# Patient Record
Sex: FEMALE | Race: BLACK OR AFRICAN AMERICAN | NOT HISPANIC OR LATINO | Employment: OTHER | URBAN - METROPOLITAN AREA
[De-identification: names, ages, dates, MRNs, and addresses within clinical notes are randomized per-mention and may not be internally consistent; named-entity substitution may affect disease eponyms.]

---

## 2023-03-09 LAB
ALBUMIN (MG/L) IN URINE: 38.5 MG/L
ALBUMIN/CREATININE (UG/MG) IN URINE: 56.2 UG/MG CRT (ref 0–30)
APPEARANCE, URINE: CLEAR
BILIRUBIN, URINE: NEGATIVE
BLOOD, URINE: NEGATIVE
COLOR, URINE: YELLOW
CREATININE (MG/DL) IN URINE: 68.5 MG/DL (ref 20–320)
CREATININE (MG/DL) IN URINE: 68.5 MG/DL (ref 20–320)
GLUCOSE, URINE: ABNORMAL MG/DL
KETONES, URINE: NEGATIVE MG/DL
LEUKOCYTE ESTERASE, URINE: NEGATIVE
NITRITE, URINE: NEGATIVE
PH, URINE: 5 (ref 5–8)
PROTEIN (MG/DL) IN URINE: 13 MG/DL (ref 5–24)
PROTEIN, URINE: NEGATIVE MG/DL
PROTEIN/CREATININE (MG/MG) IN URINE: 0.19 MG/MG CREAT (ref 0–0.17)
SPECIFIC GRAVITY, URINE: 1.01 (ref 1–1.03)
UROBILINOGEN, URINE: <2 MG/DL (ref 0–1.9)

## 2023-03-10 DIAGNOSIS — I10 PRIMARY HYPERTENSION: Primary | ICD-10-CM

## 2023-03-10 LAB — URINE CULTURE: ABNORMAL

## 2023-03-13 DIAGNOSIS — E78.00 PURE HYPERCHOLESTEROLEMIA: Primary | ICD-10-CM

## 2023-03-15 RX ORDER — CLONIDINE HYDROCHLORIDE 0.1 MG/1
TABLET ORAL
Qty: 30 TABLET | Refills: 0 | Status: SHIPPED | OUTPATIENT
Start: 2023-03-15 | End: 2023-03-28 | Stop reason: SDUPTHER

## 2023-03-15 RX ORDER — ATORVASTATIN CALCIUM 40 MG/1
TABLET, FILM COATED ORAL
Qty: 90 TABLET | Refills: 3 | Status: SHIPPED | OUTPATIENT
Start: 2023-03-15 | End: 2023-03-28 | Stop reason: SDUPTHER

## 2023-03-15 RX ORDER — METOPROLOL SUCCINATE 50 MG/1
TABLET, EXTENDED RELEASE ORAL
Qty: 90 TABLET | Refills: 3 | Status: SHIPPED | OUTPATIENT
Start: 2023-03-15 | End: 2023-03-28 | Stop reason: SDUPTHER

## 2023-03-23 PROBLEM — I34.0 MITRAL VALVE REGURGITATION: Status: ACTIVE | Noted: 2023-03-23

## 2023-03-23 PROBLEM — M20.61 TOE DEFORMITY, RIGHT: Status: ACTIVE | Noted: 2023-03-23

## 2023-03-23 PROBLEM — L84 PRE-ULCERATIVE CORN OR CALLOUS: Status: ACTIVE | Noted: 2023-03-23

## 2023-03-23 PROBLEM — L29.9 PRURITUS: Status: ACTIVE | Noted: 2023-03-23

## 2023-03-23 PROBLEM — L60.1 ONYCHOLYSIS OF TOENAIL: Status: ACTIVE | Noted: 2023-03-23

## 2023-03-23 PROBLEM — N28.1 BILATERAL RENAL CYSTS: Status: ACTIVE | Noted: 2023-03-23

## 2023-03-23 PROBLEM — R32 INTERMITTENT URINARY INCONTINENCE: Status: ACTIVE | Noted: 2023-03-23

## 2023-03-23 PROBLEM — I10 BENIGN HYPERTENSION: Status: ACTIVE | Noted: 2023-03-23

## 2023-03-23 PROBLEM — N18.9 CKD (CHRONIC KIDNEY DISEASE): Status: ACTIVE | Noted: 2023-03-23

## 2023-03-23 PROBLEM — R35.1 NOCTURIA: Status: ACTIVE | Noted: 2023-03-23

## 2023-03-23 PROBLEM — B35.1 ONYCHOMYCOSIS OF TOENAIL: Status: ACTIVE | Noted: 2023-03-23

## 2023-03-23 PROBLEM — Z86.39 HISTORY OF HYPERCHOLESTEROLEMIA: Status: ACTIVE | Noted: 2023-03-23

## 2023-03-23 PROBLEM — D21.9 FIBROIDS: Status: ACTIVE | Noted: 2023-03-23

## 2023-03-23 PROBLEM — H52.209 ASTIGMATISM: Status: ACTIVE | Noted: 2023-03-23

## 2023-03-23 PROBLEM — K76.0 HEPATIC STEATOSIS: Status: ACTIVE | Noted: 2023-03-23

## 2023-03-23 PROBLEM — G47.33 OBSTRUCTIVE SLEEP APNEA, ADULT: Status: ACTIVE | Noted: 2023-03-23

## 2023-03-23 PROBLEM — N28.1 COMPLEX RENAL CYST: Status: ACTIVE | Noted: 2023-03-23

## 2023-03-23 PROBLEM — M10.9 GOUT: Status: ACTIVE | Noted: 2023-03-23

## 2023-03-23 PROBLEM — G89.29 CHRONIC RIGHT SHOULDER PAIN: Status: ACTIVE | Noted: 2023-03-23

## 2023-03-23 PROBLEM — H43.813 PVD (POSTERIOR VITREOUS DETACHMENT), BOTH EYES: Status: ACTIVE | Noted: 2023-03-23

## 2023-03-23 PROBLEM — E78.5 HYPERLIPIDEMIA: Status: ACTIVE | Noted: 2023-03-23

## 2023-03-23 PROBLEM — I10 ESSENTIAL HYPERTENSION: Status: ACTIVE | Noted: 2023-03-23

## 2023-03-23 PROBLEM — H52.4 MYOPIA WITH PRESBYOPIA: Status: ACTIVE | Noted: 2023-03-23

## 2023-03-23 PROBLEM — H52.10 MYOPIA WITH PRESBYOPIA: Status: ACTIVE | Noted: 2023-03-23

## 2023-03-23 PROBLEM — H40.003 GLAUCOMA SUSPECT OF BOTH EYES: Status: ACTIVE | Noted: 2023-03-23

## 2023-03-23 PROBLEM — M25.511 CHRONIC RIGHT SHOULDER PAIN: Status: ACTIVE | Noted: 2023-03-23

## 2023-03-23 PROBLEM — N18.4 CHRONIC KIDNEY DISEASE (CKD) STAGE G4/A1, SEVERELY DECREASED GLOMERULAR FILTRATION RATE (GFR) BETWEEN 15-29 ML/MIN/1.73 SQUARE METER AND ALBUMINURIA CREATININE RATIO LESS THAN 30 MG/G (CMS/HC* (MULTI): Status: ACTIVE | Noted: 2023-03-23

## 2023-03-23 PROBLEM — G47.30 SLEEP APNEA: Status: ACTIVE | Noted: 2023-03-23

## 2023-03-23 PROBLEM — H25.813 COMBINED FORM OF AGE-RELATED CATARACT, BOTH EYES: Status: ACTIVE | Noted: 2023-03-23

## 2023-03-23 PROBLEM — H52.00 HYPEROPIA: Status: ACTIVE | Noted: 2023-03-23

## 2023-03-23 RX ORDER — FERROUS SULFATE 325(65) MG
1 TABLET ORAL DAILY
COMMUNITY
Start: 2020-10-14 | End: 2023-03-28 | Stop reason: SDUPTHER

## 2023-03-23 RX ORDER — LOSARTAN POTASSIUM 100 MG/1
1 TABLET ORAL DAILY
COMMUNITY
Start: 2020-02-13 | End: 2023-03-28 | Stop reason: SDUPTHER

## 2023-03-23 RX ORDER — COLCHICINE 0.6 MG/1
1 TABLET ORAL DAILY PRN
COMMUNITY
End: 2023-03-28 | Stop reason: SDUPTHER

## 2023-03-23 RX ORDER — EPINEPHRINE 0.22MG
AEROSOL WITH ADAPTER (ML) INHALATION
COMMUNITY
Start: 2021-01-13

## 2023-03-23 RX ORDER — FUROSEMIDE 40 MG/1
1 TABLET ORAL DAILY
COMMUNITY
Start: 2020-02-12 | End: 2023-03-28 | Stop reason: SDUPTHER

## 2023-03-23 RX ORDER — ALLOPURINOL 100 MG/1
1 TABLET ORAL DAILY
COMMUNITY
Start: 2020-02-13 | End: 2023-03-28 | Stop reason: SDUPTHER

## 2023-03-23 RX ORDER — MELOXICAM 15 MG/1
1 TABLET ORAL DAILY
COMMUNITY
Start: 2023-02-20 | End: 2023-03-28 | Stop reason: SINTOL

## 2023-03-23 RX ORDER — ASPIRIN 81 MG/1
1 TABLET ORAL DAILY
COMMUNITY

## 2023-03-23 RX ORDER — DOCUSATE SODIUM 100 MG/1
100 CAPSULE, LIQUID FILLED ORAL 2 TIMES DAILY PRN
COMMUNITY
End: 2023-03-28 | Stop reason: SDUPTHER

## 2023-03-23 RX ORDER — HYDRALAZINE HYDROCHLORIDE 100 MG/1
1 TABLET, FILM COATED ORAL 2 TIMES DAILY
COMMUNITY
Start: 2023-02-28 | End: 2023-03-28 | Stop reason: SDUPTHER

## 2023-03-23 RX ORDER — ASCORBIC ACID 125 MG
1 TABLET,CHEWABLE ORAL 2 TIMES WEEKLY
COMMUNITY
Start: 2020-10-14 | End: 2024-03-22 | Stop reason: SDUPTHER

## 2023-03-23 RX ORDER — HYDROXYZINE HYDROCHLORIDE 25 MG/1
TABLET, FILM COATED ORAL
COMMUNITY
Start: 2020-07-16 | End: 2023-03-28 | Stop reason: SDUPTHER

## 2023-03-23 RX ORDER — CALCITRIOL 0.25 UG/1
0.25 CAPSULE ORAL 2 TIMES WEEKLY
COMMUNITY
Start: 2021-08-18 | End: 2023-12-13 | Stop reason: SDUPTHER

## 2023-03-23 RX ORDER — DAPAGLIFLOZIN 10 MG/1
10 TABLET, FILM COATED ORAL EVERY MORNING
COMMUNITY
Start: 2023-02-15 | End: 2023-09-28 | Stop reason: SDUPTHER

## 2023-03-28 ENCOUNTER — OFFICE VISIT (OUTPATIENT)
Dept: PRIMARY CARE | Facility: CLINIC | Age: 78
End: 2023-03-28
Payer: COMMERCIAL

## 2023-03-28 VITALS
DIASTOLIC BLOOD PRESSURE: 69 MMHG | HEART RATE: 69 BPM | BODY MASS INDEX: 33.85 KG/M2 | SYSTOLIC BLOOD PRESSURE: 133 MMHG | TEMPERATURE: 97 F | WEIGHT: 203.2 LBS | HEIGHT: 65 IN

## 2023-03-28 DIAGNOSIS — Z86.2 HISTORY OF ANEMIA DUE TO CKD: ICD-10-CM

## 2023-03-28 DIAGNOSIS — I12.9 CKD STAGE 4 SECONDARY TO HYPERTENSION (MULTI): ICD-10-CM

## 2023-03-28 DIAGNOSIS — I10 PRIMARY HYPERTENSION: ICD-10-CM

## 2023-03-28 DIAGNOSIS — K76.0 HEPATIC STEATOSIS: ICD-10-CM

## 2023-03-28 DIAGNOSIS — I34.0 MITRAL VALVE INSUFFICIENCY, UNSPECIFIED ETIOLOGY: ICD-10-CM

## 2023-03-28 DIAGNOSIS — E78.00 PURE HYPERCHOLESTEROLEMIA: ICD-10-CM

## 2023-03-28 DIAGNOSIS — M1A.9XX0 CHRONIC GOUT WITHOUT TOPHUS, UNSPECIFIED CAUSE, UNSPECIFIED SITE: ICD-10-CM

## 2023-03-28 DIAGNOSIS — N18.9 HISTORY OF ANEMIA DUE TO CKD: ICD-10-CM

## 2023-03-28 DIAGNOSIS — N18.4 CKD STAGE 4 SECONDARY TO HYPERTENSION (MULTI): ICD-10-CM

## 2023-03-28 DIAGNOSIS — I10 BENIGN HYPERTENSION: ICD-10-CM

## 2023-03-28 DIAGNOSIS — L29.9 PRURITUS: Primary | ICD-10-CM

## 2023-03-28 LAB
APPEARANCE, URINE: CLEAR
BILIRUBIN, URINE: NEGATIVE
BLOOD, URINE: NEGATIVE
COLOR, URINE: YELLOW
GLUCOSE, URINE: ABNORMAL MG/DL
KETONES, URINE: NEGATIVE MG/DL
LEUKOCYTE ESTERASE, URINE: NEGATIVE
NITRITE, URINE: NEGATIVE
PH, URINE: 5 (ref 5–8)
PROTEIN, URINE: NEGATIVE MG/DL
SPECIFIC GRAVITY, URINE: 1.01 (ref 1–1.03)
UROBILINOGEN, URINE: <2 MG/DL (ref 0–1.9)

## 2023-03-28 PROCEDURE — 3078F DIAST BP <80 MM HG: CPT | Performed by: FAMILY MEDICINE

## 2023-03-28 PROCEDURE — 3075F SYST BP GE 130 - 139MM HG: CPT | Performed by: FAMILY MEDICINE

## 2023-03-28 PROCEDURE — 1159F MED LIST DOCD IN RCRD: CPT | Performed by: FAMILY MEDICINE

## 2023-03-28 PROCEDURE — 1036F TOBACCO NON-USER: CPT | Performed by: FAMILY MEDICINE

## 2023-03-28 PROCEDURE — 99214 OFFICE O/P EST MOD 30 MIN: CPT | Performed by: FAMILY MEDICINE

## 2023-03-28 RX ORDER — FERROUS SULFATE 325(65) MG
1 TABLET ORAL DAILY
Qty: 90 TABLET | Refills: 1 | Status: SHIPPED | OUTPATIENT
Start: 2023-03-28 | End: 2023-09-11

## 2023-03-28 RX ORDER — ATORVASTATIN CALCIUM 40 MG/1
40 TABLET, FILM COATED ORAL DAILY
Qty: 90 TABLET | Refills: 1 | Status: SHIPPED | OUTPATIENT
Start: 2023-03-28 | End: 2023-07-19 | Stop reason: SDUPTHER

## 2023-03-28 RX ORDER — DOCUSATE SODIUM 100 MG/1
100 CAPSULE, LIQUID FILLED ORAL 2 TIMES DAILY PRN
Qty: 90 CAPSULE | Refills: 1 | Status: SHIPPED | OUTPATIENT
Start: 2023-03-28 | End: 2023-11-26

## 2023-03-28 RX ORDER — ALLOPURINOL 100 MG/1
100 TABLET ORAL DAILY
Qty: 90 TABLET | Refills: 1 | Status: SHIPPED | OUTPATIENT
Start: 2023-03-28 | End: 2023-09-28 | Stop reason: SDUPTHER

## 2023-03-28 RX ORDER — COLCHICINE 0.6 MG/1
0.6 TABLET ORAL DAILY PRN
Qty: 90 TABLET | Refills: 1 | Status: SHIPPED | OUTPATIENT
Start: 2023-03-28 | End: 2024-03-22 | Stop reason: SDUPTHER

## 2023-03-28 RX ORDER — METOPROLOL SUCCINATE 50 MG/1
50 TABLET, EXTENDED RELEASE ORAL DAILY
Qty: 90 TABLET | Refills: 1 | Status: SHIPPED | OUTPATIENT
Start: 2023-03-28 | End: 2023-09-28 | Stop reason: SDUPTHER

## 2023-03-28 RX ORDER — ATORVASTATIN CALCIUM 40 MG/1
40 TABLET, FILM COATED ORAL DAILY
Qty: 90 TABLET | Refills: 1 | Status: SHIPPED | OUTPATIENT
Start: 2023-03-28 | End: 2023-03-28 | Stop reason: SDUPTHER

## 2023-03-28 RX ORDER — LOSARTAN POTASSIUM 100 MG/1
100 TABLET ORAL DAILY
Qty: 90 TABLET | Refills: 1 | Status: SHIPPED | OUTPATIENT
Start: 2023-03-28 | End: 2023-09-28 | Stop reason: SDUPTHER

## 2023-03-28 RX ORDER — FUROSEMIDE 40 MG/1
40 TABLET ORAL DAILY
Qty: 90 TABLET | Refills: 1 | Status: SHIPPED | OUTPATIENT
Start: 2023-03-28 | End: 2023-09-28 | Stop reason: SDUPTHER

## 2023-03-28 RX ORDER — HYDRALAZINE HYDROCHLORIDE 100 MG/1
100 TABLET, FILM COATED ORAL 2 TIMES DAILY
Qty: 180 TABLET | Refills: 1 | Status: SHIPPED | OUTPATIENT
Start: 2023-03-28 | End: 2023-09-28 | Stop reason: SDUPTHER

## 2023-03-28 RX ORDER — HYDROXYZINE HYDROCHLORIDE 25 MG/1
25 TABLET, FILM COATED ORAL 2 TIMES DAILY
Qty: 180 TABLET | Refills: 1 | Status: SHIPPED | OUTPATIENT
Start: 2023-03-28 | End: 2023-04-07 | Stop reason: SDUPTHER

## 2023-03-28 RX ORDER — CLONIDINE HYDROCHLORIDE 0.1 MG/1
0.1 TABLET ORAL DAILY
Qty: 90 TABLET | Refills: 1 | Status: SHIPPED | OUTPATIENT
Start: 2023-03-28 | End: 2023-10-19 | Stop reason: SDUPTHER

## 2023-03-28 ASSESSMENT — ENCOUNTER SYMPTOMS
OCCASIONAL FEELINGS OF UNSTEADINESS: 0
DEPRESSION: 0
LOSS OF SENSATION IN FEET: 0

## 2023-03-28 NOTE — PROGRESS NOTES
Subjective   Patient ID: Liz Hamlin is a 77 y.o. female who presents for 6 MONTH FOLLOW UP.  HPI  The patient is here for her routine visit.  A review of system was completed.  All systems were reviewed and were normal, except for the ones that are listed in the HPI.    Objective   Physical Exam  Constitutional:       Appearance: Normal appearance.   HENT:      Head: Normocephalic and atraumatic.      Right Ear: Tympanic membrane, ear canal and external ear normal.      Left Ear: Tympanic membrane, ear canal and external ear normal.      Nose: Nose normal.      Mouth/Throat:      Mouth: Mucous membranes are moist.      Pharynx: Oropharynx is clear.   Eyes:      Extraocular Movements: Extraocular movements intact.      Conjunctiva/sclera: Conjunctivae normal.      Pupils: Pupils are equal, round, and reactive to light.   Cardiovascular:      Rate and Rhythm: Normal rate and regular rhythm.      Pulses: Normal pulses.   Pulmonary:      Effort: Pulmonary effort is normal.      Breath sounds: Normal breath sounds.   Abdominal:      General: Abdomen is flat. Bowel sounds are normal.      Palpations: Abdomen is soft.   Musculoskeletal:         General: Normal range of motion.      Cervical back: Normal range of motion and neck supple.   Skin:     General: Skin is warm.   Neurological:      General: No focal deficit present.      Mental Status: She is alert and oriented to person, place, and time. Mental status is at baseline.   Psychiatric:         Mood and Affect: Mood normal.         Behavior: Behavior normal.         Thought Content: Thought content normal.         Judgment: Judgment normal.         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)    Relevant Medications    allopurinol (Zyloprim) 100 mg tablet    colchicine 0.6 mg tablet    docusate sodium (Colace) 100 mg capsule    ferrous sulfate 325 (65 Fe) MG tablet    furosemide (Lasix) 40 mg tablet    hydrALAZINE (Apresoline) 100 mg tablet    hydrOXYzine HCL (Atarax) 25 mg tablet    losartan (Cozaar) 100 mg tablet    History of anemia due to CKD       Other    Gout    Relevant Medications    allopurinol (Zyloprim) 100 mg tablet    colchicine 0.6 mg tablet    Hyperlipidemia    Relevant Medications    metoprolol succinate XL (Toprol-XL) 50 mg 24 hr tablet    atorvastatin (Lipitor) 40 mg tablet     Other Visit Diagnoses       Primary hypertension        Relevant Medications    cloNIDine (Catapres) 0.1 mg tablet    furosemide (Lasix) 40 mg tablet    hydrALAZINE (Apresoline) 100 mg tablet    losartan (Cozaar) 100 mg tablet

## 2023-03-29 LAB — URINE CULTURE: NORMAL

## 2023-04-07 DIAGNOSIS — N18.4 CKD STAGE 4 SECONDARY TO HYPERTENSION (MULTI): ICD-10-CM

## 2023-04-07 DIAGNOSIS — I12.9 CKD STAGE 4 SECONDARY TO HYPERTENSION (MULTI): ICD-10-CM

## 2023-04-07 RX ORDER — HYDROXYZINE HYDROCHLORIDE 25 MG/1
25 TABLET, FILM COATED ORAL 2 TIMES DAILY
Qty: 180 TABLET | Refills: 1 | Status: SHIPPED | OUTPATIENT
Start: 2023-04-07 | End: 2023-06-13 | Stop reason: SDUPTHER

## 2023-04-11 LAB
ALBUMIN (G/DL) IN SER/PLAS: 4 G/DL (ref 3.4–5)
ANION GAP IN SER/PLAS: 12 MMOL/L (ref 10–20)
CALCIDIOL (25 OH VITAMIN D3) (NG/ML) IN SER/PLAS: 39 NG/ML
CALCIUM (MG/DL) IN SER/PLAS: 9.2 MG/DL (ref 8.6–10.6)
CARBON DIOXIDE, TOTAL (MMOL/L) IN SER/PLAS: 25 MMOL/L (ref 21–32)
CHLORIDE (MMOL/L) IN SER/PLAS: 109 MMOL/L (ref 98–107)
CREATININE (MG/DL) IN SER/PLAS: 2.37 MG/DL (ref 0.5–1.05)
ERYTHROCYTE DISTRIBUTION WIDTH (RATIO) BY AUTOMATED COUNT: 14.6 % (ref 11.5–14.5)
ERYTHROCYTE MEAN CORPUSCULAR HEMOGLOBIN CONCENTRATION (G/DL) BY AUTOMATED: 31 G/DL (ref 32–36)
ERYTHROCYTE MEAN CORPUSCULAR VOLUME (FL) BY AUTOMATED COUNT: 91 FL (ref 80–100)
ERYTHROCYTES (10*6/UL) IN BLOOD BY AUTOMATED COUNT: 4 X10E12/L (ref 4–5.2)
GFR FEMALE: 21 ML/MIN/1.73M2
GLUCOSE (MG/DL) IN SER/PLAS: 81 MG/DL (ref 74–99)
HEMATOCRIT (%) IN BLOOD BY AUTOMATED COUNT: 36.5 % (ref 36–46)
HEMOGLOBIN (G/DL) IN BLOOD: 11.3 G/DL (ref 12–16)
LEUKOCYTES (10*3/UL) IN BLOOD BY AUTOMATED COUNT: 4.1 X10E9/L (ref 4.4–11.3)
NRBC (PER 100 WBCS) BY AUTOMATED COUNT: 0 /100 WBC (ref 0–0)
PARATHYRIN INTACT (PG/ML) IN SER/PLAS: 179.2 PG/ML (ref 18.5–88)
PHOSPHATE (MG/DL) IN SER/PLAS: 4.3 MG/DL (ref 2.5–4.9)
PLATELETS (10*3/UL) IN BLOOD AUTOMATED COUNT: 161 X10E9/L (ref 150–450)
POTASSIUM (MMOL/L) IN SER/PLAS: 4.8 MMOL/L (ref 3.5–5.3)
SODIUM (MMOL/L) IN SER/PLAS: 141 MMOL/L (ref 136–145)
UREA NITROGEN (MG/DL) IN SER/PLAS: 49 MG/DL (ref 6–23)

## 2023-05-19 LAB — URINE CULTURE: NORMAL

## 2023-05-23 LAB
ALANINE AMINOTRANSFERASE (SGPT) (U/L) IN SER/PLAS: 14 U/L (ref 7–45)
ALBUMIN (G/DL) IN SER/PLAS: 4.2 G/DL (ref 3.4–5)
ALKALINE PHOSPHATASE (U/L) IN SER/PLAS: 90 U/L (ref 33–136)
ASPARTATE AMINOTRANSFERASE (SGOT) (U/L) IN SER/PLAS: 15 U/L (ref 9–39)
BILIRUBIN DIRECT (MG/DL) IN SER/PLAS: 0.1 MG/DL (ref 0–0.3)
BILIRUBIN TOTAL (MG/DL) IN SER/PLAS: 0.5 MG/DL (ref 0–1.2)
CERULOPLASMIN (MG/DL) IN SER/PLAS: 30 MG/DL (ref 20–60)
HEPATITIS A VIRUS IGM AB PRESENCE IN SER/PLAS BY IMMUNOASSAY: NONREACTIVE
HEPATITIS B VIRUS CORE IGM AB PRESENCE IN SER/PLAS BY IMMUNOASSY: NONREACTIVE
HEPATITIS B VIRUS SURFACE AG PRESENCE IN SERUM: NONREACTIVE
HEPATITIS C VIRUS AB PRESENCE IN SERUM: NONREACTIVE
IGG (MG/DL) IN SER/PLAS: 1330 MG/DL (ref 700–1600)
IGG SUBCLASS 1 (MG/DL) IN SERUM: 870 MG/DL (ref 490–1140)
IGG SUBCLASS 2 (MG/DL) IN SERUM: 385 MG/DL (ref 150–640)
IGG SUBCLASS 3 (MG/DL) IN SERUM: 31 MG/DL (ref 11–85)
IGG SUBCLASS 4 (MG/DL) IN SERUM: 12 MG/DL (ref 3–200)
INR IN PPP BY COAGULATION ASSAY: 0.9 (ref 0.9–1.1)
PROTEIN TOTAL: 7.5 G/DL (ref 6.4–8.2)
PROTHROMBIN TIME (PT) IN PPP BY COAGULATION ASSAY: 10.3 SEC (ref 9.8–13.4)

## 2023-05-24 LAB
ANA PATTERN: ABNORMAL
ANA TITER: ABNORMAL
ANTI-CENTROMERE: <0.2 AI
ANTI-CHROMATIN: 0.4 AI
ANTI-DNA (DS): 2 IU/ML
ANTI-JO-1 IGG: <0.2 AI
ANTI-NUCLEAR ANTIBODY (ANA): POSITIVE
ANTI-RIBOSOMAL P: <0.2 AI
ANTI-RNP: <0.2 AI
ANTI-SCL-70: <0.2 AI
ANTI-SM/RNP: <0.2 AI
ANTI-SM: <0.2 AI
ANTI-SSA: <0.2 AI
ANTI-SSB: <0.2 AI
MITOCHONDRIAL ANTIBODY: NEGATIVE

## 2023-06-23 LAB — URINE CULTURE: NORMAL

## 2023-07-13 LAB
ALBUMIN (G/DL) IN SER/PLAS: 4.1 G/DL (ref 3.4–5)
ALBUMIN (MG/L) IN URINE: 21.2 MG/L
ALBUMIN/CREATININE (UG/MG) IN URINE: 57 UG/MG CRT (ref 0–30)
ANION GAP IN SER/PLAS: 13 MMOL/L (ref 10–20)
APPEARANCE, URINE: CLEAR
BILIRUBIN, URINE: NEGATIVE
BLOOD, URINE: NEGATIVE
CALCIDIOL (25 OH VITAMIN D3) (NG/ML) IN SER/PLAS: 39 NG/ML
CALCIUM (MG/DL) IN SER/PLAS: 9.4 MG/DL (ref 8.6–10.3)
CARBON DIOXIDE, TOTAL (MMOL/L) IN SER/PLAS: 24 MMOL/L (ref 21–32)
CHLORIDE (MMOL/L) IN SER/PLAS: 106 MMOL/L (ref 98–107)
COLOR, URINE: ABNORMAL
CREATININE (MG/DL) IN SER/PLAS: 1.97 MG/DL (ref 0.5–1.05)
CREATININE (MG/DL) IN URINE: 37.2 MG/DL (ref 20–320)
CREATININE (MG/DL) IN URINE: 37.2 MG/DL (ref 20–320)
ERYTHROCYTE DISTRIBUTION WIDTH (RATIO) BY AUTOMATED COUNT: 14.5 % (ref 11.5–14.5)
ERYTHROCYTE MEAN CORPUSCULAR HEMOGLOBIN CONCENTRATION (G/DL) BY AUTOMATED: 30.8 G/DL (ref 32–36)
ERYTHROCYTE MEAN CORPUSCULAR VOLUME (FL) BY AUTOMATED COUNT: 92 FL (ref 80–100)
ERYTHROCYTES (10*6/UL) IN BLOOD BY AUTOMATED COUNT: 4.07 X10E12/L (ref 4–5.2)
GFR FEMALE: 26 ML/MIN/1.73M2
GLUCOSE (MG/DL) IN SER/PLAS: 91 MG/DL (ref 74–99)
GLUCOSE, URINE: ABNORMAL MG/DL
HEMATOCRIT (%) IN BLOOD BY AUTOMATED COUNT: 37.3 % (ref 36–46)
HEMOGLOBIN (G/DL) IN BLOOD: 11.5 G/DL (ref 12–16)
KETONES, URINE: NEGATIVE MG/DL
LEUKOCYTE ESTERASE, URINE: NEGATIVE
LEUKOCYTES (10*3/UL) IN BLOOD BY AUTOMATED COUNT: 4.7 X10E9/L (ref 4.4–11.3)
NITRITE, URINE: NEGATIVE
PARATHYRIN INTACT (PG/ML) IN SER/PLAS: 95.2 PG/ML (ref 18.5–88)
PH, URINE: 5 (ref 5–8)
PHOSPHATE (MG/DL) IN SER/PLAS: 3.7 MG/DL (ref 2.5–4.9)
PLATELETS (10*3/UL) IN BLOOD AUTOMATED COUNT: 174 X10E9/L (ref 150–450)
POTASSIUM (MMOL/L) IN SER/PLAS: 4.5 MMOL/L (ref 3.5–5.3)
PROTEIN (MG/DL) IN URINE: 11 MG/DL (ref 5–24)
PROTEIN, URINE: NEGATIVE MG/DL
PROTEIN/CREATININE (MG/MG) IN URINE: 0.3 MG/MG CREAT (ref 0–0.17)
SODIUM (MMOL/L) IN SER/PLAS: 138 MMOL/L (ref 136–145)
SPECIFIC GRAVITY, URINE: 1.01 (ref 1–1.03)
UREA NITROGEN (MG/DL) IN SER/PLAS: 36 MG/DL (ref 6–23)
UROBILINOGEN, URINE: <2 MG/DL (ref 0–1.9)

## 2023-07-19 ENCOUNTER — OFFICE VISIT (OUTPATIENT)
Dept: PRIMARY CARE | Facility: CLINIC | Age: 78
End: 2023-07-19
Payer: COMMERCIAL

## 2023-07-19 VITALS
WEIGHT: 201.4 LBS | HEIGHT: 66 IN | TEMPERATURE: 97.6 F | BODY MASS INDEX: 32.37 KG/M2 | DIASTOLIC BLOOD PRESSURE: 75 MMHG | SYSTOLIC BLOOD PRESSURE: 134 MMHG | HEART RATE: 70 BPM

## 2023-07-19 DIAGNOSIS — E78.00 PURE HYPERCHOLESTEROLEMIA: ICD-10-CM

## 2023-07-19 DIAGNOSIS — L03.115 CELLULITIS OF RIGHT LEG: Primary | ICD-10-CM

## 2023-07-19 DIAGNOSIS — N18.4 CKD STAGE 4 SECONDARY TO HYPERTENSION (MULTI): ICD-10-CM

## 2023-07-19 DIAGNOSIS — I12.9 CKD STAGE 4 SECONDARY TO HYPERTENSION (MULTI): ICD-10-CM

## 2023-07-19 PROCEDURE — 1126F AMNT PAIN NOTED NONE PRSNT: CPT | Performed by: FAMILY MEDICINE

## 2023-07-19 PROCEDURE — 1159F MED LIST DOCD IN RCRD: CPT | Performed by: FAMILY MEDICINE

## 2023-07-19 PROCEDURE — 99214 OFFICE O/P EST MOD 30 MIN: CPT | Performed by: FAMILY MEDICINE

## 2023-07-19 PROCEDURE — 3075F SYST BP GE 130 - 139MM HG: CPT | Performed by: FAMILY MEDICINE

## 2023-07-19 PROCEDURE — 1036F TOBACCO NON-USER: CPT | Performed by: FAMILY MEDICINE

## 2023-07-19 PROCEDURE — 3078F DIAST BP <80 MM HG: CPT | Performed by: FAMILY MEDICINE

## 2023-07-19 RX ORDER — CEPHALEXIN 500 MG/1
500 CAPSULE ORAL 4 TIMES DAILY
Qty: 28 CAPSULE | Refills: 0 | Status: SHIPPED | OUTPATIENT
Start: 2023-07-19 | End: 2023-07-26

## 2023-07-19 RX ORDER — ATORVASTATIN CALCIUM 40 MG/1
40 TABLET, FILM COATED ORAL DAILY
Qty: 90 TABLET | Refills: 1 | Status: SHIPPED | OUTPATIENT
Start: 2023-07-19 | End: 2023-09-28 | Stop reason: SDUPTHER

## 2023-07-19 RX ORDER — CEPHALEXIN 500 MG/1
500 CAPSULE ORAL 4 TIMES DAILY
Qty: 28 CAPSULE | Refills: 0 | Status: SHIPPED | OUTPATIENT
Start: 2023-07-19 | End: 2023-07-19 | Stop reason: SDUPTHER

## 2023-07-19 RX ORDER — HYDROXYZINE HYDROCHLORIDE 25 MG/1
25 TABLET, FILM COATED ORAL 2 TIMES DAILY
Qty: 180 TABLET | Refills: 1 | Status: SHIPPED | OUTPATIENT
Start: 2023-07-19 | End: 2023-07-30

## 2023-07-19 NOTE — PROGRESS NOTES
Subjective   Patient ID: Liz Hamlin is a 78 y.o. female who presents for Fall Risk Screening.  HPI  The patient is a 78 yo AA female with a history of HTN, MVR, gout, stage 4 CKD with bilateral chronic leg edema, HLD, JOSE E ( on CPAP), here for the ,management of a right leg edema, erythema and pain that started few weeks ago.     A review of system was completed.  All systems were reviewed and were normal, except for the ones that are listed in the HPI.    Objective   Physical Exam  Constitutional:       Appearance: Normal appearance.   HENT:      Head: Normocephalic and atraumatic.      Right Ear: Tympanic membrane, ear canal and external ear normal.      Left Ear: Tympanic membrane, ear canal and external ear normal.      Nose: Nose normal.      Mouth/Throat:      Mouth: Mucous membranes are moist.      Pharynx: Oropharynx is clear.   Eyes:      Extraocular Movements: Extraocular movements intact.      Conjunctiva/sclera: Conjunctivae normal.      Pupils: Pupils are equal, round, and reactive to light.   Cardiovascular:      Rate and Rhythm: Normal rate and regular rhythm.      Pulses: Normal pulses.   Pulmonary:      Effort: Pulmonary effort is normal.      Breath sounds: Normal breath sounds.   Abdominal:      General: Abdomen is flat. Bowel sounds are normal.      Palpations: Abdomen is soft.   Musculoskeletal:         General: Normal range of motion.      Cervical back: Normal range of motion and neck supple.      Comments:  Right leg edema, erythema and pain.  Right calf tenderness to palpation.   Skin:     General: Skin is warm.   Neurological:      General: No focal deficit present.      Mental Status: She is alert and oriented to person, place, and time. Mental status is at baseline.   Psychiatric:         Mood and Affect: Mood normal.         Behavior: Behavior normal.         Thought Content: Thought content normal.         Judgment: Judgment normal.         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  -Keflex 500 mg QID for 7 days started today.  -Venous doppler US ordered STAT.  -Tylenol PRN for pain and leg elevated.          Relevant Medications    cephalexin (Keflex) 500 mg capsule    Other Relevant Orders    Vascular US lower extremity venous duplex bilateral

## 2023-07-19 NOTE — ASSESSMENT & PLAN NOTE
-Acute cellulitis from a old knee excoriation.  -Keflex 500 mg QID for 7 days started today.  -Venous doppler US ordered STAT.  -Tylenol PRN for pain and leg elevated.

## 2023-08-08 ENCOUNTER — TELEPHONE (OUTPATIENT)
Dept: PRIMARY CARE | Facility: CLINIC | Age: 78
End: 2023-08-08
Payer: COMMERCIAL

## 2023-08-08 DIAGNOSIS — L03.115 CELLULITIS OF RIGHT LEG: Primary | ICD-10-CM

## 2023-08-14 RX ORDER — CEPHALEXIN 500 MG/1
500 CAPSULE ORAL 4 TIMES DAILY
Qty: 28 CAPSULE | Refills: 0 | Status: SHIPPED | OUTPATIENT
Start: 2023-08-14 | End: 2023-08-21

## 2023-08-14 NOTE — TELEPHONE ENCOUNTER
Cephalexin prescription resent.  Patient should make sure to schedule a follow-up visit for reassessment before any additional action.

## 2023-09-28 ENCOUNTER — OFFICE VISIT (OUTPATIENT)
Dept: PRIMARY CARE | Facility: CLINIC | Age: 78
End: 2023-09-28
Payer: COMMERCIAL

## 2023-09-28 ENCOUNTER — LAB (OUTPATIENT)
Dept: LAB | Facility: LAB | Age: 78
End: 2023-09-28
Payer: COMMERCIAL

## 2023-09-28 VITALS
DIASTOLIC BLOOD PRESSURE: 71 MMHG | HEART RATE: 75 BPM | WEIGHT: 200 LBS | TEMPERATURE: 97.6 F | SYSTOLIC BLOOD PRESSURE: 132 MMHG | BODY MASS INDEX: 32.28 KG/M2

## 2023-09-28 DIAGNOSIS — Z13.1 DIABETES MELLITUS SCREENING: ICD-10-CM

## 2023-09-28 DIAGNOSIS — M1A.9XX0 CHRONIC GOUT WITHOUT TOPHUS, UNSPECIFIED CAUSE, UNSPECIFIED SITE: ICD-10-CM

## 2023-09-28 DIAGNOSIS — Z00.00 ROUTINE GENERAL MEDICAL EXAMINATION AT HEALTH CARE FACILITY: ICD-10-CM

## 2023-09-28 DIAGNOSIS — R53.82 CHRONIC FATIGUE: ICD-10-CM

## 2023-09-28 DIAGNOSIS — Z13.6 SCREENING FOR CARDIOVASCULAR CONDITION: ICD-10-CM

## 2023-09-28 DIAGNOSIS — I12.9 CKD STAGE 4 SECONDARY TO HYPERTENSION (MULTI): ICD-10-CM

## 2023-09-28 DIAGNOSIS — Z78.0 ASYMPTOMATIC MENOPAUSAL STATE: ICD-10-CM

## 2023-09-28 DIAGNOSIS — I10 PRIMARY HYPERTENSION: ICD-10-CM

## 2023-09-28 DIAGNOSIS — I10 BENIGN HYPERTENSION: ICD-10-CM

## 2023-09-28 DIAGNOSIS — N18.4 CKD STAGE 4 SECONDARY TO HYPERTENSION (MULTI): ICD-10-CM

## 2023-09-28 DIAGNOSIS — Z00.00 HEALTH CARE MAINTENANCE: Primary | ICD-10-CM

## 2023-09-28 DIAGNOSIS — I87.2 VENOUS STASIS DERMATITIS OF BOTH LOWER EXTREMITIES: ICD-10-CM

## 2023-09-28 DIAGNOSIS — Z12.31 ENCOUNTER FOR SCREENING MAMMOGRAM FOR BREAST CANCER: ICD-10-CM

## 2023-09-28 DIAGNOSIS — E78.00 PURE HYPERCHOLESTEROLEMIA: ICD-10-CM

## 2023-09-28 DIAGNOSIS — Z23 NEED FOR VACCINATION: ICD-10-CM

## 2023-09-28 LAB
CALCIDIOL (25 OH VITAMIN D3) (NG/ML) IN SER/PLAS: 43 NG/ML
CHOLESTEROL (MG/DL) IN SER/PLAS: 112 MG/DL (ref 0–199)
CHOLESTEROL IN HDL (MG/DL) IN SER/PLAS: 44.2 MG/DL
CHOLESTEROL/HDL RATIO: 2.5
ESTIMATED AVERAGE GLUCOSE FOR HBA1C: 111 MG/DL
HEMOGLOBIN A1C/HEMOGLOBIN TOTAL IN BLOOD: 5.5 %
LDL: 44 MG/DL (ref 0–99)
POC APPEARANCE, URINE: CLEAR
POC BILIRUBIN, URINE: NEGATIVE
POC BLOOD, URINE: NEGATIVE
POC COLOR, URINE: COLORLESS
POC GLUCOSE, URINE: NEGATIVE MG/DL
POC KETONES, URINE: NEGATIVE MG/DL
POC LEUKOCYTES, URINE: NEGATIVE
POC NITRITE,URINE: NEGATIVE
POC PH, URINE: 6 PH
POC PROTEIN, URINE: NEGATIVE MG/DL
POC SPECIFIC GRAVITY, URINE: <=1.005
POC UROBILINOGEN, URINE: 0.2 EU/DL
THYROTROPIN (MIU/L) IN SER/PLAS BY DETECTION LIMIT <= 0.05 MIU/L: 2.08 MIU/L (ref 0.44–3.98)
TRIGLYCERIDE (MG/DL) IN SER/PLAS: 120 MG/DL (ref 0–149)
VLDL: 24 MG/DL (ref 0–40)

## 2023-09-28 PROCEDURE — 90662 IIV NO PRSV INCREASED AG IM: CPT | Performed by: FAMILY MEDICINE

## 2023-09-28 PROCEDURE — G0439 PPPS, SUBSEQ VISIT: HCPCS | Performed by: FAMILY MEDICINE

## 2023-09-28 PROCEDURE — 80061 LIPID PANEL: CPT

## 2023-09-28 PROCEDURE — 36415 COLL VENOUS BLD VENIPUNCTURE: CPT

## 2023-09-28 PROCEDURE — 3078F DIAST BP <80 MM HG: CPT | Performed by: FAMILY MEDICINE

## 2023-09-28 PROCEDURE — 90471 IMMUNIZATION ADMIN: CPT | Performed by: FAMILY MEDICINE

## 2023-09-28 PROCEDURE — 3075F SYST BP GE 130 - 139MM HG: CPT | Performed by: FAMILY MEDICINE

## 2023-09-28 PROCEDURE — 84443 ASSAY THYROID STIM HORMONE: CPT

## 2023-09-28 PROCEDURE — 99214 OFFICE O/P EST MOD 30 MIN: CPT | Performed by: FAMILY MEDICINE

## 2023-09-28 PROCEDURE — 1126F AMNT PAIN NOTED NONE PRSNT: CPT | Performed by: FAMILY MEDICINE

## 2023-09-28 PROCEDURE — 83036 HEMOGLOBIN GLYCOSYLATED A1C: CPT

## 2023-09-28 PROCEDURE — 1036F TOBACCO NON-USER: CPT | Performed by: FAMILY MEDICINE

## 2023-09-28 PROCEDURE — 81003 URINALYSIS AUTO W/O SCOPE: CPT | Performed by: FAMILY MEDICINE

## 2023-09-28 PROCEDURE — 82306 VITAMIN D 25 HYDROXY: CPT

## 2023-09-28 PROCEDURE — 1159F MED LIST DOCD IN RCRD: CPT | Performed by: FAMILY MEDICINE

## 2023-09-28 NOTE — ASSESSMENT & PLAN NOTE
-Blood and urine test as above.   -Patient has JOSE E and is not compliant with her CPAP machine.  She does not like it.

## 2023-09-28 NOTE — ASSESSMENT & PLAN NOTE
-Dexa scan ordered.  -continue Ca w/ vit D.  -Shingrix sent to the pharmacy.  Zostavax was in 2012.  -Influenza vaccine high dose given today.   -mammogram 10/2022- wnl- Ordered.  -colonoscopy 12/2022- mild diverticulosis.  No specimen collected.

## 2023-09-28 NOTE — PROGRESS NOTES
Subjective   Patient ID: Liz Hamlin is a 78 y.o. female who presents for Follow-up and Medicare Annual Wellness Visit Subsequent/ MWV.  HPI    The patient is a 77 yo AA female with a history of HTN, MVR, gout, CKD stage IV, HLD, JOSE E ( on CPAP), here for a MWV/ CPE.     She is also requesting a referral to a dermatologist for the treatment of her chronic stasis dermatitis.    A review of system was completed.  All systems were reviewed and were normal, except for the ones that are listed in the HPI.    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  -continue Ca w/ vit D.  -Shingrix sent to the pharmacy.  Zostavax was in 2012.  -Influenza vaccine high dose given today.   -mammogram 10/2022- wnl- Ordered.  -colonoscopy 12/2022- mild diverticulosis.  No specimen collected.         Venous stasis dermatitis of both lower extremities    Relevant Orders    Referral to Dermatology    Diabetes mellitus screening    Relevant Orders    Hemoglobin A1C    Screening for cardiovascular condition    Relevant Orders    Lipid panel    Need for vaccination    Relevant Medications    zoster vaccine-recombinant adjuvanted (Shingrix) 50 mcg/0.5 mL vaccine    Other Relevant Orders    Flu vaccine, high dose seasonal, preservative free    Asymptomatic menopausal state    Relevant Orders    XR DEXA bone density    Encounter for screening mammogram for breast cancer    Relevant Orders    BI mammo bilateral screening tomosynthesis    Routine general medical examination at health care facility    Relevant Orders    1 Year Follow Up In Primary Care - Wellness Exam    Chronic fatigue     -Blood and urine test as above.   -Patient has JOSE E and is not compliant with her CPAP machine.  She does not like it.         Relevant Orders     Urine Culture    POCT UA Automated manually resulted    Vitamin D 25-Hydroxy,Total (for eval of Vitamin D levels)      Patient to return to office 6 months.

## 2023-09-29 RX ORDER — HYDRALAZINE HYDROCHLORIDE 100 MG/1
100 TABLET, FILM COATED ORAL 2 TIMES DAILY
Qty: 180 TABLET | Refills: 1 | Status: SHIPPED | OUTPATIENT
Start: 2023-09-29 | End: 2024-03-22 | Stop reason: SDUPTHER

## 2023-09-29 RX ORDER — HYDROXYZINE HYDROCHLORIDE 25 MG/1
25 TABLET, FILM COATED ORAL 2 TIMES DAILY PRN
Qty: 180 TABLET | Refills: 1 | Status: SHIPPED | OUTPATIENT
Start: 2023-09-29 | End: 2024-03-22 | Stop reason: SDUPTHER

## 2023-09-29 RX ORDER — LOSARTAN POTASSIUM 100 MG/1
100 TABLET ORAL DAILY
Qty: 90 TABLET | Refills: 0 | Status: SHIPPED | OUTPATIENT
Start: 2023-09-29 | End: 2024-01-11 | Stop reason: SDUPTHER

## 2023-09-29 RX ORDER — DAPAGLIFLOZIN 10 MG/1
10 TABLET, FILM COATED ORAL EVERY MORNING
Qty: 30 TABLET | Refills: 2 | Status: SHIPPED | OUTPATIENT
Start: 2023-09-29 | End: 2023-12-13 | Stop reason: SDUPTHER

## 2023-09-29 RX ORDER — ALLOPURINOL 100 MG/1
100 TABLET ORAL DAILY
Qty: 90 TABLET | Refills: 1 | Status: SHIPPED | OUTPATIENT
Start: 2023-09-29 | End: 2024-03-22 | Stop reason: SDUPTHER

## 2023-09-29 RX ORDER — FUROSEMIDE 40 MG/1
40 TABLET ORAL DAILY
Qty: 90 TABLET | Refills: 1 | Status: SHIPPED | OUTPATIENT
Start: 2023-09-29 | End: 2024-03-22 | Stop reason: SDUPTHER

## 2023-09-29 RX ORDER — METOPROLOL SUCCINATE 50 MG/1
50 TABLET, EXTENDED RELEASE ORAL DAILY
Qty: 90 TABLET | Refills: 1 | Status: SHIPPED | OUTPATIENT
Start: 2023-09-29 | End: 2024-03-22 | Stop reason: SDUPTHER

## 2023-09-29 RX ORDER — ATORVASTATIN CALCIUM 40 MG/1
40 TABLET, FILM COATED ORAL DAILY
Qty: 90 TABLET | Refills: 0 | Status: SHIPPED | OUTPATIENT
Start: 2023-09-29 | End: 2024-02-08

## 2023-10-09 NOTE — PROGRESS NOTES
Subjective   Liz Hamlin is a 78 y.o. female who presents for urologic health follow-up No chief complaint on file..    HPI:    78 year old post-menopausal female with PMHx CKD 4 (1.97 23<Cr 2.37; 23- follows with nephrology Dr. Stubbs), elevated/positive NATALIE 23 (GI referred to rheumatology)--> FRANKY 23 WNL , enlarging b/l complex Bosniak 2 renal cysts and b/l simple cysts (RBUS 3/21/23; MR U 3/3/23; RBUS 20), MVR (followed by cardiology; on diuretics), JOSE E, HTN, history of , multiple large uterine fibroids (MR U 3/3/23),  post menopausal ovarian cyst (RBUS 3/21/23) now followed by [facility] (Dr. Carter; last seen 23 ), hepatitic steatosis (MRU 3/3/23) followed by GI (Dr. Rodriguez; last seen 23),  presenting for urologic health f/u visit.       Last seen by me 23 for urologic health follow-up.  At visit:  -POCT UA:  (-) blood, (-) nitrite, (-) LE  -formal UA/Cx sent:         Also seen by me on  23. At visit:  -MRI kidneys w and w/o contrast ordered; completed 23  -previously referred to GI for hepatitic steatosis; saw GI (Dr. Rodriguez) 23--> referred to rheumatology for elevated NATALIE  -previously referred to OBGYN: saw OBGYN (Dr. Carter) 23      Healthcare Providers:  PCP:  OBGYN: Dr. Carter  Rheumatology: NONE; REFERRED BY GI; patient did not schedule    UA:  -23: trace glucose, (-) blood, (-) nitrite, (-) LE  -23: trace glucose, (-) blood, (-) nitrite, (-) LE  -3/28/23: 1+ glucose, (-) blood, (-) nitrite, (-) LE  -23: (-) blood    UCx:  -23: no growth  -23: no growth  -3/28/23: no growth  -23: no growth    MR Urogram (3/3/23):   IMPRESSION:  1. Motion limited examination.  2. Unremarkable opacified portions of bilateral ureters.  3. Bilateral renal cysts including multiple bilateral probable Bosniak 2 cysts as above.  4. Hepatic steatosis.  5. Enlarged, multifibroid uterus.  6. Small sliding-type hiatal hernia    RBUS  (11/13/20):   FINDINGS: see results section below for full radiology report  RIGHT KIDNEY:  There is a 2.0 cm simple upper pole right renal cortical cyst.  There is a 1.1 cm simple upper pole right renal cortical cyst.  There is a 1.5 cm simple cyst in the mid right kidney.  There is no intrarenal calculus or hydronephrosis.    LEFT KIDNEY:  There is a 4.9 cm simple upper pole left renal cyst.  There is a 2.0 cm simple cyst in the mid left kidney.  There is a 3.0 cm simple cyst in the lower pole the left kidney.  There is a 2.5 cm simple cyst in the lower pole the left kidney.  There is no intrarenal calculus or hydronephrosis.    BLADDER:  There is no focal mass or bladder wall thickening.  There are bilateral ureteral jets.    IMPRESSION:  Bilateral benign simple renal cysts.    RBUS 3/21/23:   RIGHT: Multiple scattered cysts. The largest is a midpole  cyst measuring 30 x 16 x 26 mm, increased from 15 mm maximal diameter  previously. The next largest is in the upper pole and measures 21 x  20 x 23 mm, compared to 20 x 20 x 17 mm previously.    LEFT KIDNEY: The left kidney measured 10.7 cm in length. This is within the  limits of normal. There was normal left renal echogenicity.  No shadowing stone, hydronephrosis, or perinephric edema. No gross  left renal mass. Multiple scattered cysts. The largest is an upper  pole cyst measuring 52 x 46 x 41 mm, previously measuring 37 x 47 x  49 mm; next largest is a midpole cyst measuring 38 x 28 x 34 mm,    IMPRESSION:  Multiple bilateral renal cysts, mildly larger today.    No hydronephrosis.    Findings most likely representing enlarged fibroid uterus with possible left ovarian postmenopausal cyst. Recommend dedicated pelvic ultrasound in follow-up.    MRI kidneys w and w/o contrast (6/6/23):   Urinary: Innumerable bilateral Bosniak 1 and 2 renal cysts. Targeted  Bosniak 2 cyst in the upper pole left kidney measuring 5.3 cm with  thin internal partial septation. Multiple  bilateral T1  hyperintense/T2 hypointense lesions in the kidneys bilaterally, the  largest in the upper pole right kidney measuring 2.5 cm. Allowing for  motion degradation there is no convincing solid enhancing renal mass  identified. No hydronephrosis.    IMPRESSION:  Bilateral simple and hemorrhagic/proteinaceous renal cysts. No  convincing solid enhancing renal mass allowing for significant motion  artifact.      INTERVAL HISTORY:    Today Liz  reports doing and feeling well with no urologic complaints.     She reports:    - that she saw Dr. Carter 9/25/202 who completed a endometrial bx; with possible plans for D&C, although she has not heard back from his office. States that she will call to schedule an OBGYN follow-up visit to further discuss.      -voiding on average every 3 hours, that has not changed over time. Gets up to use bathroom on average 1 times/night, that is not worsening over time. Is using CPAP for JOSE E. Reports intermittent small volume UI, if she waits too long to use bathroom, that occurs on average once every several weeks, that is not bothersome and is not worsening over time. She reports that she is not interested in medications at this time.    -previously saw GI and has been referred by GI to rheumatology (elevated NATALIE)  but she has not yet scheduled rheumatology appointment.      Today Ms. Hamlin denies fevers/chills, N/V, CP, SOB,  suprapubic or flank pain, dysuria, hematuria, urinary urgency or frequency, vaginal bulge, vaginal bleeding or discharge, sensation of incomplete bladder empyting, straining to void, recurrent UTIs, history of STIs, or difficulty voiding.Review of systems is negative for all systems except for any identified issues in HPI above.    Denies any personal history of  cancers (renal, bladder, adrenal, ureteral, vulvar, etc), non- cancers, or  surgeries/procedures.        FHx: Denies any known FHx  cancers (renal, bladder, adrenal, ureteral, prostate,  "testicular, penile, etc)    SHx: lives with her son (Richard Landaverde) who she cares for. Non-smoker. No illicits    PVR: NOT DONE; NO BLADDER SCANNER IN CLINIC  POCT UA TODAY: (-) blood, (-) nitrite, (-) LE          Objective     /70   Pulse 72   Temp 36.3 °C (97.3 °F)   Resp 20   Ht 1.676 m (5' 6\")   Wt 90.3 kg (199 lb)   LMP  (LMP Unknown)   SpO2 99%   BMI 32.12 kg/m²      Physical Examination:      General: Well developed, well nourished, alert and cooperative, appears in no acute distress  Eyes: Non-injected conjunctiva, sclera clear, no proptosis  Cardiac: Extremities are warm and well perfused. No edema, cyanosis or pallor.   Lungs: Breathing is easy, non-labored. Speaking in clear and complete sentences. Normal diaphragmatic movement.  Abdomen: non-tender to palpation. No CVAT. No HSM. No palpable masses.  MSK: Ambulatory with steady gait, unassisted  Neuro: alert and oriented to person, place and time  Psych: Demonstrates good judgement and reason, without hallucinations, abnormal affect or abnormal behaviors.  Skin: no obvious lesions, no rashes.      Assessment/Plan   Problem List Items Addressed This Visit    None  Visit Diagnoses       Other urinary incontinence    -  Primary    Relevant Orders    Urinalysis with Reflex Microscopic    Urine Culture    POCT UA Automated manually resulted (Completed)    Renal cyst        Relevant Orders    Urinalysis with Reflex Microscopic    Urine Culture    POCT UA Automated manually resulted (Completed)    Urinary frequency        Relevant Orders    Urinalysis with Reflex Microscopic    Urine Culture    POCT UA Automated manually resulted (Completed)    Urinary urgency        Relevant Orders    Urinalysis with Reflex Microscopic    Urine Culture    POCT UA Automated manually resulted (Completed)            # history of enlarging b/l complex Bosniak 2 renal cysts and b/l simple cysts, that are fairly stable with no solid masses on MRI kidney 6/6/23: On most " recent MRI w and w/o contrast (6/6/23): LEFT: Bosniak 2 cyst in the upper pole left kidney measuring 5.3 cm with thin internal partial septation. Multiple bilateral T1 hyperintense/T2 hypointense lesions in the kidneys bilaterally, the largest in the upper pole right kidney measuring 2.5 cm. with no convincing solid enhancing renal mass and no hydronephrosis per radiologist read. Previously on imaging: RIGHT midpole cyst measuring 30 x 16 x 26 mm, increased from 15 mm maximal diameter previously. The next largest is in the upper pole and measures 21 x 20 x 23 mm, compared to 20 x 20 x 17 mm previously; LEFT: upper pole cyst measuring 52 x 46 x 41 mm, previously measuring 37 x 47 x 49 mm; next largest is a midpole cyst measuring 38 x 28 x 34 mm, (RBUS 3/21/23 and MR U 3/3/23): history multiple b/l renal cysts on RBUS 11/13/20. POCT UA today WNL.   -formal UA/UCx sent  -repeat imaging in 6-12 months to evaluate stability of renal cysts; next MRI kidneys due 12/2023; discussed with patient who is aware    # nocturia 1x times/night: now wearing CPAP. On diuretics. low suspicion for UTI. r/o UTI  -formal UA/UCx sent  -patient advised to cont wearing CPAP since can decrease nocturia. Patient states will restart CPAP  -behavioral modifications; patient advised to stop drinking fluids 2-3 hours prior to bedtime  -will continue to monitor    # intermittent, non-bothersome small volume UI in the setting of waiting too long to void: low suspicion for OAB. On diuretics. Voids every 2.5-3 hours.  POCT UA WNL. low suspicion for UTI. r/o UTI  -formal UA/UCx sent today  -will continue to monitor Sxs  -pelvic floor PT referral previously ordered, not yet seen; patient will call to schedule   -will discuss vaginal estrace at follow-up visit     # fibroid uterus; multiple large fibroids, with hypoenhancing mass within the left measuring up to 4.5 x 4.1 cm likely representing a necrotic fibroid on MR U 3/3/23. Currently asymptomatic.  Referred to OBGYN to r/o GYN neoplasm/malignancy. Now follows with OBGYN Dr. Orellana;   -cont management per OBGYN Dr. Carter    # possible left ovarian postmenopausal cyst on RBUS 3/21/23: Referred to OBGYN to r/o GYN/ovarian neoplasm/malignancy and now follows with OBGYN Dr. Orellana; last seen 6/12/23.   -cont management per OBGYN Dr. Carter    # hepatic steatosis (MRU 3/3/23): referred to GI r/o liver dysfunction, and now follows with GI Dr. Rodriguez; last seen 5/23/23  -GI referral previously ordered; patient has scheduled GI appt    # Stage 4 CKD; follows with  nephrololgy  -has scheduled appt 12/2023 with nephrology  -cont management per nephrology    # history of elevated NATALIE (5/23/23), but FRANKY WNL (5/23/23):  -advised patient to discuss with her PCP  -at follow-up visit will plan to order repeat NATALIE if PCP has not reordered and if elevated will refer to rheumatology    # follow up in 8-12 weeks or sooner as needed    Discussed recommended urologic plan of care with Ms. Hamlin. Ms. Hamlin expressed understanding and agreement with plan of care. All of Ms. Hamlin's questions were answered at the time. Ms. Hamlin had no additional questions at the time. Discussed with Ms. Hamlin that our office will call with test results as they become available, and  I will also review and discuss test results at her follow-up with appointment. Ms. Hamlin consented to leaving voicemail messages regarding her test results on her cell phone at [phone] (home) or [phone] (cell). Ms. Hamlin advised to call the office with any questions or concerns.             Holly Hatch MD, PhD

## 2023-10-10 ENCOUNTER — OFFICE VISIT (OUTPATIENT)
Dept: PRIMARY CARE | Facility: CLINIC | Age: 78
End: 2023-10-10
Payer: COMMERCIAL

## 2023-10-10 ENCOUNTER — APPOINTMENT (OUTPATIENT)
Dept: PRIMARY CARE | Facility: CLINIC | Age: 78
End: 2023-10-10
Payer: COMMERCIAL

## 2023-10-10 VITALS
WEIGHT: 199 LBS | TEMPERATURE: 97.3 F | SYSTOLIC BLOOD PRESSURE: 122 MMHG | HEART RATE: 72 BPM | RESPIRATION RATE: 20 BRPM | HEIGHT: 66 IN | OXYGEN SATURATION: 99 % | DIASTOLIC BLOOD PRESSURE: 70 MMHG | BODY MASS INDEX: 31.98 KG/M2

## 2023-10-10 DIAGNOSIS — N28.1 RENAL CYST: ICD-10-CM

## 2023-10-10 DIAGNOSIS — R39.15 URINARY URGENCY: ICD-10-CM

## 2023-10-10 DIAGNOSIS — R35.0 URINARY FREQUENCY: ICD-10-CM

## 2023-10-10 DIAGNOSIS — R76.8 ELEVATED ANTINUCLEAR ANTIBODY (ANA) LEVEL: ICD-10-CM

## 2023-10-10 DIAGNOSIS — D21.9 FIBROIDS: ICD-10-CM

## 2023-10-10 DIAGNOSIS — N39.498 OTHER URINARY INCONTINENCE: Primary | ICD-10-CM

## 2023-10-10 DIAGNOSIS — K76.0 HEPATIC STEATOSIS: ICD-10-CM

## 2023-10-10 DIAGNOSIS — R35.1 NOCTURIA: ICD-10-CM

## 2023-10-10 LAB
APPEARANCE UR: CLEAR
BILIRUB UR STRIP.AUTO-MCNC: NEGATIVE MG/DL
COLOR UR: ABNORMAL
GLUCOSE UR STRIP.AUTO-MCNC: ABNORMAL MG/DL
KETONES UR STRIP.AUTO-MCNC: NEGATIVE MG/DL
LEUKOCYTE ESTERASE UR QL STRIP.AUTO: NEGATIVE
NITRITE UR QL STRIP.AUTO: NEGATIVE
PH UR STRIP.AUTO: 5 [PH]
POC APPEARANCE, URINE: CLEAR
POC BILIRUBIN, URINE: NEGATIVE
POC BLOOD, URINE: NEGATIVE
POC COLOR, URINE: YELLOW
POC GLUCOSE, URINE: ABNORMAL MG/DL
POC KETONES, URINE: NEGATIVE MG/DL
POC LEUKOCYTES, URINE: NEGATIVE
POC NITRITE,URINE: NEGATIVE
POC PH, URINE: 5 PH
POC PROTEIN, URINE: ABNORMAL MG/DL
POC SPECIFIC GRAVITY, URINE: 1.01
POC UROBILINOGEN, URINE: 1 EU/DL
PROT UR STRIP.AUTO-MCNC: NEGATIVE MG/DL
RBC # UR STRIP.AUTO: NEGATIVE /UL
SP GR UR STRIP.AUTO: 1.01
UROBILINOGEN UR STRIP.AUTO-MCNC: NORMAL MG/DL

## 2023-10-10 PROCEDURE — 1036F TOBACCO NON-USER: CPT | Performed by: FAMILY MEDICINE

## 2023-10-10 PROCEDURE — 3074F SYST BP LT 130 MM HG: CPT | Performed by: FAMILY MEDICINE

## 2023-10-10 PROCEDURE — 81003 URINALYSIS AUTO W/O SCOPE: CPT | Performed by: FAMILY MEDICINE

## 2023-10-10 PROCEDURE — 81003 URINALYSIS AUTO W/O SCOPE: CPT | Mod: QW | Performed by: FAMILY MEDICINE

## 2023-10-10 PROCEDURE — 87086 URINE CULTURE/COLONY COUNT: CPT | Performed by: FAMILY MEDICINE

## 2023-10-10 PROCEDURE — 1159F MED LIST DOCD IN RCRD: CPT | Performed by: FAMILY MEDICINE

## 2023-10-10 PROCEDURE — 3078F DIAST BP <80 MM HG: CPT | Performed by: FAMILY MEDICINE

## 2023-10-10 PROCEDURE — 1125F AMNT PAIN NOTED PAIN PRSNT: CPT | Performed by: FAMILY MEDICINE

## 2023-10-10 PROCEDURE — 99214 OFFICE O/P EST MOD 30 MIN: CPT | Performed by: FAMILY MEDICINE

## 2023-10-10 ASSESSMENT — ENCOUNTER SYMPTOMS
DEPRESSION: 0
OCCASIONAL FEELINGS OF UNSTEADINESS: 0
LOSS OF SENSATION IN FEET: 0

## 2023-10-10 ASSESSMENT — PATIENT HEALTH QUESTIONNAIRE - PHQ9
1. LITTLE INTEREST OR PLEASURE IN DOING THINGS: NOT AT ALL
2. FEELING DOWN, DEPRESSED OR HOPELESS: NOT AT ALL
SUM OF ALL RESPONSES TO PHQ9 QUESTIONS 1 AND 2: 0

## 2023-10-10 ASSESSMENT — PAIN SCALES - GENERAL: PAINLEVEL: 5

## 2023-10-10 NOTE — PATIENT INSTRUCTIONS
-next MR Urogram is due 12/2023  -please follow up with Dr. Carter regarding his plans for D&C  -please schedule to see in December or early Jan 2024     -please call with any questions or problems

## 2023-10-12 LAB — BACTERIA UR CULT: NO GROWTH

## 2023-10-19 DIAGNOSIS — I10 PRIMARY HYPERTENSION: ICD-10-CM

## 2023-10-22 RX ORDER — CLONIDINE HYDROCHLORIDE 0.1 MG/1
0.1 TABLET ORAL 2 TIMES DAILY
Qty: 90 TABLET | Refills: 1 | Status: SHIPPED | OUTPATIENT
Start: 2023-10-22 | End: 2023-12-13 | Stop reason: SDUPTHER

## 2023-10-22 RX ORDER — CLONIDINE HYDROCHLORIDE 0.1 MG/1
0.1 TABLET ORAL DAILY
Qty: 90 TABLET | Refills: 1 | Status: SHIPPED | OUTPATIENT
Start: 2023-10-22 | End: 2024-03-22 | Stop reason: SDUPTHER

## 2023-10-27 ENCOUNTER — ANCILLARY PROCEDURE (OUTPATIENT)
Dept: RADIOLOGY | Facility: CLINIC | Age: 78
End: 2023-10-27
Payer: COMMERCIAL

## 2023-10-27 VITALS — WEIGHT: 199.08 LBS | HEIGHT: 66 IN | BODY MASS INDEX: 31.99 KG/M2

## 2023-10-27 DIAGNOSIS — Z12.31 ENCOUNTER FOR SCREENING MAMMOGRAM FOR BREAST CANCER: ICD-10-CM

## 2023-10-27 PROCEDURE — 77063 BREAST TOMOSYNTHESIS BI: CPT | Mod: BILATERAL PROCEDURE | Performed by: RADIOLOGY

## 2023-10-27 PROCEDURE — 77063 BREAST TOMOSYNTHESIS BI: CPT

## 2023-10-27 PROCEDURE — 77067 SCR MAMMO BI INCL CAD: CPT | Mod: BILATERAL PROCEDURE | Performed by: RADIOLOGY

## 2023-11-06 DIAGNOSIS — N18.4 CKD STAGE 4 SECONDARY TO HYPERTENSION (MULTI): ICD-10-CM

## 2023-11-06 DIAGNOSIS — I12.9 CKD STAGE 4 SECONDARY TO HYPERTENSION (MULTI): ICD-10-CM

## 2023-11-26 RX ORDER — DOCUSATE SODIUM 100 MG/1
CAPSULE, LIQUID FILLED ORAL
Qty: 90 CAPSULE | Refills: 0 | Status: SHIPPED | OUTPATIENT
Start: 2023-11-26 | End: 2024-02-20

## 2023-12-07 ENCOUNTER — LAB (OUTPATIENT)
Dept: LAB | Facility: LAB | Age: 78
End: 2023-12-07
Payer: COMMERCIAL

## 2023-12-07 DIAGNOSIS — N18.4 CHRONIC KIDNEY DISEASE, STAGE 4 (SEVERE) (MULTI): ICD-10-CM

## 2023-12-07 DIAGNOSIS — N18.4 CHRONIC KIDNEY DISEASE, STAGE 4 (SEVERE) (MULTI): Primary | ICD-10-CM

## 2023-12-07 LAB
25(OH)D3 SERPL-MCNC: 38 NG/ML (ref 30–100)
ALBUMIN SERPL BCP-MCNC: 4 G/DL (ref 3.4–5)
ANION GAP SERPL CALC-SCNC: 17 MMOL/L (ref 10–20)
BUN SERPL-MCNC: 43 MG/DL (ref 6–23)
CALCIUM SERPL-MCNC: 9.4 MG/DL (ref 8.6–10.6)
CHLORIDE SERPL-SCNC: 106 MMOL/L (ref 98–107)
CO2 SERPL-SCNC: 21 MMOL/L (ref 21–32)
CREAT SERPL-MCNC: 2.32 MG/DL (ref 0.5–1.05)
CREAT UR-MCNC: 40.9 MG/DL (ref 20–320)
ERYTHROCYTE [DISTWIDTH] IN BLOOD BY AUTOMATED COUNT: 14.8 % (ref 11.5–14.5)
GFR SERPL CREATININE-BSD FRML MDRD: 21 ML/MIN/1.73M*2
GLUCOSE SERPL-MCNC: 88 MG/DL (ref 74–99)
HCT VFR BLD AUTO: 36.5 % (ref 36–46)
HGB BLD-MCNC: 11 G/DL (ref 12–16)
MCH RBC QN AUTO: 27.8 PG (ref 26–34)
MCHC RBC AUTO-ENTMCNC: 30.1 G/DL (ref 32–36)
MCV RBC AUTO: 92 FL (ref 80–100)
MICROALBUMIN UR-MCNC: 14 MG/L
MICROALBUMIN/CREAT UR: 34.2 UG/MG CREAT
NRBC BLD-RTO: 0 /100 WBCS (ref 0–0)
PHOSPHATE SERPL-MCNC: 4.7 MG/DL (ref 2.5–4.9)
PLATELET # BLD AUTO: 149 X10*3/UL (ref 150–450)
POTASSIUM SERPL-SCNC: 4.4 MMOL/L (ref 3.5–5.3)
PROT SERPL-MCNC: 7.1 G/DL (ref 6.4–8.2)
PTH-INTACT SERPL-MCNC: 177.5 PG/ML (ref 18.5–88)
RBC # BLD AUTO: 3.95 X10*6/UL (ref 4–5.2)
SODIUM SERPL-SCNC: 140 MMOL/L (ref 136–145)
WBC # BLD AUTO: 4.9 X10*3/UL (ref 4.4–11.3)

## 2023-12-07 PROCEDURE — 36415 COLL VENOUS BLD VENIPUNCTURE: CPT

## 2023-12-07 PROCEDURE — 85027 COMPLETE CBC AUTOMATED: CPT

## 2023-12-07 PROCEDURE — 82306 VITAMIN D 25 HYDROXY: CPT

## 2023-12-07 PROCEDURE — 81003 URINALYSIS AUTO W/O SCOPE: CPT

## 2023-12-07 PROCEDURE — 82043 UR ALBUMIN QUANTITATIVE: CPT

## 2023-12-07 PROCEDURE — 83970 ASSAY OF PARATHORMONE: CPT

## 2023-12-07 PROCEDURE — 80069 RENAL FUNCTION PANEL: CPT

## 2023-12-07 PROCEDURE — 82570 ASSAY OF URINE CREATININE: CPT

## 2023-12-07 PROCEDURE — 84155 ASSAY OF PROTEIN SERUM: CPT

## 2023-12-08 DIAGNOSIS — I12.9 CKD STAGE 4 SECONDARY TO HYPERTENSION (MULTI): ICD-10-CM

## 2023-12-08 DIAGNOSIS — N18.4 CKD STAGE 4 SECONDARY TO HYPERTENSION (MULTI): ICD-10-CM

## 2023-12-08 LAB
APPEARANCE UR: CLEAR
BILIRUB UR STRIP.AUTO-MCNC: NEGATIVE MG/DL
COLOR UR: NORMAL
GLUCOSE UR STRIP.AUTO-MCNC: NEGATIVE MG/DL
KETONES UR STRIP.AUTO-MCNC: NEGATIVE MG/DL
LEUKOCYTE ESTERASE UR QL STRIP.AUTO: NEGATIVE
NITRITE UR QL STRIP.AUTO: NEGATIVE
PH UR STRIP.AUTO: 5.5 [PH]
PROT UR STRIP.AUTO-MCNC: NEGATIVE MG/DL
RBC # UR STRIP.AUTO: NEGATIVE /UL
SP GR UR STRIP.AUTO: 1.01
UROBILINOGEN UR STRIP.AUTO-MCNC: 0.2 MG/DL

## 2023-12-13 ENCOUNTER — TELEPHONE (OUTPATIENT)
Dept: TRANSPLANT | Facility: HOSPITAL | Age: 78
End: 2023-12-13

## 2023-12-13 ENCOUNTER — OFFICE VISIT (OUTPATIENT)
Dept: NEPHROLOGY | Facility: CLINIC | Age: 78
End: 2023-12-13
Payer: COMMERCIAL

## 2023-12-13 VITALS
WEIGHT: 201 LBS | TEMPERATURE: 97.1 F | HEIGHT: 66 IN | HEART RATE: 65 BPM | BODY MASS INDEX: 32.3 KG/M2 | SYSTOLIC BLOOD PRESSURE: 139 MMHG | DIASTOLIC BLOOD PRESSURE: 77 MMHG

## 2023-12-13 DIAGNOSIS — N18.4 CKD STAGE 4 SECONDARY TO HYPERTENSION (MULTI): ICD-10-CM

## 2023-12-13 DIAGNOSIS — N18.4 HYPERTENSIVE KIDNEY DISEASE WITH STAGE 4 CHRONIC KIDNEY DISEASE (MULTI): Primary | ICD-10-CM

## 2023-12-13 DIAGNOSIS — E21.3 HYPERPARATHYROIDISM (MULTI): ICD-10-CM

## 2023-12-13 DIAGNOSIS — I12.9 CKD STAGE 4 SECONDARY TO HYPERTENSION (MULTI): ICD-10-CM

## 2023-12-13 DIAGNOSIS — I12.9 HYPERTENSIVE KIDNEY DISEASE WITH STAGE 4 CHRONIC KIDNEY DISEASE (MULTI): Primary | ICD-10-CM

## 2023-12-13 DIAGNOSIS — D63.1 ANEMIA DUE TO STAGE 4 CHRONIC KIDNEY DISEASE (MULTI): ICD-10-CM

## 2023-12-13 DIAGNOSIS — N18.4 ANEMIA DUE TO STAGE 4 CHRONIC KIDNEY DISEASE (MULTI): ICD-10-CM

## 2023-12-13 PROCEDURE — 1159F MED LIST DOCD IN RCRD: CPT | Performed by: INTERNAL MEDICINE

## 2023-12-13 PROCEDURE — 1036F TOBACCO NON-USER: CPT | Performed by: INTERNAL MEDICINE

## 2023-12-13 PROCEDURE — 3078F DIAST BP <80 MM HG: CPT | Performed by: INTERNAL MEDICINE

## 2023-12-13 PROCEDURE — 3075F SYST BP GE 130 - 139MM HG: CPT | Performed by: INTERNAL MEDICINE

## 2023-12-13 PROCEDURE — 1160F RVW MEDS BY RX/DR IN RCRD: CPT | Performed by: INTERNAL MEDICINE

## 2023-12-13 PROCEDURE — 1125F AMNT PAIN NOTED PAIN PRSNT: CPT | Performed by: INTERNAL MEDICINE

## 2023-12-13 PROCEDURE — 99213 OFFICE O/P EST LOW 20 MIN: CPT | Performed by: INTERNAL MEDICINE

## 2023-12-13 RX ORDER — DAPAGLIFLOZIN 10 MG/1
10 TABLET, FILM COATED ORAL EVERY MORNING
Qty: 30 TABLET | Refills: 2 | Status: SHIPPED | OUTPATIENT
Start: 2023-12-13 | End: 2024-03-22 | Stop reason: SDUPTHER

## 2023-12-13 RX ORDER — CALCITRIOL 0.25 UG/1
0.25 CAPSULE ORAL 3 TIMES WEEKLY
Qty: 36 CAPSULE | Refills: 3 | Status: SHIPPED | OUTPATIENT
Start: 2023-12-13 | End: 2024-12-12

## 2023-12-13 NOTE — PROGRESS NOTES
No specific complaints  Denies nausea, vomiting, chest pain, dyspnea  No urinary symptoms    NAD  Sclera AI s inj  MMM, no sores  Deferred secondary to COVID  No edema  No tremor  No rash    CKD stage 4  HTN not at goal  Proteinuria none  HPT worsening  Anemia stable, no need for DURGA    Patient requesting transplant evaluation, has potential donor  Still undecided regarding dialysis modality; asked patient to consider options and make a decision   Follow home bps and call into clinic in one week  Increase calcitriol to three times per week  Renal MVI  Labs and follow up in 3 months

## 2023-12-14 RX ORDER — FERROUS SULFATE 325(65) MG
1 TABLET ORAL DAILY
Qty: 90 TABLET | Refills: 0 | Status: SHIPPED | OUTPATIENT
Start: 2023-12-14 | End: 2024-03-14

## 2023-12-14 RX ORDER — FERROUS SULFATE TAB 325 MG (65 MG ELEMENTAL FE) 325 (65 FE) MG
1 TAB ORAL DAILY
Qty: 90 TABLET | Refills: 3 | Status: SHIPPED | OUTPATIENT
Start: 2023-12-14 | End: 2023-12-14 | Stop reason: SDUPTHER

## 2024-01-06 DIAGNOSIS — I10 BENIGN HYPERTENSION: ICD-10-CM

## 2024-01-06 DIAGNOSIS — I12.9 CKD STAGE 4 SECONDARY TO HYPERTENSION (MULTI): ICD-10-CM

## 2024-01-06 DIAGNOSIS — I10 PRIMARY HYPERTENSION: ICD-10-CM

## 2024-01-06 DIAGNOSIS — N18.4 CKD STAGE 4 SECONDARY TO HYPERTENSION (MULTI): ICD-10-CM

## 2024-01-10 ENCOUNTER — DOCUMENTATION (OUTPATIENT)
Dept: TRANSPLANT | Facility: HOSPITAL | Age: 79
End: 2024-01-10
Payer: COMMERCIAL

## 2024-01-10 ENCOUNTER — TELEPHONE (OUTPATIENT)
Dept: TRANSPLANT | Facility: HOSPITAL | Age: 79
End: 2024-01-10
Payer: COMMERCIAL

## 2024-01-10 DIAGNOSIS — T86.11 ACUTE REJECTION OF KIDNEY AND PANCREAS (HHS-HCC): ICD-10-CM

## 2024-01-10 DIAGNOSIS — T86.890 ACUTE REJECTION OF KIDNEY AND PANCREAS (HHS-HCC): ICD-10-CM

## 2024-01-11 DIAGNOSIS — I12.9 CKD STAGE 4 SECONDARY TO HYPERTENSION (MULTI): ICD-10-CM

## 2024-01-11 DIAGNOSIS — I10 BENIGN HYPERTENSION: ICD-10-CM

## 2024-01-11 DIAGNOSIS — N18.4 CKD STAGE 4 SECONDARY TO HYPERTENSION (MULTI): ICD-10-CM

## 2024-01-11 DIAGNOSIS — I10 PRIMARY HYPERTENSION: ICD-10-CM

## 2024-01-14 RX ORDER — LOSARTAN POTASSIUM 100 MG/1
100 TABLET ORAL DAILY
Qty: 90 TABLET | Refills: 0 | Status: SHIPPED | OUTPATIENT
Start: 2024-01-14 | End: 2024-03-22 | Stop reason: SDUPTHER

## 2024-01-14 RX ORDER — LOSARTAN POTASSIUM 100 MG/1
TABLET ORAL
Qty: 90 TABLET | Refills: 0 | Status: SHIPPED | OUTPATIENT
Start: 2024-01-14 | End: 2024-02-01 | Stop reason: WASHOUT

## 2024-01-18 DIAGNOSIS — L60.2 LONG TOENAIL: Primary | ICD-10-CM

## 2024-01-29 DIAGNOSIS — Z79.899 OTHER LONG TERM (CURRENT) DRUG THERAPY: ICD-10-CM

## 2024-01-29 DIAGNOSIS — Z01.818 PRE-TRANSPLANT EVALUATION FOR KIDNEY TRANSPLANT: ICD-10-CM

## 2024-01-29 DIAGNOSIS — N18.9 CHRONIC KIDNEY DISEASE, UNSPECIFIED CKD STAGE: ICD-10-CM

## 2024-01-30 ENCOUNTER — OFFICE VISIT (OUTPATIENT)
Dept: TRANSPLANT | Facility: HOSPITAL | Age: 79
End: 2024-01-30
Payer: MEDICARE

## 2024-01-30 ENCOUNTER — SOCIAL WORK (OUTPATIENT)
Dept: TRANSPLANT | Facility: HOSPITAL | Age: 79
End: 2024-01-30
Payer: MEDICARE

## 2024-01-30 ENCOUNTER — HOSPITAL ENCOUNTER (OUTPATIENT)
Dept: RADIOLOGY | Facility: HOSPITAL | Age: 79
Discharge: HOME | End: 2024-01-30
Payer: MEDICARE

## 2024-01-30 ENCOUNTER — DOCUMENTATION (OUTPATIENT)
Dept: TRANSPLANT | Facility: HOSPITAL | Age: 79
End: 2024-01-30

## 2024-01-30 ENCOUNTER — EDUCATION (OUTPATIENT)
Dept: TRANSPLANT | Facility: HOSPITAL | Age: 79
End: 2024-01-30
Payer: MEDICARE

## 2024-01-30 ENCOUNTER — LAB (OUTPATIENT)
Dept: LAB | Facility: LAB | Age: 79
End: 2024-01-30
Payer: COMMERCIAL

## 2024-01-30 ENCOUNTER — LAB REQUISITION (OUTPATIENT)
Dept: LAB | Facility: CLINIC | Age: 79
End: 2024-01-30
Payer: COMMERCIAL

## 2024-01-30 VITALS
HEIGHT: 66 IN | OXYGEN SATURATION: 95 % | TEMPERATURE: 97.2 F | SYSTOLIC BLOOD PRESSURE: 139 MMHG | DIASTOLIC BLOOD PRESSURE: 54 MMHG | BODY MASS INDEX: 32.14 KG/M2 | WEIGHT: 200 LBS | HEART RATE: 70 BPM

## 2024-01-30 DIAGNOSIS — N18.6 ESRD (END STAGE RENAL DISEASE) (MULTI): Primary | ICD-10-CM

## 2024-01-30 DIAGNOSIS — Z01.818 PRE-TRANSPLANT EVALUATION FOR KIDNEY TRANSPLANT: ICD-10-CM

## 2024-01-30 DIAGNOSIS — N18.9 CHRONIC KIDNEY DISEASE, UNSPECIFIED CKD STAGE: ICD-10-CM

## 2024-01-30 DIAGNOSIS — N18.6 END STAGE RENAL DISEASE (MULTI): ICD-10-CM

## 2024-01-30 DIAGNOSIS — Z01.818 PRE-TRANSPLANT EVALUATION FOR KIDNEY TRANSPLANT: Primary | ICD-10-CM

## 2024-01-30 DIAGNOSIS — Z79.899 OTHER LONG TERM (CURRENT) DRUG THERAPY: ICD-10-CM

## 2024-01-30 LAB
ABO GROUP (TYPE) IN BLOOD: NORMAL
ALBUMIN SERPL BCP-MCNC: 4.3 G/DL (ref 3.4–5)
ALP SERPL-CCNC: 94 U/L (ref 33–136)
ALT SERPL W P-5'-P-CCNC: 15 U/L (ref 7–45)
AMYLASE SERPL-CCNC: 85 U/L (ref 29–103)
APPEARANCE UR: CLEAR
AST SERPL W P-5'-P-CCNC: 17 U/L (ref 9–39)
BILIRUB DIRECT SERPL-MCNC: 0.1 MG/DL (ref 0–0.3)
BILIRUB SERPL-MCNC: 0.5 MG/DL (ref 0–1.2)
BILIRUB UR STRIP.AUTO-MCNC: NEGATIVE MG/DL
BUN SERPL-MCNC: 50 MG/DL (ref 6–23)
C PEPTIDE SERPL-MCNC: 2.9 NG/ML (ref 0.7–3.9)
CMV IGG AVIDITY SERPL IA-RTO: NONREACTIVE %
COLOR UR: YELLOW
CREAT SERPL-MCNC: 2.47 MG/DL (ref 0.5–1.05)
EBV EA IGG SER QL: POSITIVE
EBV NA AB SER QL: POSITIVE
EBV VCA IGG SER IA-ACNC: POSITIVE
EBV VCA IGM SER IA-ACNC: ABNORMAL
EGFRCR SERPLBLD CKD-EPI 2021: 20 ML/MIN/1.73M*2
ERYTHROCYTE [DISTWIDTH] IN BLOOD BY AUTOMATED COUNT: 14.7 % (ref 11.5–14.5)
EST. AVERAGE GLUCOSE BLD GHB EST-MCNC: 111 MG/DL
FLOW AUTOCROSSMATCH: NORMAL
GLUCOSE UR STRIP.AUTO-MCNC: ABNORMAL MG/DL
HBA1C MFR BLD: 5.5 %
HBV CORE AB SER QL: NONREACTIVE
HBV SURFACE AB SER-ACNC: <3.1 MIU/ML
HBV SURFACE AG SERPL QL IA: NONREACTIVE
HCT VFR BLD AUTO: 36.6 % (ref 36–46)
HCV AB SER QL: NONREACTIVE
HGB BLD-MCNC: 11.4 G/DL (ref 12–16)
HIV 1+2 AB+HIV1 P24 AG SERPL QL IA: NONREACTIVE
HLA CLASS I AB SCREEN,FC: NORMAL
HLA CLASS II AB SCREEN,FC: NORMAL
HLA CLS I TYP PNL BLD/T DONR HIGH RES: NORMAL
HLA RESULTS: NORMAL
HLA-DP2 QL: NORMAL
HLA-DQB1 HIGH RES: NORMAL
HLA-DRB1 HIGH RES: NORMAL
HOLD SPECIMEN: NORMAL
INR PPP: 1 (ref 0.9–1.1)
KETONES UR STRIP.AUTO-MCNC: NEGATIVE MG/DL
LEUKOCYTE ESTERASE UR QL STRIP.AUTO: NEGATIVE
MCH RBC QN AUTO: 28 PG (ref 26–34)
MCHC RBC AUTO-ENTMCNC: 31.1 G/DL (ref 32–36)
MCV RBC AUTO: 90 FL (ref 80–100)
NITRITE UR QL STRIP.AUTO: NEGATIVE
NRBC BLD-RTO: 0 /100 WBCS (ref 0–0)
PH UR STRIP.AUTO: 5 [PH]
PHOSPHATE SERPL-MCNC: 4.3 MG/DL (ref 2.5–4.9)
PLATELET # BLD AUTO: 167 X10*3/UL (ref 150–450)
PROT SERPL-MCNC: 7.3 G/DL (ref 6.4–8.2)
PROT UR STRIP.AUTO-MCNC: NEGATIVE MG/DL
PROTHROMBIN TIME: 10.7 SECONDS (ref 9.8–12.8)
RBC # BLD AUTO: 4.07 X10*6/UL (ref 4–5.2)
RBC # UR STRIP.AUTO: NEGATIVE /UL
RH FACTOR (ANTIGEN D): NORMAL
SP GR UR STRIP.AUTO: 1.01
TREPONEMA PALLIDUM IGG+IGM AB [PRESENCE] IN SERUM OR PLASMA BY IMMUNOASSAY: NONREACTIVE
UROBILINOGEN UR STRIP.AUTO-MCNC: <2 MG/DL
WBC # BLD AUTO: 3.9 X10*3/UL (ref 4.4–11.3)

## 2024-01-30 PROCEDURE — 1126F AMNT PAIN NOTED NONE PRSNT: CPT | Performed by: INTERNAL MEDICINE

## 2024-01-30 PROCEDURE — 85027 COMPLETE CBC AUTOMATED: CPT

## 2024-01-30 PROCEDURE — 86825 HLA X-MATH NON-CYTOTOXIC: CPT | Mod: OUT | Performed by: SURGERY

## 2024-01-30 PROCEDURE — 81382 HLA II TYPING 1 LOC HR: CPT | Mod: OUT | Performed by: SURGERY

## 2024-01-30 PROCEDURE — 86704 HEP B CORE ANTIBODY TOTAL: CPT

## 2024-01-30 PROCEDURE — 85610 PROTHROMBIN TIME: CPT

## 2024-01-30 PROCEDURE — 1036F TOBACCO NON-USER: CPT | Performed by: INTERNAL MEDICINE

## 2024-01-30 PROCEDURE — 80307 DRUG TEST PRSMV CHEM ANLYZR: CPT

## 2024-01-30 PROCEDURE — 84520 ASSAY OF UREA NITROGEN: CPT

## 2024-01-30 PROCEDURE — 87340 HEPATITIS B SURFACE AG IA: CPT

## 2024-01-30 PROCEDURE — 86832 HLA CLASS I HIGH DEFIN QUAL: CPT | Mod: OUT | Performed by: STUDENT IN AN ORGANIZED HEALTH CARE EDUCATION/TRAINING PROGRAM

## 2024-01-30 PROCEDURE — 99215 OFFICE O/P EST HI 40 MIN: CPT | Performed by: INTERNAL MEDICINE

## 2024-01-30 PROCEDURE — 86901 BLOOD TYPING SEROLOGIC RH(D): CPT

## 2024-01-30 PROCEDURE — 86780 TREPONEMA PALLIDUM: CPT

## 2024-01-30 PROCEDURE — 87389 HIV-1 AG W/HIV-1&-2 AB AG IA: CPT

## 2024-01-30 PROCEDURE — 84681 ASSAY OF C-PEPTIDE: CPT

## 2024-01-30 PROCEDURE — 86663 EPSTEIN-BARR ANTIBODY: CPT

## 2024-01-30 PROCEDURE — 84100 ASSAY OF PHOSPHORUS: CPT

## 2024-01-30 PROCEDURE — 86665 EPSTEIN-BARR CAPSID VCA: CPT

## 2024-01-30 PROCEDURE — 71046 X-RAY EXAM CHEST 2 VIEWS: CPT

## 2024-01-30 PROCEDURE — 80076 HEPATIC FUNCTION PANEL: CPT

## 2024-01-30 PROCEDURE — 83036 HEMOGLOBIN GLYCOSYLATED A1C: CPT

## 2024-01-30 PROCEDURE — 1036F TOBACCO NON-USER: CPT | Performed by: STUDENT IN AN ORGANIZED HEALTH CARE EDUCATION/TRAINING PROGRAM

## 2024-01-30 PROCEDURE — 99215 OFFICE O/P EST HI 40 MIN: CPT | Performed by: STUDENT IN AN ORGANIZED HEALTH CARE EDUCATION/TRAINING PROGRAM

## 2024-01-30 PROCEDURE — 1126F AMNT PAIN NOTED NONE PRSNT: CPT | Performed by: STUDENT IN AN ORGANIZED HEALTH CARE EDUCATION/TRAINING PROGRAM

## 2024-01-30 PROCEDURE — 80323 ALKALOIDS NOS: CPT

## 2024-01-30 PROCEDURE — 82565 ASSAY OF CREATININE: CPT

## 2024-01-30 PROCEDURE — 3075F SYST BP GE 130 - 139MM HG: CPT | Performed by: INTERNAL MEDICINE

## 2024-01-30 PROCEDURE — 86664 EPSTEIN-BARR NUCLEAR ANTIGEN: CPT

## 2024-01-30 PROCEDURE — 82150 ASSAY OF AMYLASE: CPT

## 2024-01-30 PROCEDURE — 86481 TB AG RESPONSE T-CELL SUSP: CPT

## 2024-01-30 PROCEDURE — 86803 HEPATITIS C AB TEST: CPT

## 2024-01-30 PROCEDURE — 3078F DIAST BP <80 MM HG: CPT | Performed by: INTERNAL MEDICINE

## 2024-01-30 PROCEDURE — 1159F MED LIST DOCD IN RCRD: CPT | Performed by: INTERNAL MEDICINE

## 2024-01-30 PROCEDURE — 87799 DETECT AGENT NOS DNA QUANT: CPT

## 2024-01-30 PROCEDURE — 86706 HEP B SURFACE ANTIBODY: CPT

## 2024-01-30 PROCEDURE — 86644 CMV ANTIBODY: CPT

## 2024-01-30 PROCEDURE — 36415 COLL VENOUS BLD VENIPUNCTURE: CPT

## 2024-01-30 PROCEDURE — 86900 BLOOD TYPING SEROLOGIC ABO: CPT

## 2024-01-30 PROCEDURE — 81003 URINALYSIS AUTO W/O SCOPE: CPT

## 2024-01-30 SDOH — ECONOMIC STABILITY: FOOD INSECURITY: WITHIN THE PAST 12 MONTHS, THE FOOD YOU BOUGHT JUST DIDN'T LAST AND YOU DIDN'T HAVE MONEY TO GET MORE.: NEVER TRUE

## 2024-01-30 SDOH — ECONOMIC STABILITY: FOOD INSECURITY: WITHIN THE PAST 12 MONTHS, YOU WORRIED THAT YOUR FOOD WOULD RUN OUT BEFORE YOU GOT MONEY TO BUY MORE.: NEVER TRUE

## 2024-01-30 ASSESSMENT — PATIENT HEALTH QUESTIONNAIRE - PHQ9
1. LITTLE INTEREST OR PLEASURE IN DOING THINGS: NOT AT ALL
SUM OF ALL RESPONSES TO PHQ9 QUESTIONS 1 & 2: 0
2. FEELING DOWN, DEPRESSED OR HOPELESS: NOT AT ALL

## 2024-01-30 ASSESSMENT — PAIN SCALES - GENERAL: PAINLEVEL: 0-NO PAIN

## 2024-01-30 NOTE — PROGRESS NOTES
ENCOUNTER    Visit Type Initial Visit  Location: [place] [address]     Barriers to Communication / Understanding:   [] Language [] Vision [] Hearing [] Other      []  Present     Accompanied By: sister Alvarez     Organ For Transplant:  Kidney    Ethnicity:  Black Or     ADLs Fully Independent      Instrumental ADLs Fully Independent      Level of Activity Active      DME: Denied     Knowledge of Health Good  Hypertension, high cholesterol    Why Do You Have End Stage Organ Disease  Pt reported hypertension.     Knowledge of Transplant / VAD:  Yes Patient Is Able To Make An Informed Decision    Patient Understands the Risks of Transplant / VAD  Yes Rejection  Yes Infection Yes Complications  Yes Death    Patient Understands Recovery and Follow-Up from Transplant / VAD  Yes Length Of State Yes Appointments  Yes Labs  Yes Rehabilitation    Patient Has Identified Goals of Transplant / VAD Yes  Pt shared to improve her quality of life and live longer.     Any Potential Donors?     Overall Compliance  good     Pt reported taking 12 medications daily.   Compliance With Medications good    Managed By Patient Pill box, but if at home uses pill bottles     Understanding Of Medication  good  Compliance With Appointments good    How Does Patient Handle Health Problems      Organ Kidney     IOP:     Fluid Restriction:   No [] Compliant     Medical And Clinic Appointment Compliant Yes    Dialysis:  [] What Dialysis Center  [] Began       [] In Center [] Home Hemo [] Peritoneal       Attendance:  Treatment Attendance  Treatment Time     [] Shortened Treatments [] Rescheduled Treatments [] Missed Treatments       Fluids:     Does Patient Still Urinate  [] Fluid Restriction [] IDWG [] EDW  Achieved Dry Weight        Diet:  Yes Patient is compliant with renal restrictions    Yes Patient is compliant with low sodium diet      Labs:    [] Patient Reported [] Collateral       []  Potassium [] Albumin []  "Phosphorus      # of Binders:  [] # of Binders per meal [] Meals per day      []  # of Binders per Snack [] Snacks per day [] Phosphorus     Pancreas:  [] Checks blood sugar      times/day [] A1c   Hypoglycemic Episodes  Unawareness  Outside Interventions    Liver:  Is Lactulose prescribed  Dose:   Timesper day:  Is patient compliant       SOCIAL HISTORY  No       Education:  education: Some College    Literate Yes   Computer literate Yes    Internet access Yes       Sources of Income: Pt shared she retired in 2001. Pt reported having a pension, where she receives $2,400/monthly.   Patient's Current Employment    [] Full-Time [] Part-Time [] Unemployed [x] Retired     []  None [] Not working by choice [] Not working disabled     [] Short Term Leave   [] Other   Employment History     Will patient have paid status from employment during recovery       Spouse / SO Current Employment     Will spouse / SO have paid status from employment during recovery Yes   retired.     Other Sources of Income Total Household Income $40,000/yearly       Does patient have financial concerns No     Is patient able to meet current monthly expenses Yes    Resources:      Patient was provided information on transplant fundraising       Insurance:  Primary Insurance Medicare [facility]     Secondary Insurance     Prescription Coverage Copay cost per month $20-25/monthly     Medicare coverage due to     Medicare Part A  Effective date    Medicare Part B  Effective date    Medicare Part C  Deductable  Out of Pocket Max    Medicare Part D  Extra Help?    Medicaid  Waiver  Redetermination Date    HMO       FAMILY SYSTEM    Single    Yes How long 43 years    Describe Relationship  \"It's good.\"     How long   Describe Relationship    When    When  In a Relationship   How Long  Describe Relationship    Spouse / SO Name    Richard Hamlin   Age 85  Health   Fair   Other Caregiver Responsibilities- " Pt shared their oldest son (52) has autism and is fully dependent on Pt and Pt's .   Spouse / Significant others reaction to donation    Children:  Yes # Biological (2 sons)    # Adopted    # Step Children        Child #1 Name Richard Gomez    Age 52  Health  Autism, hypertension, high cholesterol, diabetic- fully dependent on Pt and Pt's .     Lives With patient  How Much Contact Daily      Child #2 Name Junior    Age 48  Health  Good    Lives Out Of Town Lives in [place]   How Much Contact Other holidays, stay in contact       Parents:  Raised By Both Biological Parents    Did the patient have contact with the other parent     Mother  No Age   Cause of Death   Father  Yes Age 67  Cause of Death  Stroke and a brain aneuryism     Living Parent #1 Jody, 99.5 years old, lives local, contact daily. Pt's mom lives with Pt's sister, Kim.     Additional Information    Siblings:  [x] # Biological 1 sister  [] # Half Siblings [] # Step Siblings     Sibling #1 Kim, lives local, daily contact - She is going through living donor evaluation   Sibling #2    Support & Recovery Plan:  Yes Adequate    Primary Support:  Best Ramos   Phone home [phone],  Cell [phone]   Age 85  Relationship to Patient    If employed, can they take time off work  Retired   If so, is it paid time off    If not, will this impact your finances No   Did they attend education classes No   Do they have other caregiver responsibilities (child or eldercare) No- Pt shared he helps with their son but they have a caregiver for her son. Pt shared the caregiver is going through knee surgery and when she is healed she can help. Pt shared her granddaughter can help care for their son as well.   Do they have their own conditions which may prevent them from providing care for you No- may need back surgery but he is fully-independent.   (Medical, psychological, physical limitations)    Are they available on short  "notice Yes   Are they reliable Yes   Are they responsible Yes   Are they able to understand and process new information Yes   Do they have reliable transportation or will you allow them to use your vehicle Yes   Are they currently involved in your care Yes   Comments    Secondary Support:  Best Sood  Phone [phone]  Age 30  Relationship to Patient Granddaughter   If employed, can they take time off work Yes- works for [employer]   If so, is it paid time off Yes   If not, will this impact your finances No   Did they attend education classes No   Do they have other caregiver responsibilities (child or eldercare) No   Do they have their own conditions which may prevent them from providing care for you No  (Medical, psychological, physical limitations)    Are they available on short notice Yes   Are they reliable Yes   Are they responsible Yes   Are they able to understand and process new information Yes   Do they have reliable transportation or will you allow them to use your vehicle Yes   Are they currently involved in your care Yes   Comments  Pt encouraged SW to call her in the afternoon because she works nights. Pt shared a good day to reach out would be on a Thursday or Friday.   Alternate Support  Alternate Support    Housing:  Yes Adequate Owns home  Type of Home House  Distance to [facility] 30 minutes   Pets No   Does Patient Feel Safe in Home Yes      Transportation:  Yes Adequate  # Licensed Drivers in the Home 1  Does Patient Drive No If not, why Pt stated, \"I don't know, I've never driven.\"    # Reliable Vehicles: 1  Does Patient use Public Transportation No  Does Patient use Medical Transportation No  Comments    MENTAL HEALTH    Cognition:  Reported Impairment  Pt stated, \"Sometimes, nothing significant.\" Pt shared she will be looking for something and will be unable to find it and that concerns her.     The patient reports their mood as \"okay.\"     Reported Mental Health Diagnosis: Denied   Family " History of Mental Health Concerns Denied Son- autism   What are patient psychosocial stressors: Too many doctor's appointments for her son.        Current Medications:  No  Mental Health Meds  Denied   Rx'd by   Sleep Meds Denied   Rx'd by   Pain Meds Denied    Rx'd by     OTC Meds Tylenol   Past Medications Denied     Counseling Never  Denied. Declined resources.     Has patient ever been hospitalized for mental health reasons No   Was the hospitalization voluntary  Duration   Where    When  Describe situation    Discharge Plan for Follow Up  Was Discharge Plan Completed   Referral to Transplant Psych No  Mental Health Follow Up Required      Suicide Assessment:  History of Suicide Ideation No  [] Timeframe [] Frequency   Frequency   Plan Created  Intent to Follow Through  Outcome      History of Suicide Attempt No     History of Suicidal Ideation in the past 3 months No Intensity   Duration     Description of Plan      Plans thought of  Intent to Follow Through  Highest Level of Intent to Follow Through    Current Plan for Safety    Plan for Follow-Up    Patient's Reported Trauma History: Denied     What are patient's coping behaviors Pt's reported she enjoys reading, watching TV, dancing, and going to exercise clubs.      Mormon / Spirituality Judaism Scientologist     Attitude toward interviewer Cooperative and Appropriate    Eye Contact Patient maintained good eye contact throughout appointment    Appearance The patient was neatly groomed, appropriately dressed and adequately nourished    Affect Appropriate    Thought Process Appropriate    Substance Use /Abuse History:    Current Tobacco User No  Patient uses   Tobacco Frequency   For How Long  Is Patient Required to Quit       Former Tobacco User No  Describe past tobacco use and date quit    Current Alcohol User No  Type of Alcohol Used   Amount  Frequency   Pattern of Alcohol Use    Is Patient Required to Quit   Continued to use the substance despite being  "told the substance is affecting their health    History of problems at work, school or home due to substance use      Former Alcohol User Yes  Describe past alcohol use and date quit  Pt reported she last drank \"many years ago.\" Pt shared she would have a strawberry daiquiri when she went out to eat, once a month. Pt shared it wasn't even once a month. Pt shared she would have one drink in a sitting, and this was her heaviest. Pt shared she only has virgin drinks now. Pt denied any DUI's.     Has patient ever gone to CD treatment   If yes, When, Where and What type of Program  Attends AA meetings    Sponsor  Do support people drink alcohol   If yes, describe support people's use  Is alcohol kept in the home   Does Patient need to sign a CD contract     Current Illegal / Unprescribed Drug User No  Type of Illegal Drug Used   Frequency  Pattern of Drug Use    Is Patient Required to Quit     Illegal / Unprescribed Drug #2  Type of Illegal Drug Used   Frequency  Pattern of Drug Use      Continue to use the substance despite being told the substance is affecting their health    History of problems at work, school or home due to substance use      Former Illegal / Unprescribed Drug User No  Describe past illegal drug use and date quit    Has patient ever gone to CD treatment   If yes, When, Where and What type of Program   Attends AA/NA  meetings    Is patient on a Methadone / Suboxone regiment   Do support people use illegal drugs   If yes, describe support people's use  Are illegal drugs kept in the home   Does Patient need to sign a CD contract     Illegal / Unprescribed Drug #2  Type of Illegal Drug Used   Frequency  Pattern of Drug Use    Prescription Drug Abuse:  No Has patient experienced feelings of addiction  No Has patient experienced symptoms of withdrawal  No Has patient experienced any side effects? e.g.  hallucinations or delusions    Does Patient Meet the Criteria for Alcohol Use Disorder No Diagnosis  Does " "Patient Meet OSOTC guidelines Yes  Does Patient Meet the Criteria for Illegal Drug Use Disorder No Diagnosis  Does Patient Meet OSOTC guidelines Yes    OSOTC Substance Relapse Risk Factors   DSM-5 Severity Factors:     IOP     LEGAL ISSUES  No Arrests  Currently probation or parole    penitentiary   When   How long   Where       Citizenship:  Yes [place] Citizen   Green Card  Visa    Advance Directives: No  HCPOA     SW provided documents.   Guardian:    RODNEY FREIRE met with Pt and Pt's sister, Kim for initial psychosocial assessment. Pt was pleasant and engaged. Pt reported her insurance as [facility] [place] [facility] [place]. Pt demonstrated an excellent understanding of transplant education. Pt denied any current financial concerns. Pt shared the cause of her kidney disease is hypertension. Pt reported her goals of transplant are to improve her quality of life and to live longer. Pt reported good compliance with her medications and medical appointments. Pt is pre-dialysis. Kim shared that she is interested in going through the living donor evaluation. Pt listed her , Richard as primary support and her granddaughter, Carine as secondary support. Pt shared she and her  both care for their son (52) who has autism. Pt shared that her granddaughter can care for their son and she has a caregiver who helps out as well. SW discussed the need to meet primary support in-person and Pt expressed understanding. Pt reported her mood as \"okay.\" Pt denied any current/past MH diagnosis/concerns. Pt denied any MH treatment history. Pt scored a 2 on the PHQ-9, indicating minimal depression. Pt scored a 1 on the NATALIE-7, indicating minimal anxiety. Pt denied any current/past tobacco and illicit drug use. Pt denied any current alcohol use. Pt shared she last drank \"many years ago.\" Pt shared at her heaviest alcohol use, she would have one strawberry daiquiri in a sitting at dinner, not even once a month. SW administered " documents. Pt scored a 7 on the SIPAT, indicating Pt is a good candidate for transplant. SW would recommend to list Pt for transplant, only once Pt's primary support is met in-person and her secondary support is confirmed. Until this occurs, Pt is considered high-risk.     PLAN  SW to meet with Pt and Pt's primary support in 1 month. SW to call and confirm Pt's secondary support.

## 2024-01-30 NOTE — PROGRESS NOTES
Provided living donor resource materials to pt and support person including DASH intake link, [facility] Microsite information and tip sheet on how to find a living donor. All materials were reviewed both written and verbally. Invitation sent for microsite. Contact information for the living donor program provided in case of further questions.

## 2024-01-30 NOTE — PATIENT INSTRUCTIONS
Thank You for coming to [facility] [place].  You are currently in evaluation for kidney transplant.  In order to be eligible to be placed on the wait list you must complete certain tests and appointments.  The following tests/appointments will be scheduled for you:    CT abdomen and pelvis  CT cardiac score  Echocardiogram     Cardiology consult    Please complete the following testing with your primary care doctor:    Follow up with GYN    Please call [phone] with any questions or if you need assistance.    Jo-Ann SPAINN, RN Transplant Coordinator

## 2024-01-30 NOTE — PROGRESS NOTES
TRANSPLANT NEPHROLOGY CONSULT :   KIDNEY TRANSPLANT RECIPIENT EVALUATION        SERVICE DATE: 2024     REASON FOR CONSULT/CHIEF COMPLAINT:    FOR KIDNEY TRANSPLANT RECIPIENT EVALUATION.    HPI:    Ms. Hamlin is a 78 y.o. 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     Pt follows with Dr. Stubbs. Pt is currnelty pre-dialysis. No access yet.   Most recent labs with GFR 20 ml/min (Cr 2.47).    Accompanied by sister who is also interested in volunteering as a donor. Pt has adequate family support (other than sister) who can act as caregivers.     Colonoscopy 2022 which showed mild diverticulosis.   Mammogram 10/2023 normal.     Follows with gyn for endometrial thickening, s/p biopsy. Possible plan for D&C.     H/o urge urinary incontinence. No h/o recurrent UTIs.     Pt doing well overall other than mild fatigue    Able to perform all ADLs, IADLs.     The patient is doing well without complaints. Denied chest pain, shortness of breath, palpitation, dyspnea on exertion, dysuria, fever, nausea, vomiting, diarrhea and flu-liked symptoms. No swelling of the extremities. No recent hospitalization or ED visit.    ROS:  Review of  14 systems was performed system by system. See HPI. Otherwise, the symptoms were negative.    PAST MEDICAL HISTORY:  Past Medical History:   Diagnosis Date    Encounter for general adult medical examination without abnormal findings 2021    Encounter for Medicare annual wellness exam    Unspecified cataract     Cataracts, both eyes        PAST SURGICAL HISTORY:  Past Surgical History:   Procedure Laterality Date    OTHER SURGICAL HISTORY  2019    Carpal tunnel surgery    OTHER SURGICAL HISTORY  2019     section    OTHER SURGICAL HISTORY  2019    Hernia repair        SOCIAL  HISTORY:  Social History     Socioeconomic History    Marital status:      Spouse name: Not on file    Number of children: Not on file    Years of education: Not on file    Highest education level: Not on file   Occupational History    Not on file   Tobacco Use    Smoking status: Never    Smokeless tobacco: Never   Vaping Use    Vaping Use: Never used   Substance and Sexual Activity    Alcohol use: Never    Drug use: Never    Sexual activity: Not on file   Other Topics Concern    Not on file   Social History Narrative    Not on file     Social Determinants of Health     Financial Resource Strain: Not on file   Food Insecurity: No Food Insecurity (1/30/2024)    Hunger Vital Sign     Worried About Running Out of Food in the Last Year: Never true     Ran Out of Food in the Last Year: Never true   Transportation Needs: Not on file   Physical Activity: Not on file   Stress: Not on file   Social Connections: Not on file   Intimate Partner Violence: Not on file   Housing Stability: Not on file       FAMILY HISTORY:  Family History   Problem Relation Name Age of Onset    Diabetes Mother      Hypertension Mother      Diabetes Son      Colon cancer Mother's Sister      Colon cancer Father's Sister         MEDICATION LIST:  Current Outpatient Medications   Medication Instructions    allopurinol (ZYLOPRIM) 100 mg, oral, Daily    aspirin 81 mg EC tablet 1 tablet, oral, Daily    atorvastatin (LIPITOR) 40 mg, oral, Daily    B complex-vitamin C-folic acid (Nephrocaps) 1 mg capsule 1 capsule, oral, Daily    calcitriol (ROCALTROL) 0.25 mcg, oral, 3 times weekly, other    cholecalciferol, vitamin D3, 25 mcg (1,000 unit) tablet,chewable 1 tablet, oral, Daily    cloNIDine (CATAPRES) 0.1 mg, oral, Daily    coenzyme Q-10 100 mg capsule oral, Take as directed by mouth    colchicine 0.6 mg, oral, Daily PRN    dapagliflozin propanediol (FARXIGA) 10 mg, oral, Every morning    docusate sodium (Colace) 100 mg capsule TAKE 1 CAPSULE TWICE  "A DAY AS NEEDED FOR CONSTIPATION    ferrous sulfate, 325 mg ferrous sulfate, (FeroSuL) tablet 1 tablet, oral, Daily    furosemide (LASIX) 40 mg, oral, Daily    hydrALAZINE (APRESOLINE) 100 mg, oral, 2 times daily    hydrOXYzine HCL (ATARAX) 25 mg, oral, 2 times daily PRN    L. acidophilus/Bifid. animalis 32 billion cell capsule 1 capsule, oral, Daily    losartan (Cozaar) 100 mg tablet TAKE 1 TABLET ONCE DAILY. NEED MD APPOINTMENT FOR REFILLS    losartan (COZAAR) 100 mg, oral, Daily, Need MD appointment for refills    metoprolol succinate XL (TOPROL-XL) 50 mg, oral, Daily, Do not crush or chew.        ALLERGY  Allergies   Allergen Reactions    Codeine Itching    Erythromycin Itching    Nsaids (Non-Steroidal Anti-Inflammatory Drug) Itching and Nausea Only    Tramadol Unknown       PHYSICAL EXAM:    Visit Vitals  /54   Pulse 70   Temp 36.2 °C (97.2 °F) (Temporal)   Ht 1.676 m (5' 6\")   Wt 90.7 kg (200 lb)   LMP  (LMP Unknown)   SpO2 95%   BMI 32.28 kg/m²   OB Status Postmenopausal   Smoking Status Never   BSA 2.05 m²        General Appearance - NAD, Good speech, oriented and alert  HEENT - Supple. Not pale. No jaundice. No cervical lymphadenopathy.   CVS - RRR. Normal S1/S2. No murmur, click , rub or gallop  Lungs- clear to auscultation bilaterally  Abdomen - soft , not tender, no guarding, no rigidity. No hepatosplenomegaly. Musculoskeletal /Extremities - b/l LE chronic non-pitting edema. Full ROM. No joint tenderness.   Neuro/Psych - appropriate mood and affect. Motor power V/V all extremities. CN I -XII were grossly intact.  Skin - No visible rash    LABS:    Lab Results   Component Value Date    WBC 3.9 (L) 01/30/2024    HGB 11.4 (L) 01/30/2024    HCT 36.6 01/30/2024     01/30/2024    CHOL 112 09/28/2023    TRIG 120 09/28/2023    HDL 44.2 09/28/2023    ALT 15 01/30/2024    AST 17 01/30/2024     12/07/2023    K 4.4 12/07/2023     12/07/2023    CREATININE 2.47 (H) 01/30/2024    BUN 50 (H) " 01/30/2024    CO2 21 12/07/2023    TSH 2.08 09/28/2023    INR 0.9 05/23/2023    HGBA1C 5.5 01/30/2024     MRI abd: 6/2023  IMPRESSION:  Bilateral simple and hemorrhagic/proteinaceous renal cysts. No  convincing solid enhancing renal mass allowing for significant motion  artifact.    US liver elastography: 6/2023  IMPRESSION:  Median liver stiffness measures 1.30 m/sec, corresponding with a high  probability of normal liver stiffness.    ASSESSMENT AND PLAN:    After completion of taking history and physical examination, the patient seems to be a  reasonable candidate to proceed with the rest of transplant evaluation.    Has potential donor. Hopes to get pre-emptive transplant.   Most recent GFR 20 ml/min 1/30/24.     Will need cardiac testing for risk stratification.   Follow with gyn for possible D&C.   -Update cancer screening per age/sex  - Will need to complete the rest of work up per protocol    The case will be presented at the selection committee at the Transplant [place].  The final decision from the committee will be sent out to notify the patient/primary care physician/ nephrologist. The above recommendations were discussed with the patient at length.     In addition, the following were also discussed:    - Risks and benefits of transplantation, both short-term and long-term    - Risk of primary graft non-function, DGF, SGF, rejection, primary disease recurrence, return to dialysis    - Risks of immunosuppression including infections, CA, CV risk    - Need for compliance with medications and medical care in general    - We reviewed the necessity of HBV vaccination and other recommended vaccines before a kidney transplant, following the Centers for Disease Control and Prevention(CDC)'s guidelines [https://www.cdc.gov/vaccines/schedules/downloads/adult/adult-combined-schedule.pdf]       The patient expressed understanding of the above and wishes to proceed.  I  answered all of his questions. I urged the patient to look for living donors.    - I have spent over 60 minutes with the patient, reviewing medical record, lab result , CXR result and other specialty's notes. More than 50% of the time was spent in counseling, explaining about the transplantation and answering the questions. I also reviewed the medical record, blood test results, imaging and previous studies which were obtained from the nephrologists.     Thank you for this consultation. Please feel free to contact me for questions.

## 2024-01-31 ENCOUNTER — DOCUMENTATION (OUTPATIENT)
Dept: TRANSPLANT | Facility: HOSPITAL | Age: 79
End: 2024-01-31
Payer: COMMERCIAL

## 2024-01-31 LAB
FLOW AUTOCROSSMATCH: NORMAL
HLA RESULTS: NORMAL

## 2024-01-31 ASSESSMENT — PATIENT HEALTH QUESTIONNAIRE - PHQ9
9. THOUGHTS THAT YOU WOULD BE BETTER OFF DEAD, OR OF HURTING YOURSELF: NOT AT ALL
2. FEELING DOWN, DEPRESSED OR HOPELESS: NOT AT ALL
10. IF YOU CHECKED OFF ANY PROBLEMS, HOW DIFFICULT HAVE THESE PROBLEMS MADE IT FOR YOU TO DO YOUR WORK, TAKE CARE OF THINGS AT HOME, OR GET ALONG WITH OTHER PEOPLE: SOMEWHAT DIFFICULT
6. FEELING BAD ABOUT YOURSELF - OR THAT YOU ARE A FAILURE OR HAVE LET YOURSELF OR YOUR FAMILY DOWN: NOT AT ALL
SUM OF ALL RESPONSES TO PHQ QUESTIONS 1-9: 2
3. TROUBLE FALLING OR STAYING ASLEEP OR SLEEPING TOO MUCH: NOT AT ALL
4. FEELING TIRED OR HAVING LITTLE ENERGY: SEVERAL DAYS
5. POOR APPETITE OR OVEREATING: SEVERAL DAYS
SUM OF ALL RESPONSES TO PHQ9 QUESTIONS 1 & 2: 0
1. LITTLE INTEREST OR PLEASURE IN DOING THINGS: NOT AT ALL
8. MOVING OR SPEAKING SO SLOWLY THAT OTHER PEOPLE COULD HAVE NOTICED. OR THE OPPOSITE, BEING SO FIGETY OR RESTLESS THAT YOU HAVE BEEN MOVING AROUND A LOT MORE THAN USUAL: NOT AT ALL
7. TROUBLE CONCENTRATING ON THINGS, SUCH AS READING THE NEWSPAPER OR WATCHING TELEVISION: NOT AT ALL

## 2024-01-31 ASSESSMENT — ANXIETY QUESTIONNAIRES
6. BECOMING EASILY ANNOYED OR IRRITABLE: NOT AT ALL
GAD7 TOTAL SCORE: 1
7. FEELING AFRAID AS IF SOMETHING AWFUL MIGHT HAPPEN: NOT AT ALL
2. NOT BEING ABLE TO STOP OR CONTROL WORRYING: NOT AT ALL
4. TROUBLE RELAXING: NOT AT ALL
5. BEING SO RESTLESS THAT IT IS HARD TO SIT STILL: NOT AT ALL
1. FEELING NERVOUS, ANXIOUS, OR ON EDGE: NOT AT ALL
3. WORRYING TOO MUCH ABOUT DIFFERENT THINGS: SEVERAL DAYS
IF YOU CHECKED OFF ANY PROBLEMS ON THIS QUESTIONNAIRE, HOW DIFFICULT HAVE THESE PROBLEMS MADE IT FOR YOU TO DO YOUR WORK, TAKE CARE OF THINGS AT HOME, OR GET ALONG WITH OTHER PEOPLE: SOMEWHAT DIFFICULT

## 2024-01-31 NOTE — PROGRESS NOTES
Kidney Transplant Evaluation Office Visit    Chief Complaint: Patient presents for kidney transplant evaluation    History of Present Illness:  Liz Hamlin  is a 78 y.o. female presents with ESRD from Hypertension of over 15 years. She is currently on 5 anti-hypertensives - Hydralazine, Losartan, Metoprolol, Clonidine, and Furosemide.    She is currently pre-dialysis with an eGFR of 21.    In clinic today she denies any chest pain, SOB, palpitations, as well as any recent fever, abdominal pain, difficulty voiding or other urinary symptoms, constipation, or poor oral intake.    She does report having urinary frequency/ urgency and incontinence. She has had 2 pregnancies with .    She lives an active lifestyle and does senior cardio 4d a week.    Hemodialysis: pre-dialysis  Dialysis Access: n/a  Urine Status: make urine   Disease Etiology: hypertensive nephropathy.   Disease Complications: hypertension.   PVD: NO on exam, CT pending  Prior Abdominal Surgery: YES - C-sections x2; UHR w/mesh   Prior Malignancy: NO - but has endometrial thickening and requires D&C per GYn  BMI: 32.28  Potential Living Donors: YES - sister who is here with her today     Review of Systems:  Cardiac: Denies chest pain, palpitations  : Normal urine output. Denies history of gross hematuria, nephrolithiasis, urinary retention, or recurrent UTIs.  Vascular: Denies personal or familial history of DVT/PE. No active claudication or non-healing LE wounds.  Extremities: LE Edema -   Functional Status: Can walk up 2 flights of stairs    Past Medical History:  HTN  JOSE E  Urinary frequency/ incontinence  Endometrial thickening requiring D&C per Gyn    Past Surgical History:  UHR w/mesh   Vertical midline  x2    Social History:  Lives an active life, does cardio 4d/ week  No etoh or smoking  Sister here with her who also wants to be evaluated as a potential LD    Transfusions: None  Pregnancy: Yes x2;  c-sections  Prior transplant: No    Family History:  Mother: n/a  Father: n/a  Sibling: n/a    Physical Exam:  There were no vitals filed for this visit.  Gen: A+OX3; NAD  HEENT: PERRL, sclera anicteric, MMM  Cardiac: RRR  Chest: Normal inspiratory effort  Abdomen: S/NT/ND.  Ext: No LE edema  Vascular: 2+ palpable femoral pulses  Psychiatric: Normal mood, affect    Assessment/Plan:  - The patient is a good candidate for kidney transplantation, pending cardiac work-up    - Routine age/gender based screening - assure Gyn follow-up for D&C for endometrial thickening     - Cardiac testing per protocol: ECHO/stress test    - Non-contrast CT scan abdomen/pelvis    - Good functional status    Surgical Considerations:  Healthy 78yF, pre-dialysis    Urinary symptoms likely age/ pelvic floor related but would obtain Urology eval    Review CT and Cardiac work-up when available, Committee review to follow    Transplant Education:    I had a discussion with this patient regarding 1 year graft and patient survival statistics following renal transplantation for both living and  donor allograft recipients. This data included [facility] data compared to National data readily available for review on https://www.SRTR.org. The patient also had attended the kidney transplant education class provided by the transplant institute.     The difference between allograft function was discussed comparing living donor, KDPI 0-85%, and >85% kidneys.     Further discussion included:  -The transplant selection committee process.  -The need for lifelong immunosuppressive therapy, and the side effects of these medications including the risk of infections, cancer, and lymphoma.  -The wait list time approximately is 5 years or more for  donor transplants and the statistical superiority of a living donor.     -Using identified donors with risk criteria for transmission of infection  -The possibility of utilizing  donors  with known HCV antibody and/or JANE positivity and post-transplant treatment/surveillance protocol  -Potential transmission of infectious disease from any  donors, as well as living donors.   -The possibility of transmission of tumors and infections via the transplanted organ.  -The inability to completely test for all potential harmful tumors or infectious agents.  -The possibility of listing at multiple locations.     Surgical complications including need for reoperation(s) including but not limited to:  -Bleeding.  -Repair of leaks.  -Control of infection.  -Blood clots in the transplant vessels.  -Possible kidney transplant removal.     The medical complications including but not limited to:  -Death.  -Cardiac.  -Pulmonary.  -Infectious.  -Neurologic.  -Other Complications.     We also discussed how the kidney transplant could function:  -Non-function and possible kidney transplant removal within the first 3 to 6 months.  -Delayed graft function (dialysis needed after transplant).  -The potential of recurrence of kidney disease leading to kidney transplant graft loss.    Time Attestation:  I spent 60 minutes with the patient, over 50 minutes in counseling and education as outlined above.    Elder Mac MD, [facility]  Transplant & Hepatobiliary Surgery

## 2024-02-01 ENCOUNTER — DOCUMENTATION (OUTPATIENT)
Dept: TRANSPLANT | Facility: HOSPITAL | Age: 79
End: 2024-02-01
Payer: COMMERCIAL

## 2024-02-01 DIAGNOSIS — Z01.818 PRE-TRANSPLANT EVALUATION FOR KIDNEY TRANSPLANT: ICD-10-CM

## 2024-02-01 LAB
NIL(NEG) CONTROL SPOT COUNT: NORMAL
PANEL A SPOT COUNT: 0
PANEL B SPOT COUNT: 0
POS CONTROL SPOT COUNT: NORMAL
T-SPOT. TB INTERPRETATION: NEGATIVE
VZV QPCR,QUANT,PLASMA, VIRC: NOT DETECTED COPIES/ML

## 2024-02-01 NOTE — PROGRESS NOTES
Eval Clinic Note:  Patient attended evaluation appts on 1/30/24 with Dr. Mac and Dr. Alexander  Medications and allergies reviewed with patient.  Patient presented to appointment with her sister, Kim.  Patient ambulated independently.    History:  Patient has a history of HTN, JOSE E, Renal cysts, urinary frequency/incontinence/urgency.  Dialysis: PREDIALYSIS.  Testing completed recently: colonoscopy, mammogram, chest xray  Testing needed for evaluation: Echocardiogram, CT cardiac score, CT abdomen and pelvis  Listing Consent: KDPI >85%, DCD, Hep C, Hep B  Comments:  Provided patient with my contact info for questions and concerns.      LISTING EDUCATION    Patient educated regarding the following prior to placement on the transplant waiting list:   The patient?s medical condition, prognosis, and treatment plan.   The expectations and patient responsibilities while on the waiting list, including:   Keeping the transplant center informed of any changes in contact information or insurance coverage   Notifying the transplant center of any changes in medical status   Required testing and/or re-evaluation appointments while awaiting transplant   An overview of the surgical procedure, including potential risks and alternatives.   Information regarding what to expect during the inpatient admission and recovery period.   A discussion regarding organ offers and types of potential donors, including potential risks that may be associated with specific types of donors that could affect the success of the transplant or the health of the patient.  The right to refuse transplantation.     Patient was given the opportunity to have questions answered. Patient was provided a copy of the informed consent for transplant listing.    Education provided by:  Transplant Coordinator: Jo-Ann Marsh RN  Transplant Physician: Dr. Mac    Signed listing informed consent received? YES/NO  Patient agrees to be listed for the  following:  KDPI > 85% [x]  Donors After Circulatory Death (DCD) [x]  Donors with a Positive Core Antibody for Hepatitis B [x]  Donors with Hepatitis C Virus to recipients with hepatitis C []  Donors with Hepatitis C Virus to Negative hepatitis C recipients [x]    Patient will be discussed at an upcoming selection committee to determine eligibility to be placed on the [facility] waiting list.

## 2024-02-02 ENCOUNTER — TELEPHONE (OUTPATIENT)
Dept: OBSTETRICS AND GYNECOLOGY | Facility: CLINIC | Age: 79
End: 2024-02-02
Payer: COMMERCIAL

## 2024-02-02 LAB
AMPHETAMINES SERPL QL SCN: NEGATIVE NG/ML
ANNOTATION COMMENT IMP: NORMAL
BARBITURATES SERPL QL SCN: NEGATIVE NG/ML
BENZODIAZ SERPL QL SCN: NEGATIVE NG/ML
BUPRENORPHINE SERPL-MCNC: NEGATIVE NG/ML
CANNABINOIDS SERPL QL SCN: NEGATIVE NG/ML
COCAINE SERPL QL SCN: NEGATIVE NG/ML
EBV VCA IGM SER-ACNC: <10 U/ML (ref 0–43.9)
HLA CLASS I AB SCREEN,FC: NORMAL
HLA CLASS II AB SCREEN,FC: NORMAL
HLA RESULTS: NORMAL
METHADONE SERPL QL SCN: NEGATIVE NG/ML
METHAMPHET SERPL QL: NEGATIVE NG/ML
OPIATES SERPL QL SCN: NEGATIVE NG/ML
OXYCODONE SERPL QL: NEGATIVE NG/ML
PCP SERPL QL SCN: NEGATIVE NG/ML

## 2024-02-02 NOTE — TELEPHONE ENCOUNTER
----- Message from Liz TRACYDaisy Hamlin sent at 2/1/2024  4:43 PM EST -----  Regarding: Results of Biopsy in,July 2023  Contact: [phone]  Kalpana Ballard...My name is Liz Hamlin & I saw you last year re: my fibroids & u tried do a procedure that didn't work out to well because it was too painful for me for u to continue...I am currently being evaluated by the kidney transplant team & need to follow-up with you regarding the results of that biopsy ..They indicated that I might need a D,& C if the results from the biopsy were inconclusive... Would like your thoughts & would like to schedule an appointment regarding this matter if needed....Thank you, Liz Hamlin -[phone]

## 2024-02-02 NOTE — TELEPHONE ENCOUNTER
----- Message from Liz TRACYDaisy Hamlin sent at 2/1/2024  4:43 PM EST -----  Regarding: Results of Biopsy in,July 2023  Contact: [phone]  Kalpana Ballard...My name is Liz Hamlin & I saw you last year re: my fibroids & u tried do a procedure that didn't work out to well because it was too painful for me for u to continue...I am currently being evaluated by the kidney transplant team & need to follow-up with you regarding the results of that biopsy ..They indicated that I might need a D,& C if the results from the biopsy were inconclusive... Would like your thoughts & would like to schedule an appointment regarding this matter if needed....Thank you, Liz Hamlin -[phone]

## 2024-02-02 NOTE — TELEPHONE ENCOUNTER
RN called patient  Patient identified by name and   RN scheduled patient appointment for 2024 at 1pm for patient and provider to discuss results and procedure options.  Patient verbalized understanding    MOODY Burt

## 2024-02-03 LAB
COTININE SERPL-MCNC: <5 NG/ML
NICOTINE SERPL-MCNC: <5 NG/ML

## 2024-02-06 ENCOUNTER — TELEPHONE (OUTPATIENT)
Dept: PRIMARY CARE | Facility: CLINIC | Age: 79
End: 2024-02-06
Payer: COMMERCIAL

## 2024-02-06 DIAGNOSIS — E78.00 PURE HYPERCHOLESTEROLEMIA: ICD-10-CM

## 2024-02-07 ENCOUNTER — TELEPHONE (OUTPATIENT)
Dept: TRANSPLANT | Facility: HOSPITAL | Age: 79
End: 2024-02-07
Payer: COMMERCIAL

## 2024-02-08 RX ORDER — ATORVASTATIN CALCIUM 40 MG/1
40 TABLET, FILM COATED ORAL DAILY
Qty: 90 TABLET | Refills: 1 | Status: SHIPPED | OUTPATIENT
Start: 2024-02-08 | End: 2024-03-22 | Stop reason: SDUPTHER

## 2024-02-10 ASSESSMENT — CUP TO DISC RATIO: OD_RATIO: 0.55

## 2024-02-10 ASSESSMENT — SLIT LAMP EXAM - LIDS
COMMENTS: GOOD POSITION
COMMENTS: GOOD POSITION

## 2024-02-10 ASSESSMENT — EXTERNAL EXAM - LEFT EYE: OS_EXAM: NORMAL

## 2024-02-10 ASSESSMENT — EXTERNAL EXAM - RIGHT EYE: OD_EXAM: NORMAL

## 2024-02-10 NOTE — PROGRESS NOTES
Glaucoma suspect of both eyesH40.003  -(+)FH glaucoma - 2 first cousins  - Cup to disc asymmetry OD>OS  -Pachymetry (3/2/20) - 536/524  -HVF 24-2 (2/12/24) - OD: 14/16FL 8%FP 21%FN. Scattered. OS: 8%FP, 20%FN. Scattered nasal depression. High false negatives OU, unreliable. Stable from 2/6/23.   -OCT RNFL (2/12/24) - SS: 8/10 OD and 7/10 OS. WNL OU. 95/105. Stable from 3/2/20.   -Plan: IOP stable, HVF 24-2 stable/improved. Low suspicion for glaucoma at this time. No treatment necessary. Will need to watch IOP (higher end of normal), if IOP starts to increase, may need to initiate treatment. F/u 1 year - comprehensive exam with OCT RNFL and HVF 24-2 (LEFT EYE FIRST, prior to dilation).     Combined form of age-related cataract, right eyeH25.811  Combined form of age-related cataract, left eyeH25.812  -Not visually significant at this time. Monitor.     PVD (posterior vitreous detachment), both eyesH43.813  -No retinal tear or detachment seen on exam. Retinal detachment symptoms discussed. F/u 1 year for dilated exam.     LetdireecT31.00  RylufhhznfzV82.209  UkkhevynhcR88.4  -Current Rx from 2023 - continue. Declines new Rx at this time.       No history of intraocular surgery/refractive surgery.   No FH of AMD

## 2024-02-12 ENCOUNTER — OFFICE VISIT (OUTPATIENT)
Dept: OPHTHALMOLOGY | Facility: CLINIC | Age: 79
End: 2024-02-12
Payer: COMMERCIAL

## 2024-02-12 DIAGNOSIS — H52.203 ASTIGMATISM OF BOTH EYES, UNSPECIFIED TYPE: ICD-10-CM

## 2024-02-12 DIAGNOSIS — H43.813 PVD (POSTERIOR VITREOUS DETACHMENT), BOTH EYES: ICD-10-CM

## 2024-02-12 DIAGNOSIS — H52.03 HYPEROPIA, BILATERAL: ICD-10-CM

## 2024-02-12 DIAGNOSIS — H52.4 PRESBYOPIA: ICD-10-CM

## 2024-02-12 DIAGNOSIS — H40.003 GLAUCOMA SUSPECT OF BOTH EYES: Primary | ICD-10-CM

## 2024-02-12 DIAGNOSIS — H25.813 COMBINED FORM OF AGE-RELATED CATARACT, BOTH EYES: ICD-10-CM

## 2024-02-12 PROCEDURE — 92083 EXTENDED VISUAL FIELD XM: CPT | Performed by: OPHTHALMOLOGY

## 2024-02-12 PROCEDURE — 99214 OFFICE O/P EST MOD 30 MIN: CPT | Performed by: OPHTHALMOLOGY

## 2024-02-12 PROCEDURE — 92133 CPTRZD OPH DX IMG PST SGM ON: CPT | Performed by: OPHTHALMOLOGY

## 2024-02-12 ASSESSMENT — VISUAL ACUITY
METHOD: SNELLEN - LINEAR
OS_CC: 20/25
OD_CC: 20/25

## 2024-02-12 ASSESSMENT — CUP TO DISC RATIO: OS_RATIO: 0.5

## 2024-02-12 ASSESSMENT — REFRACTION_WEARINGRX
OD_AXIS: 045
OD_CYLINDER: -0.75
OD_SPHERE: +3.25
OS_ADD: +2.50
OD_ADD: +2.50
OS_AXIS: 125
OS_CYLINDER: -1.00
OS_SPHERE: +3.25

## 2024-02-12 ASSESSMENT — REFRACTION_MANIFEST
OS_AXIS: 125
OS_ADD: +2.50
OD_ADD: +2.50
OS_CYLINDER: -1.00
OD_CYLINDER: -0.75
OD_AXIS: 045
OD_SPHERE: +3.50
OS_SPHERE: +3.50

## 2024-02-12 ASSESSMENT — PACHYMETRY
OS_CT(UM): 524
OD_CT(UM): 536

## 2024-02-12 ASSESSMENT — TONOMETRY
OS_IOP_MMHG: 20
IOP_METHOD: GOLDMANN APPLANATION
OD_IOP_MMHG: 20

## 2024-02-12 ASSESSMENT — ENCOUNTER SYMPTOMS: EYES NEGATIVE: 1

## 2024-02-13 ENCOUNTER — OTHER (OUTPATIENT)
Dept: TRANSPLANT | Facility: HOSPITAL | Age: 79
End: 2024-02-13
Payer: COMMERCIAL

## 2024-02-13 ENCOUNTER — APPOINTMENT (OUTPATIENT)
Dept: CARDIOLOGY | Facility: HOSPITAL | Age: 79
End: 2024-02-13
Payer: COMMERCIAL

## 2024-02-14 ENCOUNTER — HOSPITAL ENCOUNTER (OUTPATIENT)
Dept: CARDIOLOGY | Facility: CLINIC | Age: 79
Discharge: HOME | End: 2024-02-14
Payer: COMMERCIAL

## 2024-02-14 DIAGNOSIS — Z01.818 PRE-TRANSPLANT EVALUATION FOR KIDNEY TRANSPLANT: ICD-10-CM

## 2024-02-14 LAB
AORTIC VALVE PEAK VELOCITY: 1.23 M/S
AV PEAK GRADIENT: 6 MMHG
AVA (PEAK VEL): 2.35 CM2
EJECTION FRACTION APICAL 4 CHAMBER: 54.5
EJECTION FRACTION: 58 %
LEFT ATRIUM VOLUME AREA LENGTH INDEX BSA: 69.2 ML/M2
LEFT VENTRICLE INTERNAL DIMENSION DIASTOLE: 4.68 CM (ref 3.5–6)
LEFT VENTRICULAR OUTFLOW TRACT DIAMETER: 2.15 CM
MITRAL VALVE E/A RATIO: 2
MITRAL VALVE E/E' RATIO: 12.96
RIGHT VENTRICLE FREE WALL PEAK S': 12.65 CM/S
RIGHT VENTRICLE PEAK SYSTOLIC PRESSURE: 56 MMHG

## 2024-02-14 PROCEDURE — 93306 TTE W/DOPPLER COMPLETE: CPT

## 2024-02-14 PROCEDURE — 93306 TTE W/DOPPLER COMPLETE: CPT | Performed by: INTERNAL MEDICINE

## 2024-02-15 ENCOUNTER — HOSPITAL ENCOUNTER (OUTPATIENT)
Dept: RADIOLOGY | Facility: CLINIC | Age: 79
Discharge: HOME | End: 2024-02-15
Payer: COMMERCIAL

## 2024-02-15 DIAGNOSIS — Z01.818 PRE-TRANSPLANT EVALUATION FOR KIDNEY TRANSPLANT: ICD-10-CM

## 2024-02-15 PROCEDURE — 74176 CT ABD & PELVIS W/O CONTRAST: CPT

## 2024-02-15 PROCEDURE — 74176 CT ABD & PELVIS W/O CONTRAST: CPT | Performed by: STUDENT IN AN ORGANIZED HEALTH CARE EDUCATION/TRAINING PROGRAM

## 2024-02-16 ENCOUNTER — APPOINTMENT (OUTPATIENT)
Dept: UROLOGY | Facility: HOSPITAL | Age: 79
End: 2024-02-16
Payer: COMMERCIAL

## 2024-02-18 DIAGNOSIS — N18.4 CKD STAGE 4 SECONDARY TO HYPERTENSION (MULTI): ICD-10-CM

## 2024-02-18 DIAGNOSIS — I12.9 CKD STAGE 4 SECONDARY TO HYPERTENSION (MULTI): ICD-10-CM

## 2024-02-18 NOTE — PROGRESS NOTES
Reason for Nutrition Visit:  Pt is a 78 y.o. female referred for initial MNT. Pt is being evaluated for kidney transplant.     Past Medical Hx:  Patient Active Problem List   Diagnosis    Astigmatism    Benign hypertension    Bilateral renal cysts    Chronic kidney disease (CKD) stage G4/A1, severely decreased glomerular filtration rate (GFR) between 15-29 mL/min/1.73 square meter and albuminuria creatinine ratio less than 30 mg/g (CMS/HCC)    CKD (chronic kidney disease)    Chronic right shoulder pain    Combined form of age-related cataract, both eyes    Essential hypertension    Glaucoma suspect of both eyes    Gout    Complex renal cyst    Fibroids    History of hypercholesterolemia    Hyperlipidemia    Hyperopia    Myopia with presbyopia    Mitral valve regurgitation    Hepatic steatosis    Intermittent urinary incontinence    Obstructive sleep apnea, adult    Onycholysis of toenail    Onychomycosis of toenail    Pre-ulcerative corn or callous    Pruritus    PVD (posterior vitreous detachment), both eyes    Sleep apnea    Toe deformity, right    Nocturia    CKD stage 4 secondary to hypertension (CMS/HCC)    History of anemia due to CKD    Cellulitis of right leg    Health care maintenance    Venous stasis dermatitis of both lower extremities    Diabetes mellitus screening    Screening for cardiovascular condition    Need for vaccination    Asymptomatic menopausal state    Encounter for screening mammogram for breast cancer    Routine general medical examination at health care facility    Chronic fatigue    Long toenail    Hyperopia, bilateral    Presbyopia        Food and Nutrition Hx:  Pt reports appetite fluctuates, sometimes she can eat other times she can't eat. Pt reports she eats about 1-2 meals a day with snacking.     24 Diet Recall:  Meal 1: 9-10 am, fruit(grapes, apples, pineapples, kiwi, cherries, strawberries) cereal(almond milk), eggs, dave   Meal 2: nothing  Meal 3: 6-6:30 pm, chicken, vegetable  or salad,  starch (rice, potato, mac and cheese)  Snacks: chips, cookies, candy bar, yogurt, pt reports things she shouldn't eat. She reports after eating chips her ankles will swell  Beverages: decaf coffee (1 cup in the morning), water, pineapple juice, tea, sparkling ice drinks    WEIGHT HISTORY  CW: 200# (90.7 kg), Pt reports she currently weights 196# at home, normal weight for her.   BMI: 32.38 kg/m (Obesity Type 1)  Weight change:  No  Significant Weight Change: No    Lab Results   Component Value Date    HGBA1C 5.5 01/30/2024    CHOL 112 09/28/2023    LDLF 44 09/28/2023    TRIG 120 09/28/2023    BUN 50 (H) 01/30/2024    PHOS 4.3 01/30/2024    K 4.4 12/07/2023        Food Preparation: Patient and Partner/Spouse  Cooking Skills/Barriers: None reported  Grocery Shopping: Patient and Partner/Spouse      Allergies: None  Intolerance: None  Appetite: Fluctuates  Intake: >75%  GI Symptoms : None   Swallowing Difficulty: No problems with swallowing  Dentition : own    Eating Out Type: Fast Food, Restaurant, and Take Out  3 times per month  Convenience Foods: Denies     Types of Activities: chair exercise class   Duration: 30-60 minutes  4 times a week     Sleep duration/quality : 5-6 hours    Supplements: Multivitamin, Vitamin C, Iron, and Probiotic daily      Nutrition Focused Physical Exam:    Performed/Deferred: Deferred due to be being virtual visit      Malnutrition Present: No    Estimated Energy Needs:    Weight Maintanence: 25 kcal/kg/day and 0.6-0.8 g/pro/kg/day    Estimated Needs: 2200 kcal for weight maintenance and 54-72g  protein    Nutrition Diagnosis:    Diagnosis Statement 1:  Diagnosis Status: New  Diagnosis : Food and nutrition related knowledge deficit related to lack of or limited prior nutrition-related education as evidenced by  pt having questions on K and phos foods of what to eat, diet recall high in salt, renal diet      Nutrition Interventions:  Explained Diet For CKD:  Sodium: We reviewed  the importance and compliance with a 2 gm sodium diet. Recommend decreasing high sodium foods such as soups, gravies, regular deli meats, condiments, prepackaged foods, crackers, chips.  One teaspoon of salt contain more than 2000 mg of sodium.  When reviewing a food label, choose foods than have less than 20% of the daily value of sodium. Dicussed with pt limiting amount of eating out and to avoid adding salt to his food.   Phosphorous: The kidneys mainly control the balance of phosphorus in the body. When phosphorus builds up in the body, it  causes calcium to come out of your bones. This can lead to weak bones that can be painful and break easily.  Too much phosphorus in your blood can also lead to heart disease. Went over food choices high/low in phosphorus. Pt recent lab value within range.   Fluid: Drink fluids to keep hydrated. Water is best. Dicussed that her fluid needs are about 64 oz per day.   Potassium: The muscles and nerves in your body use potassium to function. Too much or too little potassium can  prevent your heart muscle from working properly. If you have been told to limit your potassium intake, you will want to be especially careful about eating fruits and vegetables that are high in  potassium. Reviewed foods high/low in potassium. Pt recent lab value within range  Protein : The body uses protein to build and maintain bones, muscle, skin, and hair. It is also needed to help  prevent and fight infections. With kidney damage, you need a lower-protein diet to help prevent your kidney function from worsening. Reviewed lean protein sources with pt and suggested not exceeding 72 grams of protein per day.      Nutrition Goals:  Nutrition Goals : Compliance with CKD diet    Nutrition Recommendations:  1) None at this time.     Educational Handouts: CKD diet education

## 2024-02-19 ENCOUNTER — NUTRITION (OUTPATIENT)
Dept: TRANSPLANT | Facility: HOSPITAL | Age: 79
End: 2024-02-19
Payer: COMMERCIAL

## 2024-02-19 VITALS — BODY MASS INDEX: 32.14 KG/M2 | WEIGHT: 199.96 LBS | HEIGHT: 66 IN

## 2024-02-19 DIAGNOSIS — N18.4 CHRONIC KIDNEY DISEASE, STAGE 4 (SEVERE) (MULTI): ICD-10-CM

## 2024-02-20 ENCOUNTER — HOSPITAL ENCOUNTER (OUTPATIENT)
Dept: RADIOLOGY | Facility: HOSPITAL | Age: 79
Discharge: HOME | End: 2024-02-20
Payer: COMMERCIAL

## 2024-02-20 DIAGNOSIS — N18.4 CKD STAGE 4 SECONDARY TO HYPERTENSION (MULTI): ICD-10-CM

## 2024-02-20 DIAGNOSIS — Z01.818 PRE-TRANSPLANT EVALUATION FOR KIDNEY TRANSPLANT: ICD-10-CM

## 2024-02-20 DIAGNOSIS — I12.9 CKD STAGE 4 SECONDARY TO HYPERTENSION (MULTI): ICD-10-CM

## 2024-02-20 PROCEDURE — 75571 CT HRT W/O DYE W/CA TEST: CPT

## 2024-02-20 RX ORDER — DOCUSATE SODIUM 100 MG/1
CAPSULE, LIQUID FILLED ORAL
Qty: 90 CAPSULE | Refills: 1 | Status: SHIPPED | OUTPATIENT
Start: 2024-02-20 | End: 2024-03-22 | Stop reason: SDUPTHER

## 2024-02-21 LAB
HLA CLS I TYP PNL BLD/T DONR HIGH RES: NORMAL
HLA RESULTS: NORMAL
HLA-DP2 QL: NORMAL
HLA-DQB1 HIGH RES: NORMAL
HLA-DRB1 HIGH RES: NORMAL

## 2024-02-28 ENCOUNTER — APPOINTMENT (OUTPATIENT)
Dept: NUTRITION | Facility: CLINIC | Age: 79
End: 2024-02-28
Payer: COMMERCIAL

## 2024-02-29 ENCOUNTER — CONSULT (OUTPATIENT)
Dept: CARDIOLOGY | Facility: CLINIC | Age: 79
End: 2024-02-29
Payer: COMMERCIAL

## 2024-02-29 VITALS
BODY MASS INDEX: 32.48 KG/M2 | HEIGHT: 66 IN | WEIGHT: 202.13 LBS | SYSTOLIC BLOOD PRESSURE: 155 MMHG | HEART RATE: 74 BPM | OXYGEN SATURATION: 98 % | DIASTOLIC BLOOD PRESSURE: 78 MMHG

## 2024-02-29 DIAGNOSIS — I10 BENIGN HYPERTENSION: ICD-10-CM

## 2024-02-29 DIAGNOSIS — Z01.818 PRE-TRANSPLANT EVALUATION FOR KIDNEY TRANSPLANT: ICD-10-CM

## 2024-02-29 DIAGNOSIS — I50.32 CHRONIC DIASTOLIC HEART FAILURE (MULTI): ICD-10-CM

## 2024-02-29 DIAGNOSIS — Z01.810 PREOP CARDIOVASCULAR EXAM: Primary | ICD-10-CM

## 2024-02-29 DIAGNOSIS — I34.0 NONRHEUMATIC MITRAL VALVE REGURGITATION: ICD-10-CM

## 2024-02-29 PROCEDURE — 93005 ELECTROCARDIOGRAM TRACING: CPT | Performed by: INTERNAL MEDICINE

## 2024-02-29 PROCEDURE — 93010 ELECTROCARDIOGRAM REPORT: CPT | Performed by: INTERNAL MEDICINE

## 2024-02-29 PROCEDURE — 99204 OFFICE O/P NEW MOD 45 MIN: CPT | Performed by: INTERNAL MEDICINE

## 2024-02-29 PROCEDURE — 99214 OFFICE O/P EST MOD 30 MIN: CPT | Mod: 25 | Performed by: INTERNAL MEDICINE

## 2024-02-29 ASSESSMENT — COLUMBIA-SUICIDE SEVERITY RATING SCALE - C-SSRS
6. HAVE YOU EVER DONE ANYTHING, STARTED TO DO ANYTHING, OR PREPARED TO DO ANYTHING TO END YOUR LIFE?: NO
2. HAVE YOU ACTUALLY HAD ANY THOUGHTS OF KILLING YOURSELF?: NO
1. IN THE PAST MONTH, HAVE YOU WISHED YOU WERE DEAD OR WISHED YOU COULD GO TO SLEEP AND NOT WAKE UP?: NO

## 2024-02-29 ASSESSMENT — PATIENT HEALTH QUESTIONNAIRE - PHQ9
1. LITTLE INTEREST OR PLEASURE IN DOING THINGS: NOT AT ALL
SUM OF ALL RESPONSES TO PHQ9 QUESTIONS 1 AND 2: 0
2. FEELING DOWN, DEPRESSED OR HOPELESS: NOT AT ALL

## 2024-02-29 ASSESSMENT — PAIN SCALES - GENERAL: PAINLEVEL: 6

## 2024-02-29 ASSESSMENT — ENCOUNTER SYMPTOMS
DEPRESSION: 0
OCCASIONAL FEELINGS OF UNSTEADINESS: 0
LOSS OF SENSATION IN FEET: 0

## 2024-02-29 NOTE — PATIENT INSTRUCTIONS
You were seen at the [facility] for a check up of your heart.     Your echocardiogram shows at least moderate mitral regurgitation. I would like to get a better look at your mitral valve and make more accurate measurements of the amount of leaking across your mitral valve.     I am ordering a CHANDAN (transesophageal echocardiogram). We will have you get this test at [facility] [place] [address] testing location in our main echo lab. The CHANDAN nurses will contact you for scheduling. Because of your history of sleep apnea, I recommend this procedure be done under general anesthesia. We working with anesthesia on most Mondays and Fridays.     This test will help me determine if you are a low enough risk patient from a heart standpoint for kidney transplant surgery.

## 2024-02-29 NOTE — PROGRESS NOTES
Subjective   Liz Hamlin is a 78 y.o. female who presents to the [facility] for preoperative cardiac evaluation for renal transplant.     Ms. Hamlin has no active cardiac symptoms of chest pain, PND, orthopnea, TOD, palpitations, syncope, or claudication. Stable dyspnea on exertion with 1 flight of stairs or 2 blocks walking. Participates in 4x/week senior center exercise class (stretching, chair exercise, resistance training) without cardiac limitation.    Chronic diastolic heart failure and mitral regurgitation first diagnosed from exacerbation of dyspnea with stair climbing. Improved on SGLT2i therapy.    Past Medical History:  1. Chronic diastolic heart failure: managed with Farxiga 10 mg / furosemide 40 mg a day  2. Mitral regurgitation  3. CKD stage 4 1/2024 eGFR 21 ml/min per nephrology note)  4. Hypertension  5. JOSE E on CPAP at night    Social History:  Nonsmoker    Family History:  Family History:  No premature CAD in 1st degree relatives ( 55 years of age for male relatives,  65 years of age for female relatives). Mother is 98 yo, has atrial fibrillation.    Review of Systems    A 14 point review of systems was asked. All questions were negative except for pertinent positives listed in the HPI.     Current Outpatient Medications on File Prior to Visit   Medication Sig Dispense Refill    allopurinol (Zyloprim) 100 mg tablet Take 1 tablet (100 mg) by mouth once daily. 90 tablet 1    aspirin 81 mg EC tablet Take 1 tablet (81 mg) by mouth once daily.      atorvastatin (Lipitor) 40 mg tablet TAKE 1 TABLET DAILY 90 tablet 1    B complex-vitamin C-folic acid (Nephrocaps) 1 mg capsule Take 1 capsule by mouth once daily. 90 capsule 3    calcitriol (Rocaltrol) 0.25 mcg capsule Take 1 capsule (0.25 mcg) by mouth 3 (three) times a week. other 36 capsule 3    cholecalciferol, vitamin D3, 25 mcg (1,000 unit) tablet,chewable Chew 1 tablet (25 mcg) 2 times a week.      cloNIDine (Catapres) 0.1  "mg tablet Take 1 tablet (0.1 mg) by mouth once daily. 90 tablet 1    coenzyme Q-10 100 mg capsule Take by mouth. Take as directed by mouth      colchicine 0.6 mg tablet Take 1 tablet (0.6 mg) by mouth once daily as needed for muscle/joint pain. 90 tablet 1    dapagliflozin propanediol (Farxiga) 10 mg Take 1 tablet (10 mg) by mouth once daily in the morning. 30 tablet 2    docusate sodium (Colace) 100 mg capsule TAKE 1 CAPSULE TWICE A DAY AS NEEDED FOR CONSTIPATION 90 capsule 1    ferrous sulfate, 325 mg ferrous sulfate, (FeroSuL) tablet Take 1 tablet by mouth once daily. 90 tablet 0    furosemide (Lasix) 40 mg tablet Take 1 tablet (40 mg) by mouth once daily. 90 tablet 1    hydrALAZINE (Apresoline) 100 mg tablet Take 1 tablet (100 mg) by mouth 2 times a day. 180 tablet 1    L. acidophilus/Bifid. animalis 32 billion cell capsule Take 1 capsule by mouth once daily.      losartan (Cozaar) 100 mg tablet Take 1 tablet (100 mg) by mouth once daily. Need MD appointment for refills 90 tablet 0    metoprolol succinate XL (Toprol-XL) 50 mg 24 hr tablet Take 1 tablet (50 mg) by mouth once daily. Do not crush or chew. 90 tablet 1    hydrOXYzine HCL (Atarax) 25 mg tablet Take 1 tablet (25 mg) by mouth 2 times a day as needed for itching. 180 tablet 1     No current facility-administered medications on file prior to visit.      Objective   Physical Exam  BP Readings from Last 3 Encounters:   02/29/24 155/78   01/30/24 139/54   12/13/23 139/77      Wt Readings from Last 3 Encounters:   02/29/24 91.7 kg (202 lb 2 oz)   02/19/24 90.7 kg (199 lb 15.3 oz)   01/30/24 90.7 kg (200 lb)      BMI: Estimated body mass index is 32.62 kg/m² as calculated from the following:    Height as of this encounter: 1.676 m (5' 6\").    Weight as of this encounter: 91.7 kg (202 lb 2 oz).  BSA: Estimated body surface area is 2.07 meters squared as calculated from the following:    Height as of this encounter: 1.676 m (5' 6\").    Weight as of this " "encounter: 91.7 kg (202 lb 2 oz).    General: no acute distress  HEENT: EOMI, no scleral icterus.  Lungs: Clear to auscultation bilaterally without wheezing, rales, or rhonchi.  Cardiovascular: Regular rhythm and rate. Normal S1 and S2. No murmurs, rubs, or gallops are appreciated. JVP normal.  Cardiovascular: 3/6 systolic ejection murmur is heard at apex.  Abdomen: Soft, nontender, nondistended. Bowel sounds present.  Extremities: Warm and well perfused with equal 2+ pulses bilaterally.  No edema present.  Neurologic: Alert and oriented x3.    I have personally reviewed the following images and laboratory findings:  Last echocardiogram:  2024 echocardiogram: LV EF 55-60%, borderline LVH (LVMI 96 gm/m2), pseudonormal diastology (E/e' 13), severe LAE (CIERRA 69 ml/m2), normal RV size and function, mild STEVE (23 cm2), no AI, mild MAC, moderate MR, moderate TR, RVSP 56 mm Hg (RAP 3 mm Hg).     Last cath / stress test:  2024 CT calcium score: zero    Most recent EC2024 ECG: Sinus rhythm, 75 bpm, PVCs, normal variant ECG. Personally reviewed in office.    Lab Results   Component Value Date    CHOL 112 2023    CHOL 126 2022    CHOL 128 2021     Lab Results   Component Value Date    HDL 44.2 2023    HDL 48.3 2022    HDL 45.6 2021     No results found for: \"LDLCALC\"  Lab Results   Component Value Date    TRIG 120 2023    TRIG 131 2022    TRIG 135 2021     No components found for: \"CHOLHDL\"      Assessment/Plan   1. Preoperative evaluation:  Check CHANDAN for evaluation of anatomic etiology and quantification of mitral regurgitation. MR is at least moderate on recent 2024 echocardiogram but MR jet is eccentric and may not be fully visualized on surface echo study. Valvular heart disease is the only cardiac risk for perioperative management I see on review of cardiac studies.    Her 2024 CT calcium score is zero. This test indicates that she likely has " no detectable coronary artery plaque and low risk of perioperative MI.  Cholesterol well controlled on atorvastatin 40 mg a day to prevent atherosclerosis plaque.    I will arrange for CHANDAN under general anesthesia at [facility] echo lab. If significant MR[facility].    ----------------------------------------  8/16/2024 addendum:  Reviewed 3/11/2024 CHANDAN which showed mild-moderate mitral regurgitation and 6/4/2024 cardiac MRI which showed no coronary ischemia and ischemia scar pattern. Normal LV EF 65%. In setting of zero CT calcium score, Ms. Hamlin is a low-intermediate risk patient for transplant surgery from a cardiac standpoint. I recommend no further cardiac testing prior to listing and surgery.       SIGNATURE: Anthony Agarwal MD PATIENT NAME: Liz Hamlin   DATE/TIME: February 29, 2024 2:01 PM MRN: [id number]

## 2024-03-05 LAB
ATRIAL RATE: 75 BPM
P AXIS: 85 DEGREES
P OFFSET: 211 MS
P ONSET: 140 MS
PR INTERVAL: 158 MS
Q ONSET: 219 MS
QRS COUNT: 13 BEATS
QRS DURATION: 76 MS
QT INTERVAL: 424 MS
QTC CALCULATION(BAZETT): 473 MS
QTC FREDERICIA: 456 MS
R AXIS: 79 DEGREES
T AXIS: 50 DEGREES
T OFFSET: 431 MS
VENTRICULAR RATE: 75 BPM

## 2024-03-11 ENCOUNTER — ANESTHESIA (OUTPATIENT)
Dept: CARDIOLOGY | Facility: HOSPITAL | Age: 79
End: 2024-03-11
Payer: COMMERCIAL

## 2024-03-11 ENCOUNTER — ANESTHESIA EVENT (OUTPATIENT)
Dept: CARDIOLOGY | Facility: HOSPITAL | Age: 79
End: 2024-03-11
Payer: COMMERCIAL

## 2024-03-11 ENCOUNTER — HOSPITAL ENCOUNTER (OUTPATIENT)
Dept: CARDIOLOGY | Facility: HOSPITAL | Age: 79
Discharge: HOME | End: 2024-03-11
Payer: COMMERCIAL

## 2024-03-11 VITALS
HEART RATE: 78 BPM | SYSTOLIC BLOOD PRESSURE: 127 MMHG | RESPIRATION RATE: 14 BRPM | DIASTOLIC BLOOD PRESSURE: 66 MMHG | OXYGEN SATURATION: 99 %

## 2024-03-11 DIAGNOSIS — I34.0 NONRHEUMATIC MITRAL VALVE REGURGITATION: ICD-10-CM

## 2024-03-11 LAB — RIGHT VENTRICLE PEAK SYSTOLIC PRESSURE: 29.6 MMHG

## 2024-03-11 PROCEDURE — 3700000001 HC GENERAL ANESTHESIA TIME - INITIAL BASE CHARGE

## 2024-03-11 PROCEDURE — 2500000005 HC RX 250 GENERAL PHARMACY W/O HCPCS

## 2024-03-11 PROCEDURE — 2500000004 HC RX 250 GENERAL PHARMACY W/ HCPCS (ALT 636 FOR OP/ED)

## 2024-03-11 PROCEDURE — 93312 ECHO TRANSESOPHAGEAL: CPT | Performed by: INTERNAL MEDICINE

## 2024-03-11 PROCEDURE — 93320 DOPPLER ECHO COMPLETE: CPT | Performed by: INTERNAL MEDICINE

## 2024-03-11 PROCEDURE — A93312 PR ECHO HEART,TRANSESOPHAGEAL,COMPLETE

## 2024-03-11 PROCEDURE — 2500000001 HC RX 250 WO HCPCS SELF ADMINISTERED DRUGS (ALT 637 FOR MEDICARE OP): Performed by: INTERNAL MEDICINE

## 2024-03-11 PROCEDURE — 93325 DOPPLER ECHO COLOR FLOW MAPG: CPT | Performed by: INTERNAL MEDICINE

## 2024-03-11 PROCEDURE — A93312 PR ECHO HEART,TRANSESOPHAGEAL,COMPLETE: Performed by: ANESTHESIOLOGY

## 2024-03-11 PROCEDURE — 99100 ANES PT EXTEME AGE<1 YR&>70: CPT | Performed by: ANESTHESIOLOGY

## 2024-03-11 PROCEDURE — 3700000002 HC GENERAL ANESTHESIA TIME - EACH INCREMENTAL 1 MINUTE

## 2024-03-11 PROCEDURE — 93320 DOPPLER ECHO COMPLETE: CPT

## 2024-03-11 RX ORDER — SODIUM CHLORIDE, SODIUM LACTATE, POTASSIUM CHLORIDE, CALCIUM CHLORIDE 600; 310; 30; 20 MG/100ML; MG/100ML; MG/100ML; MG/100ML
INJECTION, SOLUTION INTRAVENOUS CONTINUOUS PRN
Status: DISCONTINUED | OUTPATIENT
Start: 2024-03-11 | End: 2024-03-11

## 2024-03-11 RX ORDER — GLYCOPYRROLATE 0.2 MG/ML
INJECTION INTRAMUSCULAR; INTRAVENOUS
Status: DISCONTINUED
Start: 2024-03-11 | End: 2024-03-12 | Stop reason: HOSPADM

## 2024-03-11 RX ORDER — LIDOCAINE HYDROCHLORIDE 20 MG/ML
SOLUTION OROPHARYNGEAL
Status: DISCONTINUED
Start: 2024-03-11 | End: 2024-03-12 | Stop reason: HOSPADM

## 2024-03-11 RX ORDER — PROPOFOL 10 MG/ML
INJECTION, EMULSION INTRAVENOUS CONTINUOUS PRN
Status: DISCONTINUED | OUTPATIENT
Start: 2024-03-11 | End: 2024-03-11

## 2024-03-11 RX ORDER — LIDOCAINE HYDROCHLORIDE 20 MG/ML
INJECTION, SOLUTION INFILTRATION; PERINEURAL AS NEEDED
Status: DISCONTINUED | OUTPATIENT
Start: 2024-03-11 | End: 2024-03-11

## 2024-03-11 RX ORDER — PROPOFOL 10 MG/ML
INJECTION, EMULSION INTRAVENOUS
Status: COMPLETED
Start: 2024-03-11 | End: 2024-03-11

## 2024-03-11 RX ORDER — PROPOFOL 10 MG/ML
INJECTION, EMULSION INTRAVENOUS AS NEEDED
Status: DISCONTINUED | OUTPATIENT
Start: 2024-03-11 | End: 2024-03-11

## 2024-03-11 RX ORDER — SODIUM CHLORIDE, SODIUM LACTATE, POTASSIUM CHLORIDE, CALCIUM CHLORIDE 600; 310; 30; 20 MG/100ML; MG/100ML; MG/100ML; MG/100ML
INJECTION, SOLUTION INTRAVENOUS
Status: COMPLETED
Start: 2024-03-11 | End: 2024-03-11

## 2024-03-11 RX ORDER — LIDOCAINE HYDROCHLORIDE 20 MG/ML
SOLUTION OROPHARYNGEAL AS NEEDED
Status: DISCONTINUED | OUTPATIENT
Start: 2024-03-11 | End: 2024-03-12 | Stop reason: HOSPADM

## 2024-03-11 RX ADMIN — BENZOCAINE 4 SPRAY: 200 SPRAY DENTAL; ORAL; PERIODONTAL at 13:37

## 2024-03-11 RX ADMIN — PROPOFOL 150 MCG/KG/MIN: 10 INJECTION, EMULSION INTRAVENOUS at 13:45

## 2024-03-11 RX ADMIN — LIDOCAINE HYDROCHLORIDE 15 ML: 20 SOLUTION ORAL; TOPICAL at 13:39

## 2024-03-11 RX ADMIN — SODIUM CHLORIDE, POTASSIUM CHLORIDE, SODIUM LACTATE AND CALCIUM CHLORIDE: 600; 310; 30; 20 INJECTION, SOLUTION INTRAVENOUS at 13:35

## 2024-03-11 RX ADMIN — LIDOCAINE HYDROCHLORIDE 30 MG: 20 INJECTION, SOLUTION INFILTRATION; PERINEURAL at 13:44

## 2024-03-11 RX ADMIN — PROPOFOL 30 MG: 10 INJECTION, EMULSION INTRAVENOUS at 13:45

## 2024-03-11 SDOH — HEALTH STABILITY: MENTAL HEALTH: CURRENT SMOKER: 0

## 2024-03-11 ASSESSMENT — PAIN SCALES - GENERAL: PAIN_LEVEL: 0

## 2024-03-11 NOTE — ANESTHESIA POSTPROCEDURE EVALUATION
Patient: Liz Hamlin    Procedure Summary       Date: 03/11/24 Room / [facility]    Anesthesia Start: 1334 Anesthesia Stop:     Procedure: TRANSESOPHAGEAL ECHO (CHANDAN) Diagnosis:       Nonrheumatic mitral valve regurgitation      (Quantify mitral regurgitation severity for pre renal transplant evaluation)    Scheduled Providers: SOLITARIO Parekh-CRNA Responsible Provider: Vonnie Albert MD    Anesthesia Type: MAC, general ASA Status: 3            Anesthesia Type: MAC, general    Vitals Value Taken Time   /64 03/11/24 1415   Temp  03/11/24 1415   Pulse 73 03/11/24 1415   Resp 12 03/11/24 1415   SpO2 100 03/11/24 1415       Anesthesia Post Evaluation    Patient location during evaluation: bedside  Patient participation: complete - patient participated  Level of consciousness: awake and awake and alert  Pain score: 0  Pain management: adequate  Airway patency: patent  Cardiovascular status: acceptable  Respiratory status: acceptable  Hydration status: acceptable  Postoperative Nausea and Vomiting: none        No notable events documented.

## 2024-03-11 NOTE — ANESTHESIA PREPROCEDURE EVALUATION
Patient: Liz Hamlin    Procedure Information       Date/Time: 24 1300    Scheduled providers: STERLING Parekh    Procedure: TRANSESOPHAGEAL ECHO (CHANDAN)    Location: [facility]        There were no vitals filed for this visit.    Past Surgical History:   Procedure Laterality Date   • OTHER SURGICAL HISTORY  2019    Carpal tunnel surgery   • OTHER SURGICAL HISTORY  2019     section   • OTHER SURGICAL HISTORY  2019    Hernia repair     Past Medical History:   Diagnosis Date   • Encounter for general adult medical examination without abnormal findings 2021    Encounter for Medicare annual wellness exam   • Unspecified cataract     Cataracts, both eyes       Current Outpatient Medications:   •  allopurinol (Zyloprim) 100 mg tablet, Take 1 tablet (100 mg) by mouth once daily., Disp: 90 tablet, Rfl: 1  •  aspirin 81 mg EC tablet, Take 1 tablet (81 mg) by mouth once daily., Disp: , Rfl:   •  atorvastatin (Lipitor) 40 mg tablet, TAKE 1 TABLET DAILY, Disp: 90 tablet, Rfl: 1  •  B complex-vitamin C-folic acid (Nephrocaps) 1 mg capsule, Take 1 capsule by mouth once daily., Disp: 90 capsule, Rfl: 3  •  calcitriol (Rocaltrol) 0.25 mcg capsule, Take 1 capsule (0.25 mcg) by mouth 3 (three) times a week. other, Disp: 36 capsule, Rfl: 3  •  cholecalciferol, vitamin D3, 25 mcg (1,000 unit) tablet,chewable, Chew 1 tablet (25 mcg) 2 times a week., Disp: , Rfl:   •  cloNIDine (Catapres) 0.1 mg tablet, Take 1 tablet (0.1 mg) by mouth once daily., Disp: 90 tablet, Rfl: 1  •  coenzyme Q-10 100 mg capsule, Take by mouth. Take as directed by mouth, Disp: , Rfl:   •  colchicine 0.6 mg tablet, Take 1 tablet (0.6 mg) by mouth once daily as needed for muscle/joint pain., Disp: 90 tablet, Rfl: 1  •  dapagliflozin propanediol (Farxiga) 10 mg, Take 1 tablet (10 mg) by mouth once daily in the morning., Disp: 30 tablet, Rfl: 2  •  docusate sodium (Colace) 100 mg capsule,  TAKE 1 CAPSULE TWICE A DAY AS NEEDED FOR CONSTIPATION, Disp: 90 capsule, Rfl: 1  •  ferrous sulfate, 325 mg ferrous sulfate, (FeroSuL) tablet, Take 1 tablet by mouth once daily., Disp: 90 tablet, Rfl: 0  •  furosemide (Lasix) 40 mg tablet, Take 1 tablet (40 mg) by mouth once daily., Disp: 90 tablet, Rfl: 1  •  hydrALAZINE (Apresoline) 100 mg tablet, Take 1 tablet (100 mg) by mouth 2 times a day., Disp: 180 tablet, Rfl: 1  •  hydrOXYzine HCL (Atarax) 25 mg tablet, Take 1 tablet (25 mg) by mouth 2 times a day as needed for itching., Disp: 180 tablet, Rfl: 1  •  L. acidophilus/Bifid. animalis 32 billion cell capsule, Take 1 capsule by mouth once daily., Disp: , Rfl:   •  losartan (Cozaar) 100 mg tablet, Take 1 tablet (100 mg) by mouth once daily. Need MD appointment for refills, Disp: 90 tablet, Rfl: 0  •  metoprolol succinate XL (Toprol-XL) 50 mg 24 hr tablet, Take 1 tablet (50 mg) by mouth once daily. Do not crush or chew., Disp: 90 tablet, Rfl: 1    Current Facility-Administered Medications:   •  glycopyrrolate (Robinul) injection  - Omnicell Override Pull, , , ,   •  propofol (Diprivan) injection  - Omnicell Override Pull, , , ,   Prior to Admission medications    Medication Sig Start Date End Date Taking? Authorizing Provider   allopurinol (Zyloprim) 100 mg tablet Take 1 tablet (100 mg) by mouth once daily. 9/29/23   Maddie Carrillo MD   aspirin 81 mg EC tablet Take 1 tablet (81 mg) by mouth once daily.    Historical Provider, MD   atorvastatin (Lipitor) 40 mg tablet TAKE 1 TABLET DAILY 2/8/24   Maddie Carrillo MD   B complex-vitamin C-folic acid (Nephrocaps) 1 mg capsule Take 1 capsule by mouth once daily. 12/13/23 12/12/24  Celina Stubbs MD   calcitriol (Rocaltrol) 0.25 mcg capsule Take 1 capsule (0.25 mcg) by mouth 3 (three) times a week. other 12/13/23 12/12/24  Celina Stubbs MD   cholecalciferol, vitamin D3, 25 mcg (1,000 unit) tablet,chewable Chew 1 tablet (25 mcg) 2 times a week.  10/14/20   Historical Provider, MD   cloNIDine (Catapres) 0.1 mg tablet Take 1 tablet (0.1 mg) by mouth once daily. 10/22/23   Maddie Carrillo MD   coenzyme Q-10 100 mg capsule Take by mouth. Take as directed by mouth 1/13/21   Historical Provider, MD   colchicine 0.6 mg tablet Take 1 tablet (0.6 mg) by mouth once daily as needed for muscle/joint pain. 3/28/23   Maddie Carrillo MD   dapagliflozin propanediol (Farxiga) 10 mg Take 1 tablet (10 mg) by mouth once daily in the morning. 12/13/23 3/12/24  Celina Stubbs MD   docusate sodium (Colace) 100 mg capsule TAKE 1 CAPSULE TWICE A DAY AS NEEDED FOR CONSTIPATION 2/20/24   Maddie Carrillo MD   ferrous sulfate, 325 mg ferrous sulfate, (FeroSuL) tablet Take 1 tablet by mouth once daily. 12/14/23   Maddie Carrillo MD   furosemide (Lasix) 40 mg tablet Take 1 tablet (40 mg) by mouth once daily. 9/29/23   Maddie Carrillo MD   hydrALAZINE (Apresoline) 100 mg tablet Take 1 tablet (100 mg) by mouth 2 times a day. 9/29/23   Maddie Carrillo MD   hydrOXYzine HCL (Atarax) 25 mg tablet Take 1 tablet (25 mg) by mouth 2 times a day as needed for itching. 9/29/23 12/28/23  Maddie Carrillo MD   L. acidophilus/Bifid. animalis 32 billion cell capsule Take 1 capsule by mouth once daily. 3/14/19   Historical Provider, MD   losartan (Cozaar) 100 mg tablet Take 1 tablet (100 mg) by mouth once daily. Need MD appointment for refills 1/14/24 4/13/24  Maddie Carrillo MD   metoprolol succinate XL (Toprol-XL) 50 mg 24 hr tablet Take 1 tablet (50 mg) by mouth once daily. Do not crush or chew. 9/29/23   Maddie Carrillo MD     Allergies   Allergen Reactions   • Codeine Itching   • Erythromycin Itching   • Nsaids (Non-Steroidal Anti-Inflammatory Drug) Itching and Nausea Only   • Tramadol Unknown     Social History     Tobacco Use   • Smoking status: Never   • Smokeless tobacco: Never   Substance Use Topics   • Alcohol use: Never          Chemistry    Lab Results   Component Value Date/Time     12/07/2023 1242    K 4.4 12/07/2023 1242     12/07/2023 1242    CO2 21 12/07/2023 1242    BUN 50 (H) 01/30/2024 1107    CREATININE 2.47 (H) 01/30/2024 1107    Lab Results   Component Value Date/Time    CALCIUM 9.4 12/07/2023 1242    ALKPHOS 94 01/30/2024 1107    AST 17 01/30/2024 1107    ALT 15 01/30/2024 1107    BILITOT 0.5 01/30/2024 1107          Lab Results   Component Value Date/Time    WBC 3.9 (L) 01/30/2024 1107    HGB 11.4 (L) 01/30/2024 1107    HCT 36.6 01/30/2024 1107     01/30/2024 1107     Lab Results   Component Value Date/Time    PROTIME 10.7 01/30/2024 1107    INR 1.0 01/30/2024 1107     Encounter Date: 02/29/24   ECG 12 Lead   Result Value    Ventricular Rate 75    Atrial Rate 75    LA Interval 158    QRS Duration 76    QT Interval 424    QTC Calculation(Bazett) 473    P Axis 85    R Axis 79    T Axis 50    QRS Count 13    Q Onset 219    P Onset 140    P Offset 211    T Offset 431    QTC Fredericia 456    Narrative    Sinus rhythm with occasional Premature ventricular complexes and Premature atrial complexes  Otherwise normal ECG  When compared with ECG of 10-SEP-2020 13:41,  Premature ventricular complexes are now Present  Premature atrial complexes are now Present  Confirmed by Anthony Agarwal ([id number]) on 3/5/2024 1:35:31 PM     No results found for this or any previous visit from the past 1095 days.        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)   (+) Chronic kidney disease (CKD) stage G4/A1, severely decreased glomerular filtration rate (GFR) between 15-29 mL/min/1.73 square meter and albuminuria  creatinine ratio less than 30 mg/g (CMS/HCC)   (+) Complex renal cyst   (+) Hepatic steatosis      Pulmonary   (+) Obstructive sleep apnea, adult      GI/Hepatic   (+) Hepatic steatosis      Infectious Disease   (+) Onychomycosis of toenail       Clinical information reviewed:                   NPO Detail:  No data recorded     Physical Exam    Airway  Mallampati: II  TM distance: >3 FB  Neck ROM: full     Cardiovascular - normal exam  Rhythm: regular  Rate: normal     Dental - normal exam     Pulmonary - normal exam     Abdominal - normal exam  (+) obese       Other findings: Mild obesity, centrally located. JOSE E         Anesthesia Plan    History of general anesthesia?: yes  History of complications of general anesthesia?: no    ASA 3     MAC and general   (All modes of anesthesia explained, with patient verbalizing understand. )  The patient is not a current smoker.  Education provided regarding risk of obstructive sleep apnea.  intravenous induction   Anesthetic plan and risks discussed with patient.  Use of blood products discussed with patient who consented to blood products.    Plan discussed with CRNA and attending.

## 2024-03-18 ENCOUNTER — OFFICE VISIT (OUTPATIENT)
Dept: PODIATRY | Facility: CLINIC | Age: 79
End: 2024-03-18
Payer: COMMERCIAL

## 2024-03-18 DIAGNOSIS — M79.671 PAIN IN BOTH FEET: ICD-10-CM

## 2024-03-18 DIAGNOSIS — M79.89 LEG SWELLING: Primary | ICD-10-CM

## 2024-03-18 DIAGNOSIS — L60.2 LONG TOENAIL: ICD-10-CM

## 2024-03-18 DIAGNOSIS — M79.672 PAIN IN BOTH FEET: ICD-10-CM

## 2024-03-18 PROCEDURE — 1160F RVW MEDS BY RX/DR IN RCRD: CPT | Performed by: PODIATRIST

## 2024-03-18 PROCEDURE — 99202 OFFICE O/P NEW SF 15 MIN: CPT | Performed by: PODIATRIST

## 2024-03-18 PROCEDURE — 1036F TOBACCO NON-USER: CPT | Performed by: PODIATRIST

## 2024-03-18 PROCEDURE — 1159F MED LIST DOCD IN RCRD: CPT | Performed by: PODIATRIST

## 2024-03-18 NOTE — PROGRESS NOTES
Patient is a 79 y/o female who presents to the office complaining of left big toenail lifting. Patient relates has been noticing thick and discolored big toenails for many years but not painful. Has tried multiple topical antifungal solution with no improvement.   Has thick and discolored nails  Presents with son for appt       Past Medical History  Past Medical History:   Diagnosis Date    Encounter for general adult medical examination without abnormal findings 09/14/2021    Encounter for Medicare annual wellness exam    Unspecified cataract     Cataracts, both eyes       Medications and Allergies have been reviewed.    Review Of Systems:  GENERAL: No weight loss, malaise or fevers.  HEENT: Negative for frequent or significant headaches,   RESPIRATORY: Negative for cough, wheezing or shortness of breath.  CARDIOVASCULAR: Negative for chest pain, leg swelling or palpitations.    Physical Exam:  Patient is alert and oriented to person, place, and time and is in no acute distress. Patient presents ambulating with no assistance.     Psychological  Appropriate mood and behavior.     Pulmonary  Breathing unlabored and unassisted at room air     Vascular  CFT is 3 seconds to hallux bilaterally. Hair growth is noted to the digits bilaterally.      Neurology  Gross sensation intact to b/l foot.      Dermatology  Nails 1-5 bilaterally are within normal limits of length. Left hallux toenail with significant lysis and discolotration. Webspaces 1-4 bilaterally are clean, dry, and intact without any debris or maceration noted. Skin appears well hydrated.      Musculoskeletal  Pain on palpation to right 3rd toe distal aspect. No pain on palpation to L hallux.    1. Leg swelling        2. Long toenail  Referral to Podiatry      3. Pain in both feet          Patient was evaluated and examined.  Nails debrided.   Keep nails trimmed and filed down  Left big toenail debrided to attached surface. No open lesions noted.  Patient to  return to the office on a as needed basis.     Theresa Cummings DPM  [phone]  Option 2  Fax: [phone]

## 2024-03-19 ENCOUNTER — OFFICE VISIT (OUTPATIENT)
Dept: UROLOGY | Facility: CLINIC | Age: 79
End: 2024-03-19
Payer: COMMERCIAL

## 2024-03-19 VITALS
BODY MASS INDEX: 32.4 KG/M2 | RESPIRATION RATE: 17 BRPM | HEIGHT: 66 IN | SYSTOLIC BLOOD PRESSURE: 166 MMHG | HEART RATE: 73 BPM | TEMPERATURE: 96.7 F | WEIGHT: 201.6 LBS | DIASTOLIC BLOOD PRESSURE: 91 MMHG

## 2024-03-19 DIAGNOSIS — Z01.818 PRE-TRANSPLANT EVALUATION FOR KIDNEY TRANSPLANT: Primary | ICD-10-CM

## 2024-03-19 DIAGNOSIS — R35.1 NOCTURIA: ICD-10-CM

## 2024-03-19 DIAGNOSIS — N28.1 BILATERAL RENAL CYSTS: ICD-10-CM

## 2024-03-19 LAB
APPEARANCE UR: CLEAR
BILIRUB UR STRIP.AUTO-MCNC: NEGATIVE MG/DL
COLOR UR: YELLOW
GLUCOSE UR STRIP.AUTO-MCNC: NORMAL MG/DL
KETONES UR STRIP.AUTO-MCNC: NEGATIVE MG/DL
LEUKOCYTE ESTERASE UR QL STRIP.AUTO: NEGATIVE
NITRITE UR QL STRIP.AUTO: NEGATIVE
PH UR STRIP.AUTO: 5.5 [PH]
POC APPEARANCE, URINE: CLEAR
POC BILIRUBIN, URINE: NEGATIVE
POC BLOOD, URINE: NEGATIVE
POC COLOR, URINE: YELLOW
POC GLUCOSE, URINE: NEGATIVE MG/DL
POC KETONES, URINE: NEGATIVE MG/DL
POC LEUKOCYTES, URINE: NEGATIVE
POC NITRITE,URINE: NEGATIVE
POC PH, URINE: 5.5 PH
POC PROTEIN, URINE: NEGATIVE MG/DL
POC SPECIFIC GRAVITY, URINE: 1.02
POC UROBILINOGEN, URINE: 0.2 EU/DL
PROT UR STRIP.AUTO-MCNC: NEGATIVE MG/DL
RBC # UR STRIP.AUTO: NEGATIVE /UL
SP GR UR STRIP.AUTO: 1.01
UROBILINOGEN UR STRIP.AUTO-MCNC: NORMAL MG/DL

## 2024-03-19 PROCEDURE — 1160F RVW MEDS BY RX/DR IN RCRD: CPT | Performed by: PHYSICIAN ASSISTANT

## 2024-03-19 PROCEDURE — 1036F TOBACCO NON-USER: CPT | Performed by: PHYSICIAN ASSISTANT

## 2024-03-19 PROCEDURE — 3077F SYST BP >= 140 MM HG: CPT | Performed by: PHYSICIAN ASSISTANT

## 2024-03-19 PROCEDURE — 1159F MED LIST DOCD IN RCRD: CPT | Performed by: PHYSICIAN ASSISTANT

## 2024-03-19 PROCEDURE — 81003 URINALYSIS AUTO W/O SCOPE: CPT | Performed by: PHYSICIAN ASSISTANT

## 2024-03-19 PROCEDURE — 99213 OFFICE O/P EST LOW 20 MIN: CPT | Performed by: PHYSICIAN ASSISTANT

## 2024-03-19 PROCEDURE — 1125F AMNT PAIN NOTED PAIN PRSNT: CPT | Performed by: PHYSICIAN ASSISTANT

## 2024-03-19 PROCEDURE — 3080F DIAST BP >= 90 MM HG: CPT | Performed by: PHYSICIAN ASSISTANT

## 2024-03-19 PROCEDURE — 87086 URINE CULTURE/COLONY COUNT: CPT

## 2024-03-19 PROCEDURE — 81003 URINALYSIS AUTO W/O SCOPE: CPT

## 2024-03-19 ASSESSMENT — ENCOUNTER SYMPTOMS
EYES NEGATIVE: 1
MUSCULOSKELETAL NEGATIVE: 1
RESPIRATORY NEGATIVE: 1
ENDOCRINE NEGATIVE: 1
CARDIOVASCULAR NEGATIVE: 1
CONSTITUTIONAL NEGATIVE: 1
NEUROLOGICAL NEGATIVE: 1
ALLERGIC/IMMUNOLOGIC NEGATIVE: 1
PSYCHIATRIC NEGATIVE: 1
GASTROINTESTINAL NEGATIVE: 1
HEMATOLOGIC/LYMPHATIC NEGATIVE: 1

## 2024-03-19 ASSESSMENT — PAIN SCALES - GENERAL: PAINLEVEL: 7

## 2024-03-19 NOTE — PROGRESS NOTES
Subjective   Patient ID: Liz Hamlin is a 78 y.o. female who presents for No chief complaint on file..  HPI  Patient is a 77 yo female with ESRD secondary to HTN currently evaluated for renal transplant, complex renal cysts seen for follow up.     Patient doing well symptomatically.  She denies bothersome urinary difficulties.  She denies urinary frequency or urgency.  She denies UTI symptoms hematuria or dysuria.    UA negative    Patient had a recent CT of the abdomen and pelvis which showed stable renal cysts.    Review of Systems   Constitutional: Negative.    HENT: Negative.     Eyes: Negative.    Respiratory: Negative.     Cardiovascular: Negative.    Gastrointestinal: Negative.    Endocrine: Negative.    Genitourinary: Negative.    Musculoskeletal: Negative.    Skin: Negative.    Allergic/Immunologic: Negative.    Neurological: Negative.    Hematological: Negative.    Psychiatric/Behavioral: Negative.         Objective   Physical Exam  Constitutional:       General: She is not in acute distress.     Appearance: Normal appearance.   HENT:      Head: Normocephalic and atraumatic.      Nose: Nose normal.      Mouth/Throat:      Mouth: Mucous membranes are dry.   Cardiovascular:      Rate and Rhythm: Normal rate.   Pulmonary:      Effort: Pulmonary effort is normal.   Abdominal:      General: Abdomen is flat.      Palpations: Abdomen is soft.   Musculoskeletal:         General: Normal range of motion.      Cervical back: Normal range of motion and neck supple.   Skin:     General: Skin is warm and dry.   Neurological:      General: No focal deficit present.      Mental Status: She is alert and oriented to person, place, and time.   Psychiatric:         Mood and Affect: Mood normal.         Assessment/Plan   Problem List Items Addressed This Visit             ICD-10-CM    Bilateral renal cysts N28.1     Urine sent for culture.  Patient cleared for kidney transplant from a urological standpoint.  Discussed renal  cysts have been stable we will continue monitoring once a year with renal ultrasound.      Follow-up in 1 year or sooner as needed         Nocturia R35.1    Relevant Orders    Measure post void residual (Completed)    POCT UA Automated manually resulted (Completed)    Urine Culture    Urinalysis with Reflex Microscopic    Pre-transplant evaluation for kidney transplant - Primary Z01.818    Relevant Orders    Urine Culture    Urinalysis with Reflex Microscopic            Dillan Palmer PA-C 03/19/24 6:07 PM

## 2024-03-19 NOTE — ASSESSMENT & PLAN NOTE
Urine sent for culture.  Patient cleared for kidney transplant from a urological standpoint.  Discussed renal cysts have been stable we will continue monitoring once a year with renal ultrasound.      Follow-up in 1 year or sooner as needed

## 2024-03-20 LAB — BACTERIA UR CULT: NORMAL

## 2024-03-22 ENCOUNTER — OFFICE VISIT (OUTPATIENT)
Dept: PRIMARY CARE | Facility: CLINIC | Age: 79
End: 2024-03-22
Payer: COMMERCIAL

## 2024-03-22 VITALS
SYSTOLIC BLOOD PRESSURE: 121 MMHG | HEART RATE: 81 BPM | WEIGHT: 203 LBS | TEMPERATURE: 98 F | BODY MASS INDEX: 32.77 KG/M2 | DIASTOLIC BLOOD PRESSURE: 68 MMHG

## 2024-03-22 DIAGNOSIS — I12.9 CKD STAGE 4 SECONDARY TO HYPERTENSION (MULTI): ICD-10-CM

## 2024-03-22 DIAGNOSIS — D63.1 ANEMIA DUE TO STAGE 4 CHRONIC KIDNEY DISEASE (MULTI): ICD-10-CM

## 2024-03-22 DIAGNOSIS — E78.00 PURE HYPERCHOLESTEROLEMIA: ICD-10-CM

## 2024-03-22 DIAGNOSIS — G47.33 OBSTRUCTIVE SLEEP APNEA, ADULT: ICD-10-CM

## 2024-03-22 DIAGNOSIS — M1A.9XX0 CHRONIC GOUT WITHOUT TOPHUS, UNSPECIFIED CAUSE, UNSPECIFIED SITE: ICD-10-CM

## 2024-03-22 DIAGNOSIS — N18.4 ANEMIA DUE TO STAGE 4 CHRONIC KIDNEY DISEASE (MULTI): ICD-10-CM

## 2024-03-22 DIAGNOSIS — N18.4 CHRONIC KIDNEY DISEASE (CKD) STAGE G4/A1, SEVERELY DECREASED GLOMERULAR FILTRATION RATE (GFR) BETWEEN 15-29 ML/MIN/1.73 SQUARE METER AND ALBUMINURIA CREATININE RATIO LESS THAN 30 MG/G (CMS/HC* (MULTI): ICD-10-CM

## 2024-03-22 DIAGNOSIS — N18.4 HYPERTENSIVE KIDNEY DISEASE WITH STAGE 4 CHRONIC KIDNEY DISEASE (MULTI): ICD-10-CM

## 2024-03-22 DIAGNOSIS — I10 PRIMARY HYPERTENSION: ICD-10-CM

## 2024-03-22 DIAGNOSIS — E21.3 HYPERPARATHYROIDISM (MULTI): ICD-10-CM

## 2024-03-22 DIAGNOSIS — I50.32 CHRONIC DIASTOLIC HEART FAILURE (MULTI): ICD-10-CM

## 2024-03-22 DIAGNOSIS — Z13.1 DIABETES MELLITUS SCREENING: ICD-10-CM

## 2024-03-22 DIAGNOSIS — I12.9 HYPERTENSIVE KIDNEY DISEASE WITH STAGE 4 CHRONIC KIDNEY DISEASE (MULTI): ICD-10-CM

## 2024-03-22 DIAGNOSIS — N18.4 CKD STAGE 4 SECONDARY TO HYPERTENSION (MULTI): ICD-10-CM

## 2024-03-22 DIAGNOSIS — I10 BENIGN HYPERTENSION: Primary | ICD-10-CM

## 2024-03-22 PROCEDURE — 3078F DIAST BP <80 MM HG: CPT | Performed by: FAMILY MEDICINE

## 2024-03-22 PROCEDURE — 1160F RVW MEDS BY RX/DR IN RCRD: CPT | Performed by: FAMILY MEDICINE

## 2024-03-22 PROCEDURE — 99214 OFFICE O/P EST MOD 30 MIN: CPT | Performed by: FAMILY MEDICINE

## 2024-03-22 PROCEDURE — 1036F TOBACCO NON-USER: CPT | Performed by: FAMILY MEDICINE

## 2024-03-22 PROCEDURE — 1159F MED LIST DOCD IN RCRD: CPT | Performed by: FAMILY MEDICINE

## 2024-03-22 PROCEDURE — 3074F SYST BP LT 130 MM HG: CPT | Performed by: FAMILY MEDICINE

## 2024-03-22 RX ORDER — DOCUSATE SODIUM 100 MG/1
CAPSULE, LIQUID FILLED ORAL
Qty: 90 CAPSULE | Refills: 1 | Status: SHIPPED | OUTPATIENT
Start: 2024-03-22

## 2024-03-22 RX ORDER — CLONIDINE HYDROCHLORIDE 0.1 MG/1
0.1 TABLET ORAL DAILY
Qty: 90 TABLET | Refills: 1 | Status: SHIPPED | OUTPATIENT
Start: 2024-03-22 | End: 2024-06-03 | Stop reason: SDUPTHER

## 2024-03-22 RX ORDER — DAPAGLIFLOZIN 10 MG/1
10 TABLET, FILM COATED ORAL EVERY MORNING
Qty: 90 TABLET | Refills: 1 | Status: SHIPPED | OUTPATIENT
Start: 2024-03-22 | End: 2024-04-13 | Stop reason: SDUPTHER

## 2024-03-22 RX ORDER — ATORVASTATIN CALCIUM 40 MG/1
40 TABLET, FILM COATED ORAL DAILY
Qty: 90 TABLET | Refills: 1 | Status: SHIPPED | OUTPATIENT
Start: 2024-03-22

## 2024-03-22 RX ORDER — HYDROXYZINE HYDROCHLORIDE 25 MG/1
25 TABLET, FILM COATED ORAL 2 TIMES DAILY PRN
Qty: 180 TABLET | Refills: 1 | Status: SHIPPED | OUTPATIENT
Start: 2024-03-22 | End: 2024-06-20

## 2024-03-22 RX ORDER — FERROUS SULFATE 325(65) MG
1 TABLET ORAL DAILY
Qty: 90 TABLET | Refills: 1 | Status: SHIPPED | OUTPATIENT
Start: 2024-03-22

## 2024-03-22 RX ORDER — LOSARTAN POTASSIUM 100 MG/1
100 TABLET ORAL DAILY
Qty: 90 TABLET | Refills: 1 | Status: SHIPPED | OUTPATIENT
Start: 2024-03-22 | End: 2024-06-20

## 2024-03-22 RX ORDER — ALLOPURINOL 100 MG/1
100 TABLET ORAL DAILY
Qty: 90 TABLET | Refills: 1 | Status: SHIPPED | OUTPATIENT
Start: 2024-03-22

## 2024-03-22 RX ORDER — ASCORBIC ACID 125 MG
1000 TABLET,CHEWABLE ORAL 2 TIMES WEEKLY
Qty: 90 TABLET | Refills: 1 | Status: SHIPPED | OUTPATIENT
Start: 2024-03-25

## 2024-03-22 RX ORDER — FUROSEMIDE 40 MG/1
40 TABLET ORAL DAILY
Qty: 90 TABLET | Refills: 1 | Status: SHIPPED | OUTPATIENT
Start: 2024-03-22

## 2024-03-22 RX ORDER — METOPROLOL SUCCINATE 50 MG/1
50 TABLET, EXTENDED RELEASE ORAL DAILY
Qty: 90 TABLET | Refills: 1 | Status: SHIPPED | OUTPATIENT
Start: 2024-03-22

## 2024-03-22 RX ORDER — COLCHICINE 0.6 MG/1
0.6 TABLET ORAL DAILY PRN
Qty: 90 TABLET | Refills: 1 | Status: SHIPPED | OUTPATIENT
Start: 2024-03-22 | End: 2024-05-08 | Stop reason: HOSPADM

## 2024-03-22 RX ORDER — HYDRALAZINE HYDROCHLORIDE 100 MG/1
100 TABLET, FILM COATED ORAL 2 TIMES DAILY
Qty: 180 TABLET | Refills: 1 | Status: SHIPPED | OUTPATIENT
Start: 2024-03-22

## 2024-03-22 NOTE — PROGRESS NOTES
Subjective   Patient ID: Liz Hamlin is a 78 y.o. female who presents for 6 month follow up.  HPI  The patient is a 78 yo AA female with a history of HTN, MVR, gout, CKD, HLD, JOSE E ( on CPAP), here for a follow-up visit.    A review of system was completed.  All systems were reviewed and were normal, except for the ones that are listed in the HPI.    Objective   Physical Exam  Constitutional:       Appearance: Normal appearance.   HENT:      Head: Normocephalic and atraumatic.      Right Ear: Tympanic membrane, ear canal and external ear normal.      Left Ear: Tympanic membrane, ear canal and external ear normal.      Nose: Nose normal.      Mouth/Throat:      Mouth: Mucous membranes are moist.      Pharynx: Oropharynx is clear.   Eyes:      Extraocular Movements: Extraocular movements intact.      Conjunctiva/sclera: Conjunctivae normal.      Pupils: Pupils are equal, round, and reactive to light.   Cardiovascular:      Rate and Rhythm: Normal rate and regular rhythm.      Pulses: Normal pulses.   Pulmonary:      Effort: Pulmonary effort is normal.      Breath sounds: Normal breath sounds.   Abdominal:      General: Abdomen is flat. Bowel sounds are normal.      Palpations: Abdomen is soft.   Musculoskeletal:         General: Normal range of motion.      Cervical back: Normal range of motion and neck supple.   Skin:     General: Skin is warm.   Neurological:      General: No focal deficit present.      Mental Status: She is alert and oriented to person, place, and time. Mental status is at baseline.   Psychiatric:         Mood and Affect: Mood normal.         Behavior: Behavior normal.         Thought Content: Thought content normal.         Judgment: Judgment normal.       Assessment/Plan   Problem List Items Addressed This Visit       Benign hypertension - Primary    Relevant Medications    furosemide (Lasix) 40 mg tablet    hydrALAZINE (Apresoline) 100 mg tablet    losartan (Cozaar) 100 mg tablet    Chronic  kidney disease (CKD) stage G4/A1, severely decreased glomerular filtration rate (GFR) between 15-29 mL/min/1.73 square meter and albuminuria creatinine ratio less than 30 mg/g (CMS/[facility])     -The patient is on the kidney transplant list.          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 (CMS/[facility])    Relevant Medications    allopurinol (Zyloprim) 100 mg tablet    dapagliflozin propanediol (Farxiga) 10 mg    docusate sodium (Colace) 100 mg capsule    ferrous sulfate, 325 mg ferrous sulfate, (FeroSuL) tablet    furosemide (Lasix) 40 mg tablet    hydrALAZINE (Apresoline) 100 mg tablet    hydrOXYzine HCL (Atarax) 25 mg tablet    losartan (Cozaar) 100 mg tablet    colchicine 0.6 mg tablet    Diabetes mellitus screening    Relevant Orders    Hemoglobin A1C    Chronic diastolic heart failure (CMS/HCC)    Relevant Medications    metoprolol succinate XL (Toprol-XL) 50 mg 24 hr tablet     Other Visit Diagnoses       Hypertensive kidney disease with stage 4 chronic kidney disease (CMS/HCC)        Hyperparathyroidism (CMS/[facility])        Anemia due to stage 4 chronic kidney disease (CMS/[facility])        Primary hypertension        Relevant Medications    cloNIDine (Catapres) 0.1 mg tablet    furosemide (Lasix) 40 mg tablet    hydrALAZINE (Apresoline) 100 mg tablet    losartan (Cozaar) 100 mg tablet         Patient to return to office in 6 months for your MWV.

## 2024-03-25 ENCOUNTER — OFFICE VISIT (OUTPATIENT)
Dept: OBSTETRICS AND GYNECOLOGY | Facility: CLINIC | Age: 79
End: 2024-03-25
Payer: COMMERCIAL

## 2024-03-25 VITALS
HEIGHT: 66 IN | BODY MASS INDEX: 32.53 KG/M2 | SYSTOLIC BLOOD PRESSURE: 155 MMHG | WEIGHT: 202.4 LBS | DIASTOLIC BLOOD PRESSURE: 79 MMHG

## 2024-03-25 DIAGNOSIS — Z01.818 PREOPERATIVE EXAM FOR GYNECOLOGIC SURGERY: ICD-10-CM

## 2024-03-25 DIAGNOSIS — R93.89 ENDOMETRIAL THICKENING ON ULTRASOUND: Primary | ICD-10-CM

## 2024-03-25 DIAGNOSIS — N83.202 LEFT OVARIAN CYST: ICD-10-CM

## 2024-03-25 PROCEDURE — 1126F AMNT PAIN NOTED NONE PRSNT: CPT | Performed by: OBSTETRICS & GYNECOLOGY

## 2024-03-25 PROCEDURE — 1159F MED LIST DOCD IN RCRD: CPT | Performed by: OBSTETRICS & GYNECOLOGY

## 2024-03-25 PROCEDURE — 1160F RVW MEDS BY RX/DR IN RCRD: CPT | Performed by: OBSTETRICS & GYNECOLOGY

## 2024-03-25 PROCEDURE — 99214 OFFICE O/P EST MOD 30 MIN: CPT | Performed by: OBSTETRICS & GYNECOLOGY

## 2024-03-25 PROCEDURE — 1036F TOBACCO NON-USER: CPT | Performed by: OBSTETRICS & GYNECOLOGY

## 2024-03-25 PROCEDURE — 3078F DIAST BP <80 MM HG: CPT | Performed by: OBSTETRICS & GYNECOLOGY

## 2024-03-25 PROCEDURE — 3077F SYST BP >= 140 MM HG: CPT | Performed by: OBSTETRICS & GYNECOLOGY

## 2024-03-25 ASSESSMENT — ENCOUNTER SYMPTOMS
NEUROLOGICAL NEGATIVE: 0
ENDOCRINE NEGATIVE: 0
GASTROINTESTINAL NEGATIVE: 0
FATIGUE: 1
EYES NEGATIVE: 0
JOINT SWELLING: 1
RESPIRATORY NEGATIVE: 0
HEMATOLOGIC/LYMPHATIC NEGATIVE: 0
ALLERGIC/IMMUNOLOGIC NEGATIVE: 0
BACK PAIN: 1
CARDIOVASCULAR NEGATIVE: 0
MUSCULOSKELETAL NEGATIVE: 0
CONSTITUTIONAL NEGATIVE: 0
PSYCHIATRIC NEGATIVE: 0
FREQUENCY: 1
SHORTNESS OF BREATH: 1

## 2024-03-25 ASSESSMENT — PAIN SCALES - GENERAL: PAINLEVEL: 0-NO PAIN

## 2024-03-25 NOTE — PROGRESS NOTES
Subjective   Patient ID: Liz Hamlin is a 78 y.o. female who presents for Results (Pt here to Discuss Results. Last Pap None on File. Mammogram 10/27/23 -.).  Patient here following up from ultrasound done in July 2023.  Had history of a complex ovarian cyst.  Review of ultrasound shows thickened endometrium 12 mm with 2.9 cm cyst in the left ovary.  Since the ultrasound patient denies any further episodes of bleeding.  No abdominal pain.  No nausea vomiting fever chills no GI or  complaints      Review of Systems   Constitutional:  Positive for fatigue.   Respiratory:  Positive for shortness of breath.    Genitourinary:  Positive for frequency.   Musculoskeletal:  Positive for back pain and joint swelling.   All other systems reviewed and are negative.      Objective   Physical Exam  Vitals reviewed.   Constitutional:       Appearance: Normal appearance. She is obese.   Cardiovascular:      Rate and Rhythm: Normal rate and regular rhythm.   Pulmonary:      Effort: Pulmonary effort is normal.      Breath sounds: Normal breath sounds.   Abdominal:      General: Bowel sounds are normal. There is no distension.      Palpations: Abdomen is soft. There is no mass.      Tenderness: There is no abdominal tenderness.   Genitourinary:     Comments: External genitalia unremarkable  Vagina clear  Uterus appears distorted cervix very anterior stenotic small with appears to be a cervical fibroid overall appears to be minimally enlarged 6 weeks size  Adnexa unremarkable  Perineum without lesions or swelling  Neurological:      General: No focal deficit present.      Mental Status: She is alert.   Psychiatric:         Mood and Affect: Mood normal.         Behavior: Behavior normal.         Thought Content: Thought content normal.         Judgment: Judgment normal.     Assessment/Plan   Diagnoses and all orders for this visit:  Endometrial thickening on ultrasound  -     US PELVIS TRANSABDOMINAL WITH TRANSVAGINAL; Future  Left  ovarian cyst  -     US PELVIS TRANSABDOMINAL WITH TRANSVAGINAL; Future  Preoperative exam for gynecologic surgery  Current exam still compatible with uterine fibroids.  Reviewed ultrasound with patient.  Has delayed in returning to office however.  Ultrasound does show thickened endometrium and a 2.9 cm left ovarian cyst.  No septations noted or papillary excrescences.  Reviewed situation with patient.  Probably at the minimum needs a diagnostic hysteroscopy D&C for endometrial sampling.  Unable to do endometrial biopsy in office due to what appears to be a distorted cervix from possibly fibroid.  Depending on follow-up left ovary may also need attention as well.  Will order ultrasound at this point to redemonstrate endometrial thickening and to check left ovarian cyst.  At this point planning on diagnostic hysteroscopy D&C.  Will await scheduling until results ultrasound are back.  Chart reviewed along with previous ultrasound CT scans.       Iam Carter MD 03/25/24 1:27 PM

## 2024-03-26 ENCOUNTER — HOSPITAL ENCOUNTER (OUTPATIENT)
Dept: RADIOLOGY | Facility: CLINIC | Age: 79
Discharge: HOME | End: 2024-03-26
Payer: COMMERCIAL

## 2024-03-26 DIAGNOSIS — R93.89 ENDOMETRIAL THICKENING ON ULTRASOUND: ICD-10-CM

## 2024-03-26 DIAGNOSIS — N83.202 LEFT OVARIAN CYST: ICD-10-CM

## 2024-03-26 DIAGNOSIS — Z78.0 ASYMPTOMATIC MENOPAUSAL STATE: ICD-10-CM

## 2024-03-26 PROCEDURE — 76856 US EXAM PELVIC COMPLETE: CPT

## 2024-03-26 PROCEDURE — 77080 DXA BONE DENSITY AXIAL: CPT

## 2024-03-26 PROCEDURE — 77080 DXA BONE DENSITY AXIAL: CPT | Performed by: RADIOLOGY

## 2024-03-26 PROCEDURE — 76830 TRANSVAGINAL US NON-OB: CPT | Performed by: RADIOLOGY

## 2024-03-26 PROCEDURE — 76857 US EXAM PELVIC LIMITED: CPT | Performed by: RADIOLOGY

## 2024-03-28 NOTE — RESULT ENCOUNTER NOTE
Patient with thickened endometrium.  Uterine fibroids.  Ultrasound compared to previous ultrasound on 7/23/2023.  Left ovarian cyst appears to be exactly the same size.  Appears to be a simple cyst.  Would repeat follow-up in 3 months probably benign

## 2024-04-05 ENCOUNTER — PREP FOR PROCEDURE (OUTPATIENT)
Dept: OBSTETRICS AND GYNECOLOGY | Facility: CLINIC | Age: 79
End: 2024-04-05
Payer: COMMERCIAL

## 2024-04-05 DIAGNOSIS — Z01.818 PREOPERATIVE EXAM FOR GYNECOLOGIC SURGERY: Primary | ICD-10-CM

## 2024-04-08 ENCOUNTER — APPOINTMENT (OUTPATIENT)
Dept: PODIATRY | Facility: CLINIC | Age: 79
End: 2024-04-08
Payer: COMMERCIAL

## 2024-04-11 ENCOUNTER — LAB (OUTPATIENT)
Dept: LAB | Facility: LAB | Age: 79
End: 2024-04-11
Payer: COMMERCIAL

## 2024-04-11 DIAGNOSIS — N18.4 CKD STAGE 4 SECONDARY TO HYPERTENSION (MULTI): ICD-10-CM

## 2024-04-11 DIAGNOSIS — N18.4 CHRONIC KIDNEY DISEASE (CKD) STAGE G4/A1, SEVERELY DECREASED GLOMERULAR FILTRATION RATE (GFR) BETWEEN 15-29 ML/MIN/1.73 SQUARE METER AND ALBUMINURIA CREATININE RATIO LESS THAN 30 MG/G (CMS/HC* (MULTI): ICD-10-CM

## 2024-04-11 DIAGNOSIS — Z01.818 PREOPERATIVE EXAM FOR GYNECOLOGIC SURGERY: ICD-10-CM

## 2024-04-11 DIAGNOSIS — D63.1 ANEMIA DUE TO STAGE 4 CHRONIC KIDNEY DISEASE (MULTI): ICD-10-CM

## 2024-04-11 DIAGNOSIS — I12.9 CKD STAGE 4 SECONDARY TO HYPERTENSION (MULTI): ICD-10-CM

## 2024-04-11 DIAGNOSIS — N18.4 ANEMIA DUE TO STAGE 4 CHRONIC KIDNEY DISEASE (MULTI): ICD-10-CM

## 2024-04-11 DIAGNOSIS — Z13.1 DIABETES MELLITUS SCREENING: ICD-10-CM

## 2024-04-11 DIAGNOSIS — E21.3 HYPERPARATHYROIDISM (MULTI): ICD-10-CM

## 2024-04-11 DIAGNOSIS — I12.9 HYPERTENSIVE KIDNEY DISEASE WITH STAGE 4 CHRONIC KIDNEY DISEASE (MULTI): ICD-10-CM

## 2024-04-11 DIAGNOSIS — N18.4 HYPERTENSIVE KIDNEY DISEASE WITH STAGE 4 CHRONIC KIDNEY DISEASE (MULTI): ICD-10-CM

## 2024-04-11 DIAGNOSIS — E78.00 PURE HYPERCHOLESTEROLEMIA: ICD-10-CM

## 2024-04-11 LAB
ALBUMIN SERPL BCP-MCNC: 4.1 G/DL (ref 3.4–5)
ANION GAP SERPL CALC-SCNC: 15 MMOL/L (ref 10–20)
ANION GAP SERPL CALC-SCNC: 16 MMOL/L (ref 10–20)
APPEARANCE UR: CLEAR
BILIRUB UR STRIP.AUTO-MCNC: NEGATIVE MG/DL
BUN SERPL-MCNC: 48 MG/DL (ref 6–23)
BUN SERPL-MCNC: 50 MG/DL (ref 6–23)
CALCIUM SERPL-MCNC: 9.8 MG/DL (ref 8.6–10.6)
CALCIUM SERPL-MCNC: 9.9 MG/DL (ref 8.6–10.6)
CHLORIDE SERPL-SCNC: 105 MMOL/L (ref 98–107)
CHLORIDE SERPL-SCNC: 106 MMOL/L (ref 98–107)
CHOLEST SERPL-MCNC: 121 MG/DL (ref 0–199)
CHOLESTEROL/HDL RATIO: 2.7
CO2 SERPL-SCNC: 23 MMOL/L (ref 21–32)
CO2 SERPL-SCNC: 24 MMOL/L (ref 21–32)
COLOR UR: ABNORMAL
CREAT SERPL-MCNC: 2.4 MG/DL (ref 0.5–1.05)
CREAT SERPL-MCNC: 2.41 MG/DL (ref 0.5–1.05)
CREAT UR-MCNC: 52.8 MG/DL (ref 20–320)
CREAT UR-MCNC: 52.8 MG/DL (ref 20–320)
EGFRCR SERPLBLD CKD-EPI 2021: 20 ML/MIN/1.73M*2
EGFRCR SERPLBLD CKD-EPI 2021: 20 ML/MIN/1.73M*2
ERYTHROCYTE [DISTWIDTH] IN BLOOD BY AUTOMATED COUNT: 15.1 % (ref 11.5–14.5)
EST. AVERAGE GLUCOSE BLD GHB EST-MCNC: 114 MG/DL
GLUCOSE SERPL-MCNC: 88 MG/DL (ref 74–99)
GLUCOSE SERPL-MCNC: 92 MG/DL (ref 74–99)
GLUCOSE UR STRIP.AUTO-MCNC: ABNORMAL MG/DL
HBA1C MFR BLD: 5.6 %
HCT VFR BLD AUTO: 37.6 % (ref 36–46)
HDLC SERPL-MCNC: 44.3 MG/DL
HGB BLD-MCNC: 11.6 G/DL (ref 12–16)
KETONES UR STRIP.AUTO-MCNC: NEGATIVE MG/DL
LDLC SERPL CALC-MCNC: 53 MG/DL
LEUKOCYTE ESTERASE UR QL STRIP.AUTO: NEGATIVE
MCH RBC QN AUTO: 28.4 PG (ref 26–34)
MCHC RBC AUTO-ENTMCNC: 30.9 G/DL (ref 32–36)
MCV RBC AUTO: 92 FL (ref 80–100)
MICROALBUMIN UR-MCNC: 9.4 MG/L
MICROALBUMIN/CREAT UR: 17.8 UG/MG CREAT
NITRITE UR QL STRIP.AUTO: NEGATIVE
NON HDL CHOLESTEROL: 77 MG/DL (ref 0–149)
NRBC BLD-RTO: 0 /100 WBCS (ref 0–0)
PH UR STRIP.AUTO: 5 [PH]
PHOSPHATE SERPL-MCNC: 4.4 MG/DL (ref 2.5–4.9)
PLATELET # BLD AUTO: 152 X10*3/UL (ref 150–450)
POTASSIUM SERPL-SCNC: 4.9 MMOL/L (ref 3.5–5.3)
POTASSIUM SERPL-SCNC: 5 MMOL/L (ref 3.5–5.3)
PROT UR STRIP.AUTO-MCNC: NEGATIVE MG/DL
PROT UR-ACNC: 8 MG/DL (ref 5–24)
PROT/CREAT UR: 0.15 MG/MG CREAT (ref 0–0.17)
RBC # BLD AUTO: 4.09 X10*6/UL (ref 4–5.2)
RBC # UR STRIP.AUTO: NEGATIVE /UL
SODIUM SERPL-SCNC: 139 MMOL/L (ref 136–145)
SODIUM SERPL-SCNC: 140 MMOL/L (ref 136–145)
SP GR UR STRIP.AUTO: 1.01
TRIGL SERPL-MCNC: 120 MG/DL (ref 0–149)
TSH SERPL-ACNC: 2.34 MIU/L (ref 0.44–3.98)
UROBILINOGEN UR STRIP.AUTO-MCNC: NORMAL MG/DL
VLDL: 24 MG/DL (ref 0–40)
WBC # BLD AUTO: 4.3 X10*3/UL (ref 4.4–11.3)

## 2024-04-11 PROCEDURE — 84443 ASSAY THYROID STIM HORMONE: CPT

## 2024-04-11 PROCEDURE — 80061 LIPID PANEL: CPT

## 2024-04-11 PROCEDURE — 84156 ASSAY OF PROTEIN URINE: CPT

## 2024-04-11 PROCEDURE — 82043 UR ALBUMIN QUANTITATIVE: CPT

## 2024-04-11 PROCEDURE — 80048 BASIC METABOLIC PNL TOTAL CA: CPT

## 2024-04-11 PROCEDURE — 85027 COMPLETE CBC AUTOMATED: CPT

## 2024-04-11 PROCEDURE — 81003 URINALYSIS AUTO W/O SCOPE: CPT

## 2024-04-11 PROCEDURE — 83036 HEMOGLOBIN GLYCOSYLATED A1C: CPT

## 2024-04-11 PROCEDURE — 80069 RENAL FUNCTION PANEL: CPT

## 2024-04-11 PROCEDURE — 36415 COLL VENOUS BLD VENIPUNCTURE: CPT

## 2024-04-11 PROCEDURE — 82570 ASSAY OF URINE CREATININE: CPT

## 2024-04-12 ENCOUNTER — APPOINTMENT (OUTPATIENT)
Dept: PODIATRY | Facility: CLINIC | Age: 79
End: 2024-04-12
Payer: COMMERCIAL

## 2024-04-12 ENCOUNTER — TELEPHONE (OUTPATIENT)
Dept: TRANSPLANT | Facility: HOSPITAL | Age: 79
End: 2024-04-12

## 2024-04-12 ENCOUNTER — DOCUMENTATION (OUTPATIENT)
Dept: TRANSPLANT | Facility: HOSPITAL | Age: 79
End: 2024-04-12

## 2024-04-12 NOTE — TELEPHONE ENCOUNTER
I returned the patient's call to update her on her evaluation progress.  Patient is waiting to have a D&C on 5/8/24.  Patient has had all cardiac testing except a stress test.  I am checking the with cardiologist to see if she needs one and will call the patient back on 4/16.  Otherwise her testing seems fairly complete.   Patient denied having had any hep B vaccines.  Patient denied any further questions.

## 2024-04-12 NOTE — PROGRESS NOTES
Lab Results   Component Value Date    HEPBSAB <3.1 01/30/2024       There is no immunization history for the selected administration types on file for this patient.  Patient denies receiving the immunization.

## 2024-04-13 DIAGNOSIS — N18.4 CKD STAGE 4 SECONDARY TO HYPERTENSION (MULTI): ICD-10-CM

## 2024-04-13 DIAGNOSIS — I12.9 CKD STAGE 4 SECONDARY TO HYPERTENSION (MULTI): ICD-10-CM

## 2024-04-13 RX ORDER — DAPAGLIFLOZIN 10 MG/1
10 TABLET, FILM COATED ORAL EVERY MORNING
Qty: 90 TABLET | Refills: 3 | Status: SHIPPED | OUTPATIENT
Start: 2024-04-13 | End: 2025-04-13

## 2024-04-15 ENCOUNTER — APPOINTMENT (OUTPATIENT)
Dept: CARDIOLOGY | Facility: HOSPITAL | Age: 79
End: 2024-04-15
Payer: COMMERCIAL

## 2024-04-16 ENCOUNTER — TELEPHONE (OUTPATIENT)
Dept: TRANSPLANT | Facility: HOSPITAL | Age: 79
End: 2024-04-16
Payer: COMMERCIAL

## 2024-04-17 ENCOUNTER — OFFICE VISIT (OUTPATIENT)
Dept: NEPHROLOGY | Facility: CLINIC | Age: 79
End: 2024-04-17
Payer: COMMERCIAL

## 2024-04-17 ENCOUNTER — TELEPHONE (OUTPATIENT)
Dept: TRANSPLANT | Facility: HOSPITAL | Age: 79
End: 2024-04-17

## 2024-04-17 VITALS
TEMPERATURE: 97.7 F | BODY MASS INDEX: 32.3 KG/M2 | DIASTOLIC BLOOD PRESSURE: 74 MMHG | WEIGHT: 201 LBS | HEIGHT: 66 IN | HEART RATE: 62 BPM | SYSTOLIC BLOOD PRESSURE: 137 MMHG

## 2024-04-17 DIAGNOSIS — I12.9 HYPERTENSIVE KIDNEY DISEASE WITH STAGE 4 CHRONIC KIDNEY DISEASE (MULTI): Primary | ICD-10-CM

## 2024-04-17 DIAGNOSIS — Z01.818 PRE-TRANSPLANT EVALUATION FOR KIDNEY TRANSPLANT: ICD-10-CM

## 2024-04-17 DIAGNOSIS — N18.4 HYPERTENSIVE KIDNEY DISEASE WITH STAGE 4 CHRONIC KIDNEY DISEASE (MULTI): Primary | ICD-10-CM

## 2024-04-17 PROCEDURE — 1159F MED LIST DOCD IN RCRD: CPT | Performed by: INTERNAL MEDICINE

## 2024-04-17 PROCEDURE — 1160F RVW MEDS BY RX/DR IN RCRD: CPT | Performed by: INTERNAL MEDICINE

## 2024-04-17 PROCEDURE — 3075F SYST BP GE 130 - 139MM HG: CPT | Performed by: INTERNAL MEDICINE

## 2024-04-17 PROCEDURE — 3078F DIAST BP <80 MM HG: CPT | Performed by: INTERNAL MEDICINE

## 2024-04-17 PROCEDURE — 1036F TOBACCO NON-USER: CPT | Performed by: INTERNAL MEDICINE

## 2024-04-17 PROCEDURE — 99213 OFFICE O/P EST LOW 20 MIN: CPT | Performed by: INTERNAL MEDICINE

## 2024-04-17 NOTE — PROGRESS NOTES
Chief Complaint: Follow up CKD    No specific complaints  Denies nausea, vomiting, chest pain, dyspnea  No urinary symptoms  Home bps 120s systolic on average    NAD  Sclera AI s inj  MMM, no sores  Deferred secondary to COVID  No edema  No tremor  No rash    CKD stage 4   HTN well controlled at home  Proteinuria, none significant   Transplant listed, awaiting living donor eval  Modality education, needs to decide upon modality    Labs and follow up with DT in 3 months  Labs and follow up with me in 6   No medication changes at this time

## 2024-04-17 NOTE — TELEPHONE ENCOUNTER
I called the patient to update her on her evaluation.  I informed the patient that she needs a cardiac stress MRI.  We discussed what the test entails and the patient denied any further questions.  Patient understands she will receive a call to schedule the appointment and to wait for that call.

## 2024-04-22 ENCOUNTER — DOCUMENTATION (OUTPATIENT)
Dept: TRANSPLANT | Facility: HOSPITAL | Age: 79
End: 2024-04-22
Payer: COMMERCIAL

## 2024-04-29 ENCOUNTER — OFFICE VISIT (OUTPATIENT)
Dept: ORTHOPEDIC SURGERY | Facility: HOSPITAL | Age: 79
End: 2024-04-29
Payer: COMMERCIAL

## 2024-04-29 DIAGNOSIS — M75.101 ROTATOR CUFF TEAR ARTHROPATHY OF RIGHT SHOULDER: Primary | ICD-10-CM

## 2024-04-29 DIAGNOSIS — M12.811 ROTATOR CUFF TEAR ARTHROPATHY OF RIGHT SHOULDER: Primary | ICD-10-CM

## 2024-04-29 PROCEDURE — 99212 OFFICE O/P EST SF 10 MIN: CPT | Performed by: ORTHOPAEDIC SURGERY

## 2024-04-29 PROCEDURE — 1160F RVW MEDS BY RX/DR IN RCRD: CPT | Performed by: ORTHOPAEDIC SURGERY

## 2024-04-29 PROCEDURE — 1159F MED LIST DOCD IN RCRD: CPT | Performed by: ORTHOPAEDIC SURGERY

## 2024-04-29 PROCEDURE — 20610 DRAIN/INJ JOINT/BURSA W/O US: CPT | Mod: RT | Performed by: ORTHOPAEDIC SURGERY

## 2024-04-29 PROCEDURE — 2500000004 HC RX 250 GENERAL PHARMACY W/ HCPCS (ALT 636 FOR OP/ED): Performed by: ORTHOPAEDIC SURGERY

## 2024-04-29 PROCEDURE — 2500000005 HC RX 250 GENERAL PHARMACY W/O HCPCS: Performed by: ORTHOPAEDIC SURGERY

## 2024-04-29 RX ORDER — TRIAMCINOLONE ACETONIDE 40 MG/ML
40 INJECTION, SUSPENSION INTRA-ARTICULAR; INTRAMUSCULAR
Status: COMPLETED | OUTPATIENT
Start: 2024-04-29 | End: 2024-04-29

## 2024-04-29 RX ORDER — LIDOCAINE HYDROCHLORIDE 10 MG/ML
4 INJECTION INFILTRATION; PERINEURAL
Status: COMPLETED | OUTPATIENT
Start: 2024-04-29 | End: 2024-04-29

## 2024-04-29 RX ADMIN — LIDOCAINE HYDROCHLORIDE 4 ML: 10 INJECTION, SOLUTION INFILTRATION; PERINEURAL at 13:06

## 2024-04-29 RX ADMIN — TRIAMCINOLONE ACETONIDE 40 MG: 400 INJECTION, SUSPENSION INTRA-ARTICULAR; INTRAMUSCULAR at 13:06

## 2024-04-29 NOTE — PROGRESS NOTES
Patient is here for repeat injection of the right shoulder she has rotator cuff arthropathy and was considering surgery but has recently kidney insufficiency has led to plan for kidney transplant in the near future.  So she really cannot have shoulder surgery and would like to have repeat injection.  Will    Right shoulder painful arc of motion and elevation above 90 degrees and motor power in abduction is 4/5.      Rotator cuff arthropathy right shoulder    Patient request injection was performed and tolerated well and the patient send follow-up as needed.    This was dictated using voice recognition software and not corrected for grammatical or spelling errors.  L Inj/Asp: R subacromial bursa on 4/29/2024 1:06 PM  Indications: pain  Details: 22 G needle, posterior approach  Medications: 40 mg triamcinolone acetonide 40 mg/mL; 4 mL lidocaine 10 mg/mL (1 %)  Outcome: tolerated well, no immediate complications  Procedure, treatment alternatives, risks and benefits explained, specific risks discussed. Consent was given by the patient. Immediately prior to procedure a time out was called to verify the correct patient, procedure, equipment, support staff and site/side marked as required.

## 2024-05-01 ENCOUNTER — PATIENT MESSAGE (OUTPATIENT)
Dept: OBSTETRICS AND GYNECOLOGY | Facility: CLINIC | Age: 79
End: 2024-05-01
Payer: COMMERCIAL

## 2024-05-07 ENCOUNTER — ANESTHESIA EVENT (OUTPATIENT)
Dept: OPERATING ROOM | Facility: HOSPITAL | Age: 79
End: 2024-05-07
Payer: COMMERCIAL

## 2024-05-07 NOTE — ANESTHESIA PREPROCEDURE EVALUATION
Patient: Liz Hamlin    Procedure Information       Date/Time: 05/08/24 0730    Procedure: D & C Hysteroscopy    Location: [facility] A OR 05 / Virtual [facility] A OR    Surgeons: Iam Carter MD            Relevant Problems   Cardiac   (+) Benign hypertension   (+) Essential hypertension   (+) Hyperlipidemia   (+) Mitral valve regurgitation      Pulmonary   (+) Obstructive sleep apnea, adult      Liver   (+) Hepatic steatosis      ID   (+) Onychomycosis of toenail       Clinical information reviewed:                   NPO Detail:  No data recorded     Physical Exam    Airway  Mallampati: II     Cardiovascular   Rhythm: regular  Rate: normal     Dental    Pulmonary   Breath sounds clear to auscultation     Abdominal          Anesthesia Plan    History of general anesthesia?: yes  History of complications of general anesthesia?: no    ASA 3     general     intravenous induction   Anesthetic plan and risks discussed with patient.    Plan discussed with CRNA and CAA.

## 2024-05-08 ENCOUNTER — HOSPITAL ENCOUNTER (OUTPATIENT)
Facility: HOSPITAL | Age: 79
Setting detail: OUTPATIENT SURGERY
Discharge: HOME | End: 2024-05-08
Attending: OBSTETRICS & GYNECOLOGY | Admitting: OBSTETRICS & GYNECOLOGY
Payer: COMMERCIAL

## 2024-05-08 ENCOUNTER — ANESTHESIA (OUTPATIENT)
Dept: OPERATING ROOM | Facility: HOSPITAL | Age: 79
End: 2024-05-08
Payer: COMMERCIAL

## 2024-05-08 VITALS
TEMPERATURE: 97.7 F | BODY MASS INDEX: 32.06 KG/M2 | RESPIRATION RATE: 16 BRPM | HEART RATE: 80 BPM | WEIGHT: 199.52 LBS | OXYGEN SATURATION: 98 % | SYSTOLIC BLOOD PRESSURE: 150 MMHG | DIASTOLIC BLOOD PRESSURE: 70 MMHG | HEIGHT: 66 IN

## 2024-05-08 DIAGNOSIS — N95.0 POSTMENOPAUSAL BLEEDING: Primary | ICD-10-CM

## 2024-05-08 DIAGNOSIS — Z01.818 PREOPERATIVE EXAM FOR GYNECOLOGIC SURGERY: ICD-10-CM

## 2024-05-08 PROCEDURE — 88305 TISSUE EXAM BY PATHOLOGIST: CPT | Performed by: STUDENT IN AN ORGANIZED HEALTH CARE EDUCATION/TRAINING PROGRAM

## 2024-05-08 PROCEDURE — A4217 STERILE WATER/SALINE, 500 ML: HCPCS | Performed by: OBSTETRICS & GYNECOLOGY

## 2024-05-08 PROCEDURE — A58558 PR HYSTEROSCOPY,W/ENDO BX: Performed by: ANESTHESIOLOGIST ASSISTANT

## 2024-05-08 PROCEDURE — 2720000007 HC OR 272 NO HCPCS: Performed by: OBSTETRICS & GYNECOLOGY

## 2024-05-08 PROCEDURE — 7100000010 HC PHASE TWO TIME - EACH INCREMENTAL 1 MINUTE: Performed by: OBSTETRICS & GYNECOLOGY

## 2024-05-08 PROCEDURE — 3700000001 HC GENERAL ANESTHESIA TIME - INITIAL BASE CHARGE: Performed by: OBSTETRICS & GYNECOLOGY

## 2024-05-08 PROCEDURE — 99100 ANES PT EXTEME AGE<1 YR&>70: CPT | Performed by: ANESTHESIOLOGY

## 2024-05-08 PROCEDURE — 7100000001 HC RECOVERY ROOM TIME - INITIAL BASE CHARGE: Performed by: OBSTETRICS & GYNECOLOGY

## 2024-05-08 PROCEDURE — 7100000002 HC RECOVERY ROOM TIME - EACH INCREMENTAL 1 MINUTE: Performed by: OBSTETRICS & GYNECOLOGY

## 2024-05-08 PROCEDURE — 2500000005 HC RX 250 GENERAL PHARMACY W/O HCPCS: Performed by: ANESTHESIOLOGIST ASSISTANT

## 2024-05-08 PROCEDURE — 3700000002 HC GENERAL ANESTHESIA TIME - EACH INCREMENTAL 1 MINUTE: Performed by: OBSTETRICS & GYNECOLOGY

## 2024-05-08 PROCEDURE — 7100000009 HC PHASE TWO TIME - INITIAL BASE CHARGE: Performed by: OBSTETRICS & GYNECOLOGY

## 2024-05-08 PROCEDURE — 3600000007 HC OR TIME - EACH INCREMENTAL 1 MINUTE - PROCEDURE LEVEL TWO: Performed by: OBSTETRICS & GYNECOLOGY

## 2024-05-08 PROCEDURE — 58558 HYSTEROSCOPY BIOPSY: CPT | Performed by: OBSTETRICS & GYNECOLOGY

## 2024-05-08 PROCEDURE — 2500000004 HC RX 250 GENERAL PHARMACY W/ HCPCS (ALT 636 FOR OP/ED): Performed by: ANESTHESIOLOGY

## 2024-05-08 PROCEDURE — 2500000004 HC RX 250 GENERAL PHARMACY W/ HCPCS (ALT 636 FOR OP/ED): Performed by: ANESTHESIOLOGIST ASSISTANT

## 2024-05-08 PROCEDURE — A58558 PR HYSTEROSCOPY,W/ENDO BX: Performed by: ANESTHESIOLOGY

## 2024-05-08 PROCEDURE — 3600000002 HC OR TIME - INITIAL BASE CHARGE - PROCEDURE LEVEL TWO: Performed by: OBSTETRICS & GYNECOLOGY

## 2024-05-08 PROCEDURE — 2500000004 HC RX 250 GENERAL PHARMACY W/ HCPCS (ALT 636 FOR OP/ED): Performed by: OBSTETRICS & GYNECOLOGY

## 2024-05-08 PROCEDURE — 88305 TISSUE EXAM BY PATHOLOGIST: CPT | Mod: TC,AHULAB | Performed by: OBSTETRICS & GYNECOLOGY

## 2024-05-08 RX ORDER — DROPERIDOL 2.5 MG/ML
0.62 INJECTION, SOLUTION INTRAMUSCULAR; INTRAVENOUS ONCE AS NEEDED
Status: DISCONTINUED | OUTPATIENT
Start: 2024-05-08 | End: 2024-05-08 | Stop reason: HOSPADM

## 2024-05-08 RX ORDER — SODIUM CHLORIDE, SODIUM LACTATE, POTASSIUM CHLORIDE, CALCIUM CHLORIDE 600; 310; 30; 20 MG/100ML; MG/100ML; MG/100ML; MG/100ML
100 INJECTION, SOLUTION INTRAVENOUS CONTINUOUS
Status: DISCONTINUED | OUTPATIENT
Start: 2024-05-08 | End: 2024-05-08 | Stop reason: HOSPADM

## 2024-05-08 RX ORDER — ONDANSETRON HYDROCHLORIDE 2 MG/ML
4 INJECTION, SOLUTION INTRAVENOUS ONCE AS NEEDED
Status: DISCONTINUED | OUTPATIENT
Start: 2024-05-08 | End: 2024-05-08 | Stop reason: HOSPADM

## 2024-05-08 RX ORDER — IPRATROPIUM BROMIDE 0.5 MG/2.5ML
500 SOLUTION RESPIRATORY (INHALATION) ONCE
Status: DISCONTINUED | OUTPATIENT
Start: 2024-05-08 | End: 2024-05-08 | Stop reason: HOSPADM

## 2024-05-08 RX ORDER — ALBUTEROL SULFATE 0.83 MG/ML
2.5 SOLUTION RESPIRATORY (INHALATION) ONCE AS NEEDED
Status: DISCONTINUED | OUTPATIENT
Start: 2024-05-08 | End: 2024-05-08 | Stop reason: HOSPADM

## 2024-05-08 RX ORDER — ONDANSETRON HYDROCHLORIDE 2 MG/ML
INJECTION, SOLUTION INTRAVENOUS AS NEEDED
Status: DISCONTINUED | OUTPATIENT
Start: 2024-05-08 | End: 2024-05-08

## 2024-05-08 RX ORDER — SODIUM CHLORIDE 0.9 G/100ML
IRRIGANT IRRIGATION AS NEEDED
Status: DISCONTINUED | OUTPATIENT
Start: 2024-05-08 | End: 2024-05-08 | Stop reason: HOSPADM

## 2024-05-08 RX ORDER — LIDOCAINE HYDROCHLORIDE 20 MG/ML
INJECTION, SOLUTION EPIDURAL; INFILTRATION; INTRACAUDAL; PERINEURAL AS NEEDED
Status: DISCONTINUED | OUTPATIENT
Start: 2024-05-08 | End: 2024-05-08

## 2024-05-08 RX ORDER — OXYCODONE HYDROCHLORIDE 5 MG/1
5 TABLET ORAL EVERY 4 HOURS PRN
Status: DISCONTINUED | OUTPATIENT
Start: 2024-05-08 | End: 2024-05-08 | Stop reason: HOSPADM

## 2024-05-08 RX ORDER — PROPOFOL 10 MG/ML
INJECTION, EMULSION INTRAVENOUS AS NEEDED
Status: DISCONTINUED | OUTPATIENT
Start: 2024-05-08 | End: 2024-05-08

## 2024-05-08 RX ORDER — MIDAZOLAM HYDROCHLORIDE 1 MG/ML
INJECTION INTRAMUSCULAR; INTRAVENOUS AS NEEDED
Status: DISCONTINUED | OUTPATIENT
Start: 2024-05-08 | End: 2024-05-08

## 2024-05-08 RX ORDER — ACETAMINOPHEN 325 MG/1
650 TABLET ORAL EVERY 4 HOURS PRN
Status: DISCONTINUED | OUTPATIENT
Start: 2024-05-08 | End: 2024-05-08 | Stop reason: HOSPADM

## 2024-05-08 RX ORDER — FENTANYL CITRATE 50 UG/ML
INJECTION, SOLUTION INTRAMUSCULAR; INTRAVENOUS AS NEEDED
Status: DISCONTINUED | OUTPATIENT
Start: 2024-05-08 | End: 2024-05-08

## 2024-05-08 RX ORDER — LIDOCAINE HYDROCHLORIDE 10 MG/ML
0.1 INJECTION, SOLUTION EPIDURAL; INFILTRATION; INTRACAUDAL; PERINEURAL ONCE
Status: DISCONTINUED | OUTPATIENT
Start: 2024-05-08 | End: 2024-05-08 | Stop reason: HOSPADM

## 2024-05-08 RX ADMIN — PROPOFOL 160 MG: 10 INJECTION, EMULSION INTRAVENOUS at 07:31

## 2024-05-08 RX ADMIN — SODIUM CHLORIDE, POTASSIUM CHLORIDE, SODIUM LACTATE AND CALCIUM CHLORIDE 100 ML/HR: 600; 310; 30; 20 INJECTION, SOLUTION INTRAVENOUS at 06:37

## 2024-05-08 RX ADMIN — SODIUM CHLORIDE, SODIUM LACTATE, POTASSIUM CHLORIDE, AND CALCIUM CHLORIDE: 600; 310; 30; 20 INJECTION, SOLUTION INTRAVENOUS at 07:20

## 2024-05-08 RX ADMIN — DEXAMETHASONE SODIUM PHOSPHATE 4 MG: 4 INJECTION, SOLUTION INTRAMUSCULAR; INTRAVENOUS at 07:37

## 2024-05-08 RX ADMIN — FENTANYL CITRATE 50 MCG: 50 INJECTION, SOLUTION INTRAMUSCULAR; INTRAVENOUS at 07:31

## 2024-05-08 RX ADMIN — ONDANSETRON 4 MG: 2 INJECTION INTRAMUSCULAR; INTRAVENOUS at 07:37

## 2024-05-08 RX ADMIN — LIDOCAINE HYDROCHLORIDE 80 MG: 20 INJECTION, SOLUTION EPIDURAL; INFILTRATION; INTRACAUDAL; PERINEURAL at 07:31

## 2024-05-08 RX ADMIN — MIDAZOLAM HYDROCHLORIDE 2 MG: 1 INJECTION, SOLUTION INTRAMUSCULAR; INTRAVENOUS at 07:24

## 2024-05-08 ASSESSMENT — PAIN - FUNCTIONAL ASSESSMENT
PAIN_FUNCTIONAL_ASSESSMENT: 0-10

## 2024-05-08 ASSESSMENT — COLUMBIA-SUICIDE SEVERITY RATING SCALE - C-SSRS
1. IN THE PAST MONTH, HAVE YOU WISHED YOU WERE DEAD OR WISHED YOU COULD GO TO SLEEP AND NOT WAKE UP?: NO
2. HAVE YOU ACTUALLY HAD ANY THOUGHTS OF KILLING YOURSELF?: NO
6. HAVE YOU EVER DONE ANYTHING, STARTED TO DO ANYTHING, OR PREPARED TO DO ANYTHING TO END YOUR LIFE?: NO

## 2024-05-08 ASSESSMENT — PAIN DESCRIPTION - DESCRIPTORS: DESCRIPTORS: ACHING

## 2024-05-08 ASSESSMENT — PAIN SCALES - GENERAL
PAINLEVEL_OUTOF10: 0 - NO PAIN
PAINLEVEL_OUTOF10: 7
PAINLEVEL_OUTOF10: 0 - NO PAIN

## 2024-05-08 NOTE — ANESTHESIA PROCEDURE NOTES
Airway  Date/Time: 5/8/2024 7:32 AM  Urgency: elective    Airway not difficult    Staffing  Performed: HEIDE   Authorized by: Oswaldo Cochran MD    Performed by: FROYLAN Davies  Patient location during procedure: OR    Indications and Patient Condition  Indications for airway management: anesthesia  Spontaneous ventilation: present  Sedation level: deep  Preoxygenated: yes  Patient position: sniffing  Mask difficulty assessment: 1 - vent by mask    Final Airway Details  Final airway type: supraglottic airway      Successful airway: Size 4     Number of attempts at approach: 1

## 2024-05-08 NOTE — ANESTHESIA POSTPROCEDURE EVALUATION
Patient: Liz Hamlin    Procedure Summary       Date: 05/08/24 Room / Location: [facility] A OR 05 / Virtual [facility] A OR    Anesthesia Start: 0720 Anesthesia Stop: 0802    Procedure: D & C Hysteroscopy Diagnosis:       Postmenopausal bleeding      (Preoperative exam for gynecologic surgery [Z01.818])    Surgeons: Iam Carter MD Responsible Provider: Oswaldo Cochran MD    Anesthesia Type: general ASA Status: 3            Anesthesia Type: general    Vitals Value Taken Time   /70 05/08/24 0859   Temp 36.5 °C (97.7 °F) 05/08/24 0859   Pulse 80 05/08/24 0900   Resp 16 05/08/24 0859   SpO2 98 % 05/08/24 0900   Vitals shown include unfiled device data.    Anesthesia Post Evaluation    Patient location during evaluation: bedside  Patient participation: complete - patient participated  Level of consciousness: awake  Pain management: adequate  Airway patency: patent  Cardiovascular status: acceptable  Respiratory status: acceptable  Hydration status: acceptable  Postoperative Nausea and Vomiting: none        No notable events documented.

## 2024-05-08 NOTE — OP NOTE
D & C Hysteroscopy Operative Note     Date: 2024  OR Location: [facility] OR    Name: Liz Hamlin, : 1945, Age: 79 y.o., MRN: [id number], Sex: female    Diagnosis  Pre-op Diagnosis     * Preoperative exam for gynecologic surgery [Z01.818] Post-op Diagnosis     * Postmenopausal bleeding [N95.0]     Procedures  D & C Hysteroscopy  80220 - TX HYSTEROSCOPY BX ENDOMETRIUM&/POLYPC W/WO D&C      Surgeons      * Iam Carter - Primary    Resident/Fellow/Other Assistant:  Surgeons and Role:  * No surgeons found with a matching role *    Procedure Summary  Anesthesia: Monitor Anesthesia Care  ASA: III  Anesthesia Staff: Anesthesiologist: Oswaldo Cochran MD  C-AA: FROYLAN Davies  Estimated Blood Loss: 10 mL  Intra-op Medications:   Administrations occurring from 0730 to 0830 on 24:   Medication Name Total Dose   sodium chloride 0.9 % irrigation solution 3,000 mL              Anesthesia Record               Intraprocedure I/O Totals          Intake    LR bolus 400.00 mL    Total Intake 400 mL       Output    Est. Blood Loss 10 mL    Total Output 10 mL       Net    Net Volume 390 mL          Specimen:   ID Type Source Tests Collected by Time   1 : ENDOMETRIAL CURRETTING Tissue ENDOMETRIUM POLYPECTOMY - HYSTEROSCOPIC SURGICAL PATHOLOGY EXAM Iam Carter MD 2024 0747        Staff:   Circulator: Brandee Chester RN  Scrub Person: Wendi Smith         Drains and/or Catheters: * None in log *    Tourniquet Times:         Implants:     Findings: Small uterus atrophic tissue probable false passage    Indications: Liz Hamlin is an 79 y.o. female who is having surgery for Preoperative exam for gynecologic surgery [Z01.818].  For postmenopausal bleeding    The patient was seen in the preoperative area. The risks, benefits, complications, treatment options, non-operative alternatives, expected recovery and outcomes were discussed with the patient. The possibilities of reaction to medication, pulmonary aspiration,  injury to surrounding structures, bleeding, recurrent infection, the need for additional procedures, failure to diagnose a condition, and creating a complication requiring transfusion or operation were discussed with the patient. The patient concurred with the proposed plan, giving informed consent.  The site of surgery was properly noted/marked if necessary per policy. The patient has been actively warmed in preoperative area. Preoperative antibiotics are not indicated. Venous thrombosis prophylaxis are not indicated.    Procedure Details: Under satisfactory anesthesia patient was placed in a dorsolithotomy position.  Vaginal perineal prep was carried out patient was draped in usual manner.  Weighted speculum is placed in the anterior lip of cervix grasped with single-tooth tenaculum.  Uterus was noted to be small mid position slightly fixed adnexa were not palpated cervix was dilated with a great deal difficulty is very stenotic.  Hysteroscope was placed in position using liquid medium axial inspection was noted.  Was seen in the endometrium appeared to be very atrophic what appeared to be a small false passage was noted which did not perforate through.  No definitive landmarks were noted.  Biopsies were taken under direct vision hysteroscope was removed curettage the lower segment was also done taking great care not to perforate.  Examination will scan showed no perforation through hemostasis was good.  At this point procedure was terminated.  Estimated blood loss was less than 10 mL.  Patient of recovery good condition.  End of dictation  Complications:  None; patient tolerated the procedure well.    Disposition: PACU - hemodynamically stable.  Condition: stable     Attending Attestation: I performed the procedure.    Iam Carter  Phone Number: [phone]

## 2024-05-08 NOTE — PERIOPERATIVE NURSING NOTE
0759 Pt to bay 45 on SFM. SFM removed and pt placed on room air. Bedside report given to this rn by or rn and aa.    0810 Pt sipping ice water without difficulty.    0811 Pt family updated via phone call and notified pt will be in recovery for at least an hour.    0819 Pt placed on purawick catheter at this time.    0845 Pt provided with warm blanket for comfort.    0859 JOSE E clearance received from Dr Cochran at this time.     0910 Discharge instructions provided to pt and family. Educated on medications, effects of anesthesia, and homecoming care. Pt and family verbalizing understanding of all instructions provided at this time. All questions and concerns answered. Pt given contact information for provider. Pt assisted with dressing with help of family. IV removed dressing applied. Pt ambulated around room with steady gait without assistance for 15 feet.

## 2024-05-08 NOTE — H&P
History Of Present Illness  Liz Hamlin is a 79 y.o. female presenting with history of postmenopausal bleeding thickened endometrium.     Past Medical History  Past Medical History:   Diagnosis Date    CKD (chronic kidney disease)     Encounter for general adult medical examination without abnormal findings 2021    Encounter for Medicare annual wellness exam    Glaucoma     Gout     Hyperlipidemia     Hypertension     Mitral valve regurgitation     JOSE E (obstructive sleep apnea)     Unspecified cataract     Cataracts, both eyes       Surgical History  Past Surgical History:   Procedure Laterality Date    OTHER SURGICAL HISTORY  2019    Carpal tunnel surgery    OTHER SURGICAL HISTORY  2019     section    OTHER SURGICAL HISTORY  2019    Hernia repair        Social History  She reports that she has never smoked. She has never been exposed to tobacco smoke. She has never used smokeless tobacco. She reports that she does not drink alcohol and does not use drugs.    Family History  Family History   Problem Relation Name Age of Onset    Diabetes Mother      Hypertension Mother      Diabetes Son      Colon cancer Mother's Sister      Colon cancer Father's Sister          Allergies  Amlodipine, Codeine, Erythromycin, Nsaids (non-steroidal anti-inflammatory drug), Tramadol, and Lisinopril    Review of Systems  Denies any new issues    Physical Exam  Vitals reviewed.   Constitutional:       Appearance: Normal appearance. She is normal weight.   Cardiovascular:      Rate and Rhythm: Normal rate and regular rhythm.   Pulmonary:      Effort: Pulmonary effort is normal.      Breath sounds: Normal breath sounds.   Abdominal:      General: Abdomen is flat. Bowel sounds are normal.      Palpations: Abdomen is soft.   Genitourinary:     Comments: Pelvic exam deferred this morning.  Chart reviewed for previous office exam.  Neurological:      General: No focal deficit present.      Mental Status: She  "is alert.   Psychiatric:         Mood and Affect: Mood normal.         Behavior: Behavior normal.         Thought Content: Thought content normal.         Judgment: Judgment normal.          Last Recorded Vitals  Resp. rate 16, height 1.676 m (5' 6\"), weight 90.5 kg (199 lb 8.3 oz), not currently breastfeeding.    Relevant Results         Assessment/Plan   Principal Problem:    Preoperative exam for gynecologic surgery      Postmenopausal bleeding thickened endometrium       Iam Carter MD    "

## 2024-05-08 NOTE — DISCHARGE INSTRUCTIONS
Patient should make appointment in 2 weeks to see provider  Please call for bleeding more than 1 pad per hour  Please call for severe nausea and vomiting  Please call for pain not relieved by Tylenol  No driving for 3 to 5 days

## 2024-05-09 ENCOUNTER — TELEPHONE (OUTPATIENT)
Dept: OBSTETRICS AND GYNECOLOGY | Facility: CLINIC | Age: 79
End: 2024-05-09
Payer: COMMERCIAL

## 2024-05-09 ASSESSMENT — PAIN SCALES - GENERAL: PAINLEVEL_OUTOF10: 0 - NO PAIN

## 2024-05-09 NOTE — TELEPHONE ENCOUNTER
RN called and verified Patient to follow up on D & C Hysteroscopy 5/8/24  Patient is feeling well and has no complaints  2 week follow up appt scheduled  Kady Bates RN

## 2024-05-13 LAB
LABORATORY COMMENT REPORT: NORMAL
PATH REPORT.FINAL DX SPEC: NORMAL
PATH REPORT.GROSS SPEC: NORMAL
PATH REPORT.TOTAL CANCER: NORMAL

## 2024-05-24 ENCOUNTER — OFFICE VISIT (OUTPATIENT)
Dept: OBSTETRICS AND GYNECOLOGY | Facility: CLINIC | Age: 79
End: 2024-05-24
Payer: COMMERCIAL

## 2024-05-24 VITALS
SYSTOLIC BLOOD PRESSURE: 152 MMHG | BODY MASS INDEX: 32.17 KG/M2 | WEIGHT: 200.2 LBS | HEIGHT: 66 IN | DIASTOLIC BLOOD PRESSURE: 79 MMHG

## 2024-05-24 DIAGNOSIS — Z09 POSTOPERATIVE EXAMINATION: ICD-10-CM

## 2024-05-24 PROCEDURE — 1160F RVW MEDS BY RX/DR IN RCRD: CPT | Performed by: OBSTETRICS & GYNECOLOGY

## 2024-05-24 PROCEDURE — 1159F MED LIST DOCD IN RCRD: CPT | Performed by: OBSTETRICS & GYNECOLOGY

## 2024-05-24 PROCEDURE — 1036F TOBACCO NON-USER: CPT | Performed by: OBSTETRICS & GYNECOLOGY

## 2024-05-24 PROCEDURE — 3077F SYST BP >= 140 MM HG: CPT | Performed by: OBSTETRICS & GYNECOLOGY

## 2024-05-24 PROCEDURE — 1126F AMNT PAIN NOTED NONE PRSNT: CPT | Performed by: OBSTETRICS & GYNECOLOGY

## 2024-05-24 PROCEDURE — 99213 OFFICE O/P EST LOW 20 MIN: CPT | Performed by: OBSTETRICS & GYNECOLOGY

## 2024-05-24 PROCEDURE — 3078F DIAST BP <80 MM HG: CPT | Performed by: OBSTETRICS & GYNECOLOGY

## 2024-05-24 ASSESSMENT — ENCOUNTER SYMPTOMS
PSYCHIATRIC NEGATIVE: 0
RESPIRATORY NEGATIVE: 0
HEMATOLOGIC/LYMPHATIC NEGATIVE: 0
EYES NEGATIVE: 0
ALLERGIC/IMMUNOLOGIC NEGATIVE: 0
GASTROINTESTINAL NEGATIVE: 0
NEUROLOGICAL NEGATIVE: 0
MUSCULOSKELETAL NEGATIVE: 0
ENDOCRINE NEGATIVE: 0
CONSTITUTIONAL NEGATIVE: 0
CARDIOVASCULAR NEGATIVE: 0

## 2024-05-24 ASSESSMENT — PAIN SCALES - GENERAL: PAINLEVEL: 0-NO PAIN

## 2024-05-31 ENCOUNTER — PATIENT MESSAGE (OUTPATIENT)
Dept: PRIMARY CARE | Facility: CLINIC | Age: 79
End: 2024-05-31
Payer: COMMERCIAL

## 2024-06-03 DIAGNOSIS — I10 PRIMARY HYPERTENSION: Primary | ICD-10-CM

## 2024-06-04 ENCOUNTER — HOSPITAL ENCOUNTER (OUTPATIENT)
Dept: RADIOLOGY | Facility: HOSPITAL | Age: 79
Discharge: HOME | End: 2024-06-04
Payer: COMMERCIAL

## 2024-06-04 VITALS — BODY MASS INDEX: 31.89 KG/M2 | WEIGHT: 198.41 LBS | HEIGHT: 66 IN

## 2024-06-04 DIAGNOSIS — Z01.818 PRE-TRANSPLANT EVALUATION FOR KIDNEY TRANSPLANT: ICD-10-CM

## 2024-06-04 PROCEDURE — 75563 CARD MRI W/STRESS IMG & DYE: CPT

## 2024-06-04 PROCEDURE — A9575 INJ GADOTERATE MEGLUMI 0.1ML: HCPCS | Performed by: STUDENT IN AN ORGANIZED HEALTH CARE EDUCATION/TRAINING PROGRAM

## 2024-06-04 PROCEDURE — 2550000001 HC RX 255 CONTRASTS: Performed by: STUDENT IN AN ORGANIZED HEALTH CARE EDUCATION/TRAINING PROGRAM

## 2024-06-04 PROCEDURE — 75561 CARDIAC MRI FOR MORPH W/DYE: CPT | Performed by: RADIOLOGY

## 2024-06-04 RX ORDER — GADOTERATE MEGLUMINE 376.9 MG/ML
36 INJECTION INTRAVENOUS
Status: COMPLETED | OUTPATIENT
Start: 2024-06-04 | End: 2024-06-04

## 2024-06-04 RX ADMIN — GADOTERATE MEGLUMINE 36 ML: 376.9 INJECTION INTRAVENOUS at 14:09

## 2024-06-04 NOTE — NURSING NOTE
MRI STRESS        Stress protocol: Regadenoson  Regadenoson Dose: 0.4mg  Aminophylline Dose: 100mg     Resting Vitals:  BP: 132/69    HR: 72 bpm   SPO2: 97% RA  Resting ECG: Sinus, PAC's, occasional PVC's      Stress:  0.4mg IV push Regadenoson:  1 min: /69  HR 87 bpm 97% RA Symptoms: Short of breath  2 min: /61  HR 80 bpm 99% RA Symptoms: Short of breath     Reversal/Recovery:  100mg IV push Aminophylline:  1 min: /66  HR 77 bpm 98% RA Symptoms: Denies  2 min: /68  HR 78 bpm 96% RA Symptoms: Denies  4 min: /60  HR 76 bpm 96% RA Symptoms: Denies  6 min: /65  HR 75 bpm 94% RA Symptoms: Denies     Patients resting HR of 72 bpm anant to a maximum of 87 bpm.  Resting BP of 132/69 anant to a maximum of 141/69. Patients post stress EKG remained unchanged.  Patient discharged from the [facility] to home.

## 2024-06-07 RX ORDER — CLONIDINE HYDROCHLORIDE 0.1 MG/1
0.1 TABLET ORAL DAILY
Qty: 90 TABLET | Refills: 1 | Status: SHIPPED | OUTPATIENT
Start: 2024-06-07

## 2024-06-11 ENCOUNTER — TELEPHONE (OUTPATIENT)
Dept: PRIMARY CARE | Facility: CLINIC | Age: 79
End: 2024-06-11

## 2024-08-01 ENCOUNTER — TELEPHONE (OUTPATIENT)
Dept: TRANSPLANT | Facility: HOSPITAL | Age: 79
End: 2024-08-01
Payer: COMMERCIAL

## 2024-08-01 NOTE — TELEPHONE ENCOUNTER
Patient called in to get a status update on her evaluation.  Evaluation seems complete, except we need final cardiac clearance from Dr. Agarwal.  That was requested via staff message on 7/26.  I advised the patient to call us next week to see if clearance has come through.  Patient denied any further questions.

## 2024-08-22 ENCOUNTER — DOCUMENTATION (OUTPATIENT)
Dept: TRANSPLANT | Facility: HOSPITAL | Age: 79
End: 2024-08-22
Payer: COMMERCIAL

## 2024-08-22 NOTE — PROGRESS NOTES
Transplant Candidate Pharmacist Screening Note     Current Outpatient Medications on File Prior to Visit   Medication Sig Dispense Refill    allopurinol (Zyloprim) 100 mg tablet Take 1 tablet (100 mg) by mouth once daily. 90 tablet 1    aspirin 81 mg EC tablet Take 1 tablet (81 mg) by mouth once daily.      atorvastatin (Lipitor) 40 mg tablet Take 1 tablet (40 mg) by mouth once daily. 90 tablet 1    B complex-vitamin C-folic acid (Nephrocaps) 1 mg capsule Take 1 capsule by mouth once daily. 90 capsule 3    calcitriol (Rocaltrol) 0.25 mcg capsule Take 1 capsule (0.25 mcg) by mouth 3 (three) times a week. other 36 capsule 3    cholecalciferol, vitamin D3, 25 mcg (1,000 unit) tablet,chewable Chew 1 tablet (1,000 Units) 2 times a week. 90 tablet 1    cloNIDine (Catapres) 0.1 mg tablet Take 1 tablet (0.1 mg) by mouth once daily. 90 tablet 1    coenzyme Q-10 100 mg capsule Take by mouth. Take as directed by mouth      dapagliflozin propanediol (Farxiga) 10 mg Take 1 tablet (10 mg) by mouth once daily in the morning. 90 tablet 3    docusate sodium (Colace) 100 mg capsule TAKE 1 CAPSULE TWICE A DAY AS NEEDED FOR CONSTIPATION 90 capsule 1    ferrous sulfate, 325 mg ferrous sulfate, (FeroSuL) tablet Take 1 tablet by mouth once daily. 90 tablet 1    furosemide (Lasix) 40 mg tablet Take 1 tablet (40 mg) by mouth once daily. 90 tablet 1    hydrALAZINE (Apresoline) 100 mg tablet Take 1 tablet (100 mg) by mouth 2 times a day. 180 tablet 1    hydrOXYzine HCL (Atarax) 25 mg tablet Take 1 tablet (25 mg) by mouth 2 times a day as needed for itching. 180 tablet 1    L. acidophilus/Bifid. animalis 32 billion cell capsule Take 1 capsule by mouth once daily.      losartan (Cozaar) 100 mg tablet Take 1 tablet (100 mg) by mouth once daily. Need MD appointment for refills 90 tablet 1    metoprolol succinate XL (Toprol-XL) 50 mg 24 hr tablet Take 1 tablet (50 mg) by mouth once daily. Do not crush or chew. 90 tablet 1     No current  facility-administered medications on file prior to visit.     The patient's reported medications have been reviewed. Based on the above medication list, there are no pharmacologic contraindications to kidney transplantation.    Of note, she will be counseled to avoid probiotics after transplant.    Estefany Watson, PharmD, [facility]   Solid Organ Transplant Clinical Pharmacy Specialist

## 2024-08-26 ENCOUNTER — APPOINTMENT (OUTPATIENT)
Dept: OBSTETRICS AND GYNECOLOGY | Facility: CLINIC | Age: 79
End: 2024-08-26
Payer: COMMERCIAL

## 2024-08-26 ENCOUNTER — IMMUNIZATION (OUTPATIENT)
Dept: TRANSPLANT | Facility: HOSPITAL | Age: 79
End: 2024-08-26

## 2024-08-26 ENCOUNTER — COMMITTEE REVIEW (OUTPATIENT)
Dept: TRANSPLANT | Facility: HOSPITAL | Age: 79
End: 2024-08-26
Payer: COMMERCIAL

## 2024-08-26 ENCOUNTER — TELEPHONE (OUTPATIENT)
Dept: TRANSPLANT | Facility: HOSPITAL | Age: 79
End: 2024-08-26

## 2024-08-26 NOTE — COMMITTEE REVIEW
Presentation for Listing Evaluation Date: 1/30/2024  Committee Review Date: 8/22/2024    Organ being evaluated for: Kidney    Transplant Phase: Evaluation  Transplant Status: Active    Referring Physician:      Primary Diagnosis:   Secondary Diagnosis:     Committee Review Decision: Pending      Committee Discussion Details:   The candidate's evaluation was presented and discussed at the Kidney Multidisciplinary Selection Conference. After review of the candidate's diagnosis and the evaluations of the multidisciplinary team members, it was the consensus of the Selection Committee that the candidate has not yet met Kidney Selection Criteria and is Pending for Kidney transplant.    Resolution:  Approved listing status 1 candidate pending f/u with in person SW visit and f/u with GYN. No listing auth needed.    Lab Results   Component Value Date    HEPBSAB <3.1 01/30/2024       Immunization History   Administered Date(s) Administered Comments    None   Deferred Date(s) Deferred Comments    Hepatitis B vaccine, adult *Check Product/Dose* 08/26/2024 Patient decision

## 2024-08-26 NOTE — TELEPHONE ENCOUNTER
CRISELDA req a call back to discuss the committee decision.    Also let her know we would be reaching out to schedule an appointment.  Tasked out a SW appt today to JASON Jimenez.

## 2024-08-26 NOTE — LETTER
August 26, 2024    Liz Hamlin  [address] [address]      Dear Ms. Hamlin:    Our multi-disciplinary transplant team completed a review of your medical records on 8/22/2024.  I regret to inform you that the decision was not to proceed with placing you on the [facility] ([facility]) waiting list for a Kidney transplant at this time. Issues were identified that would jeopardize a successful transplant.    Our transplant program consists of surgeons and medical doctors who provide coverage 365 days a year, 24 hours a day.     If you have any questions or concerns regarding your insurance coverage or billing issues, a  is available to speak with you.     It is important to keep us updated of any major changes in your medical condition, contact information and health insurance coverage.     Please don't hesitate to contact us at Dept: [phone] with any questions or concerns. We look forward to working with you through this process.      Sincerely,      Sindi Virk RN          The [facility] Toll-free Patient Services Line:  Your Resource for Organ Transplant Information    If you have a question regarding your own medical care, you always should call your transplant hospital first. However, for general organ transplant-related information, you should call the [facility] (UN[facility]) toll-free patient services line at 1-695.844.9624.  Anyone, including potential transplant candidates, candidates, recipients, family members, friends, living donors, and donor family members, can call this number to:    Talk about organ donation, living donation, the transplant process, the donation process, and transplant policies.  Get a free patient information kit with helpful booklets, waiting list and transplant information, and a list of all transplant hospitals.  Ask questions about the Organ Procurement and Transplantation Network (OPTN) web site  (http://optn.transplant.hrsa.gov/), the [facility] Web site (http://unos.org/), or the UNOS web site for living donors and transplant recipients. (http://www.transplantliving.org/).  Learn how UNOS and the OPTN can help you.  Talk about any concerns that you may have with a transplant hospital.    [employer] is a not-for-profit organization that provides the administrative services for the national OPTN under federal contract to the Health Resources and Services Administration ([facility] an agency under the U.S. Department of Health and Human Services (HHS).    UNOS and the OPTN are responsible for:    Providing educational material for patients, the public, and professionals.  Raising awareness of the need for donated organs and tissue.  Writing organ transplant policy with help from transplant professionals, transplant patients, transplant candidates, donor families, living donors, and the public.  Coordinating organ procurement, matching, and placement.  Collecting information about every organ transplant and donation that occurs in the [place].    Remember, you should contact your transplant hospital directly if you have questions or concerns about your own medical care including medical records, work-up progress, and test results.    [facility] is not your transplant hospital, and staff at [facility] will not be able to transfer you to your transplant hospital, so keep your transplant hospital’s phone number handy.    However, while you research your transplant needs and learn as much as you can about transplantation and donation, we welcome your call to our toll-free patient services line at [phone].      OS PIL Final Rev 1-

## 2024-08-27 NOTE — TELEPHONE ENCOUNTER
Talked to pt and let her know she was approved by committee and we just needed a SW appointment with support.   She understands we can get her approved as soon as she has this appointment.

## 2024-08-29 ENCOUNTER — TELEPHONE (OUTPATIENT)
Dept: TRANSPLANT | Facility: HOSPITAL | Age: 79
End: 2024-08-29
Payer: COMMERCIAL

## 2024-08-30 ENCOUNTER — LAB REQUISITION (OUTPATIENT)
Dept: LAB | Facility: CLINIC | Age: 79
End: 2024-08-30
Payer: COMMERCIAL

## 2024-08-30 DIAGNOSIS — N18.6 END STAGE RENAL DISEASE (MULTI): ICD-10-CM

## 2024-08-30 LAB
HLA RESULTS: NORMAL
HLA-A+B+C AB NFR SER: NORMAL %
HLA-DP+DQ+DR AB NFR SER: NORMAL %

## 2024-09-03 ENCOUNTER — TELEPHONE (OUTPATIENT)
Dept: PRIMARY CARE | Facility: CLINIC | Age: 79
End: 2024-09-03
Payer: COMMERCIAL

## 2024-09-05 ENCOUNTER — SOCIAL WORK (OUTPATIENT)
Dept: TRANSPLANT | Facility: HOSPITAL | Age: 79
End: 2024-09-05
Payer: COMMERCIAL

## 2024-09-05 NOTE — PROGRESS NOTES
"TANI met with Pt and Pt's sister, Kim in exam room to meet Pt's primary support. Pt was pleasant and engaged. Kim reported previously she was going through living donor evaluation and this was why she was not listed as primary support. Kim shared she was told she could not be a living donor for Pt and she will be Pt's new primary support. Pt reported she would like her granddaughter, Carine to be listed as her secondary support and her , Richard to be listed as an alternate support. Pt shared Richard recently had back surgery on May 17th and is in the process of \"learning how to walk again.\" Kim reported her phone number is [phone]. Kim reported she is retired, lives local to Pt, has no limitations, and drives and has a working vehicle. Kim shared her mom is fully dependent on her, but Carine is able to help care for her. TANI discussed the TCA and SW, Pt, and Kim all signed. SW allowed the space for Pt and Kim to ask questions regarding the transplant process/living donor process. Pt and Kim were thankful and denied any further questions/concerns. Pt is currently low psychosocial risk for transplant.     Plan: SW to follow-up with Pt annually.   "

## 2024-09-10 ENCOUNTER — TELEPHONE (OUTPATIENT)
Dept: TRANSPLANT | Facility: HOSPITAL | Age: 79
End: 2024-09-10
Payer: COMMERCIAL

## 2024-09-10 ENCOUNTER — DOCUMENTATION (OUTPATIENT)
Dept: TRANSPLANT | Facility: HOSPITAL | Age: 79
End: 2024-09-10
Payer: COMMERCIAL

## 2024-09-11 ENCOUNTER — COMMITTEE REVIEW (OUTPATIENT)
Dept: TRANSPLANT | Facility: HOSPITAL | Age: 79
End: 2024-09-11

## 2024-09-11 NOTE — COMMITTEE REVIEW
Presentation for Listing Evaluation Date: 1/30/2024  Committee Review Date: 9/5/2024    Organ being evaluated for: Kidney    Transplant Phase: Evaluation  Transplant Status: Active    Referring Physician:      Primary Diagnosis:   Secondary Diagnosis:     Committee Review Decision: Approved      Committee Discussion Details:   The candidate's evaluation was presented and discussed at the Kidney  Multidisciplinary Selection Conference. After review of the candidate's diagnosis and the evaluations of the multidisciplinary team members, it was the consensus of the Selection Committee that the candidate does meet Kidney Selection Criteria and is Approved for Kidney transplant.    Resolution:  List patient status 1.     Lab Results   Component Value Date    HEPBSAB <3.1 01/30/2024       Immunization History   Administered Date(s) Administered Comments    None   Deferred Date(s) Deferred Comments    Hepatitis B vaccine, adult *Check Product/Dose* 08/26/2024 Patient decision

## 2024-09-11 NOTE — LETTER
September 11, 2024    Liz Hamlin  [address] [facility] [address]      Dear Ms. Hamlin:    Our multi-disciplinary transplant team completed a review of your medical records on 9/5/2024.  I am pleased to inform you that you will be placed on the [facility] ([facility]) waiting list for a Kidney transplant.    Our transplant program consists of surgeons and medical doctors who provide coverage 365 days a year, 24 hours a day.     If you have any questions or concerns regarding your insurance coverage or billing issues, a  is available to speak with you.     It is important to keep us updated of any major changes in your medical condition, contact information and health insurance coverage.     Please don't hesitate to contact us at Dept: [phone] with any questions or concerns. We look forward to working with you through this process.      Sincerely,      Sindi Virk RN          The UNOS Toll-free Patient Services Line:  Your Resource for Organ Transplant Information    If you have a question regarding your own medical care, you always should call your transplant hospital first. However, for general organ transplant-related information, you should call the [facility] (UNOS) toll-free patient services line at [phone].  Anyone, including potential transplant candidates, candidates, recipients, family members, friends, living donors, and donor family members, can call this number to:    Talk about organ donation, living donation, the transplant process, the donation process, and transplant policies.  Get a free patient information kit with helpful booklets, waiting list and transplant information, and a list of all transplant hospitals.  Ask questions about the Organ Procurement and Transplantation Network (OPTN) web site (http://optn.transplant.hrsa.gov/), the UNOS Web site (http://unos.org/), or the UNOS web site for living donors  and transplant recipients. (http://www.transplantliving.org/).  Learn how [facility] and the OPTN can help you.  Talk about any concerns that you may have with a transplant hospital.    [facility] is a not-for-profit organization that provides the administrative services for the national OPTN under federal contract to the Health Resources and Services Administration (HRSA), an agency under the U.S. Department of Health and Human Services (HHS).    [facility] and the OPTN are responsible for:    Providing educational material for patients, the public, and professionals.  Raising awareness of the need for donated organs and tissue.  Writing organ transplant policy with help from transplant professionals, transplant patients, transplant candidates, donor families, living donors, and the public.  Coordinating organ procurement, matching, and placement.  Collecting information about every organ transplant and donation that occurs in the [place].    Remember, you should contact your transplant hospital directly if you have questions or concerns about your own medical care including medical records, work-up progress, and test results.    [facility] is not your transplant hospital, and staff at [facility] will not be able to transfer you to your transplant hospital, so keep your transplant hospital’s phone number handy.    However, while you research your transplant needs and learn as much as you can about transplantation and donation, we welcome your call to our toll-free patient services line at [phone].      [facility] PIL Final Rev 1-

## 2024-09-16 ENCOUNTER — TELEPHONE (OUTPATIENT)
Dept: TRANSPLANT | Facility: HOSPITAL | Age: 79
End: 2024-09-16
Payer: COMMERCIAL

## 2024-09-16 ENCOUNTER — DOCUMENTATION (OUTPATIENT)
Dept: TRANSPLANT | Facility: HOSPITAL | Age: 79
End: 2024-09-16
Payer: COMMERCIAL

## 2024-09-16 DIAGNOSIS — Z01.818 PRE-TRANSPLANT EVALUATION FOR KIDNEY TRANSPLANT: ICD-10-CM

## 2024-09-16 NOTE — PROGRESS NOTES
LM for pt to call me back to discuss her waitlist status.    She will need an abo and HLA sample drawn before I can list her status 1.    Needs GFR also.

## 2024-09-16 NOTE — TELEPHONE ENCOUNTER
Talked to pt today, let her know she was status 1 - approved by selection committee.    She will go to  lab and get abo and 2nd HLA.  She will call me after she goes, likely Thursday, and then I can put her on the list.  I will let her know once she is listed in UNOS.  Will send letter and email to HLA at that time notifying them of add.    Talked about answering calls from non- numbers and that she needs to return call in an hour.    Talked about monthly blood kits, pt is predialysis and understands she will take one box a month to the lab.

## 2024-09-18 ENCOUNTER — LAB (OUTPATIENT)
Dept: LAB | Facility: LAB | Age: 79
End: 2024-09-18
Payer: COMMERCIAL

## 2024-09-18 DIAGNOSIS — I12.9 HYPERTENSIVE KIDNEY DISEASE WITH STAGE 4 CHRONIC KIDNEY DISEASE (MULTI): ICD-10-CM

## 2024-09-18 DIAGNOSIS — Z01.818 PRE-TRANSPLANT EVALUATION FOR KIDNEY TRANSPLANT: ICD-10-CM

## 2024-09-18 DIAGNOSIS — N18.4 HYPERTENSIVE KIDNEY DISEASE WITH STAGE 4 CHRONIC KIDNEY DISEASE (MULTI): ICD-10-CM

## 2024-09-18 LAB
25(OH)D3 SERPL-MCNC: 53 NG/ML (ref 30–100)
ABO GROUP (TYPE) IN BLOOD: NORMAL
ALBUMIN SERPL BCP-MCNC: 4 G/DL (ref 3.4–5)
ANION GAP SERPL CALC-SCNC: 16 MMOL/L (ref 10–20)
APPEARANCE UR: CLEAR
BILIRUB UR STRIP.AUTO-MCNC: NEGATIVE MG/DL
BUN SERPL-MCNC: 40 MG/DL (ref 6–23)
CALCIUM SERPL-MCNC: 9.5 MG/DL (ref 8.6–10.6)
CHLORIDE SERPL-SCNC: 107 MMOL/L (ref 98–107)
CO2 SERPL-SCNC: 19 MMOL/L (ref 21–32)
COLOR UR: ABNORMAL
CREAT SERPL-MCNC: 2.11 MG/DL (ref 0.5–1.05)
CREAT UR-MCNC: 47.1 MG/DL (ref 20–320)
CREAT UR-MCNC: 47.1 MG/DL (ref 20–320)
EGFRCR SERPLBLD CKD-EPI 2021: 24 ML/MIN/1.73M*2
ERYTHROCYTE [DISTWIDTH] IN BLOOD BY AUTOMATED COUNT: 14.9 % (ref 11.5–14.5)
GLUCOSE SERPL-MCNC: 107 MG/DL (ref 74–99)
GLUCOSE UR STRIP.AUTO-MCNC: ABNORMAL MG/DL
HCT VFR BLD AUTO: 36.2 % (ref 36–46)
HGB BLD-MCNC: 11.2 G/DL (ref 12–16)
KETONES UR STRIP.AUTO-MCNC: NEGATIVE MG/DL
LEUKOCYTE ESTERASE UR QL STRIP.AUTO: ABNORMAL
MCH RBC QN AUTO: 28.1 PG (ref 26–34)
MCHC RBC AUTO-ENTMCNC: 30.9 G/DL (ref 32–36)
MCV RBC AUTO: 91 FL (ref 80–100)
MICROALBUMIN UR-MCNC: 22.2 MG/L
MICROALBUMIN/CREAT UR: 47.1 UG/MG CREAT
NITRITE UR QL STRIP.AUTO: NEGATIVE
NRBC BLD-RTO: 0 /100 WBCS (ref 0–0)
PH UR STRIP.AUTO: 5 [PH]
PHOSPHATE SERPL-MCNC: 4 MG/DL (ref 2.5–4.9)
PLATELET # BLD AUTO: 190 X10*3/UL (ref 150–450)
POTASSIUM SERPL-SCNC: 4.7 MMOL/L (ref 3.5–5.3)
PROT UR STRIP.AUTO-MCNC: NEGATIVE MG/DL
PROT UR-ACNC: 13 MG/DL (ref 5–24)
PROT/CREAT UR: 0.28 MG/MG CREAT (ref 0–0.17)
PTH-INTACT SERPL-MCNC: 87.2 PG/ML (ref 18.5–88)
RBC # BLD AUTO: 3.99 X10*6/UL (ref 4–5.2)
RBC # UR STRIP.AUTO: NEGATIVE /UL
RBC #/AREA URNS AUTO: NORMAL /HPF
RH FACTOR (ANTIGEN D): NORMAL
SODIUM SERPL-SCNC: 137 MMOL/L (ref 136–145)
SP GR UR STRIP.AUTO: 1.01
UROBILINOGEN UR STRIP.AUTO-MCNC: NORMAL MG/DL
WBC # BLD AUTO: 4.7 X10*3/UL (ref 4.4–11.3)
WBC #/AREA URNS AUTO: NORMAL /HPF

## 2024-09-18 PROCEDURE — 83970 ASSAY OF PARATHORMONE: CPT

## 2024-09-18 PROCEDURE — 81001 URINALYSIS AUTO W/SCOPE: CPT

## 2024-09-18 PROCEDURE — 82306 VITAMIN D 25 HYDROXY: CPT

## 2024-09-18 PROCEDURE — 86832 HLA CLASS I HIGH DEFIN QUAL: CPT | Mod: OUT | Performed by: SURGERY

## 2024-09-18 PROCEDURE — 86901 BLOOD TYPING SEROLOGIC RH(D): CPT

## 2024-09-18 PROCEDURE — 82570 ASSAY OF URINE CREATININE: CPT

## 2024-09-18 PROCEDURE — 85027 COMPLETE CBC AUTOMATED: CPT

## 2024-09-18 PROCEDURE — 86900 BLOOD TYPING SEROLOGIC ABO: CPT

## 2024-09-18 PROCEDURE — 80069 RENAL FUNCTION PANEL: CPT

## 2024-09-18 PROCEDURE — 36415 COLL VENOUS BLD VENIPUNCTURE: CPT

## 2024-09-18 PROCEDURE — 82043 UR ALBUMIN QUANTITATIVE: CPT

## 2024-09-18 PROCEDURE — 84156 ASSAY OF PROTEIN URINE: CPT

## 2024-09-20 ENCOUNTER — DOCUMENTATION (OUTPATIENT)
Dept: TRANSPLANT | Facility: HOSPITAL | Age: 79
End: 2024-09-20
Payer: COMMERCIAL

## 2024-09-20 NOTE — PROGRESS NOTES
Per Dr. Alexander:  After reviewing the records on [facility], we are unable to find the qualified GFR for waiting time modification.      2nd GFR letter sent to patient.

## 2024-09-20 NOTE — PROGRESS NOTES
Patient listed status 1 in UNOS and Epic.  2 RNs verified add and ABO.  Patient notified of listing.  Listing letter sent.  Email sent to HLA pathology.

## 2024-09-22 ENCOUNTER — OFFICE VISIT (OUTPATIENT)
Dept: URGENT CARE | Age: 79
End: 2024-09-22
Payer: COMMERCIAL

## 2024-09-22 VITALS
DIASTOLIC BLOOD PRESSURE: 78 MMHG | SYSTOLIC BLOOD PRESSURE: 169 MMHG | TEMPERATURE: 98.1 F | OXYGEN SATURATION: 97 % | HEART RATE: 89 BPM | RESPIRATION RATE: 15 BRPM

## 2024-09-22 DIAGNOSIS — B02.9 HERPES ZOSTER WITHOUT COMPLICATION: ICD-10-CM

## 2024-09-22 DIAGNOSIS — G44.89 OTHER HEADACHE SYNDROME: Primary | ICD-10-CM

## 2024-09-22 RX ORDER — VALACYCLOVIR HYDROCHLORIDE 1 G/1
1000 TABLET, FILM COATED ORAL 3 TIMES DAILY
Qty: 30 TABLET | Refills: 0 | Status: SHIPPED | OUTPATIENT
Start: 2024-09-22 | End: 2024-10-02

## 2024-09-22 ASSESSMENT — PAIN SCALES - GENERAL: PAINLEVEL: 10-WORST PAIN EVER

## 2024-09-22 ASSESSMENT — ENCOUNTER SYMPTOMS
EYES NEGATIVE: 1
GASTROINTESTINAL NEGATIVE: 1
CARDIOVASCULAR NEGATIVE: 1
RESPIRATORY NEGATIVE: 1
HEMATOLOGIC/LYMPHATIC NEGATIVE: 1
ENDOCRINE NEGATIVE: 1
NEUROLOGICAL NEGATIVE: 1
PSYCHIATRIC NEGATIVE: 1
CONSTITUTIONAL NEGATIVE: 1
MUSCULOSKELETAL NEGATIVE: 1
ALLERGIC/IMMUNOLOGIC NEGATIVE: 1

## 2024-09-22 NOTE — PROGRESS NOTES
Subjective   Patient ID: Liz Hamlin is a 79 y.o. female. They present today with a chief complaint of No chief complaint on file..    History of Present Illness  78 y/o F with a new onset of HA to right occipital region w/o aura or worst HA of her life, but unilateral. Pt states she noticed a skin eruption today but not itching or painful. Denies fever, chills, SOB, eye pain, vision changes, n/v          Past Medical History  Allergies as of 2024 - Reviewed 2024   Allergen Reaction Noted    Amlodipine Swelling 2013    Codeine Itching 2023    Erythromycin Itching 2023    Nsaids (non-steroidal anti-inflammatory drug) Itching and Nausea Only 2009    Tramadol Itching and Nausea/vomiting 2023    Lisinopril Cough 2013       (Not in a hospital admission)       Past Medical History:   Diagnosis Date    CKD (chronic kidney disease)     Encounter for general adult medical examination without abnormal findings 2021    Encounter for Medicare annual wellness exam    Glaucoma     Gout     Hyperlipidemia     Hypertension     Mitral valve regurgitation     JOSE E (obstructive sleep apnea)     Unspecified cataract     Cataracts, both eyes       Past Surgical History:   Procedure Laterality Date    OTHER SURGICAL HISTORY  2019    Carpal tunnel surgery    OTHER SURGICAL HISTORY  2019     section    OTHER SURGICAL HISTORY  2019    Hernia repair        reports that she has never smoked. She has never been exposed to tobacco smoke. She has never used smokeless tobacco. She reports that she does not drink alcohol and does not use drugs.    Review of Systems  Review of Systems   Constitutional: Negative.    HENT: Negative.     Eyes: Negative.    Respiratory: Negative.     Cardiovascular: Negative.    Gastrointestinal: Negative.    Endocrine: Negative.    Genitourinary: Negative.    Musculoskeletal: Negative.    Skin:  Positive for rash.    Allergic/Immunologic: Negative.    Neurological: Negative.    Hematological: Negative.    Psychiatric/Behavioral: Negative.                                    Objective    Vitals:    09/22/24 1421   BP: 169/78   BP Location: Right arm   Patient Position: Sitting   BP Cuff Size: Adult   Pulse: 89   Resp: 15   Temp: 36.7 °C (98.1 °F)   TempSrc: Oral   SpO2: 97%     No LMP recorded (lmp unknown). Patient is postmenopausal.    Physical Exam  Constitutional:       Appearance: Normal appearance.   HENT:      Head: Normocephalic.      Right Ear: Tympanic membrane normal.      Left Ear: Tympanic membrane normal.      Nose: Nose normal.      Mouth/Throat:      Mouth: Mucous membranes are dry.      Pharynx: Oropharynx is clear.   Eyes:      Extraocular Movements: Extraocular movements intact.      Pupils: Pupils are equal, round, and reactive to light.   Cardiovascular:      Rate and Rhythm: Normal rate and regular rhythm.      Pulses: Normal pulses.      Heart sounds: Normal heart sounds.   Pulmonary:      Effort: Pulmonary effort is normal.      Breath sounds: Normal breath sounds.   Musculoskeletal:         General: Normal range of motion.      Cervical back: Normal range of motion and neck supple.   Skin:     General: Skin is warm and dry.      Findings: Rash present. Rash is pustular and vesicular.             Comments: Linear, intact erythematous papulovesicular rash to right occipital region progressing superior to right clavicle   Neurological:      General: No focal deficit present.      Mental Status: She is alert and oriented to person, place, and time.   Psychiatric:         Mood and Affect: Mood normal.         Behavior: Behavior normal.         Procedures    Point of Care Test & Imaging Results from this visit  No results found for this visit on 09/22/24.   No results found.    Diagnostic study results (if any) were reviewed by GURMEET Desouza.    Assessment/Plan   Allergies, medications, history,  and pertinent labs/EKGs/Imaging reviewed by Shraddha Oh, APRN-CNP.     Medical Decision Making  Suspect shingles to right side, involving scalp region progressing superior to right clavicle so valtrex prescribed  Education provided to daughter and pt, in which print out provided  F/u with PCP 1 week   If new onset of HA or eye pain or vision changes, call 911 or go to nearest ER  Questions answered and pt agrees to POC  Cover the rash  Avoid touching and scratching your rash  Stay away from pregnant women, babies 12 months old or younger, anyone who is sick, and everyone who has not had chickenpox    Reduce your symptoms, such as burning and stinging  Lessen the amount of time you have shingles  Lower your risk of developing other health problems, such as long-lasting pain that can linger for months or years after the rash clears  Care for the rash every day until it clears. Most people see the blister-like rash start to scab in about 7 to 10 days. It usually takes between 2 and 4 weeks for the rash to go away completely.  Keep uncomfortable skin cool by applying a clean, cool, and damp washcloth several times a day. You want to apply it for 5 to 10 minutes each time.  Soak in a cool, oatmeal bath.  After the blisters have scabbed over, calm itchy skin by applying calamine lotion.  Get plenty of rest  Eat healthy, well-balanced meals  Take your mind off your discomfort by doing activities that you enjoy, such as listening to music, watching TV, or reading  Avoid stress  Wear loose-fitting clothing made of cotton or linen  Tell your doctor if you experience any other health problems. Some people who get shingles develop other health problems. Call your doctor’s office right away if you have any of the following:    The rash shows signs of infection, such as swelling, pus, or not clearing  Pain continues after the rash clears  You feel unwell after the rash clears  Talk with your doctor about getting the shingles  vaccine. You can get shingles again. A shingles vaccine that the U.S. Food and Drug Administration (FDA) approved in 2017 can greatly reduce your risk of developing shingles again.          Orders and Diagnoses  Diagnoses and all orders for this visit:  Other headache syndrome  Herpes zoster without complication  -     valACYclovir (Valtrex) 1 gram tablet; Take 1 tablet (1,000 mg) by mouth 3 times a day for 10 days.      Medical Admin Record      Patient disposition: Home    Electronically signed by GURMEET Desouza  2:56 PM

## 2024-09-23 ENCOUNTER — DOCUMENTATION (OUTPATIENT)
Dept: INFECTIOUS DISEASES | Facility: HOSPITAL | Age: 79
End: 2024-09-23

## 2024-09-23 ENCOUNTER — APPOINTMENT (OUTPATIENT)
Dept: PODIATRY | Facility: CLINIC | Age: 79
End: 2024-09-23
Payer: COMMERCIAL

## 2024-09-23 ENCOUNTER — LAB REQUISITION (OUTPATIENT)
Dept: LAB | Facility: CLINIC | Age: 79
End: 2024-09-23
Payer: COMMERCIAL

## 2024-09-23 ENCOUNTER — OFFICE VISIT (OUTPATIENT)
Dept: TRANSPLANT | Facility: HOSPITAL | Age: 79
End: 2024-09-23
Payer: MEDICARE

## 2024-09-23 ENCOUNTER — DOCUMENTATION (OUTPATIENT)
Dept: TRANSPLANT | Facility: HOSPITAL | Age: 79
End: 2024-09-23

## 2024-09-23 ENCOUNTER — HOSPITAL ENCOUNTER (INPATIENT)
Facility: HOSPITAL | Age: 79
End: 2024-09-23
Attending: STUDENT IN AN ORGANIZED HEALTH CARE EDUCATION/TRAINING PROGRAM | Admitting: STUDENT IN AN ORGANIZED HEALTH CARE EDUCATION/TRAINING PROGRAM
Payer: MEDICARE

## 2024-09-23 ENCOUNTER — TELEPHONE (OUTPATIENT)
Dept: TRANSPLANT | Facility: HOSPITAL | Age: 79
End: 2024-09-23

## 2024-09-23 VITALS
WEIGHT: 193.5 LBS | BODY MASS INDEX: 31.47 KG/M2 | DIASTOLIC BLOOD PRESSURE: 93 MMHG | SYSTOLIC BLOOD PRESSURE: 188 MMHG | OXYGEN SATURATION: 98 % | TEMPERATURE: 96.9 F | HEART RATE: 94 BPM

## 2024-09-23 DIAGNOSIS — N18.6 END STAGE RENAL DISEASE (MULTI): ICD-10-CM

## 2024-09-23 DIAGNOSIS — Z94.0 KIDNEY REPLACED BY TRANSPLANT (HHS-HCC): ICD-10-CM

## 2024-09-23 DIAGNOSIS — Z01.818 PRE-TRANSPLANT EVALUATION FOR KIDNEY TRANSPLANT: Primary | ICD-10-CM

## 2024-09-23 LAB
HLA RESULTS: NORMAL
HLA-A+B+C AB NFR SER: NORMAL %
HLA-DP+DQ+DR AB NFR SER: NORMAL %

## 2024-09-23 PROCEDURE — 1125F AMNT PAIN NOTED PAIN PRSNT: CPT | Performed by: STUDENT IN AN ORGANIZED HEALTH CARE EDUCATION/TRAINING PROGRAM

## 2024-09-23 PROCEDURE — 80505 PATH CLIN CONSLTJ HIGH 41-60: CPT | Performed by: PATHOLOGY

## 2024-09-23 PROCEDURE — 99214 OFFICE O/P EST MOD 30 MIN: CPT | Performed by: STUDENT IN AN ORGANIZED HEALTH CARE EDUCATION/TRAINING PROGRAM

## 2024-09-23 PROCEDURE — 3080F DIAST BP >= 90 MM HG: CPT | Performed by: STUDENT IN AN ORGANIZED HEALTH CARE EDUCATION/TRAINING PROGRAM

## 2024-09-23 PROCEDURE — 3077F SYST BP >= 140 MM HG: CPT | Performed by: STUDENT IN AN ORGANIZED HEALTH CARE EDUCATION/TRAINING PROGRAM

## 2024-09-23 ASSESSMENT — PAIN SCALES - GENERAL: PAINLEVEL: 7

## 2024-09-23 NOTE — TELEPHONE ENCOUNTER
UNOS ID: [id number]  MATCH ID: [id number]  ORGAN: Right Kidney  KDPI (if applicable): 92%  KNOWN RISK STATUS: None  LOCAL VS IMPORT: Local  HCV (if applicable): Negative  HepBcAb (if applicable): Negative    Confirm the following:  Any COVID symptoms (nausea, vomiting, diarrhea, fever, chills, cough, sore throat, headache): YES-DESCRIBE/NO: No  Any contact with anyone positive for or suspected to have COVID: YES-DESCRIBE/NO: No  Any recent hospitalizations: YES-DESCRIBE/NO: No  Any recent illnesses: YES-DESCRIBE/NO: No  Any recent injuries (cracked teeth, broken bones, wounds that won’t heal): YES-DESCRIBE/NO: No  Any antibiotics: YES-DESCRIBE/NO: Yes, describe: Valtrex  Any blood thinners: YES-DESCRIBE/NO: Yes, describe: ASA  Any blood transfusions: YES-DESCRIBE/NO: No  When was last dialysis/type: YES/NA/NO: N/A    The last time they ate or drank anythin/23 @ 0900  NPO @ 0911  Determine ETA to hospital: 30 minutes  Patient aware of the possibility of a dry-run.

## 2024-09-23 NOTE — PROGRESS NOTES
I was requested by Dr. Mac to see this patient for concern for shingles.  Patient has a kidney offer pending and was diagnosed with shingles yesterday by urgent care.    Patient has multiple grouped vesicles on erythematous base on right C3 and C4 dermatomal distribution.    Recommend not to proceed with transplant for 2 weeks from today (until Oct 6).    Advised the patient to decrease Valtrex 1 g p.o. every 24 hours for 2 weeks (creatinine clearance is less than 30 mL/min).    Advised the patient to receive 2 dose Shingrix vaccine after 1 month at any pharmacy.    Patient's care discussed with transplant surgeon .

## 2024-09-23 NOTE — PROGRESS NOTES
Kidney Transplant Evaluation Office Visit    Chief Complaint: Patient presents for kidney transplant evaluation      She was called in today for examination of new skin lesions c/f Shingles - this was in regard to a potential DDKT offer for her    In clinic, upon exam, she does have vesicular rash suggestive of active shingles. She is on Valganciclovir for this. We discussed renal dose for it and that we will plan for 2 weeks of status 7 and re-activate once the rash is no longer infective/ active.    ID colleague Dr Chand also examined the rash and provided recommendations for Shingrix vaccine at 4 weeks.    I also d/w her Gyn this morning bout endometrial thickening and plan for observation - he stated the risk of malignancy is low <1% and okay to proceed to transplant from that standpoint    Patient in agreement    History of Present Illness:  Liz Hamlin  is a 78 y.o. female presents with ESRD from Hypertension of over 15 years. She is currently on 5 anti-hypertensives - Hydralazine, Losartan, Metoprolol, Clonidine, and Furosemide.     She is currently pre-dialysis with an eGFR of 21.     In clinic today she denies any chest pain, SOB, palpitations, as well as any recent fever, abdominal pain, difficulty voiding or other urinary symptoms, constipation, or poor oral intake.     She does report having urinary frequency/ urgency and incontinence. She has had 2 pregnancies with .     She lives an active lifestyle and does senior cardio 4d a week.    Hemodialysis: pre-dialysis  Dialysis Access: n/a  Urine Status: make urine   Disease Etiology: hypertensive nephropathy.   Disease Complications: hypertension.   PVD: NO on exam, CT pending  Prior Abdominal Surgery: YES - C-sections x2; UHR w/mesh   Prior Malignancy: NO - but has endometrial thickening being observed and followed by GYN  BMI: 32.28  Potential Living Donors: YES - sister     Review of Systems:  Cardiac: Denies chest  pain, palpitations  : Normal urine output. Denies history of gross hematuria, nephrolithiasis, urinary retention, or recurrent UTIs.  Vascular: Denies personal or familial history of DVT/PE. No active claudication or non-healing LE wounds.  Extremities: LE Edema -   Functional Status: Can walk up 2 flights of stairs    Past Medical History:  HTN  JOSE E  Urinary frequency/ incontinence  Endometrial thickening requiring D&C per Gyn     Past Surgical History:  UHR w/mesh   Vertical midline  x2     Social History:  Lives an active life, does cardio 4d/ week  No etoh or smoking  Sister here with her who also wants to be evaluated as a potential LD     Transfusions: None  Pregnancy: Yes x2; c-sections  Prior transplant: No     Family History:  Mother: n/a  Father: n/a  Sibling: n/a    Physical Exam:  Vitals:    24 1205   BP: (!) 188/93   Pulse: 94   Temp: 36.1 °C (96.9 °F)   SpO2: 98%     Gen: A+OX3; NAD  HEENT: PERRL, sclera anicteric, MMM  Cardiac: RRR  Chest: Normal inspiratory effort  Abdomen: S/NT/ND.  Ext: No LE edema  Vascular: 2+ palpable femoral pulses  Psychiatric: Normal mood, affect    Assessment/Plan:  - The patient remains a good candidate for kidney transplant    - Will Status 7 for 2 weeks for active Shingles    - Shingrix vaccine at 4 weeks    Surgical Considerations:  none    Transplant Education:    I had a discussion with this patient regarding 1 year graft and patient survival statistics following renal transplantation for both living and  donor allograft recipients. This data included [facility] data compared to National data readily available for review on https://www.SRTR.org. The patient also had attended the kidney transplant education class provided by the transplant institute.     The difference between allograft function was discussed comparing living donor, KDPI 0-85%, and >85% kidneys.     Further discussion included:  -The transplant selection committee  process.  -The need for lifelong immunosuppressive therapy, and the side effects of these medications including the risk of infections, cancer, and lymphoma.  -The wait list time approximately is 5 years or more for  donor transplants and the statistical superiority of a living donor.     -Using identified donors with risk criteria for transmission of infection  -The possibility of utilizing  donors with known HCV antibody and/or JANE positivity and post-transplant treatment/surveillance protocol  -Potential transmission of infectious disease from any  donors, as well as living donors.   -The possibility of transmission of tumors and infections via the transplanted organ.  -The inability to completely test for all potential harmful tumors or infectious agents.  -The possibility of listing at multiple locations.     Surgical complications including need for reoperation(s) including but not limited to:  -Bleeding.  -Repair of leaks.  -Control of infection.  -Blood clots in the transplant vessels.  -Possible kidney transplant removal.     The medical complications including but not limited to:  -Death.  -Cardiac.  -Pulmonary.  -Infectious.  -Neurologic.  -Other Complications.     We also discussed how the kidney transplant could function:  -Non-function and possible kidney transplant removal within the first 3 to 6 months.  -Delayed graft function (dialysis needed after transplant).  -The potential of recurrence of kidney disease leading to kidney transplant graft loss.    Time Attestation:  I spent 60 minutes with the patient, over 50 minutes in counseling and education as outlined above.    Elder Mac MD, [facility]  Transplant & Hepatobiliary Surgery

## 2024-09-24 ENCOUNTER — DOCUMENTATION (OUTPATIENT)
Dept: TRANSPLANT | Facility: HOSPITAL | Age: 79
End: 2024-09-24
Payer: COMMERCIAL

## 2024-09-24 NOTE — PROGRESS NOTES
Late entry:  Pt came to clinic 09/23/2024 - for Dr. Mac and Dr. Chand to evaluate her rash; pt was up for a kidney offer and was recently dx'd with shingles.    Unfortunately she was confirmed to have shingles.    Dr. Chand recommended pt be placed on hold for two weeks which was done in [facility] and [facility].

## 2024-09-27 ENCOUNTER — TELEPHONE (OUTPATIENT)
Dept: TRANSPLANT | Facility: HOSPITAL | Age: 79
End: 2024-09-27
Payer: COMMERCIAL

## 2024-09-30 ENCOUNTER — LAB REQUISITION (OUTPATIENT)
Dept: LAB | Facility: CLINIC | Age: 79
End: 2024-09-30
Payer: COMMERCIAL

## 2024-09-30 DIAGNOSIS — N18.6 END STAGE RENAL DISEASE (MULTI): ICD-10-CM

## 2024-10-01 ENCOUNTER — APPOINTMENT (OUTPATIENT)
Dept: TRANSPLANT | Facility: HOSPITAL | Age: 79
End: 2024-10-01
Payer: COMMERCIAL

## 2024-10-10 ENCOUNTER — APPOINTMENT (OUTPATIENT)
Dept: PRIMARY CARE | Facility: CLINIC | Age: 79
End: 2024-10-10
Payer: COMMERCIAL

## 2024-10-10 VITALS
DIASTOLIC BLOOD PRESSURE: 71 MMHG | WEIGHT: 190 LBS | SYSTOLIC BLOOD PRESSURE: 171 MMHG | BODY MASS INDEX: 30.9 KG/M2 | TEMPERATURE: 96.6 F | HEART RATE: 81 BPM

## 2024-10-10 DIAGNOSIS — N18.4 CHRONIC KIDNEY DISEASE (CKD) STAGE G4/A1, SEVERELY DECREASED GLOMERULAR FILTRATION RATE (GFR) BETWEEN 15-29 ML/MIN/1.73 SQUARE METER AND ALBUMINURIA CREATININE RATIO LESS THAN 30 MG/G (CMS/HC* (MULTI): ICD-10-CM

## 2024-10-10 DIAGNOSIS — I12.9 CKD STAGE 4 SECONDARY TO HYPERTENSION (MULTI): ICD-10-CM

## 2024-10-10 DIAGNOSIS — M1A.9XX0 CHRONIC GOUT WITHOUT TOPHUS, UNSPECIFIED CAUSE, UNSPECIFIED SITE: ICD-10-CM

## 2024-10-10 DIAGNOSIS — Z12.31 VISIT FOR SCREENING MAMMOGRAM: ICD-10-CM

## 2024-10-10 DIAGNOSIS — Z78.0 ASYMPTOMATIC MENOPAUSE: ICD-10-CM

## 2024-10-10 DIAGNOSIS — N18.4 CKD STAGE 4 SECONDARY TO HYPERTENSION (MULTI): ICD-10-CM

## 2024-10-10 DIAGNOSIS — Z13.220 SCREENING CHOLESTEROL LEVEL: ICD-10-CM

## 2024-10-10 DIAGNOSIS — Z00.00 HEALTH CARE MAINTENANCE: Primary | ICD-10-CM

## 2024-10-10 DIAGNOSIS — I10 PRIMARY HYPERTENSION: ICD-10-CM

## 2024-10-10 DIAGNOSIS — I10 BENIGN HYPERTENSION: ICD-10-CM

## 2024-10-10 DIAGNOSIS — E78.00 PURE HYPERCHOLESTEROLEMIA: ICD-10-CM

## 2024-10-10 DIAGNOSIS — Z13.1 DIABETES MELLITUS SCREENING: ICD-10-CM

## 2024-10-10 DIAGNOSIS — Z12.11 COLON CANCER SCREENING: ICD-10-CM

## 2024-10-10 PROCEDURE — 99397 PER PM REEVAL EST PAT 65+ YR: CPT | Performed by: FAMILY MEDICINE

## 2024-10-10 PROCEDURE — 1159F MED LIST DOCD IN RCRD: CPT | Performed by: FAMILY MEDICINE

## 2024-10-10 PROCEDURE — 90679 RSV VACC PREF RECOMB ADJT IM: CPT | Performed by: FAMILY MEDICINE

## 2024-10-10 PROCEDURE — 90662 IIV NO PRSV INCREASED AG IM: CPT | Performed by: FAMILY MEDICINE

## 2024-10-10 PROCEDURE — 90472 IMMUNIZATION ADMIN EACH ADD: CPT | Performed by: FAMILY MEDICINE

## 2024-10-10 PROCEDURE — 3078F DIAST BP <80 MM HG: CPT | Performed by: FAMILY MEDICINE

## 2024-10-10 PROCEDURE — 3077F SYST BP >= 140 MM HG: CPT | Performed by: FAMILY MEDICINE

## 2024-10-10 PROCEDURE — 1125F AMNT PAIN NOTED PAIN PRSNT: CPT | Performed by: FAMILY MEDICINE

## 2024-10-10 PROCEDURE — 90471 IMMUNIZATION ADMIN: CPT | Performed by: FAMILY MEDICINE

## 2024-10-10 PROCEDURE — 1036F TOBACCO NON-USER: CPT | Performed by: FAMILY MEDICINE

## 2024-10-10 RX ORDER — ATORVASTATIN CALCIUM 40 MG/1
40 TABLET, FILM COATED ORAL DAILY
Qty: 90 TABLET | Refills: 1 | Status: SHIPPED | OUTPATIENT
Start: 2024-10-10

## 2024-10-10 RX ORDER — ALLOPURINOL 100 MG/1
100 TABLET ORAL DAILY
Qty: 90 TABLET | Refills: 1 | Status: SHIPPED | OUTPATIENT
Start: 2024-10-10

## 2024-10-10 RX ORDER — CLONIDINE HYDROCHLORIDE 0.1 MG/1
0.1 TABLET ORAL DAILY
Qty: 90 TABLET | Refills: 1 | Status: SHIPPED | OUTPATIENT
Start: 2024-10-10

## 2024-10-10 RX ORDER — HYDROXYZINE HYDROCHLORIDE 25 MG/1
25 TABLET, FILM COATED ORAL 2 TIMES DAILY PRN
Qty: 180 TABLET | Refills: 1 | Status: SHIPPED | OUTPATIENT
Start: 2024-10-10 | End: 2025-01-08

## 2024-10-10 RX ORDER — ASCORBIC ACID 125 MG
1000 TABLET,CHEWABLE ORAL 2 TIMES WEEKLY
Qty: 90 TABLET | Refills: 1 | Status: SHIPPED | OUTPATIENT
Start: 2024-10-10

## 2024-10-10 RX ORDER — HYDRALAZINE HYDROCHLORIDE 100 MG/1
100 TABLET, FILM COATED ORAL 2 TIMES DAILY
Qty: 180 TABLET | Refills: 1 | Status: SHIPPED | OUTPATIENT
Start: 2024-10-10

## 2024-10-10 RX ORDER — LOSARTAN POTASSIUM 100 MG/1
100 TABLET ORAL DAILY
Qty: 90 TABLET | Refills: 1 | Status: SHIPPED | OUTPATIENT
Start: 2024-10-10 | End: 2025-01-08

## 2024-10-10 RX ORDER — FUROSEMIDE 40 MG/1
40 TABLET ORAL DAILY
Qty: 90 TABLET | Refills: 1 | Status: SHIPPED | OUTPATIENT
Start: 2024-10-10

## 2024-10-10 RX ORDER — METOPROLOL SUCCINATE 50 MG/1
50 TABLET, EXTENDED RELEASE ORAL DAILY
Qty: 90 TABLET | Refills: 1 | Status: SHIPPED | OUTPATIENT
Start: 2024-10-10

## 2024-10-10 ASSESSMENT — PAIN SCALES - GENERAL: PAINLEVEL: 6

## 2024-10-10 ASSESSMENT — ENCOUNTER SYMPTOMS: NECK PAIN: 1

## 2024-10-10 NOTE — ASSESSMENT & PLAN NOTE
-mammogram 10/27/2023: wnl. Ordered.  -colonoscopy 12/21/2022.  Repeat colonoscopy not recommended.   -blood test UTD- 4/17/2024.  -dexa scan 3/26/2024:  wnl.  -vit D continued.  - Immunization: High dose Influenza and RVS vaccines given today. Shingrix recommended in 3 months.  Had shingles on 9/23/2024.   -eye exam was 6 months ago.   -hearing is intact.

## 2024-10-10 NOTE — PROGRESS NOTES
Subjective   Patient ID: Liz Hamlin is a 79 y.o. female who presents for Annual Exam (Physical ), Neck Pain, and Shoulder Pain (Right ).  Neck Pain     Shoulder Pain     The patient is a 76 yo AA female with a history of HTN, MVR, gout, CKD- end stage renal disease on the transplant list, HLD, JOSE E ( on CPAP), here for:    1- a CPE visit.  -mammogram 10/27/2023: wnl. Ordered.  -colonoscopy 12/21/2022.  Repeat colonoscopy not recommended.   -blood test UTD- 4/17/2024.  -dexa scan 3/26/2024:  wnl.  -vit D continued.  - Immunization: High dose Influenza and RVS vaccines given today. Shingrix recommended in 3 months.  Had shingles on 9/23/2024.   -eye exam was 6 months ago.   -hearing is intact.     2- medication refills.    A review of system was completed.  All systems were reviewed and were normal, except for the ones that are listed in the HPI.    Objective   Physical Exam  Constitutional:       Appearance: Normal appearance.   HENT:      Head: Normocephalic and atraumatic.      Right Ear: Tympanic membrane, ear canal and external ear normal.      Left Ear: Tympanic membrane, ear canal and external ear normal.      Nose: Nose normal.      Mouth/Throat:      Mouth: Mucous membranes are moist.      Pharynx: Oropharynx is clear.   Eyes:      Extraocular Movements: Extraocular movements intact.      Conjunctiva/sclera: Conjunctivae normal.      Pupils: Pupils are equal, round, and reactive to light.   Cardiovascular:      Rate and Rhythm: Normal rate and regular rhythm.      Pulses: Normal pulses.   Pulmonary:      Effort: Pulmonary effort is normal.      Breath sounds: Normal breath sounds.   Abdominal:      General: Abdomen is flat. Bowel sounds are normal.      Palpations: Abdomen is soft.   Musculoskeletal:         General: Normal range of motion.      Cervical back: Normal range of motion and neck supple.   Skin:     General: Skin is warm.   Neurological:      General: No focal deficit present.      Mental  Status: She is alert and oriented to person, place, and time. Mental status is at baseline.   Psychiatric:         Mood and Affect: Mood normal.         Behavior: Behavior normal.         Thought Content: Thought content normal.         Judgment: Judgment normal.         Assessment/Plan   Problem List Items Addressed This Visit       Benign hypertension    Relevant Medications    hydrALAZINE (Apresoline) 100 mg tablet    furosemide (Lasix) 40 mg tablet    losartan (Cozaar) 100 mg tablet    Chronic kidney disease (CKD) stage G4/A1, severely decreased glomerular filtration rate (GFR) between 15-29 mL/min/1.73 square meter and albuminuria creatinine ratio less than 30 mg/g (CMS/HC* (Multi)    Relevant Medications    cholecalciferol, vitamin D3, 25 mcg (1,000 unit) tablet,chewable    Gout    Relevant Medications    allopurinol (Zyloprim) 100 mg tablet    Hyperlipidemia    Relevant Medications    atorvastatin (Lipitor) 40 mg tablet    metoprolol succinate XL (Toprol-XL) 50 mg 24 hr tablet    CKD stage 4 secondary to hypertension (Multi)    Relevant Medications    hydrALAZINE (Apresoline) 100 mg tablet    allopurinol (Zyloprim) 100 mg tablet    furosemide (Lasix) 40 mg tablet    hydrOXYzine HCL (Atarax) 25 mg tablet    losartan (Cozaar) 100 mg tablet    Health care maintenance - Primary     -mammogram 10/27/2023: wnl. Ordered.  -colonoscopy 12/21/2022.  Repeat colonoscopy not recommended.   -blood test UTD- 4/17/2024.  -dexa scan 3/26/2024:  wnl.  -vit D continued.  - Immunization: High dose Influenza and RVS vaccines given today. Shingrix recommended in 3 months.  Had shingles on 9/23/2024.   -eye exam was 6 months ago.   -hearing is intact.          Diabetes mellitus screening    Screening cholesterol level    Asymptomatic menopause    Visit for screening mammogram    Relevant Orders    BI mammo bilateral screening tomosynthesis    Colon cancer screening     Other Visit Diagnoses       Primary hypertension         Relevant Medications    hydrALAZINE (Apresoline) 100 mg tablet    cloNIDine (Catapres) 0.1 mg tablet    furosemide (Lasix) 40 mg tablet    losartan (Cozaar) 100 mg tablet         Patient to return to office in 6 months.

## 2024-10-15 ENCOUNTER — TELEPHONE (OUTPATIENT)
Dept: TRANSPLANT | Facility: HOSPITAL | Age: 79
End: 2024-10-15

## 2024-10-15 ENCOUNTER — OFFICE VISIT (OUTPATIENT)
Dept: TRANSPLANT | Facility: HOSPITAL | Age: 79
End: 2024-10-15
Payer: COMMERCIAL

## 2024-10-15 ENCOUNTER — DOCUMENTATION (OUTPATIENT)
Dept: TRANSPLANT | Facility: HOSPITAL | Age: 79
End: 2024-10-15

## 2024-10-15 VITALS
HEART RATE: 83 BPM | DIASTOLIC BLOOD PRESSURE: 81 MMHG | BODY MASS INDEX: 31.72 KG/M2 | SYSTOLIC BLOOD PRESSURE: 167 MMHG | TEMPERATURE: 97.7 F | OXYGEN SATURATION: 98 % | WEIGHT: 195 LBS

## 2024-10-15 DIAGNOSIS — N18.4 CHRONIC KIDNEY DISEASE, STAGE 4 (SEVERE) (MULTI): Primary | ICD-10-CM

## 2024-10-15 PROCEDURE — 99214 OFFICE O/P EST MOD 30 MIN: CPT | Performed by: TRANSPLANT SURGERY

## 2024-10-15 ASSESSMENT — ENCOUNTER SYMPTOMS
COLOR CHANGE: 0
DIZZINESS: 0
CONFUSION: 0
FEVER: 0
ABDOMINAL PAIN: 0
ARTHRALGIAS: 0
DYSURIA: 0
CONSTIPATION: 0
FREQUENCY: 0
CHILLS: 0
HEMATURIA: 0
ADENOPATHY: 0
COUGH: 0
ABDOMINAL DISTENTION: 0
EYES NEGATIVE: 1
SHORTNESS OF BREATH: 0
DIARRHEA: 0
WEAKNESS: 0
AGITATION: 0
HALLUCINATIONS: 0
LIGHT-HEADEDNESS: 0

## 2024-10-15 ASSESSMENT — PAIN SCALES - GENERAL: PAINLEVEL: 0-NO PAIN

## 2024-10-15 NOTE — PROGRESS NOTES
Pt in to see Dr. Jimenez today to see if she is free from shingles.    Dr. Jimenez examined the patient and she is clear.  She is okay to be reactivated.    This was done in [facility] and Epic; pt made aware of the status change before she left the appt today.

## 2024-10-15 NOTE — PROGRESS NOTES
Subjective   Liz Hamlin is a 79 y.o. female who presents for follow while listed for kidney transplant.    She had shingles a few weeks ago and was made inactive on the list.    He shingles have resolved but she is still complaining of some residual pain    Review of Systems   Constitutional:  Negative for chills and fever.   HENT: Negative.  Negative for congestion.    Eyes: Negative.    Respiratory:  Negative for cough and shortness of breath.    Cardiovascular:  Negative for chest pain.   Gastrointestinal:  Negative for abdominal distention, abdominal pain, constipation and diarrhea.   Endocrine: Negative for cold intolerance and heat intolerance.   Genitourinary:  Negative for dysuria, frequency, hematuria and urgency.   Musculoskeletal:  Negative for arthralgias.   Skin:  Negative for color change.   Allergic/Immunologic: Negative for environmental allergies.   Neurological:  Negative for dizziness, weakness and light-headedness.   Hematological:  Negative for adenopathy.   Psychiatric/Behavioral:  Negative for agitation, confusion and hallucinations.         Objective   Vitals:    10/15/24 0850   BP: 167/81   Pulse: 83   Temp: 36.5 °C (97.7 °F)   SpO2: 98%       Physical Exam  Constitutional:       Appearance: Normal appearance.   HENT:      Head: Normocephalic and atraumatic.      Nose: Nose normal.   Eyes:      Pupils: Pupils are equal, round, and reactive to light.   Cardiovascular:      Rate and Rhythm: Normal rate.   Pulmonary:      Effort: Pulmonary effort is normal. No respiratory distress.   Abdominal:      General: There is no distension.      Palpations: Abdomen is soft. There is no mass.   Musculoskeletal:         General: No swelling. Normal range of motion.      Cervical back: Normal range of motion.   Skin:     General: Skin is warm and dry.   Neurological:      General: No focal deficit present.      Mental Status: She is alert and oriented to person, place, and time.   Psychiatric:          "Mood and Affect: Mood normal.         Behavior: Behavior normal.          Lab Review    Blood Type: No results found for: \"ABORH\"  Lab Results   Component Value Date    CREATININE 2.11 (H) 09/18/2024    K 4.7 09/18/2024    GLUCOSE 107 (H) 09/18/2024    HCT 36.2 09/18/2024    WBC 4.7 09/18/2024     09/18/2024    CALCIUM 9.5 09/18/2024     Echocardiogram 3/2024  CONCLUSIONS:   1. Left ventricular systolic function is normal with a 60-65% estimated ejection fraction.   2. Vena contracta:0.5 cm; ERO:0.13 cm2; MR regurgitation [id number] ml/beat. Mitral regurgitation due to posterior mitral annular calcification limiting motion of the posterior MV leaflet with valvular damage.   3. Mild to moderate mitral valve regurgitation.   4. RVSP within normal limits.   5. There is no evidence of a patent foramen ovale.    MRI cardiac   IMPRESSION:  1. Limited exam. Within the confines of these limitations, the left  ventricle is normal in size, shape, and has normal global systolic  function. LVEF = 65%. There are no segmental wall motion  abnormalities.  Quantitative values are as noted above.  2. No evidence of regadenoson induced ischemia.  3. Area of linear mid myocardial delayed enhancement within the basal  anteroseptum which is nonspecific. No evidence of prior infarction.  Myocardial T1 and T2 mapping times are normal.  4. Biatrial enlargement.  5. Subjectively mild mitral regurgitation. Limited quantification of  the regurgitant volume/fraction secondary to motion artifact.    Assessment/Plan    Diagnoses:   1. Chronic kidney disease, stage 4 (severe) (Multi)      Liz Hamlin is a good candidate for kidney transplant    Shingles resolved, no evidence of residual skin lesions  Appears to be in good health otherwise  Can be reactivated to status 1  "

## 2024-10-15 NOTE — H&P (VIEW-ONLY)
Subjective   Liz Hamlin is a 79 y.o. female who presents for follow while listed for kidney transplant.    She had shingles a few weeks ago and was made inactive on the list.    He shingles have resolved but she is still complaining of some residual pain    Review of Systems   Constitutional:  Negative for chills and fever.   HENT: Negative.  Negative for congestion.    Eyes: Negative.    Respiratory:  Negative for cough and shortness of breath.    Cardiovascular:  Negative for chest pain.   Gastrointestinal:  Negative for abdominal distention, abdominal pain, constipation and diarrhea.   Endocrine: Negative for cold intolerance and heat intolerance.   Genitourinary:  Negative for dysuria, frequency, hematuria and urgency.   Musculoskeletal:  Negative for arthralgias.   Skin:  Negative for color change.   Allergic/Immunologic: Negative for environmental allergies.   Neurological:  Negative for dizziness, weakness and light-headedness.   Hematological:  Negative for adenopathy.   Psychiatric/Behavioral:  Negative for agitation, confusion and hallucinations.         Objective   Vitals:    10/15/24 0850   BP: 167/81   Pulse: 83   Temp: 36.5 °C (97.7 °F)   SpO2: 98%       Physical Exam  Constitutional:       Appearance: Normal appearance.   HENT:      Head: Normocephalic and atraumatic.      Nose: Nose normal.   Eyes:      Pupils: Pupils are equal, round, and reactive to light.   Cardiovascular:      Rate and Rhythm: Normal rate.   Pulmonary:      Effort: Pulmonary effort is normal. No respiratory distress.   Abdominal:      General: There is no distension.      Palpations: Abdomen is soft. There is no mass.   Musculoskeletal:         General: No swelling. Normal range of motion.      Cervical back: Normal range of motion.   Skin:     General: Skin is warm and dry.   Neurological:      General: No focal deficit present.      Mental Status: She is alert and oriented to person, place, and time.   Psychiatric:          "Mood and Affect: Mood normal.         Behavior: Behavior normal.          Lab Review    Blood Type: No results found for: \"ABORH\"  Lab Results   Component Value Date    CREATININE 2.11 (H) 09/18/2024    K 4.7 09/18/2024    GLUCOSE 107 (H) 09/18/2024    HCT 36.2 09/18/2024    WBC 4.7 09/18/2024     09/18/2024    CALCIUM 9.5 09/18/2024     Echocardiogram 3/2024  CONCLUSIONS:   1. Left ventricular systolic function is normal with a 60-65% estimated ejection fraction.   2. Vena contracta:0.5 cm; ERO:0.13 cm2; MR regurgitation [id number] ml/beat. Mitral regurgitation due to posterior mitral annular calcification limiting motion of the posterior MV leaflet with valvular damage.   3. Mild to moderate mitral valve regurgitation.   4. RVSP within normal limits.   5. There is no evidence of a patent foramen ovale.    MRI cardiac   IMPRESSION:  1. Limited exam. Within the confines of these limitations, the left  ventricle is normal in size, shape, and has normal global systolic  function. LVEF = 65%. There are no segmental wall motion  abnormalities.  Quantitative values are as noted above.  2. No evidence of regadenoson induced ischemia.  3. Area of linear mid myocardial delayed enhancement within the basal  anteroseptum which is nonspecific. No evidence of prior infarction.  Myocardial T1 and T2 mapping times are normal.  4. Biatrial enlargement.  5. Subjectively mild mitral regurgitation. Limited quantification of  the regurgitant volume/fraction secondary to motion artifact.    Assessment/Plan    Diagnoses:   1. Chronic kidney disease, stage 4 (severe) (Multi)      Liz Hamlin is a good candidate for kidney transplant    Shingles resolved, no evidence of residual skin lesions  Appears to be in good health otherwise  Can be reactivated to status 1  "

## 2024-10-15 NOTE — TELEPHONE ENCOUNTER
Talked to pt, let her know she could get the shingrix vaccine next week (October 23).    Asked her to call me after she gets this.

## 2024-10-18 ENCOUNTER — LAB (OUTPATIENT)
Dept: LAB | Facility: LAB | Age: 79
End: 2024-10-18

## 2024-10-18 PROCEDURE — 86826 HLA X-MATCH NONCYTOTOXC ADDL: CPT | Mod: OUT | Performed by: SURGERY

## 2024-10-19 PROCEDURE — 81382 HLA II TYPING 1 LOC HR: CPT | Mod: OUT | Performed by: SURGERY

## 2024-10-20 ENCOUNTER — ANESTHESIA EVENT (OUTPATIENT)
Dept: OPERATING ROOM | Facility: HOSPITAL | Age: 79
End: 2024-10-20
Payer: MEDICARE

## 2024-10-20 ENCOUNTER — APPOINTMENT (OUTPATIENT)
Dept: RADIOLOGY | Facility: HOSPITAL | Age: 79
End: 2024-10-20
Payer: MEDICARE

## 2024-10-20 ENCOUNTER — PREP FOR PROCEDURE (OUTPATIENT)
Dept: TRANSPLANT | Facility: HOSPITAL | Age: 79
End: 2024-10-20

## 2024-10-20 ENCOUNTER — APPOINTMENT (OUTPATIENT)
Dept: CARDIOLOGY | Facility: HOSPITAL | Age: 79
End: 2024-10-20
Payer: MEDICARE

## 2024-10-20 ENCOUNTER — TELEPHONE (OUTPATIENT)
Dept: TRANSPLANT | Facility: HOSPITAL | Age: 79
End: 2024-10-20

## 2024-10-20 ENCOUNTER — HOSPITAL ENCOUNTER (OUTPATIENT)
Facility: HOSPITAL | Age: 79
Setting detail: SURGERY ADMIT
End: 2024-10-20
Attending: TRANSPLANT SURGERY | Admitting: TRANSPLANT SURGERY
Payer: MEDICARE

## 2024-10-20 ENCOUNTER — ANESTHESIA (OUTPATIENT)
Dept: OPERATING ROOM | Facility: HOSPITAL | Age: 79
End: 2024-10-20
Payer: MEDICARE

## 2024-10-20 VITALS
HEART RATE: 102 BPM | BODY MASS INDEX: 30.59 KG/M2 | TEMPERATURE: 96.8 F | SYSTOLIC BLOOD PRESSURE: 158 MMHG | DIASTOLIC BLOOD PRESSURE: 70 MMHG | WEIGHT: 190.37 LBS | RESPIRATION RATE: 10 BRPM | OXYGEN SATURATION: 98 % | HEIGHT: 66 IN

## 2024-10-20 DIAGNOSIS — N18.6 ESRD (END STAGE RENAL DISEASE) (MULTI): Primary | ICD-10-CM

## 2024-10-20 DIAGNOSIS — D52.9 ANEMIA DUE TO FOLIC ACID DEFICIENCY, UNSPECIFIED DEFICIENCY TYPE: ICD-10-CM

## 2024-10-20 DIAGNOSIS — Z94.0 KIDNEY REPLACED BY TRANSPLANT (HHS-HCC): ICD-10-CM

## 2024-10-20 DIAGNOSIS — Z74.09 MOBILITY IMPAIRED: ICD-10-CM

## 2024-10-20 DIAGNOSIS — E83.39 HYPOPHOSPHATEMIA: ICD-10-CM

## 2024-10-20 DIAGNOSIS — N18.6 ESRD (END STAGE RENAL DISEASE) (MULTI): ICD-10-CM

## 2024-10-20 DIAGNOSIS — I10 ESSENTIAL HYPERTENSION: ICD-10-CM

## 2024-10-20 DIAGNOSIS — Z94.0 KIDNEY TRANSPLANTED (HHS-HCC): Primary | ICD-10-CM

## 2024-10-20 DIAGNOSIS — E87.70 HYPERVOLEMIA, UNSPECIFIED HYPERVOLEMIA TYPE: ICD-10-CM

## 2024-10-20 LAB
ABO GROUP (TYPE) IN BLOOD: NORMAL
ALBUMIN SERPL BCP-MCNC: 3.4 G/DL (ref 3.4–5)
ALBUMIN SERPL BCP-MCNC: 4.2 G/DL (ref 3.4–5)
ALP SERPL-CCNC: 96 U/L (ref 33–136)
ALT SERPL W P-5'-P-CCNC: 16 U/L (ref 7–45)
ANION GAP BLDV CALCULATED.4IONS-SCNC: 13 MMOL/L (ref 10–25)
ANION GAP BLDV CALCULATED.4IONS-SCNC: 14 MMOL/L (ref 10–25)
ANION GAP BLDV CALCULATED.4IONS-SCNC: ABNORMAL MMOL/L
ANION GAP SERPL CALC-SCNC: 14 MMOL/L (ref 10–20)
ANION GAP SERPL CALC-SCNC: 14 MMOL/L (ref 10–20)
ANTIBODY SCREEN: NORMAL
APTT PPP: 42 SECONDS (ref 27–38)
AST SERPL W P-5'-P-CCNC: 19 U/L (ref 9–39)
BASE EXCESS BLDV CALC-SCNC: -4.9 MMOL/L (ref -2–3)
BASE EXCESS BLDV CALC-SCNC: -8.2 MMOL/L (ref -2–3)
BASE EXCESS BLDV CALC-SCNC: -8.3 MMOL/L (ref -2–3)
BASOPHILS # BLD AUTO: 0 X10*3/UL (ref 0–0.1)
BASOPHILS NFR BLD AUTO: 0 %
BILIRUB DIRECT SERPL-MCNC: 0.1 MG/DL (ref 0–0.3)
BILIRUB SERPL-MCNC: 0.6 MG/DL (ref 0–1.2)
BLOOD EXPIRATION DATE: NORMAL
BLOOD EXPIRATION DATE: NORMAL
BODY TEMPERATURE: 37 DEGREES CELSIUS
BUN SERPL-MCNC: 34 MG/DL (ref 6–23)
BUN SERPL-MCNC: 45 MG/DL (ref 6–23)
CA-I BLDV-SCNC: 1.09 MMOL/L (ref 1.1–1.33)
CA-I BLDV-SCNC: 1.15 MMOL/L (ref 1.1–1.33)
CA-I BLDV-SCNC: 1.29 MMOL/L (ref 1.1–1.33)
CALCIUM SERPL-MCNC: 8 MG/DL (ref 8.6–10.6)
CALCIUM SERPL-MCNC: 9.7 MG/DL (ref 8.6–10.6)
CHLORIDE BLDV-SCNC: 109 MMOL/L (ref 98–107)
CHLORIDE BLDV-SCNC: 109 MMOL/L (ref 98–107)
CHLORIDE BLDV-SCNC: 110 MMOL/L (ref 98–107)
CHLORIDE SERPL-SCNC: 109 MMOL/L (ref 98–107)
CHLORIDE SERPL-SCNC: 111 MMOL/L (ref 98–107)
CMV IGG AVIDITY SERPL IA-RTO: NONREACTIVE %
CO2 SERPL-SCNC: 21 MMOL/L (ref 21–32)
CO2 SERPL-SCNC: 22 MMOL/L (ref 21–32)
CREAT SERPL-MCNC: 1.76 MG/DL (ref 0.5–1.05)
CREAT SERPL-MCNC: 2.19 MG/DL (ref 0.5–1.05)
DISPENSE STATUS: NORMAL
DISPENSE STATUS: NORMAL
EBV NA AB SER QL: POSITIVE
EGFRCR SERPLBLD CKD-EPI 2021: 23 ML/MIN/1.73M*2
EGFRCR SERPLBLD CKD-EPI 2021: 30 ML/MIN/1.73M*2
EOSINOPHIL # BLD AUTO: 0 X10*3/UL (ref 0–0.4)
EOSINOPHIL NFR BLD AUTO: 0 %
ERYTHROCYTE [DISTWIDTH] IN BLOOD BY AUTOMATED COUNT: 18.4 % (ref 11.5–14.5)
ERYTHROCYTE [DISTWIDTH] IN BLOOD BY AUTOMATED COUNT: 19.9 % (ref 11.5–14.5)
GLUCOSE BLDV-MCNC: 122 MG/DL (ref 74–99)
GLUCOSE BLDV-MCNC: 158 MG/DL (ref 74–99)
GLUCOSE BLDV-MCNC: 97 MG/DL (ref 74–99)
GLUCOSE SERPL-MCNC: 146 MG/DL (ref 74–99)
GLUCOSE SERPL-MCNC: 94 MG/DL (ref 74–99)
HBV CORE AB SER QL: NONREACTIVE
HBV SURFACE AB SER-ACNC: <3.1 MIU/ML
HBV SURFACE AG SERPL QL IA: NONREACTIVE
HCO3 BLDV-SCNC: 20.4 MMOL/L (ref 22–26)
HCO3 BLDV-SCNC: 20.8 MMOL/L (ref 22–26)
HCO3 BLDV-SCNC: 21.1 MMOL/L (ref 22–26)
HCT VFR BLD AUTO: 35.8 % (ref 36–46)
HCT VFR BLD AUTO: 36.4 % (ref 36–46)
HCT VFR BLD EST: 32 % (ref 36–46)
HCT VFR BLD EST: 36 % (ref 36–46)
HCT VFR BLD EST: 39 % (ref 36–46)
HCV AB SER QL: NONREACTIVE
HGB BLD-MCNC: 10.9 G/DL (ref 12–16)
HGB BLD-MCNC: 11.4 G/DL (ref 12–16)
HGB BLDV-MCNC: 10.8 G/DL (ref 12–16)
HGB BLDV-MCNC: 11.9 G/DL (ref 12–16)
HGB BLDV-MCNC: 12.9 G/DL (ref 12–16)
HIV 1+2 AB+HIV1 P24 AG SERPL QL IA: NONREACTIVE
HOLD SPECIMEN: NORMAL
HOLD SPECIMEN: NORMAL
IMM GRANULOCYTES # BLD AUTO: 0.04 X10*3/UL (ref 0–0.5)
IMM GRANULOCYTES NFR BLD AUTO: 0.9 % (ref 0–0.9)
INHALED O2 CONCENTRATION: 21 %
INHALED O2 CONCENTRATION: 21 %
INHALED O2 CONCENTRATION: 98 %
INR PPP: 0.9 (ref 0.9–1.1)
LACTATE BLDV-SCNC: 0.9 MMOL/L (ref 0.4–2)
LACTATE BLDV-SCNC: 2 MMOL/L (ref 0.4–2)
LACTATE BLDV-SCNC: 2.6 MMOL/L (ref 0.4–2)
LACTATE BLDV-SCNC: 2.7 MMOL/L (ref 0.4–2)
LYMPHOCYTES # BLD AUTO: 0.21 X10*3/UL (ref 0.8–3)
LYMPHOCYTES NFR BLD AUTO: 4.5 %
MCH RBC QN AUTO: 28.4 PG (ref 26–34)
MCH RBC QN AUTO: 29.1 PG (ref 26–34)
MCHC RBC AUTO-ENTMCNC: 30.4 G/DL (ref 32–36)
MCHC RBC AUTO-ENTMCNC: 31.3 G/DL (ref 32–36)
MCV RBC AUTO: 91 FL (ref 80–100)
MCV RBC AUTO: 96 FL (ref 80–100)
MONOCYTES # BLD AUTO: 0.04 X10*3/UL (ref 0.05–0.8)
MONOCYTES NFR BLD AUTO: 0.9 %
NEUTROPHILS # BLD AUTO: 4.36 X10*3/UL (ref 1.6–5.5)
NEUTROPHILS NFR BLD AUTO: 93.7 %
NRBC BLD-RTO: 0 /100 WBCS (ref 0–0)
NRBC BLD-RTO: 0 /100 WBCS (ref 0–0)
OXYHGB MFR BLDV: 40.5 % (ref 45–75)
OXYHGB MFR BLDV: 42.3 % (ref 45–75)
OXYHGB MFR BLDV: 52.7 % (ref 45–75)
PCO2 BLDV: 42 MM HG (ref 41–51)
PCO2 BLDV: 56 MM HG (ref 41–51)
PCO2 BLDV: 57 MM HG (ref 41–51)
PH BLDV: 7.17 PH (ref 7.33–7.43)
PH BLDV: 7.17 PH (ref 7.33–7.43)
PH BLDV: 7.31 PH (ref 7.33–7.43)
PHOSPHATE SERPL-MCNC: 4 MG/DL (ref 2.5–4.9)
PHOSPHATE SERPL-MCNC: 4.1 MG/DL (ref 2.5–4.9)
PLATELET # BLD AUTO: 149 X10*3/UL (ref 150–450)
PLATELET # BLD AUTO: 198 X10*3/UL (ref 150–450)
PO2 BLDV: 29 MM HG (ref 35–45)
PO2 BLDV: 33 MM HG (ref 35–45)
PO2 BLDV: 34 MM HG (ref 35–45)
POTASSIUM BLDV-SCNC: 4.5 MMOL/L (ref 3.5–5.3)
POTASSIUM BLDV-SCNC: 4.6 MMOL/L (ref 3.5–5.3)
POTASSIUM BLDV-SCNC: >10 MMOL/L (ref 3.5–5.3)
POTASSIUM SERPL-SCNC: 3.8 MMOL/L (ref 3.5–5.3)
POTASSIUM SERPL-SCNC: 4.4 MMOL/L (ref 3.5–5.3)
PRODUCT BLOOD TYPE: 9500
PRODUCT BLOOD TYPE: 9500
PRODUCT CODE: NORMAL
PRODUCT CODE: NORMAL
PROT SERPL-MCNC: 7.4 G/DL (ref 6.4–8.2)
PROTHROMBIN TIME: 10.1 SECONDS (ref 9.8–12.8)
RBC # BLD AUTO: 3.74 X10*6/UL (ref 4–5.2)
RBC # BLD AUTO: 4.02 X10*6/UL (ref 4–5.2)
RH FACTOR (ANTIGEN D): NORMAL
SAO2 % BLDV: 41 % (ref 45–75)
SAO2 % BLDV: 43 % (ref 45–75)
SAO2 % BLDV: 53 % (ref 45–75)
SARS-COV-2 RNA RESP QL NAA+PROBE: DETECTED
SODIUM BLDV-SCNC: 132 MMOL/L (ref 136–145)
SODIUM BLDV-SCNC: 139 MMOL/L (ref 136–145)
SODIUM BLDV-SCNC: 140 MMOL/L (ref 136–145)
SODIUM SERPL-SCNC: 141 MMOL/L (ref 136–145)
SODIUM SERPL-SCNC: 142 MMOL/L (ref 136–145)
UNIT ABO: NORMAL
UNIT ABO: NORMAL
UNIT NUMBER: NORMAL
UNIT NUMBER: NORMAL
UNIT RH: NORMAL
UNIT RH: NORMAL
UNIT VOLUME: 350
UNIT VOLUME: 350
WBC # BLD AUTO: 4.7 X10*3/UL (ref 4.4–11.3)
WBC # BLD AUTO: 4.8 X10*3/UL (ref 4.4–11.3)
XM INTEP: NORMAL
XM INTEP: NORMAL

## 2024-10-20 PROCEDURE — 2500000004 HC RX 250 GENERAL PHARMACY W/ HCPCS (ALT 636 FOR OP/ED)

## 2024-10-20 PROCEDURE — 80069 RENAL FUNCTION PANEL: CPT | Mod: CCI

## 2024-10-20 PROCEDURE — 76776 US EXAM K TRANSPL W/DOPPLER: CPT

## 2024-10-20 PROCEDURE — 74018 RADEX ABDOMEN 1 VIEW: CPT

## 2024-10-20 PROCEDURE — 84295 ASSAY OF SERUM SODIUM: CPT

## 2024-10-20 PROCEDURE — 83605 ASSAY OF LACTIC ACID: CPT

## 2024-10-20 PROCEDURE — 0TY00Z0 TRANSPLANTATION OF RIGHT KIDNEY, ALLOGENEIC, OPEN APPROACH: ICD-10-PCS | Performed by: TRANSPLANT SURGERY

## 2024-10-20 PROCEDURE — 82435 ASSAY OF BLOOD CHLORIDE: CPT

## 2024-10-20 PROCEDURE — 3600000004 HC OR TIME - INITIAL BASE CHARGE - PROCEDURE LEVEL FOUR: Performed by: TRANSPLANT SURGERY

## 2024-10-20 PROCEDURE — 87635 SARS-COV-2 COVID-19 AMP PRB: CPT

## 2024-10-20 PROCEDURE — 86803 HEPATITIS C AB TEST: CPT

## 2024-10-20 PROCEDURE — 74018 RADEX ABDOMEN 1 VIEW: CPT | Performed by: RADIOLOGY

## 2024-10-20 PROCEDURE — 86923 COMPATIBILITY TEST ELECTRIC: CPT

## 2024-10-20 PROCEDURE — 50360 RNL ALTRNSPLJ W/O RCP NFRCT: CPT | Performed by: TRANSPLANT SURGERY

## 2024-10-20 PROCEDURE — 2720000007 HC OR 272 NO HCPCS: Performed by: TRANSPLANT SURGERY

## 2024-10-20 PROCEDURE — 76775 US EXAM ABDO BACK WALL LIM: CPT | Performed by: RADIOLOGY

## 2024-10-20 PROCEDURE — 86901 BLOOD TYPING SEROLOGIC RH(D): CPT

## 2024-10-20 PROCEDURE — 85610 PROTHROMBIN TIME: CPT

## 2024-10-20 PROCEDURE — 2500000005 HC RX 250 GENERAL PHARMACY W/O HCPCS: Performed by: TRANSPLANT SURGERY

## 2024-10-20 PROCEDURE — 3700000001 HC GENERAL ANESTHESIA TIME - INITIAL BASE CHARGE: Performed by: TRANSPLANT SURGERY

## 2024-10-20 PROCEDURE — 84100 ASSAY OF PHOSPHORUS: CPT

## 2024-10-20 PROCEDURE — A50360 PR TRANSPLANTATION OF KIDNEY: Performed by: ANESTHESIOLOGY

## 2024-10-20 PROCEDURE — 87522 HEPATITIS C REVRS TRNSCRPJ: CPT

## 2024-10-20 PROCEDURE — 76770 US EXAM ABDO BACK WALL COMP: CPT

## 2024-10-20 PROCEDURE — 36415 COLL VENOUS BLD VENIPUNCTURE: CPT

## 2024-10-20 PROCEDURE — 71045 X-RAY EXAM CHEST 1 VIEW: CPT

## 2024-10-20 PROCEDURE — 86704 HEP B CORE ANTIBODY TOTAL: CPT

## 2024-10-20 PROCEDURE — 85025 COMPLETE CBC W/AUTO DIFF WBC: CPT

## 2024-10-20 PROCEDURE — 85027 COMPLETE CBC AUTOMATED: CPT

## 2024-10-20 PROCEDURE — 82746 ASSAY OF FOLIC ACID SERUM: CPT | Performed by: STUDENT IN AN ORGANIZED HEALTH CARE EDUCATION/TRAINING PROGRAM

## 2024-10-20 PROCEDURE — 2500000004 HC RX 250 GENERAL PHARMACY W/ HCPCS (ALT 636 FOR OP/ED): Performed by: TRANSPLANT SURGERY

## 2024-10-20 PROCEDURE — 7100000002 HC RECOVERY ROOM TIME - EACH INCREMENTAL 1 MINUTE: Performed by: TRANSPLANT SURGERY

## 2024-10-20 PROCEDURE — 2500000001 HC RX 250 WO HCPCS SELF ADMINISTERED DRUGS (ALT 637 FOR MEDICARE OP)

## 2024-10-20 PROCEDURE — 3700000002 HC GENERAL ANESTHESIA TIME - EACH INCREMENTAL 1 MINUTE: Performed by: TRANSPLANT SURGERY

## 2024-10-20 PROCEDURE — 93005 ELECTROCARDIOGRAM TRACING: CPT

## 2024-10-20 PROCEDURE — 82248 BILIRUBIN DIRECT: CPT

## 2024-10-20 PROCEDURE — 86644 CMV ANTIBODY: CPT

## 2024-10-20 PROCEDURE — C2617 STENT, NON-COR, TEM W/O DEL: HCPCS | Performed by: TRANSPLANT SURGERY

## 2024-10-20 PROCEDURE — 51702 INSERT TEMP BLADDER CATH: CPT

## 2024-10-20 PROCEDURE — 99140 ANES COMP EMERGENCY COND: CPT | Performed by: ANESTHESIOLOGY

## 2024-10-20 PROCEDURE — 86706 HEP B SURFACE ANTIBODY: CPT

## 2024-10-20 PROCEDURE — 2780000003 HC OR 278 NO HCPCS: Performed by: TRANSPLANT SURGERY

## 2024-10-20 PROCEDURE — 86664 EPSTEIN-BARR NUCLEAR ANTIGEN: CPT

## 2024-10-20 PROCEDURE — C1757 CATH, THROMBECTOMY/EMBOLECT: HCPCS | Performed by: TRANSPLANT SURGERY

## 2024-10-20 PROCEDURE — 99100 ANES PT EXTEME AGE<1 YR&>70: CPT | Performed by: ANESTHESIOLOGY

## 2024-10-20 PROCEDURE — 87340 HEPATITIS B SURFACE AG IA: CPT

## 2024-10-20 PROCEDURE — 8120000002 HC CADAVER DONOR - KIDNEY: Performed by: TRANSPLANT SURGERY

## 2024-10-20 PROCEDURE — 80505 PATH CLIN CONSLTJ HIGH 41-60: CPT | Performed by: PATHOLOGY

## 2024-10-20 PROCEDURE — 99222 1ST HOSP IP/OBS MODERATE 55: CPT | Performed by: INTERNAL MEDICINE

## 2024-10-20 PROCEDURE — 82947 ASSAY GLUCOSE BLOOD QUANT: CPT

## 2024-10-20 PROCEDURE — 7100000001 HC RECOVERY ROOM TIME - INITIAL BASE CHARGE: Performed by: TRANSPLANT SURGERY

## 2024-10-20 PROCEDURE — 87389 HIV-1 AG W/HIV-1&-2 AB AG IA: CPT

## 2024-10-20 PROCEDURE — 50605 INSERT URETERAL SUPPORT: CPT | Performed by: TRANSPLANT SURGERY

## 2024-10-20 PROCEDURE — 3600000009 HC OR TIME - EACH INCREMENTAL 1 MINUTE - PROCEDURE LEVEL FOUR: Performed by: TRANSPLANT SURGERY

## 2024-10-20 PROCEDURE — 82306 VITAMIN D 25 HYDROXY: CPT | Performed by: STUDENT IN AN ORGANIZED HEALTH CARE EDUCATION/TRAINING PROGRAM

## 2024-10-20 DEVICE — URETERAL STENT
Type: IMPLANTABLE DEVICE | Site: URETER | Status: FUNCTIONAL
Brand: POLARIS™ ULTRA

## 2024-10-20 RX ORDER — CARVEDILOL 12.5 MG/1
12.5 TABLET ORAL 2 TIMES DAILY
Status: DISCONTINUED | OUTPATIENT
Start: 2024-10-20 | End: 2024-10-23

## 2024-10-20 RX ORDER — LABETALOL HYDROCHLORIDE 5 MG/ML
5 INJECTION, SOLUTION INTRAVENOUS ONCE AS NEEDED
Status: DISCONTINUED | OUTPATIENT
Start: 2024-10-20 | End: 2024-10-21 | Stop reason: HOSPADM

## 2024-10-20 RX ORDER — MANNITOL 20 G/100ML
INJECTION, SOLUTION INTRAVENOUS AS NEEDED
Status: DISCONTINUED | OUTPATIENT
Start: 2024-10-20 | End: 2024-10-20

## 2024-10-20 RX ORDER — SODIUM CHLORIDE 9 MG/ML
999 INJECTION, SOLUTION INTRAVENOUS CONTINUOUS
Status: DISCONTINUED | OUTPATIENT
Start: 2024-10-20 | End: 2024-10-21

## 2024-10-20 RX ORDER — CLONIDINE HYDROCHLORIDE 0.1 MG/1
0.1 TABLET ORAL DAILY
Status: DISCONTINUED | OUTPATIENT
Start: 2024-10-20 | End: 2024-10-21

## 2024-10-20 RX ORDER — ROCURONIUM BROMIDE 10 MG/ML
INJECTION, SOLUTION INTRAVENOUS AS NEEDED
Status: DISCONTINUED | OUTPATIENT
Start: 2024-10-20 | End: 2024-10-20

## 2024-10-20 RX ORDER — METOPROLOL SUCCINATE 50 MG/1
50 TABLET, EXTENDED RELEASE ORAL DAILY
Status: DISCONTINUED | OUTPATIENT
Start: 2024-10-20 | End: 2024-10-21

## 2024-10-20 RX ORDER — FENTANYL CITRATE 50 UG/ML
INJECTION, SOLUTION INTRAMUSCULAR; INTRAVENOUS AS NEEDED
Status: DISCONTINUED | OUTPATIENT
Start: 2024-10-20 | End: 2024-10-20

## 2024-10-20 RX ORDER — MIDAZOLAM HYDROCHLORIDE 1 MG/ML
INJECTION INTRAMUSCULAR; INTRAVENOUS AS NEEDED
Status: DISCONTINUED | OUTPATIENT
Start: 2024-10-20 | End: 2024-10-20

## 2024-10-20 RX ORDER — FUROSEMIDE 10 MG/ML
INJECTION INTRAMUSCULAR; INTRAVENOUS AS NEEDED
Status: DISCONTINUED | OUTPATIENT
Start: 2024-10-20 | End: 2024-10-20

## 2024-10-20 RX ORDER — PROPOFOL 10 MG/ML
INJECTION, EMULSION INTRAVENOUS AS NEEDED
Status: DISCONTINUED | OUTPATIENT
Start: 2024-10-20 | End: 2024-10-20

## 2024-10-20 RX ORDER — GABAPENTIN 300 MG/1
300 CAPSULE ORAL ONCE
Status: DISCONTINUED | OUTPATIENT
Start: 2024-10-20 | End: 2024-10-21 | Stop reason: HOSPADM

## 2024-10-20 RX ORDER — MEPERIDINE HYDROCHLORIDE 25 MG/ML
12.5 INJECTION INTRAMUSCULAR; INTRAVENOUS; SUBCUTANEOUS EVERY 10 MIN PRN
Status: DISCONTINUED | OUTPATIENT
Start: 2024-10-20 | End: 2024-10-21 | Stop reason: HOSPADM

## 2024-10-20 RX ORDER — HYDRALAZINE HYDROCHLORIDE 10 MG/1
100 TABLET, FILM COATED ORAL 2 TIMES DAILY
Status: DISCONTINUED | OUTPATIENT
Start: 2024-10-20 | End: 2024-10-21

## 2024-10-20 RX ORDER — LOSARTAN POTASSIUM 100 MG/1
100 TABLET ORAL DAILY
Status: DISCONTINUED | OUTPATIENT
Start: 2024-10-20 | End: 2024-10-20

## 2024-10-20 RX ORDER — LIDOCAINE HYDROCHLORIDE 20 MG/ML
INJECTION, SOLUTION INFILTRATION; PERINEURAL AS NEEDED
Status: DISCONTINUED | OUTPATIENT
Start: 2024-10-20 | End: 2024-10-20

## 2024-10-20 RX ORDER — ONDANSETRON HYDROCHLORIDE 2 MG/ML
INJECTION, SOLUTION INTRAVENOUS AS NEEDED
Status: DISCONTINUED | OUTPATIENT
Start: 2024-10-20 | End: 2024-10-20

## 2024-10-20 RX ORDER — ALBUTEROL SULFATE 0.83 MG/ML
2.5 SOLUTION RESPIRATORY (INHALATION) ONCE AS NEEDED
Status: DISCONTINUED | OUTPATIENT
Start: 2024-10-20 | End: 2024-10-21 | Stop reason: HOSPADM

## 2024-10-20 RX ORDER — SODIUM CHLORIDE 450 MG/100ML
60 INJECTION, SOLUTION INTRAVENOUS CONTINUOUS
Status: DISCONTINUED | OUTPATIENT
Start: 2024-10-20 | End: 2024-10-21

## 2024-10-20 RX ORDER — ESMOLOL HYDROCHLORIDE 10 MG/ML
INJECTION INTRAVENOUS AS NEEDED
Status: DISCONTINUED | OUTPATIENT
Start: 2024-10-20 | End: 2024-10-20

## 2024-10-20 RX ORDER — SODIUM CHLORIDE, SODIUM LACTATE, POTASSIUM CHLORIDE, CALCIUM CHLORIDE 600; 310; 30; 20 MG/100ML; MG/100ML; MG/100ML; MG/100ML
INJECTION, SOLUTION INTRAVENOUS CONTINUOUS PRN
Status: DISCONTINUED | OUTPATIENT
Start: 2024-10-20 | End: 2024-10-20

## 2024-10-20 RX ORDER — ONDANSETRON HYDROCHLORIDE 2 MG/ML
4 INJECTION, SOLUTION INTRAVENOUS ONCE AS NEEDED
Status: DISCONTINUED | OUTPATIENT
Start: 2024-10-20 | End: 2024-10-21 | Stop reason: HOSPADM

## 2024-10-20 RX ORDER — HYDROMORPHONE HYDROCHLORIDE 1 MG/ML
INJECTION, SOLUTION INTRAMUSCULAR; INTRAVENOUS; SUBCUTANEOUS AS NEEDED
Status: DISCONTINUED | OUTPATIENT
Start: 2024-10-20 | End: 2024-10-20

## 2024-10-20 RX ORDER — FENTANYL CITRATE 50 UG/ML
12.5 INJECTION, SOLUTION INTRAMUSCULAR; INTRAVENOUS EVERY 5 MIN PRN
Status: DISCONTINUED | OUTPATIENT
Start: 2024-10-20 | End: 2024-10-21 | Stop reason: HOSPADM

## 2024-10-20 RX ORDER — ACETAMINOPHEN 325 MG/1
975 TABLET ORAL ONCE
Status: DISCONTINUED | OUTPATIENT
Start: 2024-10-20 | End: 2024-10-21 | Stop reason: HOSPADM

## 2024-10-20 RX ORDER — CEFAZOLIN 1 G/1
INJECTION, POWDER, FOR SOLUTION INTRAVENOUS AS NEEDED
Status: DISCONTINUED | OUTPATIENT
Start: 2024-10-20 | End: 2024-10-20

## 2024-10-20 RX ORDER — FENTANYL CITRATE 50 UG/ML
25 INJECTION, SOLUTION INTRAMUSCULAR; INTRAVENOUS EVERY 5 MIN PRN
Status: DISCONTINUED | OUTPATIENT
Start: 2024-10-20 | End: 2024-10-21 | Stop reason: HOSPADM

## 2024-10-20 RX ORDER — DIPHENHYDRAMINE HYDROCHLORIDE 50 MG/ML
INJECTION INTRAMUSCULAR; INTRAVENOUS AS NEEDED
Status: DISCONTINUED | OUTPATIENT
Start: 2024-10-20 | End: 2024-10-20

## 2024-10-20 RX ORDER — METOPROLOL SUCCINATE 25 MG/1
50 TABLET, EXTENDED RELEASE ORAL DAILY
Status: COMPLETED | OUTPATIENT
Start: 2024-10-20 | End: 2024-10-20

## 2024-10-20 RX ORDER — FUROSEMIDE 10 MG/ML
40 INJECTION INTRAMUSCULAR; INTRAVENOUS EVERY 8 HOURS SCHEDULED
Status: DISCONTINUED | OUTPATIENT
Start: 2024-10-20 | End: 2024-10-21

## 2024-10-20 RX ORDER — HYDRALAZINE HYDROCHLORIDE 20 MG/ML
5 INJECTION INTRAMUSCULAR; INTRAVENOUS EVERY 30 MIN PRN
Status: DISCONTINUED | OUTPATIENT
Start: 2024-10-20 | End: 2024-10-21 | Stop reason: HOSPADM

## 2024-10-20 RX ADMIN — HYDRALAZINE HYDROCHLORIDE 5 MG: 20 INJECTION INTRAMUSCULAR; INTRAVENOUS at 21:06

## 2024-10-20 RX ADMIN — FENTANYL CITRATE 25 MCG: 50 INJECTION INTRAMUSCULAR; INTRAVENOUS at 23:06

## 2024-10-20 RX ADMIN — METOPROLOL SUCCINATE 50 MG: 25 TABLET, FILM COATED, EXTENDED RELEASE ORAL at 11:47

## 2024-10-20 RX ADMIN — SODIUM CHLORIDE 1000 ML: 0.9 INJECTION, SOLUTION INTRAVENOUS at 22:00

## 2024-10-20 RX ADMIN — FENTANYL CITRATE 25 MCG: 50 INJECTION INTRAMUSCULAR; INTRAVENOUS at 20:35

## 2024-10-20 RX ADMIN — PROMETHAZINE HYDROCHLORIDE 6.25 MG: 25 INJECTION INTRAMUSCULAR; INTRAVENOUS at 19:58

## 2024-10-20 RX ADMIN — LABETALOL HYDROCHLORIDE 5 MG: 5 INJECTION INTRAVENOUS at 20:02

## 2024-10-20 RX ADMIN — SODIUM CHLORIDE 60 ML/HR: 4.5 INJECTION, SOLUTION INTRAVENOUS at 19:15

## 2024-10-20 RX ADMIN — FUROSEMIDE 40 MG: 10 INJECTION, SOLUTION INTRAVENOUS at 20:20

## 2024-10-20 RX ADMIN — CARVEDILOL 12.5 MG: 12.5 TABLET, FILM COATED ORAL at 21:21

## 2024-10-20 RX ADMIN — FENTANYL CITRATE 25 MCG: 50 INJECTION INTRAMUSCULAR; INTRAVENOUS at 20:03

## 2024-10-20 SDOH — SOCIAL STABILITY: SOCIAL INSECURITY: HAVE YOU HAD THOUGHTS OF HARMING ANYONE ELSE?: NO

## 2024-10-20 SDOH — SOCIAL STABILITY: SOCIAL INSECURITY: DOES ANYONE TRY TO KEEP YOU FROM HAVING/CONTACTING OTHER FRIENDS OR DOING THINGS OUTSIDE YOUR HOME?: NO

## 2024-10-20 SDOH — SOCIAL STABILITY: SOCIAL INSECURITY: ARE YOU OR HAVE YOU BEEN THREATENED OR ABUSED PHYSICALLY, EMOTIONALLY, OR SEXUALLY BY ANYONE?: NO

## 2024-10-20 SDOH — SOCIAL STABILITY: SOCIAL INSECURITY: HAS ANYONE EVER THREATENED TO HURT YOUR FAMILY OR YOUR PETS?: NO

## 2024-10-20 SDOH — ECONOMIC STABILITY: INCOME INSECURITY: IN THE PAST 12 MONTHS HAS THE ELECTRIC, GAS, OIL, OR WATER COMPANY THREATENED TO SHUT OFF SERVICES IN YOUR HOME?: NO

## 2024-10-20 SDOH — SOCIAL STABILITY: SOCIAL INSECURITY: DO YOU FEEL UNSAFE GOING BACK TO THE PLACE WHERE YOU ARE LIVING?: NO

## 2024-10-20 SDOH — SOCIAL STABILITY: SOCIAL INSECURITY: WITHIN THE LAST YEAR, HAVE YOU BEEN HUMILIATED OR EMOTIONALLY ABUSED IN OTHER WAYS BY YOUR PARTNER OR EX-PARTNER?: NO

## 2024-10-20 SDOH — HEALTH STABILITY: MENTAL HEALTH: HOW OFTEN DO YOU HAVE SIX OR MORE DRINKS ON ONE OCCASION?: NEVER

## 2024-10-20 SDOH — SOCIAL STABILITY: SOCIAL INSECURITY: WITHIN THE LAST YEAR, HAVE YOU BEEN AFRAID OF YOUR PARTNER OR EX-PARTNER?: NO

## 2024-10-20 SDOH — ECONOMIC STABILITY: FOOD INSECURITY: WITHIN THE PAST 12 MONTHS, YOU WORRIED THAT YOUR FOOD WOULD RUN OUT BEFORE YOU GOT THE MONEY TO BUY MORE.: NEVER TRUE

## 2024-10-20 SDOH — SOCIAL STABILITY: SOCIAL INSECURITY: DO YOU FEEL ANYONE HAS EXPLOITED OR TAKEN ADVANTAGE OF YOU FINANCIALLY OR OF YOUR PERSONAL PROPERTY?: NO

## 2024-10-20 SDOH — SOCIAL STABILITY: SOCIAL INSECURITY
WITHIN THE LAST YEAR, HAVE YOU BEEN RAPED OR FORCED TO HAVE ANY KIND OF SEXUAL ACTIVITY BY YOUR PARTNER OR EX-PARTNER?: NO

## 2024-10-20 SDOH — SOCIAL STABILITY: SOCIAL INSECURITY
WITHIN THE LAST YEAR, HAVE YOU BEEN KICKED, HIT, SLAPPED, OR OTHERWISE PHYSICALLY HURT BY YOUR PARTNER OR EX-PARTNER?: NO

## 2024-10-20 SDOH — SOCIAL STABILITY: SOCIAL INSECURITY: ARE THERE ANY APPARENT SIGNS OF INJURIES/BEHAVIORS THAT COULD BE RELATED TO ABUSE/NEGLECT?: NO

## 2024-10-20 SDOH — ECONOMIC STABILITY: FOOD INSECURITY: WITHIN THE PAST 12 MONTHS, THE FOOD YOU BOUGHT JUST DIDN'T LAST AND YOU DIDN'T HAVE MONEY TO GET MORE.: NEVER TRUE

## 2024-10-20 SDOH — HEALTH STABILITY: MENTAL HEALTH: HOW OFTEN DO YOU HAVE A DRINK CONTAINING ALCOHOL?: NEVER

## 2024-10-20 SDOH — SOCIAL STABILITY: SOCIAL INSECURITY: HAVE YOU HAD ANY THOUGHTS OF HARMING ANYONE ELSE?: NO

## 2024-10-20 SDOH — SOCIAL STABILITY: SOCIAL INSECURITY: WERE YOU ABLE TO COMPLETE ALL THE BEHAVIORAL HEALTH SCREENINGS?: YES

## 2024-10-20 SDOH — HEALTH STABILITY: MENTAL HEALTH: HOW MANY DRINKS CONTAINING ALCOHOL DO YOU HAVE ON A TYPICAL DAY WHEN YOU ARE DRINKING?: PATIENT DOES NOT DRINK

## 2024-10-20 SDOH — SOCIAL STABILITY: SOCIAL INSECURITY: ABUSE: ADULT

## 2024-10-20 SDOH — HEALTH STABILITY: MENTAL HEALTH: CURRENT SMOKER: 0

## 2024-10-20 ASSESSMENT — COGNITIVE AND FUNCTIONAL STATUS - GENERAL
MOBILITY SCORE: 24
PATIENT BASELINE BEDBOUND: NO
DAILY ACTIVITIY SCORE: 24
MOBILITY SCORE: 24
DAILY ACTIVITIY SCORE: 24

## 2024-10-20 ASSESSMENT — PAIN - FUNCTIONAL ASSESSMENT
PAIN_FUNCTIONAL_ASSESSMENT: 0-10
PAIN_FUNCTIONAL_ASSESSMENT: UNABLE TO SELF-REPORT
PAIN_FUNCTIONAL_ASSESSMENT: 0-10

## 2024-10-20 ASSESSMENT — ACTIVITIES OF DAILY LIVING (ADL)
TOILETING: INDEPENDENT
HEARING - RIGHT EAR: FUNCTIONAL
TOILETING: INDEPENDENT
HEARING - LEFT EAR: FUNCTIONAL
BATHING: INDEPENDENT
ASSISTIVE_DEVICE: CANE;EYEGLASSES
JUDGMENT_ADEQUATE_SAFELY_COMPLETE_DAILY_ACTIVITIES: YES
WALKS IN HOME: INDEPENDENT
JUDGMENT_ADEQUATE_SAFELY_COMPLETE_DAILY_ACTIVITIES: YES
GROOMING: INDEPENDENT
PATIENT'S MEMORY ADEQUATE TO SAFELY COMPLETE DAILY ACTIVITIES?: YES
DRESSING YOURSELF: INDEPENDENT
BATHING: INDEPENDENT
GROOMING: INDEPENDENT
PATIENT'S MEMORY ADEQUATE TO SAFELY COMPLETE DAILY ACTIVITIES?: YES
DRESSING YOURSELF: INDEPENDENT
ADEQUATE_TO_COMPLETE_ADL: YES
HEARING - LEFT EAR: FUNCTIONAL
WALKS IN HOME: INDEPENDENT
HEARING - RIGHT EAR: FUNCTIONAL
FEEDING YOURSELF: INDEPENDENT
ASSISTIVE_DEVICE: CANE;EYEGLASSES
LACK_OF_TRANSPORTATION: NO
ADEQUATE_TO_COMPLETE_ADL: YES
FEEDING YOURSELF: INDEPENDENT

## 2024-10-20 ASSESSMENT — PAIN SCALES - GENERAL
PAINLEVEL_OUTOF10: 0 - NO PAIN
PAINLEVEL_OUTOF10: 7
PAINLEVEL_OUTOF10: 0 - NO PAIN
PAINLEVEL_OUTOF10: 7
PAINLEVEL_OUTOF10: 0 - NO PAIN
PAINLEVEL_OUTOF10: 7

## 2024-10-20 ASSESSMENT — LIFESTYLE VARIABLES
AUDIT-C TOTAL SCORE: 0
HOW OFTEN DO YOU HAVE 6 OR MORE DRINKS ON ONE OCCASION: NEVER
AUDIT-C TOTAL SCORE: 0
SKIP TO QUESTIONS 9-10: 1
AUDIT-C TOTAL SCORE: 0
HOW OFTEN DO YOU HAVE A DRINK CONTAINING ALCOHOL: NEVER
HOW MANY STANDARD DRINKS CONTAINING ALCOHOL DO YOU HAVE ON A TYPICAL DAY: PATIENT DOES NOT DRINK
SKIP TO QUESTIONS 9-10: 1

## 2024-10-20 ASSESSMENT — PATIENT HEALTH QUESTIONNAIRE - PHQ9: 1. LITTLE INTEREST OR PLEASURE IN DOING THINGS: NOT AT ALL

## 2024-10-20 ASSESSMENT — COLUMBIA-SUICIDE SEVERITY RATING SCALE - C-SSRS
2. HAVE YOU ACTUALLY HAD ANY THOUGHTS OF KILLING YOURSELF?: NO
6. HAVE YOU EVER DONE ANYTHING, STARTED TO DO ANYTHING, OR PREPARED TO DO ANYTHING TO END YOUR LIFE?: NO
1. IN THE PAST MONTH, HAVE YOU WISHED YOU WERE DEAD OR WISHED YOU COULD GO TO SLEEP AND NOT WAKE UP?: NO

## 2024-10-20 NOTE — CARE PLAN
The patient's goals for the shift include      The clinical goals for the shift include OR for KD transplant    Problem: Skin  Goal: Decreased wound size/increased tissue granulation at next dressing change  Outcome: Progressing  Goal: Participates in plan/prevention/treatment measures  Outcome: Progressing  Goal: Prevent/manage excess moisture  Outcome: Progressing  Goal: Prevent/minimize sheer/friction injuries  Outcome: Progressing  Goal: Promote/optimize nutrition  Outcome: Progressing  Goal: Promote skin healing  Outcome: Progressing     Problem: Pain - Adult  Goal: Verbalizes/displays adequate comfort level or baseline comfort level  Outcome: Progressing     Problem: Safety - Adult  Goal: Free from fall injury  Outcome: Progressing     Problem: Discharge Planning  Goal: Discharge to home or other facility with appropriate resources  Outcome: Progressing     Problem: Chronic Conditions and Co-morbidities  Goal: Patient's chronic conditions and co-morbidity symptoms are monitored and maintained or improved  Outcome: Progressing     Problem: Pain  Goal: Takes deep breaths with improved pain control throughout the shift  Outcome: Progressing  Goal: Turns in bed with improved pain control throughout the shift  Outcome: Progressing  Goal: Walks with improved pain control throughout the shift  Outcome: Progressing  Goal: Performs ADL's with improved pain control throughout shift  Outcome: Progressing  Goal: Participates in PT with improved pain control throughout the shift  Outcome: Progressing  Goal: Free from opioid side effects throughout the shift  Outcome: Progressing  Goal: Free from acute confusion related to pain meds throughout the shift  Outcome: Progressing     Problem: Fall/Injury  Goal: Not fall by end of shift  Outcome: Progressing  Goal: Be free from injury by end of the shift  Outcome: Progressing  Goal: Verbalize understanding of personal risk factors for fall in the hospital  Outcome:  Progressing  Goal: Verbalize understanding of risk factor reduction measures to prevent injury from fall in the home  Outcome: Progressing  Goal: Use assistive devices by end of the shift  Outcome: Progressing  Goal: Pace activities to prevent fatigue by end of the shift  Outcome: Progressing

## 2024-10-20 NOTE — OP NOTE
Transplant Kidney (L) Operative Note     Date: 10/20/2024  OR Location: [facility] OR    Name: Liz Hamlin, : 1945, Age: 79 y.o., MRN: [id number], Sex: female    Diagnosis  Pre-op Diagnosis      * ESRD (end stage renal disease) (Multi) [N18.6] Post-op Diagnosis     * ESRD (end stage renal disease) (Multi) [N18.6]     Procedures  Transplant Kidney  72673 - [place] RENAL ALTRNSPLJ IMPLTJ GRF W/O RCP NEPHRECTOMY    Bench preparation of kidney allograft   donor kidney transplant  Insertion of ureteral stent    Surgeons      * Ruben Jimenez - Primary    Resident/Fellow/Other Assistant:  Surgeons and Role:     * Torrey Payton MD - Resident - Assisting    Procedure Summary  Anesthesia: General  ASA: III  Anesthesia Staff: Anesthesiologist: Zhen Tenorio MD  Anesthesia Resident: Augustin Becerril MD  Estimated Blood Loss: 150mL  Intra-op Medications:   Administrations occurring from  to  on 10/20/24:   Medication Name Total Dose   methylene blue (Provayblue) 3 mL, polymyxin B 500,000 Units in sodium chloride 0.9 % 1,000 mL irrigation 1,008 mL   heparin (porcine) 25,000 Units in sodium chloride 0.9 % 100 mL irrigation 25,000 Units   anti-thymocyte globulin rabbit (Thymoglobulin) 125 mg, hydrocortisone sodium succinate (PF) (Solu-CORTEF) 20 mg, heparin 1,000 Units in sodium chloride 0.9% 500 mL  mg   ceFAZolin (Ancef) vial 1 g 2 g   diphenhydrAMINE 50 mg/mL 50 mg   esmolol 10 mg/mL 50 mg   fentaNYL (Sublimaze) injection 50 mcg/mL 100 mcg   furosemide (Lasix) 10 mg/mL 80 mg   HYDROmorphone (Dilaudid) injection 1 mg/mL 1 mg   LR infusion Cannot be calculated   lidocaine (Xylocaine) injection 2 % 100 mg   mannitol 20% 25 g   midazolam PF (Versed) injection 1 mg/mL 0.5 mg   ondansetron 2 mg/mL 4 mg   propofol (Diprivan) injection 10 mg/mL 200 mg   rocuronium (ZeMuron) 50 mg/5 mL injection 100 mg   sugammadex (Bridion) 200 mg/2 mL injection 200 mg   methylPREDNISolone sodium succinate (SOLU-Medrol) 500 mg  in dextrose 5% 100 mL  mg              Anesthesia Record               Intraprocedure I/O Totals          Intake    LR infusion 2000.00 mL    anti-thymocyte globulin rabbit (Thymoglobulin) 125 mg, hydrocortisone sodium succinate (PF) (Solu-CORTEF) 20 mg, heparin 1,000 Units in sodium chloride 0.9% 500 mL .40 mL    Total Intake 2526.4 mL       Output    Urine 635 mL    Total Output 635 mL       Net    Net Volume 1891.4 mL          Specimen: No specimens collected     Staff:   Circulator: Julian  Scrub Person: Rosalia        Implants:  Implants       Type Name Action Serial No.      Stent STENT, POLARIS ULTRA 5FR X 12CM, W/O WIRE - [id number] Implanted               Findings:   Single artery, vein, and ureter  Intra-operative urine    Indications: Liz Hamlin is an 79 y.o. female who is having surgery for kidney transplant    The patient was seen in the preoperative area. The risks, benefits, complications, treatment options, non-operative alternatives, expected recovery and outcomes were discussed with the patient. The possibilities of reaction to medication, pulmonary aspiration, injury to surrounding structures, bleeding, recurrent infection, the need for additional procedures, failure to diagnose a condition, and creating a complication requiring transfusion or operation were discussed with the patient. The patient concurred with the proposed plan, giving informed consent.  The site of surgery was properly noted/marked if necessary per policy. The patient has been actively warmed in preoperative area. Preoperative antibiotics have been ordered and given within 1 hours of incision. Venous thrombosis prophylaxis have been ordered including bilateral sequential compression devices    Procedure Details:       The organ was removed from the Storage using sterile technique and brought up into a basin with ice. The organ was the LEFT kidney. UNOS donor ID: [id number] The ureter had been tagged and protected.  The renal vein was identified, and all branches and tributaries tied off. The artery had been procured with a Carrel patch. The artery was skeletonized. The periarterial tissue was carefully dissected and tied. The aortic patch was then trimmed to size in preparation for implantation. Following this, the remainder of the perinephric fat were tied-off and removed. Once this was done, the organ was replaced in a basin full of slushed ice in preparation for implantation.     The patient was brought to the operating room, placed in a supine position, a huddle was performed. Sequential compression devices were placed. At this point, ABO verification was performed confirming correct organ and compatibility with patient awake. After this, general endotracheal anesthesia was induced.  The patient was given IV antibiotics and a 3 way Patel catheter. Appropriate lines were placed by Anesthesia service.  The abdomen was shaved, prepped, and draped in the usual sterile fashion. Methylprednisolone and Thymoglobulin_ were administered. A Morales incision was made in the __RLQ and carried down to the subcutaneous tissue and the abdominal wall fascia. The Retroperitoneal space was entered. The peritoneum was peeled medially. The epigastric vessels were double ligated and divided using silk tie and surgical clips. The external iliac artery and vein were exposed. The bookwalter retractor was placed and the lymphatics overlying the vessels were serially ligated and divided.     We applied atraumatic vascular clamps on the iliac vein and the donor kidney was brought to the operative field. We made an appropriate size venotomy and the donor  renal vein was anastomosed to the recipient RIGHT External Iliac vein in an end-to-side fashion with running 5-0 prolene suture. An arteriotomy was made and the donor renal artery was anastomosed to the recipient RIGHT External iliac artery in an end to side fashion with running 6-0 prolene suture.  The patient was simultaneously loaded with IV mannitol, Lasix and volume. The clamps were removed and kidney allograft reperfused. Anastomosis time was 36 mins. The total Cold ischemic time was 19 hours and 49 minutes. The kidney had an excellent reperfusion and was Pink and had Firm turgor. Hemostasis was meticulously achieved.     The bladder was identified by clamping the fu and filled with irrigation fluids. The donor ureter was shortened to the appropriate length and spatulated. Ureteroneocystostomy was created using 5-0 PDS in running fashion over double J ureteral stent. Lich-Gregoir was performed to prevent reflux using 4-0 PDS. The kidney was making urine.    Hemostasis was checked, the anastomoses inspected, and the kidney placed in the iliac fossa. After placement, the vessel lay was inspected and found to be acceptable. The kidney position was Extra-peritoneal. The field was irrigated thoroughly. The retractor was removed and the abdominal wall fascia re-approximated with running #1 Prolene suture in 2 layers. Subcutaneous tissues were irrigated, and approximated with 3-0 Vicryl stitches.  The skin was closed with staples. All counts were correct. I was presented in the entire case.       Complications:  None; patient tolerated the procedure well.    Disposition: PACU - hemodynamically stable.  Condition: stable       Attending Attestation: I was present and scrubbed for the entire procedure.    Ruben Jimenez  Phone Number: [phone]

## 2024-10-20 NOTE — ANESTHESIA PROCEDURE NOTES
Airway  Date/Time: 10/20/2024 3:25 PM  Urgency: elective    Airway not difficult    Staffing  Performed: resident   Authorized by: Zhen Tenorio MD    Performed by: Augustin Becerril MD  Patient location during procedure: OR    Indications and Patient Condition  Indications for airway management: anesthesia and airway protection  Spontaneous ventilation: present  Sedation level: deep  Preoxygenated: yes  Patient position: sniffing  Mask difficulty assessment: 1 - vent by mask    Final Airway Details  Final airway type: endotracheal airway      Successful airway: ETT  Cuffed: yes   Successful intubation technique: video laryngoscopy  Facilitating devices/methods: intubating stylet  Endotracheal tube insertion site: oral  Blade size: #3  ETT size (mm): 7.0  Cormack-Lehane Classification: grade I - full view of glottis  Placement verified by: chest auscultation and capnometry   Measured from: lips  ETT to lips (cm): 22  Number of attempts at approach: 1

## 2024-10-20 NOTE — BRIEF OP NOTE
Date: 10/20/2024  OR Location: [facility] OR    Name: Liz Hamlin, : 1945, Age: 79 y.o., MRN: [id number], Sex: female    Diagnosis  Pre-op Diagnosis      * ESRD (end stage renal disease) (Multi) [N18.6] Post-op Diagnosis     * ESRD (end stage renal disease) (Multi) [N18.6]     Procedures  Transplant Kidney  56895 - [place] RENAL ALTRNSPLJ IMPLTJ GRF W/O RCP NEPHRECTOMY      Surgeons      * Ruben Jimenez - Primary    Resident/Fellow/Other Assistant:  Surgeons and Role:     * Torrey Payton MD - Resident - Assisting    Procedure Summary  Anesthesia: General  ASA: III  Anesthesia Staff: Anesthesiologist: Zhen Tenorio MD  Anesthesia Resident: Augustin Becerril MD  Estimated Blood Loss: 150mL  Intra-op Medications:   Administrations occurring from  to  on 10/20/24:   Medication Name Total Dose   methylene blue (Provayblue) 3 mL, polymyxin B 500,000 Units in sodium chloride 0.9 % 1,000 mL irrigation 1,008 mL   heparin (porcine) 25,000 Units in sodium chloride 0.9 % 100 mL irrigation 25,000 Units   anti-thymocyte globulin rabbit (Thymoglobulin) 125 mg, hydrocortisone sodium succinate (PF) (Solu-CORTEF) 20 mg, heparin 1,000 Units in sodium chloride 0.9% 500 mL  mg   ceFAZolin (Ancef) vial 1 g 2 g   diphenhydrAMINE 50 mg/mL 50 mg   esmolol 10 mg/mL 50 mg   fentaNYL (Sublimaze) injection 50 mcg/mL 100 mcg   furosemide (Lasix) 10 mg/mL 80 mg   HYDROmorphone (Dilaudid) injection 1 mg/mL 1 mg   LR infusion Cannot be calculated   lidocaine (Xylocaine) injection 2 % 100 mg   mannitol 20% 25 g   midazolam PF (Versed) injection 1 mg/mL 0.5 mg   ondansetron 2 mg/mL 4 mg   propofol (Diprivan) injection 10 mg/mL 200 mg   rocuronium (ZeMuron) 50 mg/5 mL injection 100 mg   sugammadex (Bridion) 200 mg/2 mL injection 200 mg   methylPREDNISolone sodium succinate (SOLU-Medrol) 500 mg in dextrose 5% 100 mL  mg              Anesthesia Record               Intraprocedure I/O Totals          Intake    LR infusion .00 mL     anti-thymocyte globulin rabbit (Thymoglobulin) 125 mg, hydrocortisone sodium succinate (PF) (Solu-CORTEF) 20 mg, heparin 1,000 Units in sodium chloride 0.9% 500 mL .40 mL    Total Intake 2526.4 mL       Output    Urine 635 mL    Total Output 635 mL       Net    Net Volume 1891.4 mL          Specimen: No specimens collected     Staff:   Circulator: Julian  Scrub Person: Rosalia    Findings: grossly normal recipient anatomy    Complications:  None; patient tolerated the procedure well.     Disposition: PACU - hemodynamically stable.  Condition: stable  Specimens Collected: No specimens collected  Attending Attestation:     Ruben Jimenez  Phone Number: [phone]

## 2024-10-20 NOTE — TELEPHONE ENCOUNTER
UNOS ID: [id number]  MATCH ID: [id number]  ORGAN: Left Kidney  KDPI (if applicable): 86%  KNOWN RISK STATUS: No  LOCAL VS IMPORT: Import  HCV (if applicable): Negative  HepBcAb (if applicable): Not done    Confirm the following:  Any COVID symptoms (nausea, vomiting, diarrhea, fever, chills, cough, sore throat, headache): YES-DESCRIBE/NO: No  Any contact with anyone positive for or suspected to have COVID: YES-DESCRIBE/NO: No  Any recent hospitalizations: YES-DESCRIBE/NO: No  Any recent illnesses: YES-DESCRIBE/NO: Yes, describe: Shingles one week ago  Any recent injuries (cracked teeth, broken bones, wounds that won’t heal): YES-DESCRIBE/NO: No  Any antibiotics: YES-DESCRIBE/NO: No  Any blood thinners: YES-DESCRIBE/NO: No  Any blood transfusions: YES-DESCRIBE/NO: No  When was last dialysis/type: YES/NA/NO: No have not started yet.    The last time they ate or drank anything: last night 9pm  Ask the surgeon whether or not the patient should be made NPO at this time. Yes, now  It is not necessary for the patient to bring anything with them other than a current medication list and insurance information  Determine ETA to hospital: 45 minutes   Patient aware of the possibility of a dry-run.  Yes

## 2024-10-20 NOTE — ANESTHESIA PROCEDURE NOTES
Peripheral IV  Date/Time: 10/20/2024 3:30 PM      Placement  Needle size: 14 G  Laterality: left  Location: hand  Site prep: alcohol  Technique: anatomical landmarks  Attempts: 1         regular rate and rhythm/no rub/no murmur/normal PMI

## 2024-10-20 NOTE — ANESTHESIA PREPROCEDURE EVALUATION
Patient: Liz Hamlin    Procedure Information       Date/Time: 10/20/24 1500    Procedure: Transplant Kidney (Left)    Location: [facility] OR 15 / Virtual [facility] OR    Surgeons: Ruben Jimenez MD            Relevant Problems   Cardiac   (+) Benign hypertension   (+) Essential hypertension   (+) Hyperlipidemia   (+) Mitral valve regurgitation      Pulmonary   (+) Obstructive sleep apnea, adult      /Renal   (+) ESRD (end stage renal disease) (Multi)      Liver   (+) Hepatic steatosis      ID   (+) Onychomycosis of toenail       COVID POSITIVE TODAY  Shingles +, now 2 weeks resolved, still residual pain, okay to be on transplant list again per notes    Clinical information reviewed:   Tobacco  Allergies    Med Hx  Surg Hx   Fam Hx  Soc Hx      Echo:   CONCLUSIONS:   1. Left ventricular systolic function is normal with a 60-65% estimated ejection fraction.   2. Vena contracta:0.5 cm; ERO:0.13 cm2; MR regurgitation saybgh60 ml/beat. Mitral regurgitation due to posterior mitral annular calcification limiting motion of the posterior MV leaflet with valvular damage.   3. Mild to moderate mitral valve regurgitation.   4. RVSP within normal limits.   5. There is no evidence of a patent foramen ovale.     NPO Detail:  Over 8 hours, see RN charting (sips water with meds just after MN, no solids since MN)     PHYSICAL EXAM    Anesthesia Plan    History of general anesthesia?: yes  History of complications of general anesthesia?: no    ASA 3 - emergent     general   (Direct to OR due to droplet precautions per OR control desk  Glidescope  Larger PIV  Antibiotics/immunosuppressants (dosing, timing), lines, fluid management, etc to be discussed with surgeon during time out  Plan postop ICU  )  The patient is not a current smoker.    intravenous induction   Trial extubation is planned.    Plan discussed with attending and resident.    
29-Mar-2022 23:46

## 2024-10-20 NOTE — INTERVAL H&P NOTE
H&P reviewed. The patient was examined and there are no changes to the H&P. Risks of the procedure were discussed. Informed consent obtained. She will be admitted to the transplant service postoperatively.

## 2024-10-20 NOTE — ANESTHESIA POSTPROCEDURE EVALUATION
Patient: Liz Hamlin    Procedure Summary       Date: 10/20/24 Room / Location: [facility] OR 15 / Virtual [facility] OR    Anesthesia Start: 1510 Anesthesia Stop: 1922    Procedure: Transplant Kidney (Left: Abdomen) Diagnosis:       ESRD (end stage renal disease) (Multi)      (ESRD (end stage renal disease) (Multi) [N18.6])    Surgeons: Ruben Jimenez MD Responsible Provider: Zhen Tenorio MD    Anesthesia Type: general ASA Status: 3 - Emergent            Anesthesia Type: general    Vitals Value Taken Time   /84 10/20/24 1923   Temp 36.4 10/20/24 1923   Pulse 87 10/20/24 1923   Resp 12 10/20/24 1923   SpO2 100 % 10/20/24 1923       Anesthesia Post Evaluation    Patient location during evaluation: PACU  Patient participation: complete - patient participated  Level of consciousness: awake  Pain management: adequate  Multimodal analgesia pain management approach  Airway patency: patent  Two or more strategies used to mitigate risk of obstructive sleep apnea  Cardiovascular status: blood pressure returned to baseline  Respiratory status: acceptable  Hydration status: acceptable  Postoperative Nausea and Vomiting: none      There were no known notable events for this encounter.

## 2024-10-21 ENCOUNTER — TELEPHONE (OUTPATIENT)
Dept: TRANSPLANT | Facility: HOSPITAL | Age: 79
End: 2024-10-21
Payer: COMMERCIAL

## 2024-10-21 ENCOUNTER — SPECIALTY PHARMACY (OUTPATIENT)
Dept: PHARMACY | Facility: CLINIC | Age: 79
End: 2024-10-21

## 2024-10-21 DIAGNOSIS — Z94.0 KIDNEY REPLACED BY TRANSPLANT (HHS-HCC): Primary | ICD-10-CM

## 2024-10-21 DIAGNOSIS — Z92.25 PERSONAL HISTORY OF IMMUNOSUPRESSION THERAPY: ICD-10-CM

## 2024-10-21 DIAGNOSIS — Z94.0 KIDNEY REPLACED BY TRANSPLANT (HHS-HCC): ICD-10-CM

## 2024-10-21 DIAGNOSIS — N18.6 ESRD (END STAGE RENAL DISEASE) (MULTI): ICD-10-CM

## 2024-10-21 LAB
25(OH)D3 SERPL-MCNC: 50 NG/ML (ref 30–100)
ALBUMIN SERPL BCP-MCNC: 3.3 G/DL (ref 3.4–5)
ALBUMIN SERPL BCP-MCNC: 3.3 G/DL (ref 3.4–5)
ANION GAP BLDV CALCULATED.4IONS-SCNC: 13 MMOL/L (ref 10–25)
ANION GAP SERPL CALC-SCNC: 15 MMOL/L (ref 10–20)
ANION GAP SERPL CALC-SCNC: 16 MMOL/L (ref 10–20)
ATRIAL RATE: 79 BPM
BASE EXCESS BLDV CALC-SCNC: -8.8 MMOL/L (ref -2–3)
BASOPHILS # BLD MANUAL: 0 X10*3/UL (ref 0–0.1)
BASOPHILS NFR BLD MANUAL: 0 %
BODY TEMPERATURE: 37 DEGREES CELSIUS
BUN SERPL-MCNC: 33 MG/DL (ref 6–23)
BUN SERPL-MCNC: 34 MG/DL (ref 6–23)
CA-I BLDV-SCNC: 1.07 MMOL/L (ref 1.1–1.33)
CALCIUM SERPL-MCNC: 7.5 MG/DL (ref 8.6–10.6)
CALCIUM SERPL-MCNC: 7.6 MG/DL (ref 8.6–10.6)
CHLORIDE BLDV-SCNC: 113 MMOL/L (ref 98–107)
CHLORIDE SERPL-SCNC: 111 MMOL/L (ref 98–107)
CHLORIDE SERPL-SCNC: 112 MMOL/L (ref 98–107)
CO2 SERPL-SCNC: 18 MMOL/L (ref 21–32)
CO2 SERPL-SCNC: 20 MMOL/L (ref 21–32)
CREAT SERPL-MCNC: 1.55 MG/DL (ref 0.5–1.05)
CREAT SERPL-MCNC: 1.65 MG/DL (ref 0.5–1.05)
EGFRCR SERPLBLD CKD-EPI 2021: 32 ML/MIN/1.73M*2
EGFRCR SERPLBLD CKD-EPI 2021: 34 ML/MIN/1.73M*2
EOSINOPHIL # BLD MANUAL: 0 X10*3/UL (ref 0–0.4)
EOSINOPHIL NFR BLD MANUAL: 0 %
ERYTHROCYTE [DISTWIDTH] IN BLOOD BY AUTOMATED COUNT: 18.4 % (ref 11.5–14.5)
ERYTHROCYTE [DISTWIDTH] IN BLOOD BY AUTOMATED COUNT: 18.5 % (ref 11.5–14.5)
FOLATE SERPL-MCNC: >24 NG/ML
GLUCOSE BLD MANUAL STRIP-MCNC: 147 MG/DL (ref 74–99)
GLUCOSE BLD MANUAL STRIP-MCNC: 152 MG/DL (ref 74–99)
GLUCOSE BLD MANUAL STRIP-MCNC: 157 MG/DL (ref 74–99)
GLUCOSE BLD MANUAL STRIP-MCNC: 176 MG/DL (ref 74–99)
GLUCOSE BLD MANUAL STRIP-MCNC: 196 MG/DL (ref 74–99)
GLUCOSE BLDV-MCNC: 178 MG/DL (ref 74–99)
GLUCOSE SERPL-MCNC: 180 MG/DL (ref 74–99)
GLUCOSE SERPL-MCNC: 202 MG/DL (ref 74–99)
HCO3 BLDV-SCNC: 17.4 MMOL/L (ref 22–26)
HCT VFR BLD AUTO: 30.5 % (ref 36–46)
HCT VFR BLD AUTO: 31.5 % (ref 36–46)
HCT VFR BLD EST: 32 % (ref 36–46)
HGB BLD-MCNC: 9.7 G/DL (ref 12–16)
HGB BLD-MCNC: 9.7 G/DL (ref 12–16)
HGB BLDV-MCNC: 10.5 G/DL (ref 12–16)
IMM GRANULOCYTES # BLD AUTO: 0.07 X10*3/UL (ref 0–0.5)
IMM GRANULOCYTES NFR BLD AUTO: 0.5 % (ref 0–0.9)
INHALED O2 CONCENTRATION: 21 %
IRON SATN MFR SERPL: ABNORMAL %
IRON SERPL-MCNC: <10 UG/DL (ref 35–150)
LACTATE BLDV-SCNC: 1.7 MMOL/L (ref 0.4–2)
LACTATE BLDV-SCNC: 2.2 MMOL/L (ref 0.4–2)
LYMPHOCYTES # BLD MANUAL: 0.15 X10*3/UL (ref 0.8–3)
LYMPHOCYTES NFR BLD MANUAL: 1 %
MAGNESIUM SERPL-MCNC: 1.47 MG/DL (ref 1.6–2.4)
MAGNESIUM SERPL-MCNC: 1.51 MG/DL (ref 1.6–2.4)
MCH RBC QN AUTO: 28.4 PG (ref 26–34)
MCH RBC QN AUTO: 29.1 PG (ref 26–34)
MCHC RBC AUTO-ENTMCNC: 30.8 G/DL (ref 32–36)
MCHC RBC AUTO-ENTMCNC: 31.8 G/DL (ref 32–36)
MCV RBC AUTO: 92 FL (ref 80–100)
MCV RBC AUTO: 92 FL (ref 80–100)
MONOCYTES # BLD MANUAL: 0.62 X10*3/UL (ref 0.05–0.8)
MONOCYTES NFR BLD MANUAL: 4 %
NEUTROPHILS # BLD MANUAL: 14.63 X10*3/UL (ref 1.6–5.5)
NEUTS BAND # BLD MANUAL: 2.62 X10*3/UL (ref 0–0.5)
NEUTS BAND NFR BLD MANUAL: 17 %
NEUTS SEG # BLD MANUAL: 12.01 X10*3/UL (ref 1.6–5)
NEUTS SEG NFR BLD MANUAL: 78 %
NRBC BLD-RTO: 0 /100 WBCS (ref 0–0)
NRBC BLD-RTO: 0 /100 WBCS (ref 0–0)
OVALOCYTES BLD QL SMEAR: ABNORMAL
OXYHGB MFR BLDV: 98.1 % (ref 45–75)
P AXIS: 71 DEGREES
P OFFSET: 211 MS
P ONSET: 145 MS
PCO2 BLDV: 38 MM HG (ref 41–51)
PH BLDV: 7.27 PH (ref 7.33–7.43)
PHOSPHATE SERPL-MCNC: 3.7 MG/DL (ref 2.5–4.9)
PHOSPHATE SERPL-MCNC: 4 MG/DL (ref 2.5–4.9)
PLATELET # BLD AUTO: 124 X10*3/UL (ref 150–450)
PLATELET # BLD AUTO: 135 X10*3/UL (ref 150–450)
PO2 BLDV: 125 MM HG (ref 35–45)
POTASSIUM BLDV-SCNC: 3.9 MMOL/L (ref 3.5–5.3)
POTASSIUM SERPL-SCNC: 3.8 MMOL/L (ref 3.5–5.3)
POTASSIUM SERPL-SCNC: 3.8 MMOL/L (ref 3.5–5.3)
PR INTERVAL: 162 MS
PTH-INTACT SERPL-MCNC: 256.2 PG/ML (ref 18.5–88)
Q ONSET: 226 MS
QRS COUNT: 13 BEATS
QRS DURATION: 70 MS
QT INTERVAL: 388 MS
QTC CALCULATION(BAZETT): 444 MS
QTC FREDERICIA: 425 MS
R AXIS: 33 DEGREES
RBC # BLD AUTO: 3.33 X10*6/UL (ref 4–5.2)
RBC # BLD AUTO: 3.41 X10*6/UL (ref 4–5.2)
RBC MORPH BLD: ABNORMAL
SAO2 % BLDV: 100 % (ref 45–75)
SODIUM BLDV-SCNC: 139 MMOL/L (ref 136–145)
SODIUM SERPL-SCNC: 142 MMOL/L (ref 136–145)
SODIUM SERPL-SCNC: 142 MMOL/L (ref 136–145)
T AXIS: 51 DEGREES
T OFFSET: 420 MS
TIBC SERPL-MCNC: ABNORMAL UG/DL
TOTAL CELLS COUNTED BLD: 100
UIBC SERPL-MCNC: 171 UG/DL (ref 110–370)
VENTRICULAR RATE: 79 BPM
VIT B12 SERPL-MCNC: 415 PG/ML (ref 211–911)
WBC # BLD AUTO: 15.4 X10*3/UL (ref 4.4–11.3)
WBC # BLD AUTO: 15.5 X10*3/UL (ref 4.4–11.3)

## 2024-10-21 PROCEDURE — 2500000001 HC RX 250 WO HCPCS SELF ADMINISTERED DRUGS (ALT 637 FOR MEDICARE OP)

## 2024-10-21 PROCEDURE — 97530 THERAPEUTIC ACTIVITIES: CPT | Mod: GP

## 2024-10-21 PROCEDURE — 2500000004 HC RX 250 GENERAL PHARMACY W/ HCPCS (ALT 636 FOR OP/ED): Performed by: STUDENT IN AN ORGANIZED HEALTH CARE EDUCATION/TRAINING PROGRAM

## 2024-10-21 PROCEDURE — 83735 ASSAY OF MAGNESIUM: CPT

## 2024-10-21 PROCEDURE — 83970 ASSAY OF PARATHORMONE: CPT | Performed by: STUDENT IN AN ORGANIZED HEALTH CARE EDUCATION/TRAINING PROGRAM

## 2024-10-21 PROCEDURE — 36415 COLL VENOUS BLD VENIPUNCTURE: CPT | Performed by: STUDENT IN AN ORGANIZED HEALTH CARE EDUCATION/TRAINING PROGRAM

## 2024-10-21 PROCEDURE — 36415 COLL VENOUS BLD VENIPUNCTURE: CPT

## 2024-10-21 PROCEDURE — 2500000004 HC RX 250 GENERAL PHARMACY W/ HCPCS (ALT 636 FOR OP/ED): Mod: JZ

## 2024-10-21 PROCEDURE — 51702 INSERT TEMP BLADDER CATH: CPT

## 2024-10-21 PROCEDURE — 2500000005 HC RX 250 GENERAL PHARMACY W/O HCPCS

## 2024-10-21 PROCEDURE — 82947 ASSAY GLUCOSE BLOOD QUANT: CPT

## 2024-10-21 PROCEDURE — 99232 SBSQ HOSP IP/OBS MODERATE 35: CPT | Performed by: STUDENT IN AN ORGANIZED HEALTH CARE EDUCATION/TRAINING PROGRAM

## 2024-10-21 PROCEDURE — 82607 VITAMIN B-12: CPT | Performed by: STUDENT IN AN ORGANIZED HEALTH CARE EDUCATION/TRAINING PROGRAM

## 2024-10-21 PROCEDURE — 82330 ASSAY OF CALCIUM: CPT

## 2024-10-21 PROCEDURE — 2500000004 HC RX 250 GENERAL PHARMACY W/ HCPCS (ALT 636 FOR OP/ED)

## 2024-10-21 PROCEDURE — 2500000002 HC RX 250 W HCPCS SELF ADMINISTERED DRUGS (ALT 637 FOR MEDICARE OP, ALT 636 FOR OP/ED)

## 2024-10-21 PROCEDURE — RXMED WILLOW AMBULATORY MEDICATION CHARGE

## 2024-10-21 PROCEDURE — 84295 ASSAY OF SERUM SODIUM: CPT

## 2024-10-21 PROCEDURE — 80069 RENAL FUNCTION PANEL: CPT

## 2024-10-21 PROCEDURE — 85027 COMPLETE CBC AUTOMATED: CPT

## 2024-10-21 PROCEDURE — 97162 PT EVAL MOD COMPLEX 30 MIN: CPT | Mod: GP

## 2024-10-21 PROCEDURE — 83605 ASSAY OF LACTIC ACID: CPT

## 2024-10-21 PROCEDURE — 1200000002 HC GENERAL ROOM WITH TELEMETRY DAILY

## 2024-10-21 PROCEDURE — 99233 SBSQ HOSP IP/OBS HIGH 50: CPT | Performed by: HOSPITALIST

## 2024-10-21 PROCEDURE — 83540 ASSAY OF IRON: CPT | Performed by: STUDENT IN AN ORGANIZED HEALTH CARE EDUCATION/TRAINING PROGRAM

## 2024-10-21 PROCEDURE — 85007 BL SMEAR W/DIFF WBC COUNT: CPT

## 2024-10-21 PROCEDURE — 76776 US EXAM K TRANSPL W/DOPPLER: CPT | Performed by: RADIOLOGY

## 2024-10-21 RX ORDER — EPINEPHRINE 0.3 MG/.3ML
0.3 INJECTION SUBCUTANEOUS EVERY 5 MIN PRN
Status: CANCELLED | OUTPATIENT
Start: 2024-11-03

## 2024-10-21 RX ORDER — POLYETHYLENE GLYCOL 3350 17 G/17G
17 POWDER, FOR SOLUTION ORAL DAILY PRN
Qty: 3 PACKET | Refills: 0 | Status: SHIPPED | OUTPATIENT
Start: 2024-10-21 | End: 2024-10-28

## 2024-10-21 RX ORDER — INSULIN LISPRO 100 [IU]/ML
0-10 INJECTION, SOLUTION INTRAVENOUS; SUBCUTANEOUS
Status: DISCONTINUED | OUTPATIENT
Start: 2024-10-21 | End: 2024-10-26 | Stop reason: HOSPADM

## 2024-10-21 RX ORDER — MYCOPHENOLATE MOFETIL 250 MG/1
1000 CAPSULE ORAL EVERY 12 HOURS
Qty: 240 CAPSULE | Refills: 0 | Status: SHIPPED | OUTPATIENT
Start: 2024-10-21 | End: 2024-10-28 | Stop reason: SDUPTHER

## 2024-10-21 RX ORDER — ACETAMINOPHEN 325 MG/1
650 TABLET ORAL EVERY 6 HOURS SCHEDULED
Status: COMPLETED | OUTPATIENT
Start: 2024-10-21 | End: 2024-10-22

## 2024-10-21 RX ORDER — ACETAMINOPHEN 325 MG/1
650 TABLET ORAL EVERY 6 HOURS PRN
Status: DISCONTINUED | OUTPATIENT
Start: 2024-10-22 | End: 2024-10-26 | Stop reason: HOSPADM

## 2024-10-21 RX ORDER — PREDNISONE 20 MG/1
20 TABLET ORAL DAILY
Status: DISCONTINUED | OUTPATIENT
Start: 2024-10-24 | End: 2024-10-26 | Stop reason: HOSPADM

## 2024-10-21 RX ORDER — CARVEDILOL 12.5 MG/1
12.5 TABLET ORAL 2 TIMES DAILY
Qty: 60 TABLET | Refills: 0 | Status: SHIPPED | OUTPATIENT
Start: 2024-10-21 | End: 2024-10-24 | Stop reason: HOSPADM

## 2024-10-21 RX ORDER — VALGANCICLOVIR 450 MG/1
450 TABLET, FILM COATED ORAL
Qty: 15 TABLET | Refills: 0 | Status: CANCELLED | OUTPATIENT
Start: 2024-10-21 | End: 2024-11-20

## 2024-10-21 RX ORDER — SULFAMETHOXAZOLE AND TRIMETHOPRIM 400; 80 MG/1; MG/1
80 TABLET ORAL DAILY
Status: DISCONTINUED | OUTPATIENT
Start: 2024-10-21 | End: 2024-10-26 | Stop reason: HOSPADM

## 2024-10-21 RX ORDER — ALBUTEROL SULFATE 0.83 MG/ML
3 SOLUTION RESPIRATORY (INHALATION) AS NEEDED
Status: CANCELLED | OUTPATIENT
Start: 2024-11-03

## 2024-10-21 RX ORDER — FAMOTIDINE 10 MG/ML
20 INJECTION INTRAVENOUS ONCE AS NEEDED
Status: CANCELLED | OUTPATIENT
Start: 2025-02-09

## 2024-10-21 RX ORDER — CEFAZOLIN SODIUM 2 G/100ML
2 INJECTION, SOLUTION INTRAVENOUS EVERY 8 HOURS
Status: COMPLETED | OUTPATIENT
Start: 2024-10-21 | End: 2024-10-21

## 2024-10-21 RX ORDER — DIPHENHYDRAMINE HYDROCHLORIDE 50 MG/ML
50 INJECTION INTRAMUSCULAR; INTRAVENOUS AS NEEDED
Status: CANCELLED | OUTPATIENT
Start: 2024-11-03

## 2024-10-21 RX ORDER — DEXTROSE 50 % IN WATER (D50W) INTRAVENOUS SYRINGE
12.5
Status: DISCONTINUED | OUTPATIENT
Start: 2024-10-21 | End: 2024-10-26 | Stop reason: HOSPADM

## 2024-10-21 RX ORDER — CALCIUM GLUCONATE 20 MG/ML
1 INJECTION, SOLUTION INTRAVENOUS ONCE
Status: COMPLETED | OUTPATIENT
Start: 2024-10-21 | End: 2024-10-21

## 2024-10-21 RX ORDER — OXYCODONE HYDROCHLORIDE 5 MG/1
10 TABLET ORAL EVERY 4 HOURS PRN
Status: DISCONTINUED | OUTPATIENT
Start: 2024-10-21 | End: 2024-10-23

## 2024-10-21 RX ORDER — DEXTROSE 50 % IN WATER (D50W) INTRAVENOUS SYRINGE
25
Status: DISCONTINUED | OUTPATIENT
Start: 2024-10-21 | End: 2024-10-26 | Stop reason: HOSPADM

## 2024-10-21 RX ORDER — PANTOPRAZOLE SODIUM 40 MG/1
40 TABLET, DELAYED RELEASE ORAL
Qty: 30 TABLET | Refills: 0 | Status: SHIPPED | OUTPATIENT
Start: 2024-10-21 | End: 2024-11-24

## 2024-10-21 RX ORDER — ONDANSETRON 4 MG/1
4 TABLET, ORALLY DISINTEGRATING ORAL EVERY 8 HOURS PRN
Status: DISCONTINUED | OUTPATIENT
Start: 2024-10-21 | End: 2024-10-26 | Stop reason: HOSPADM

## 2024-10-21 RX ORDER — SODIUM CHLORIDE 450 MG/100ML
50 INJECTION, SOLUTION INTRAVENOUS CONTINUOUS
Status: DISPENSED | OUTPATIENT
Start: 2024-10-21 | End: 2024-10-22

## 2024-10-21 RX ORDER — ONDANSETRON HYDROCHLORIDE 2 MG/ML
4 INJECTION, SOLUTION INTRAVENOUS EVERY 8 HOURS PRN
Status: DISCONTINUED | OUTPATIENT
Start: 2024-10-21 | End: 2024-10-26 | Stop reason: HOSPADM

## 2024-10-21 RX ORDER — NALOXONE HYDROCHLORIDE 0.4 MG/ML
0.2 INJECTION, SOLUTION INTRAMUSCULAR; INTRAVENOUS; SUBCUTANEOUS EVERY 5 MIN PRN
Status: DISCONTINUED | OUTPATIENT
Start: 2024-10-21 | End: 2024-10-26 | Stop reason: HOSPADM

## 2024-10-21 RX ORDER — METOPROLOL TARTRATE 25 MG/1
25 TABLET, FILM COATED ORAL 2 TIMES DAILY
Status: DISCONTINUED | OUTPATIENT
Start: 2024-10-21 | End: 2024-10-21

## 2024-10-21 RX ORDER — FAMOTIDINE 10 MG/ML
20 INJECTION INTRAVENOUS ONCE AS NEEDED
Status: CANCELLED | OUTPATIENT
Start: 2024-11-03

## 2024-10-21 RX ORDER — MYCOPHENOLATE MOFETIL 250 MG/1
1000 CAPSULE ORAL EVERY 12 HOURS
Status: DISCONTINUED | OUTPATIENT
Start: 2024-10-21 | End: 2024-10-26 | Stop reason: HOSPADM

## 2024-10-21 RX ORDER — SULFAMETHOXAZOLE AND TRIMETHOPRIM 400; 80 MG/1; MG/1
1 TABLET ORAL DAILY
Qty: 30 TABLET | Refills: 0 | Status: SHIPPED | OUTPATIENT
Start: 2024-10-22 | End: 2024-10-28 | Stop reason: SDUPTHER

## 2024-10-21 RX ORDER — ATORVASTATIN CALCIUM 40 MG/1
40 TABLET, FILM COATED ORAL DAILY
Status: DISCONTINUED | OUTPATIENT
Start: 2024-10-21 | End: 2024-10-26 | Stop reason: HOSPADM

## 2024-10-21 RX ORDER — EPINEPHRINE 0.3 MG/.3ML
0.3 INJECTION SUBCUTANEOUS EVERY 5 MIN PRN
Status: CANCELLED | OUTPATIENT
Start: 2025-02-09

## 2024-10-21 RX ORDER — SODIUM BICARBONATE 650 MG/1
1300 TABLET ORAL 2 TIMES DAILY
Status: DISCONTINUED | OUTPATIENT
Start: 2024-10-21 | End: 2024-10-23

## 2024-10-21 RX ORDER — CLOTRIMAZOLE 10 MG/1
10 LOZENGE ORAL
Qty: 90 TROCHE | Refills: 0 | Status: SHIPPED | OUTPATIENT
Start: 2024-10-21 | End: 2024-10-28 | Stop reason: SDUPTHER

## 2024-10-21 RX ORDER — PANTOPRAZOLE SODIUM 40 MG/1
40 TABLET, DELAYED RELEASE ORAL
Status: DISCONTINUED | OUTPATIENT
Start: 2024-10-21 | End: 2024-10-26 | Stop reason: HOSPADM

## 2024-10-21 RX ORDER — ASPIRIN 81 MG/1
81 TABLET ORAL DAILY
Status: DISCONTINUED | OUTPATIENT
Start: 2024-10-21 | End: 2024-10-26 | Stop reason: HOSPADM

## 2024-10-21 RX ORDER — ALBUTEROL SULFATE 0.83 MG/ML
3 SOLUTION RESPIRATORY (INHALATION) AS NEEDED
Status: CANCELLED | OUTPATIENT
Start: 2025-02-09

## 2024-10-21 RX ORDER — CLOTRIMAZOLE 10 MG/1
10 LOZENGE ORAL
Status: DISCONTINUED | OUTPATIENT
Start: 2024-10-21 | End: 2024-10-26 | Stop reason: HOSPADM

## 2024-10-21 RX ORDER — DOCUSATE SODIUM 100 MG/1
100 CAPSULE, LIQUID FILLED ORAL 2 TIMES DAILY PRN
Qty: 20 CAPSULE | Refills: 0 | Status: SHIPPED | OUTPATIENT
Start: 2024-10-21 | End: 2024-11-04

## 2024-10-21 RX ORDER — OXYCODONE HYDROCHLORIDE 5 MG/1
5 TABLET ORAL EVERY 4 HOURS PRN
Status: DISCONTINUED | OUTPATIENT
Start: 2024-10-21 | End: 2024-10-23

## 2024-10-21 RX ORDER — VALGANCICLOVIR 450 MG/1
450 TABLET, FILM COATED ORAL
Qty: 15 TABLET | Refills: 0 | Status: SHIPPED | OUTPATIENT
Start: 2024-10-23 | End: 2024-10-24 | Stop reason: HOSPADM

## 2024-10-21 RX ORDER — PREDNISONE 20 MG/1
20 TABLET ORAL DAILY
Qty: 30 TABLET | Refills: 0 | Status: SHIPPED | OUTPATIENT
Start: 2024-10-24 | End: 2024-10-24 | Stop reason: HOSPADM

## 2024-10-21 RX ORDER — VALGANCICLOVIR 450 MG/1
450 TABLET, FILM COATED ORAL
Status: DISCONTINUED | OUTPATIENT
Start: 2024-10-21 | End: 2024-10-23

## 2024-10-21 RX ORDER — ACETAMINOPHEN 325 MG/1
650 TABLET ORAL EVERY 6 HOURS PRN
Qty: 30 TABLET | Refills: 0 | Status: SHIPPED | OUTPATIENT
Start: 2024-10-22 | End: 2024-11-05

## 2024-10-21 RX ORDER — DIPHENHYDRAMINE HYDROCHLORIDE 50 MG/ML
50 INJECTION INTRAMUSCULAR; INTRAVENOUS AS NEEDED
Status: CANCELLED | OUTPATIENT
Start: 2025-02-09

## 2024-10-21 RX ORDER — HYDRALAZINE HYDROCHLORIDE 20 MG/ML
10 INJECTION INTRAMUSCULAR; INTRAVENOUS EVERY 6 HOURS PRN
Status: DISCONTINUED | OUTPATIENT
Start: 2024-10-21 | End: 2024-10-26 | Stop reason: HOSPADM

## 2024-10-21 RX ADMIN — PANTOPRAZOLE SODIUM 40 MG: 40 TABLET, DELAYED RELEASE ORAL at 16:11

## 2024-10-21 RX ADMIN — CLOTRIMAZOLE 10 MG: 10 LOZENGE ORAL at 18:07

## 2024-10-21 RX ADMIN — CALCIUM GLUCONATE 1 G: 20 INJECTION, SOLUTION INTRAVENOUS at 09:13

## 2024-10-21 RX ADMIN — VALGANCICLOVIR HYDROCHLORIDE 450 MG: 450 TABLET ORAL at 10:09

## 2024-10-21 RX ADMIN — ACETAMINOPHEN 650 MG: 325 TABLET ORAL at 18:07

## 2024-10-21 RX ADMIN — OXYCODONE HYDROCHLORIDE 5 MG: 5 TABLET ORAL at 09:02

## 2024-10-21 RX ADMIN — PANTOPRAZOLE SODIUM 40 MG: 40 TABLET, DELAYED RELEASE ORAL at 06:47

## 2024-10-21 RX ADMIN — Medication 1 L/MIN: at 09:06

## 2024-10-21 RX ADMIN — SODIUM BICARBONATE 650 MG TABLET 1300 MG: at 09:03

## 2024-10-21 RX ADMIN — ACETAMINOPHEN 650 MG: 325 TABLET ORAL at 12:02

## 2024-10-21 RX ADMIN — SODIUM CHLORIDE 125 ML/HR: 4.5 INJECTION, SOLUTION INTRAVENOUS at 04:47

## 2024-10-21 RX ADMIN — DEXTROSE MONOHYDRATE 862.5 MG: 50 INJECTION, SOLUTION INTRAVENOUS at 12:53

## 2024-10-21 RX ADMIN — OXYCODONE HYDROCHLORIDE 5 MG: 5 TABLET ORAL at 16:10

## 2024-10-21 RX ADMIN — CLOTRIMAZOLE 10 MG: 10 LOZENGE ORAL at 09:02

## 2024-10-21 RX ADMIN — SULFAMETHOXAZOLE AND TRIMETHOPRIM 80 MG OF TRIMETHOPRIM: 400; 80 TABLET ORAL at 10:09

## 2024-10-21 RX ADMIN — FUROSEMIDE 40 MG: 10 INJECTION, SOLUTION INTRAVENOUS at 06:47

## 2024-10-21 RX ADMIN — MYCOPHENOLATE MOFETIL 1000 MG: 250 CAPSULE ORAL at 18:07

## 2024-10-21 RX ADMIN — CARVEDILOL 12.5 MG: 12.5 TABLET, FILM COATED ORAL at 09:03

## 2024-10-21 RX ADMIN — CEFAZOLIN SODIUM 2 G: 2 INJECTION, SOLUTION INTRAVENOUS at 16:11

## 2024-10-21 RX ADMIN — ASPIRIN 81 MG: 81 TABLET, COATED ORAL at 09:03

## 2024-10-21 RX ADMIN — ACETAMINOPHEN 650 MG: 325 TABLET ORAL at 06:45

## 2024-10-21 RX ADMIN — INSULIN LISPRO 2 UNITS: 100 INJECTION, SOLUTION INTRAVENOUS; SUBCUTANEOUS at 09:13

## 2024-10-21 RX ADMIN — CLOTRIMAZOLE 10 MG: 10 LOZENGE ORAL at 12:53

## 2024-10-21 RX ADMIN — CEFAZOLIN SODIUM 2 G: 2 INJECTION, SOLUTION INTRAVENOUS at 09:03

## 2024-10-21 RX ADMIN — MYCOPHENOLATE MOFETIL 1000 MG: 250 CAPSULE ORAL at 06:45

## 2024-10-21 RX ADMIN — INSULIN LISPRO 2 UNITS: 100 INJECTION, SOLUTION INTRAVENOUS; SUBCUTANEOUS at 12:53

## 2024-10-21 RX ADMIN — INSULIN LISPRO 2 UNITS: 100 INJECTION, SOLUTION INTRAVENOUS; SUBCUTANEOUS at 18:07

## 2024-10-21 RX ADMIN — DEXTROSE MONOHYDRATE 250 MG: 50 INJECTION, SOLUTION INTRAVENOUS at 09:03

## 2024-10-21 RX ADMIN — CARVEDILOL 12.5 MG: 12.5 TABLET, FILM COATED ORAL at 20:35

## 2024-10-21 RX ADMIN — ATORVASTATIN CALCIUM 40 MG: 40 TABLET, FILM COATED ORAL at 09:03

## 2024-10-21 RX ADMIN — Medication 3 L/MIN: at 00:00

## 2024-10-21 RX ADMIN — SODIUM BICARBONATE 650 MG TABLET 1300 MG: at 20:35

## 2024-10-21 RX ADMIN — CEFAZOLIN SODIUM 2 G: 2 INJECTION, SOLUTION INTRAVENOUS at 00:44

## 2024-10-21 SDOH — SOCIAL STABILITY: SOCIAL INSECURITY: DO YOU FEEL ANYONE HAS EXPLOITED OR TAKEN ADVANTAGE OF YOU FINANCIALLY OR OF YOUR PERSONAL PROPERTY?: NO

## 2024-10-21 SDOH — SOCIAL STABILITY: SOCIAL INSECURITY: HAVE YOU HAD ANY THOUGHTS OF HARMING ANYONE ELSE?: NO

## 2024-10-21 SDOH — SOCIAL STABILITY: SOCIAL INSECURITY: HAVE YOU HAD THOUGHTS OF HARMING ANYONE ELSE?: NO

## 2024-10-21 SDOH — SOCIAL STABILITY: SOCIAL INSECURITY: DO YOU FEEL UNSAFE GOING BACK TO THE PLACE WHERE YOU ARE LIVING?: NO

## 2024-10-21 SDOH — SOCIAL STABILITY: SOCIAL INSECURITY: ARE YOU OR HAVE YOU BEEN THREATENED OR ABUSED PHYSICALLY, EMOTIONALLY, OR SEXUALLY BY ANYONE?: NO

## 2024-10-21 SDOH — SOCIAL STABILITY: SOCIAL INSECURITY: HAS ANYONE EVER THREATENED TO HURT YOUR FAMILY OR YOUR PETS?: NO

## 2024-10-21 SDOH — SOCIAL STABILITY: SOCIAL INSECURITY: ABUSE: ADULT

## 2024-10-21 SDOH — SOCIAL STABILITY: SOCIAL INSECURITY: ARE THERE ANY APPARENT SIGNS OF INJURIES/BEHAVIORS THAT COULD BE RELATED TO ABUSE/NEGLECT?: NO

## 2024-10-21 SDOH — SOCIAL STABILITY: SOCIAL INSECURITY: DOES ANYONE TRY TO KEEP YOU FROM HAVING/CONTACTING OTHER FRIENDS OR DOING THINGS OUTSIDE YOUR HOME?: NO

## 2024-10-21 SDOH — SOCIAL STABILITY: SOCIAL INSECURITY: WERE YOU ABLE TO COMPLETE ALL THE BEHAVIORAL HEALTH SCREENINGS?: YES

## 2024-10-21 ASSESSMENT — COGNITIVE AND FUNCTIONAL STATUS - GENERAL
HELP NEEDED FOR BATHING: A LITTLE
DRESSING REGULAR UPPER BODY CLOTHING: A LITTLE
PERSONAL GROOMING: A LITTLE
HELP NEEDED FOR BATHING: A LITTLE
WALKING IN HOSPITAL ROOM: A LITTLE
STANDING UP FROM CHAIR USING ARMS: A LITTLE
DRESSING REGULAR LOWER BODY CLOTHING: A LITTLE
DAILY ACTIVITIY SCORE: 18
DRESSING REGULAR UPPER BODY CLOTHING: A LITTLE
MOVING FROM LYING ON BACK TO SITTING ON SIDE OF FLAT BED WITH BEDRAILS: A LITTLE
STANDING UP FROM CHAIR USING ARMS: A LITTLE
EATING MEALS: A LITTLE
MOBILITY SCORE: 18
EATING MEALS: A LITTLE
DRESSING REGULAR LOWER BODY CLOTHING: A LITTLE
MOVING TO AND FROM BED TO CHAIR: A LITTLE
MOBILITY SCORE: 18
MOVING FROM LYING ON BACK TO SITTING ON SIDE OF FLAT BED WITH BEDRAILS: A LITTLE
DRESSING REGULAR LOWER BODY CLOTHING: A LITTLE
MOBILITY SCORE: 18
WALKING IN HOSPITAL ROOM: A LITTLE
TURNING FROM BACK TO SIDE WHILE IN FLAT BAD: A LITTLE
DAILY ACTIVITIY SCORE: 18
STANDING UP FROM CHAIR USING ARMS: A LITTLE
TOILETING: A LITTLE
EATING MEALS: A LITTLE
PERSONAL GROOMING: A LITTLE
MOBILITY SCORE: 11
CLIMB 3 TO 5 STEPS WITH RAILING: TOTAL
MOVING FROM LYING ON BACK TO SITTING ON SIDE OF FLAT BED WITH BEDRAILS: A LOT
TOILETING: A LITTLE
MOVING TO AND FROM BED TO CHAIR: A LOT
CLIMB 3 TO 5 STEPS WITH RAILING: A LITTLE
WALKING IN HOSPITAL ROOM: A LOT
DAILY ACTIVITIY SCORE: 18
CLIMB 3 TO 5 STEPS WITH RAILING: A LITTLE
WALKING IN HOSPITAL ROOM: A LITTLE
TURNING FROM BACK TO SIDE WHILE IN FLAT BAD: A LITTLE
CLIMB 3 TO 5 STEPS WITH RAILING: A LITTLE
DRESSING REGULAR UPPER BODY CLOTHING: A LITTLE
STANDING UP FROM CHAIR USING ARMS: A LOT
PERSONAL GROOMING: A LITTLE
HELP NEEDED FOR BATHING: A LITTLE
TOILETING: A LITTLE
MOVING TO AND FROM BED TO CHAIR: A LITTLE
MOVING FROM LYING ON BACK TO SITTING ON SIDE OF FLAT BED WITH BEDRAILS: A LITTLE
TURNING FROM BACK TO SIDE WHILE IN FLAT BAD: A LOT
TURNING FROM BACK TO SIDE WHILE IN FLAT BAD: A LITTLE
MOVING TO AND FROM BED TO CHAIR: A LITTLE

## 2024-10-21 ASSESSMENT — PAIN - FUNCTIONAL ASSESSMENT
PAIN_FUNCTIONAL_ASSESSMENT: 0-10

## 2024-10-21 ASSESSMENT — PAIN DESCRIPTION - LOCATION
LOCATION: ABDOMEN

## 2024-10-21 ASSESSMENT — PAIN DESCRIPTION - ORIENTATION
ORIENTATION: MID
ORIENTATION: RIGHT
ORIENTATION: MID

## 2024-10-21 ASSESSMENT — ACTIVITIES OF DAILY LIVING (ADL)
LACK_OF_TRANSPORTATION: NO
ADL_ASSISTANCE: INDEPENDENT

## 2024-10-21 ASSESSMENT — PAIN SCALES - GENERAL
PAINLEVEL_OUTOF10: 6
PAINLEVEL_OUTOF10: 5 - MODERATE PAIN
PAINLEVEL_OUTOF10: 0 - NO PAIN
PAINLEVEL_OUTOF10: 3
PAINLEVEL_OUTOF10: 6
PAINLEVEL_OUTOF10: 0 - NO PAIN
PAINLEVEL_OUTOF10: 2
PAINLEVEL_OUTOF10: 0 - NO PAIN
PAINLEVEL_OUTOF10: 4
PAINLEVEL_OUTOF10: 3
PAINLEVEL_OUTOF10: 4

## 2024-10-21 ASSESSMENT — LIFESTYLE VARIABLES
AUDIT-C TOTAL SCORE: 0
HOW MANY STANDARD DRINKS CONTAINING ALCOHOL DO YOU HAVE ON A TYPICAL DAY: PATIENT DOES NOT DRINK
HOW OFTEN DO YOU HAVE 6 OR MORE DRINKS ON ONE OCCASION: NEVER
SKIP TO QUESTIONS 9-10: 1
AUDIT-C TOTAL SCORE: 0
HOW OFTEN DO YOU HAVE A DRINK CONTAINING ALCOHOL: NEVER

## 2024-10-21 ASSESSMENT — PAIN SCALES - PAIN ASSESSMENT IN ADVANCED DEMENTIA (PAINAD): TOTALSCORE: MEDICATION (SEE MAR)

## 2024-10-21 ASSESSMENT — PATIENT HEALTH QUESTIONNAIRE - PHQ9
2. FEELING DOWN, DEPRESSED OR HOPELESS: NOT AT ALL
SUM OF ALL RESPONSES TO PHQ9 QUESTIONS 1 & 2: 0
1. LITTLE INTEREST OR PLEASURE IN DOING THINGS: NOT AT ALL

## 2024-10-21 NOTE — PROGRESS NOTES
Pharmacy Medication History Review    Liz Hamlin is a 79 y.o. female admitted for Kidney replaced by transplant ([facility]). Pharmacy reviewed the patient's [id number] medications and allergies for accuracy.    Medications ADDED:  NONE  Medications CHANGED:  NONE  Medications REMOVED:   NONE     The list below reflects the updated PTA list.   Prior to Admission Medications   Prescriptions Last Dose Informant   B complex-vitamin C-folic acid (Nephrocaps) 1 mg capsule  Self   Sig: Take 1 capsule by mouth once daily.   L. acidophilus/Bifid. animalis 32 billion cell capsule  Self   Sig: Take 1 capsule by mouth once daily.   allopurinol (Zyloprim) 100 mg tablet  Self   Sig: Take 1 tablet (100 mg) by mouth once daily.   aspirin 81 mg EC tablet  Self   Sig: Take 1 tablet (81 mg) by mouth once daily.   atorvastatin (Lipitor) 40 mg tablet  Self   Sig: Take 1 tablet (40 mg) by mouth once daily.   belatacept (Nulojix) 250 mg  Self   Sig: Infuse 35 mL (875 mg total) into a venous catheter see administration instructions.  Ordered dose 10 mg/kg at day 14, 28, 56, and 84,  then followed by 5 mg/kg monthly   calcitriol (Rocaltrol) 0.25 mcg capsule  Self   Sig: Take 1 capsule (0.25 mcg) by mouth 3 (three) times a week. other   cholecalciferol, vitamin D3, 25 mcg (1,000 unit) tablet,chewable  Self   Sig: Chew 1 tablet (1,000 Units) 2 times a week.   cloNIDine (Catapres) 0.1 mg tablet  Self   Sig: Take 1 tablet (0.1 mg) by mouth once daily.   coenzyme Q-10 100 mg capsule  Self   Sig: Take by mouth. Take as directed by mouth   dapagliflozin propanediol (Farxiga) 10 mg  Self   Sig: Take 1 tablet (10 mg) by mouth once daily in the morning.   docusate sodium (Colace) 100 mg capsule  Self   Sig: TAKE 1 CAPSULE TWICE A DAY AS NEEDED FOR CONSTIPATION   ferrous sulfate, 325 mg ferrous sulfate, (FeroSuL) tablet  Self   Sig: Take 1 tablet by mouth once daily.   furosemide (Lasix) 40 mg tablet  Self   Sig: Take 1 tablet (40 mg) by  "mouth once daily.   hydrALAZINE (Apresoline) 100 mg tablet  Self   Sig: Take 1 tablet (100 mg) by mouth 2 times a day.   hydrOXYzine HCL (Atarax) 25 mg tablet  Self   Sig: Take 1 tablet (25 mg) by mouth 2 times a day as needed for itching.   losartan (Cozaar) 100 mg tablet  Self   Sig: Take 1 tablet (100 mg) by mouth once daily. Need MD appointment for refills   metoprolol succinate XL (Toprol-XL) 50 mg 24 hr tablet  Self   Sig: Take 1 tablet (50 mg) by mouth once daily. Do not crush or chew.      Facility-Administered Medications: None        The list below reflects the updated allergy list. Please review each documented allergy for additional clarification and justification.  Allergies  Reviewed by Monae Bro RN on 10/20/2024        Severity Reactions Comments    Amlodipine Medium Swelling Edema    Codeine Medium Itching     Erythromycin Medium Itching     Nsaids (non-steroidal Anti-inflammatory Drug) Medium Itching, Nausea Only chronic renal insufficiency    Tramadol Medium Itching, Nausea/vomiting     Lisinopril Low Cough Cough            Patient accepts M2B at discharge.     Sources:   out patient fill history, OARRS, and patient interview who was a great historian.   Office visit transplant Ruben Jimenez 10/15/24     Additional Comments:  none      Eric Johnston [facility]  Transitions of Care Pharmacist  10/21/24     Secure Chat preferred   If no response call [phone] or LocBox \"Med Rec\"    "

## 2024-10-21 NOTE — HOSPITAL COURSE
Pt is a 80 y/o female with a hx of ESRD secondary to hypertension whom received a  donor kidney transplant on 10/20/24 by Dr. Jimenez with a KDPI of 86% and PRA of 71%. Donor was Hepc - / - and has not met risk factors. EBV + / +. Left donor kidney transplanted to patient right pelvis. Admit weight was 86.4 kg (discharge weight is 93.2kg ).  Pt received a total of 3 mg/kg total of thymoglobulin induction therapy in conjunction with 500mg IV solumedrol.  Pt was initiated on Belatacept  & Cellcept immunosuppression in conjunction with IV solumedrol taper.  Pt was started on valcyte (CMV D + / R - ) and bactrim for CMV & PCP prophylaxis per protocol. She was started on clotrimazole as antifungal coverage per protocol. Operative course was uncomplicated.  Hospital course was uncomplicated. Patel catheter was removed on POD #3 and the patient is voiding spontaneously. Pt did not require dialysis and electrolytes remain stable. Patient was pre-dialysis and does not have HD access.   BP & glucose under good control. Of note, patient was asymptomatically covid positive. She was treated with remdesevir X3 doses.    Pt is tolerating a regular diet, maintaining adequate hydration with oral intake, ambulating independently and having BMs. Immunosuppression was managed by the transplant team. Patient is in stable condition for discharge home with renal telehealth today. RX delivered to bedside prior to discharge. The patient will f/u in the transplant institute and have labs drawn in the walk-in clinic.

## 2024-10-21 NOTE — PROGRESS NOTES
"REASON FOR CONSULT:  Immunosuppressive medication management and nephrology related issues.    REQUESTING PHYSICIAN: Ruben Jimenez MD  PRIMARY CARE PHYSICIAN: Maddie Carrillo MD    ADMISSION DIAGNOSIS:   1. Kidney transplanted ([facility])    2. ESRD (end stage renal disease) (Multi)        TRANSPLANT DATE: 10/20/2024 (Kidney)    BLOOD TYPE: O    SUBJECTIVE:  Feeling fine, no acute complains    PHYCISCAL EXAMINATION:  Visit Vitals  /70 (BP Location: Left arm, Patient Position: Lying)   Pulse 92   Temp 37 °C (98.6 °F) (Temporal)   Resp 16   Ht 1.676 m (5' 6\")   Wt 86.9 kg (191 lb 9.3 oz)   LMP  (LMP Unknown)   SpO2 96%   BMI 30.92 kg/m²   OB Status Postmenopausal   Smoking Status Never   BSA 2.01 m²        10/19 1900 - 10/21 0659  In: 8196.7 [P.O.:120; I.V.:6401]  Out: 5795 [Urine:5335; Drains:435]       General Appearance - NAD, Good speech, oriented and alert  HEENT - Supple. Not pale. No jaundice. No cervical lymphadenopathy. Pharynx and tonsils are not injected.  CVS - RRR. Normal S1/S2. No murmur, click , rub or gallop  Lungs- clear to auscultation bilaterally  Abdomen - soft , not tender, no guarding, no rigidity. No hepatosplenomegaly. Normal bowel sounds. No masses and ascites.   Musculoskeletal /Extremities - no edema. Full ROM. No joint tenderness.   Neuro/Psych - appropriate mood and affect. Motor power V/V all extremities. CN I -XII were grossly intact.  Skin - No visible rash  Dialysis access - Pre dialysis    LABS:  Results for orders placed or performed during the hospital encounter of 10/20/24 (from the past 24 hours)   Prepare RBC: 2 Units   Result Value Ref Range    PRODUCT CODE X5947L60     Unit Number [id number]     Unit ABO O     Unit RH NEG     XM INTEP COMP     Dispense Status XM     Blood Expiration Date 10/23/2024 11:59:00 PM EDT     PRODUCT BLOOD TYPE 9500     UNIT VOLUME 350     PRODUCT CODE M9559F10     Unit Number [id number]     Unit ABO O     Unit RH NEG     XM INTEP " COMP     Dispense Status XM     Blood Expiration Date 10/23/2024 11:59:00 PM EDT     PRODUCT BLOOD TYPE 9500     UNIT VOLUME 350    SST TOP   Result Value Ref Range    Extra Tube Hold for add-ons.    SST TOP   Result Value Ref Range    Extra Tube Hold for add-ons.    CBC and Auto Differential   Result Value Ref Range    WBC 4.7 4.4 - 11.3 x10*3/uL    nRBC 0.0 0.0 - 0.0 /100 WBCs    RBC 3.74 (L) 4.00 - 5.20 x10*6/uL    Hemoglobin 10.9 (L) 12.0 - 16.0 g/dL    Hematocrit 35.8 (L) 36.0 - 46.0 %    MCV 96 80 - 100 fL    MCH 29.1 26.0 - 34.0 pg    MCHC 30.4 (L) 32.0 - 36.0 g/dL    RDW 19.9 (H) 11.5 - 14.5 %    Platelets 149 (L) 150 - 450 x10*3/uL    Neutrophils % 93.7 40.0 - 80.0 %    Immature Granulocytes %, Automated 0.9 0.0 - 0.9 %    Lymphocytes % 4.5 13.0 - 44.0 %    Monocytes % 0.9 2.0 - 10.0 %    Eosinophils % 0.0 0.0 - 6.0 %    Basophils % 0.0 0.0 - 2.0 %    Neutrophils Absolute 4.36 1.60 - 5.50 x10*3/uL    Immature Granulocytes Absolute, Automated 0.04 0.00 - 0.50 x10*3/uL    Lymphocytes Absolute 0.21 (L) 0.80 - 3.00 x10*3/uL    Monocytes Absolute 0.04 (L) 0.05 - 0.80 x10*3/uL    Eosinophils Absolute 0.00 0.00 - 0.40 x10*3/uL    Basophils Absolute 0.00 0.00 - 0.10 x10*3/uL   Blood Gas Venous Full Panel   Result Value Ref Range    POCT pH, Venous 7.17 (LL) 7.33 - 7.43 pH    POCT pCO2, Venous 56 (H) 41 - 51 mm Hg    POCT pO2, Venous 29 (L) 35 - 45 mm Hg    POCT SO2, Venous 41 (L) 45 - 75 %    POCT Oxy Hemoglobin, Venous 40.5 (L) 45.0 - 75.0 %    POCT Hematocrit Calculated, Venous 32.0 (L) 36.0 - 46.0 %    POCT Sodium, Venous 132 (L) 136 - 145 mmol/L    POCT Potassium, Venous >10.0 (HH) 3.5 - 5.3 mmol/L    POCT Chloride, Venous 109 (H) 98 - 107 mmol/L    POCT Ionized Calicum, Venous 1.09 (L) 1.10 - 1.33 mmol/L    POCT Glucose, Venous 122 (H) 74 - 99 mg/dL    POCT Lactate, Venous 2.0 0.4 - 2.0 mmol/L    POCT Base Excess, Venous -8.3 (L) -2.0 - 3.0 mmol/L    POCT HCO3 Calculated, Venous 20.4 (L) 22.0 - 26.0 mmol/L     POCT Hemoglobin, Venous 10.8 (L) 12.0 - 16.0 g/dL    POCT Anion Gap, Venous      Patient Temperature 37.0 degrees Celsius    FiO2 21 %   BLOOD GAS VENOUS FULL PANEL   Result Value Ref Range    POCT pH, Venous 7.17 (LL) 7.33 - 7.43 pH    POCT pCO2, Venous 57 (H) 41 - 51 mm Hg    POCT pO2, Venous 33 (L) 35 - 45 mm Hg    POCT SO2, Venous 43 (L) 45 - 75 %    POCT Oxy Hemoglobin, Venous 42.3 (L) 45.0 - 75.0 %    POCT Hematocrit Calculated, Venous 39.0 36.0 - 46.0 %    POCT Sodium, Venous 140 136 - 145 mmol/L    POCT Potassium, Venous 4.6 3.5 - 5.3 mmol/L    POCT Chloride, Venous 110 (H) 98 - 107 mmol/L    POCT Ionized Calicum, Venous 1.15 1.10 - 1.33 mmol/L    POCT Glucose, Venous 158 (H) 74 - 99 mg/dL    POCT Lactate, Venous 2.6 (H) 0.4 - 2.0 mmol/L    POCT Base Excess, Venous -8.2 (L) -2.0 - 3.0 mmol/L    POCT HCO3 Calculated, Venous 20.8 (L) 22.0 - 26.0 mmol/L    POCT Hemoglobin, Venous 12.9 12.0 - 16.0 g/dL    POCT Anion Gap, Venous 14.0 10.0 - 25.0 mmol/L    Patient Temperature 37.0 degrees Celsius    FiO2 21 %   Renal function panel   Result Value Ref Range    Glucose 146 (H) 74 - 99 mg/dL    Sodium 142 136 - 145 mmol/L    Potassium 3.8 3.5 - 5.3 mmol/L    Chloride 111 (H) 98 - 107 mmol/L    Bicarbonate 21 21 - 32 mmol/L    Anion Gap 14 10 - 20 mmol/L    Urea Nitrogen 34 (H) 6 - 23 mg/dL    Creatinine 1.76 (H) 0.50 - 1.05 mg/dL    eGFR 30 (L) >60 mL/min/1.73m*2    Calcium 8.0 (L) 8.6 - 10.6 mg/dL    Phosphorus 4.1 2.5 - 4.9 mg/dL    Albumin 3.4 3.4 - 5.0 g/dL   Blood Gas Lactic Acid, Venous   Result Value Ref Range    POCT Lactate, Venous 2.7 (H) 0.4 - 2.0 mmol/L   POCT GLUCOSE   Result Value Ref Range    POCT Glucose 147 (H) 74 - 99 mg/dL   Renal function panel   Result Value Ref Range    Glucose 180 (H) 74 - 99 mg/dL    Sodium 142 136 - 145 mmol/L    Potassium 3.8 3.5 - 5.3 mmol/L    Chloride 112 (H) 98 - 107 mmol/L    Bicarbonate 18 (L) 21 - 32 mmol/L    Anion Gap 16 10 - 20 mmol/L    Urea Nitrogen 33 (H) 6 - 23  mg/dL    Creatinine 1.55 (H) 0.50 - 1.05 mg/dL    eGFR 34 (L) >60 mL/min/1.73m*2    Calcium 7.6 (L) 8.6 - 10.6 mg/dL    Phosphorus 3.7 2.5 - 4.9 mg/dL    Albumin 3.3 (L) 3.4 - 5.0 g/dL   CBC   Result Value Ref Range    WBC 15.5 (H) 4.4 - 11.3 x10*3/uL    nRBC 0.0 0.0 - 0.0 /100 WBCs    RBC 3.33 (L) 4.00 - 5.20 x10*6/uL    Hemoglobin 9.7 (L) 12.0 - 16.0 g/dL    Hematocrit 30.5 (L) 36.0 - 46.0 %    MCV 92 80 - 100 fL    MCH 29.1 26.0 - 34.0 pg    MCHC 31.8 (L) 32.0 - 36.0 g/dL    RDW 18.4 (H) 11.5 - 14.5 %    Platelets 124 (L) 150 - 450 x10*3/uL   BLOOD GAS VENOUS FULL PANEL   Result Value Ref Range    POCT pH, Venous 7.27 (L) 7.33 - 7.43 pH    POCT pCO2, Venous 38 (L) 41 - 51 mm Hg    POCT pO2, Venous 125 (H) 35 - 45 mm Hg    POCT SO2, Venous 100 (H) 45 - 75 %    POCT Oxy Hemoglobin, Venous 98.1 (H) 45.0 - 75.0 %    POCT Hematocrit Calculated, Venous 32.0 (L) 36.0 - 46.0 %    POCT Sodium, Venous 139 136 - 145 mmol/L    POCT Potassium, Venous 3.9 3.5 - 5.3 mmol/L    POCT Chloride, Venous 113 (H) 98 - 107 mmol/L    POCT Ionized Calicum, Venous 1.07 (L) 1.10 - 1.33 mmol/L    POCT Glucose, Venous 178 (H) 74 - 99 mg/dL    POCT Lactate, Venous 2.2 (H) 0.4 - 2.0 mmol/L    POCT Base Excess, Venous -8.8 (L) -2.0 - 3.0 mmol/L    POCT HCO3 Calculated, Venous 17.4 (L) 22.0 - 26.0 mmol/L    POCT Hemoglobin, Venous 10.5 (L) 12.0 - 16.0 g/dL    POCT Anion Gap, Venous 13.0 10.0 - 25.0 mmol/L    Patient Temperature 37.0 degrees Celsius    FiO2 21 %   Blood Gas Lactic Acid, Venous   Result Value Ref Range    POCT Lactate, Venous 1.7 0.4 - 2.0 mmol/L   POCT GLUCOSE   Result Value Ref Range    POCT Glucose 157 (H) 74 - 99 mg/dL   Magnesium   Result Value Ref Range    Magnesium 1.51 (L) 1.60 - 2.40 mg/dL   CBC and Auto Differential   Result Value Ref Range    WBC 15.4 (H) 4.4 - 11.3 x10*3/uL    nRBC 0.0 0.0 - 0.0 /100 WBCs    RBC 3.41 (L) 4.00 - 5.20 x10*6/uL    Hemoglobin 9.7 (L) 12.0 - 16.0 g/dL    Hematocrit 31.5 (L) 36.0 - 46.0 %     MCV 92 80 - 100 fL    MCH 28.4 26.0 - 34.0 pg    MCHC 30.8 (L) 32.0 - 36.0 g/dL    RDW 18.5 (H) 11.5 - 14.5 %    Platelets 135 (L) 150 - 450 x10*3/uL    Neutrophils %      Immature Granulocytes %, Automated      Lymphocytes %      Monocytes %      Eosinophils %      Basophils %      Neutrophils Absolute      Lymphocytes Absolute      Monocytes Absolute      Eosinophils Absolute      Basophils Absolute     Renal function panel   Result Value Ref Range    Glucose 202 (H) 74 - 99 mg/dL    Sodium 142 136 - 145 mmol/L    Potassium 3.8 3.5 - 5.3 mmol/L    Chloride 111 (H) 98 - 107 mmol/L    Bicarbonate 20 (L) 21 - 32 mmol/L    Anion Gap 15 10 - 20 mmol/L    Urea Nitrogen 34 (H) 6 - 23 mg/dL    Creatinine 1.65 (H) 0.50 - 1.05 mg/dL    eGFR 32 (L) >60 mL/min/1.73m*2    Calcium 7.5 (L) 8.6 - 10.6 mg/dL    Phosphorus 4.0 2.5 - 4.9 mg/dL    Albumin 3.3 (L) 3.4 - 5.0 g/dL     *Note: Due to a large number of results and/or encounters for the requested time period, some results have not been displayed. A complete set of results can be found in Results Review.      Estimated Creatinine Clearance: 31.7 mL/min (A) (by C-G formula based on SCr of 1.65 mg/dL (H)).    ASSESSMENT AND PLAN:    Ms. Hamlin is a 79 y.o. female who is predialysis -CKD -4 ,JOSE E,hepatic steatosis , who underwent a kidney transplant surgery on 10/20/2024 (Kidney) and was hospitalized for kidney transplant surgery.  Kidney info: KDPI: 86%,Pra :71%,HCV D-/R-,EBVD+/R+,CMV D+/R-,HBV negative,Toxo Neg    Transplant nephrology is consulted to assist with immunosuppressive medication management, and nephrology related issues.     Principal Problem:    Kidney transplanted (HHS-HCC)  Active Problems:    ESRD (end stage renal disease) (Multi)      1. ESRD S/P Kidney transplant.   - Allograft function: stable at 1.5-1.6 for now, overall trending down  - Continue to monitor UOP and Serum creatinine closely.   - Avoid nephrotoxic agents, NSAIDs and IV contrast   - Strict  I/O.   - Renally dose all medications by the most recent CrCl from Cockcroft-Gault formula.    2. Immunosuppression   - Induction with thymo 3 mg per kg, belatacept denovo 1st dose 10/21/2024, MMF BID, MP taper, pred 20 mg OD     3. Anemia and WBC   - Continue to monitor Hgb   - No indications for PRBC transfusion     4. Electrolyte   - Reviewed renal profile. hypoMg  - bicarb 20, on bicarb tab 1300 mg BID    6. Hypertension   - BP had been around 170-150 SBP  - Goal BP < 140/90 mmHg     7. Wound management  -  Defer it to surgery    8.  GI prophylaxis   - On PPI     9. DVT Prophylaxis  - Defer to primary team    10. ID prophylaxis  - CMV, PJP and fungal prophylaxis per  Transplant [place] Protocol    11. Asymptomatic COVID  - Defer to primary team      Lindy Singh MD  Nephrology Fellow

## 2024-10-21 NOTE — PROGRESS NOTES
Pharmacist Post-Transplant Note    The Clinical Transplant Pharmacist is aware that Liz Hamlin has been admitted and has undergone kidney transplantation. A transplant pharmacist will be rounding with the multidisciplinary transplant team and making recommendations on her medication regimen.     The patient's medications have been reviewed in conjunction with the multidisciplinary transplant team. The pharmacist is in agreement with the plan of care for medications at this time.    Patient and donor factors were screened to determine the appropriate post-transplant protocol and current immunosuppression plan includes:    Induction Regimen:  Antithymocyte globulin    Maintenance Regimen:  Belatacept, Mycophenolate mofetil, and Methylprednisolone/Prednisone Taper    Maintenance immunosuppression and anti-infective prophylaxis will begin according to protocol. Home medications will begin when the patient is clinically stable. Of note, CMV D+/R- so patient will require 6 months of CMV prophylaxis with valganciclovir per protocol.    Christy Melchor, PharmD   Solid Organ Transplant Clinical Pharmacy Specialist

## 2024-10-21 NOTE — PROGRESS NOTES
"Liz Hamlin is a 79 y.o. female now POD # 1 s/p DDKT transplant.    Subjective   Doing well. 5 L UOP       Objective   Physical Exam  Gen: A+OX3; NAD  Abd: S/NT/ND. Incision C/D/I.  Drains: Serosanguinous    Ext: No LE edema    Last Recorded Vitals  Blood pressure 147/70, pulse 92, temperature 37 °C (98.6 °F), temperature source Temporal, resp. rate 16, height 1.676 m (5' 6\"), weight 86.9 kg (191 lb 9.3 oz), SpO2 96%, not currently breastfeeding.  Intake/Output last 3 Shifts:  I/O last 3 completed shifts:  In: 8196.7 (94.3 mL/kg) [P.O.:120; I.V.:6401 (73.7 mL/kg); IV Piggyback:1675.7]  Out: 5795 (66.7 mL/kg) [Urine:5335 (1.7 mL/kg/hr); Emesis/NG output:25; Drains:435]  Weight: 86.9 kg      Lab Results   Component Value Date    CREATININE 1.55 (H) 10/21/2024    K 3.8 10/21/2024    GLUCOSE 180 (H) 10/21/2024    HGB 9.7 (L) 10/21/2024    WBC 15.4 (H) 10/21/2024     (L) 10/21/2024    CALCIUM 7.6 (L) 10/21/2024       Assessment/Plan  78 y/o F w/ HTN, CKD preemptive to HD now s/p DDKT 10/20/24    S/p DDKT 10/20/24   Doing well, Cr downtrending  Stop lasix  Asa 81 mg  Hold Hep DVT ppx for now  Incidental COVID +, monitor  Cont JESSICA  PT/OT    Immunosuppression   Thymo goal 3mg/kg - second dose tomorrow  Belatacept, de val - first dose today  MMF 1000 BID  Steroid taper    3. HTN  Coreg   PRN hydralazine    Donor Kidney Info:  Etiology:  DBD   KDPI: 86 %  PRA: 71%  CMV: D+/R-  EBV: D+/R+  Toxo: Donor Negative      I spent 35 minutes in the professional and overall care of this patient.      Janene Hogan MD  "

## 2024-10-21 NOTE — CONSULTS
INPATIENT INITIAL TRANSPLANT NEPHROLOGY CONSULT        SERVICE DATE: 10/20/2024   SERVICE TIME:  11:20 PM    REASON FOR CONSULT:  Immunosuppressive medication management and nephrology related issues.    REQUESTING PHYSICIAN: Ruben Jimenez MD  PRIMARY CARE PHYSICIAN: Maddie Carrillo MD    ADMISSION DIAGNOSIS:   1. Kidney transplanted ([facility]HS-HCC)    2. ESRD (end stage renal disease) (Multi)        TRANSPLANT DATE: 10/20/2024 (Kidney)    BLOOD TYPE: O    HPI:    Ms. Hamlin is a 79 y.o. female with past medical history significant for CKD 4 attributed to longstanding HTN, JOSE E, h/o renal cysts, hepatic steatosis .    Patient had been doing well in usual status of health. The  donor of her blood type became available. The cross match was negative. Patient was then admitted for a  donor renal transplant surgery.     Denied hx of active infection. No fever, chills, chest pain, shortness of breath and palpitation. No nausea, vomiting, diarrhea.    REVIEW OF SYSTEM:  Review of system was done system by system (10/14). Apart from HPI, other symptoms were negative.    PAST MEDICAL HISTORY:  Past Medical History:   Diagnosis Date    CKD (chronic kidney disease)     Encounter for general adult medical examination without abnormal findings 2021    Encounter for Medicare annual wellness exam    Glaucoma     Gout     Hyperlipidemia     Hypertension     Mitral valve regurgitation     JOSE E (obstructive sleep apnea)     Unspecified cataract     Cataracts, both eyes        PAST SURGICAL HISTORY:  Past Surgical History:   Procedure Laterality Date    OTHER SURGICAL HISTORY  2019    Carpal tunnel surgery    OTHER SURGICAL HISTORY  2019     section    OTHER SURGICAL HISTORY  2019    Hernia repair        SOCIAL HISTORY:  Social History     Socioeconomic History    Marital status:      Spouse name: Not on file    Number of children: Not on file    Years of education: Not on  file    Highest education level: Not on file   Occupational History    Not on file   Tobacco Use    Smoking status: Never     Passive exposure: Never    Smokeless tobacco: Never   Vaping Use    Vaping status: Never Used   Substance and Sexual Activity    Alcohol use: Never    Drug use: Never    Sexual activity: Yes     Partners: Male   Other Topics Concern    Not on file   Social History Narrative    Not on file     Social Drivers of Health     Financial Resource Strain: Low Risk  (10/20/2024)    Overall Financial Resource Strain (CARDIA)     Difficulty of Paying Living Expenses: Not hard at all   Food Insecurity: No Food Insecurity (10/20/2024)    Hunger Vital Sign     Worried About Running Out of Food in the Last Year: Never true     Ran Out of Food in the Last Year: Never true   Transportation Needs: No Transportation Needs (10/20/2024)    PRAPARE - Transportation     Lack of Transportation (Medical): No     Lack of Transportation (Non-Medical): No   Physical Activity: Not on file   Stress: Not on file   Social Connections: Not on file   Intimate Partner Violence: Not At Risk (10/20/2024)    Humiliation, Afraid, Rape, and Kick questionnaire     Fear of Current or Ex-Partner: No     Emotionally Abused: No     Physically Abused: No     Sexually Abused: No   Housing Stability: Low Risk  (10/20/2024)    Housing Stability Vital Sign     Unable to Pay for Housing in the Last Year: No     Number of Times Moved in the Last Year: 0     Homeless in the Last Year: No       FAMILY HISTORY:  Family History   Problem Relation Name Age of Onset    Diabetes Mother      Hypertension Mother      Diabetes Son      Colon cancer Mother's Sister      Colon cancer Father's Sister         MEDICATION LIST:  acetaminophen, 975 mg, Once  carvedilol, 12.5 mg, BID  [Held by provider] cloNIDine, 0.1 mg, Daily  furosemide, 40 mg, q8h NATALIA  gabapentin, 300 mg, Once  [Held by provider] hydrALAZINE, 100 mg, BID  [Held by provider] metoprolol  "succinate XL, 50 mg, Daily  sodium chloride, 1,000 mL, Once      sodium chloride, Last Rate: 60 mL/hr (10/20/24 2130)  sodium chloride 0.9%, Last Rate: 600 mL/hr (10/20/24 2300)      albuterol, 2.5 mg, Once PRN  fentaNYL PF, 12.5 mcg, q5 min PRN  fentaNYL PF, 25 mcg, q5 min PRN  fentaNYL PF, 25 mcg, q5 min PRN  hydrALAZINE, 5 mg, q30 min PRN  labetaloL, 5 mg, Once PRN  meperidine, 12.5 mg, q10 min PRN  ondansetron, 4 mg, Once PRN  oxygen, , Continuous PRN - O2/gases        ALLERGY:  Allergies   Allergen Reactions    Amlodipine Swelling     Edema    Codeine Itching    Erythromycin Itching    Nsaids (Non-Steroidal Anti-Inflammatory Drug) Itching and Nausea Only     chronic renal insufficiency    Tramadol Itching and Nausea/vomiting    Lisinopril Cough     Cough       PHYCISCAL EXAMINATION:  Visit Vitals  /68   Pulse 102   Temp 36 °C (96.8 °F)   Resp 13   Ht 1.676 m (5' 6\")   Wt 86.4 kg (190 lb 5.9 oz)   LMP  (LMP Unknown)   SpO2 99%   BMI 30.73 kg/m²   OB Status Postmenopausal   Smoking Status Never   BSA 2.01 m²        10/19 0700 - 10/20 1859  In: 2526.4 [I.V.:2000]  Out: 635 [Urine:635]       General Appearance - NAD, Good speech, oriented and alert  HEENT - Supple. Not pale. No jaundice. No cervical lymphadenopathy. Pharynx and tonsils are not injected.  CVS - RRR. Normal S1/S2. No murmur, click , rub or gallop  Lungs- clear to auscultation bilaterally  Abdomen - soft , not tender, no guarding, no rigidity. No hepatosplenomegaly. Normal bowel sounds. No masses and ascites.   Musculoskeletal /Extremities - no edema. Full ROM. No joint tenderness.   Neuro/Psych - appropriate mood and affect. Motor power V/V all extremities. CN I -XII were grossly intact.  Skin - No visible rash  Dialysis access - Pre dialysis    LABS:  Results for orders placed or performed during the hospital encounter of 10/20/24 (from the past 24 hours)   Type And Screen   Result Value Ref Range    ABO TYPE O     Rh TYPE NEG     ANTIBODY " SCREEN NEG    Blood Gas Venous Full Panel   Result Value Ref Range    POCT pH, Venous 7.31 (L) 7.33 - 7.43 pH    POCT pCO2, Venous 42 41 - 51 mm Hg    POCT pO2, Venous 34 (L) 35 - 45 mm Hg    POCT SO2, Venous 53 45 - 75 %    POCT Oxy Hemoglobin, Venous 52.7 45.0 - 75.0 %    POCT Hematocrit Calculated, Venous 36.0 36.0 - 46.0 %    POCT Sodium, Venous 139 136 - 145 mmol/L    POCT Potassium, Venous 4.5 3.5 - 5.3 mmol/L    POCT Chloride, Venous 109 (H) 98 - 107 mmol/L    POCT Ionized Calicum, Venous 1.29 1.10 - 1.33 mmol/L    POCT Glucose, Venous 97 74 - 99 mg/dL    POCT Lactate, Venous 0.9 0.4 - 2.0 mmol/L    POCT Base Excess, Venous -4.9 (L) -2.0 - 3.0 mmol/L    POCT HCO3 Calculated, Venous 21.1 (L) 22.0 - 26.0 mmol/L    POCT Hemoglobin, Venous 11.9 (L) 12.0 - 16.0 g/dL    POCT Anion Gap, Venous 13.0 10.0 - 25.0 mmol/L    Patient Temperature 37.0 degrees Celsius    FiO2 98 %   Maria E-Barr Virus Nuclear Antigen Antibody, IgG   Result Value Ref Range    EBV Nuclear Antigen Antibody, IgG Positive (A) Negative   Coagulation Screen   Result Value Ref Range    Protime 10.1 9.8 - 12.8 seconds    INR 0.9 0.9 - 1.1    aPTT 42 (H) 27 - 38 seconds   Cytomegalovirus IgG   Result Value Ref Range    Cytomegalovirus IgG Nonreactive Nonreactive   Hepatic Function Panel   Result Value Ref Range    Albumin 4.2 3.4 - 5.0 g/dL    Bilirubin, Total 0.6 0.0 - 1.2 mg/dL    Bilirubin, Direct 0.1 0.0 - 0.3 mg/dL    Alkaline Phosphatase 96 33 - 136 U/L    ALT 16 7 - 45 U/L    AST 19 9 - 39 U/L    Total Protein 7.4 6.4 - 8.2 g/dL   Hepatitis C Antibody   Result Value Ref Range    Hepatitis C AB Nonreactive Nonreactive   Hepatitis B Core Antibody, Total   Result Value Ref Range    Hepatitis B Core AB- Total Nonreactive Nonreactive   Hepatitis B Surface Antibody   Result Value Ref Range    Hepatitis B Surface AB <3.1 <10.0 mIU/mL   Hepatitis B Surface Antigen   Result Value Ref Range    Hepatitis B Surface AG Nonreactive Nonreactive   HIV 1/2  Antigen/Antibody Screen with Reflex to Confirmation   Result Value Ref Range    HIV 1/2 Antigen/Antibody Screen with Reflex to Confirmation Nonreactive Nonreactive   CBC   Result Value Ref Range    WBC 4.8 4.4 - 11.3 x10*3/uL    nRBC 0.0 0.0 - 0.0 /100 WBCs    RBC 4.02 4.00 - 5.20 x10*6/uL    Hemoglobin 11.4 (L) 12.0 - 16.0 g/dL    Hematocrit 36.4 36.0 - 46.0 %    MCV 91 80 - 100 fL    MCH 28.4 26.0 - 34.0 pg    MCHC 31.3 (L) 32.0 - 36.0 g/dL    RDW 18.4 (H) 11.5 - 14.5 %    Platelets 198 150 - 450 x10*3/uL   Phosphorus   Result Value Ref Range    Phosphorus 4.0 2.5 - 4.9 mg/dL   Basic Metabolic Panel   Result Value Ref Range    Glucose 94 74 - 99 mg/dL    Sodium 141 136 - 145 mmol/L    Potassium 4.4 3.5 - 5.3 mmol/L    Chloride 109 (H) 98 - 107 mmol/L    Bicarbonate 22 21 - 32 mmol/L    Anion Gap 14 10 - 20 mmol/L    Urea Nitrogen 45 (H) 6 - 23 mg/dL    Creatinine 2.19 (H) 0.50 - 1.05 mg/dL    eGFR 23 (L) >60 mL/min/1.73m*2    Calcium 9.7 8.6 - 10.6 mg/dL   SARS-COV-2 PCR   Result Value Ref Range    Coronavirus 2019, PCR Detected (A) Not Detected   Prepare RBC: 2 Units   Result Value Ref Range    PRODUCT CODE P5249F50     Unit Number [id number]     Unit ABO O     Unit RH NEG     XM INTEP COMP     Dispense Status XM     Blood Expiration Date 10/23/2024 11:59:00 PM EDT     PRODUCT BLOOD TYPE 9500     UNIT VOLUME 350     PRODUCT CODE J4211B04     Unit Number [id number]     Unit ABO O     Unit RH NEG     XM INTEP COMP     Dispense Status XM     Blood Expiration Date 10/23/2024 11:59:00 PM EDT     PRODUCT BLOOD TYPE 9500     UNIT VOLUME 350    SST TOP   Result Value Ref Range    Extra Tube Hold for add-ons.    SST TOP   Result Value Ref Range    Extra Tube Hold for add-ons.    CBC and Auto Differential   Result Value Ref Range    WBC 4.7 4.4 - 11.3 x10*3/uL    nRBC 0.0 0.0 - 0.0 /100 WBCs    RBC 3.74 (L) 4.00 - 5.20 x10*6/uL    Hemoglobin 10.9 (L) 12.0 - 16.0 g/dL    Hematocrit 35.8 (L) 36.0 - 46.0 %    MCV 96  80 - 100 fL    MCH 29.1 26.0 - 34.0 pg    MCHC 30.4 (L) 32.0 - 36.0 g/dL    RDW 19.9 (H) 11.5 - 14.5 %    Platelets 149 (L) 150 - 450 x10*3/uL    Neutrophils % 93.7 40.0 - 80.0 %    Immature Granulocytes %, Automated 0.9 0.0 - 0.9 %    Lymphocytes % 4.5 13.0 - 44.0 %    Monocytes % 0.9 2.0 - 10.0 %    Eosinophils % 0.0 0.0 - 6.0 %    Basophils % 0.0 0.0 - 2.0 %    Neutrophils Absolute 4.36 1.60 - 5.50 x10*3/uL    Immature Granulocytes Absolute, Automated 0.04 0.00 - 0.50 x10*3/uL    Lymphocytes Absolute 0.21 (L) 0.80 - 3.00 x10*3/uL    Monocytes Absolute 0.04 (L) 0.05 - 0.80 x10*3/uL    Eosinophils Absolute 0.00 0.00 - 0.40 x10*3/uL    Basophils Absolute 0.00 0.00 - 0.10 x10*3/uL   Blood Gas Venous Full Panel   Result Value Ref Range    POCT pH, Venous 7.17 (LL) 7.33 - 7.43 pH    POCT pCO2, Venous 56 (H) 41 - 51 mm Hg    POCT pO2, Venous 29 (L) 35 - 45 mm Hg    POCT SO2, Venous 41 (L) 45 - 75 %    POCT Oxy Hemoglobin, Venous 40.5 (L) 45.0 - 75.0 %    POCT Hematocrit Calculated, Venous 32.0 (L) 36.0 - 46.0 %    POCT Sodium, Venous 132 (L) 136 - 145 mmol/L    POCT Potassium, Venous >10.0 (HH) 3.5 - 5.3 mmol/L    POCT Chloride, Venous 109 (H) 98 - 107 mmol/L    POCT Ionized Calicum, Venous 1.09 (L) 1.10 - 1.33 mmol/L    POCT Glucose, Venous 122 (H) 74 - 99 mg/dL    POCT Lactate, Venous 2.0 0.4 - 2.0 mmol/L    POCT Base Excess, Venous -8.3 (L) -2.0 - 3.0 mmol/L    POCT HCO3 Calculated, Venous 20.4 (L) 22.0 - 26.0 mmol/L    POCT Hemoglobin, Venous 10.8 (L) 12.0 - 16.0 g/dL    POCT Anion Gap, Venous      Patient Temperature 37.0 degrees Celsius    FiO2 21 %   BLOOD GAS VENOUS FULL PANEL   Result Value Ref Range    POCT pH, Venous 7.17 (LL) 7.33 - 7.43 pH    POCT pCO2, Venous 57 (H) 41 - 51 mm Hg    POCT pO2, Venous 33 (L) 35 - 45 mm Hg    POCT SO2, Venous 43 (L) 45 - 75 %    POCT Oxy Hemoglobin, Venous 42.3 (L) 45.0 - 75.0 %    POCT Hematocrit Calculated, Venous 39.0 36.0 - 46.0 %    POCT Sodium, Venous 140 136 - 145  mmol/L    POCT Potassium, Venous 4.6 3.5 - 5.3 mmol/L    POCT Chloride, Venous 110 (H) 98 - 107 mmol/L    POCT Ionized Calicum, Venous 1.15 1.10 - 1.33 mmol/L    POCT Glucose, Venous 158 (H) 74 - 99 mg/dL    POCT Lactate, Venous 2.6 (H) 0.4 - 2.0 mmol/L    POCT Base Excess, Venous -8.2 (L) -2.0 - 3.0 mmol/L    POCT HCO3 Calculated, Venous 20.8 (L) 22.0 - 26.0 mmol/L    POCT Hemoglobin, Venous 12.9 12.0 - 16.0 g/dL    POCT Anion Gap, Venous 14.0 10.0 - 25.0 mmol/L    Patient Temperature 37.0 degrees Celsius    FiO2 21 %   Renal function panel   Result Value Ref Range    Glucose 146 (H) 74 - 99 mg/dL    Sodium 142 136 - 145 mmol/L    Potassium 3.8 3.5 - 5.3 mmol/L    Chloride 111 (H) 98 - 107 mmol/L    Bicarbonate 21 21 - 32 mmol/L    Anion Gap 14 10 - 20 mmol/L    Urea Nitrogen 34 (H) 6 - 23 mg/dL    Creatinine 1.76 (H) 0.50 - 1.05 mg/dL    eGFR 30 (L) >60 mL/min/1.73m*2    Calcium 8.0 (L) 8.6 - 10.6 mg/dL    Phosphorus 4.1 2.5 - 4.9 mg/dL    Albumin 3.4 3.4 - 5.0 g/dL      Estimated Creatinine Clearance: 29.6 mL/min (A) (by C-G formula based on SCr of 1.76 mg/dL (H)).    ASSESSMENT AND PLAN:    Ms. Hamlin is a 79 y.o. female who underwent a kidney transplant surgery on 10/20/2024 (Kidney) and was hospitalized for kidney transplant surgery.    Transplant nephrology is consulted to assist with immunosuppressive medication management, and nephrology related issues.     Principal Problem:    Kidney transplanted ([facility])  Active Problems:    ESRD (end stage renal disease) (Multi)      1. ESRD S/P Kidney transplant.     Lab Results   Component Value Date    CREATININE 1.76 (H) 10/20/2024     Serum creatinine: 1.76 mg/dL (H) 10/20/24 2157  Estimated creatinine clearance: 29.6 mL/min (A)    Intake/Output Summary (Last 24 hours) at 10/20/2024 2320  Last data filed at 10/20/2024 2300  Gross per 24 hour   Intake 4905.4 ml   Output 3960 ml   Net 945.4 ml       - Indication for dialysis: None at this time  - Continue to monitor  UOP and Serum creatinine closely.   - Avoid nephrotoxic agents, NSAIDs and IV contrast   - Strict I/O.   - Renally dose all medications by the most recent CrCl from Cockcroft-Gault formula.    2. Immunosuppression   - Induction with thymo 3 mg per kg      3. Anemia and WBC   Lab Results   Component Value Date    WBC 4.7 10/20/2024    HGB 10.9 (L) 10/20/2024    HCT 35.8 (L) 10/20/2024    MCV 96 10/20/2024     (L) 10/20/2024     -Continue to monitor Hgb   -No indications for PRBC transfusion     4. Electrolyte   Lab Results   Component Value Date    GLUCOSE 146 (H) 10/20/2024    CALCIUM 8.0 (L) 10/20/2024     10/20/2024    K 3.8 10/20/2024    CO2 21 10/20/2024     (H) 10/20/2024    BUN 34 (H) 10/20/2024    CREATININE 1.76 (H) 10/20/2024     - Reviewed renal profile.     6. Hypertension   Blood Pressures         10/20/2024  2145 10/20/2024  2200 10/20/2024  2215 10/20/2024  2230 10/20/2024  2245    BP: 152/71 160/58 162/71 162/71 156/68          - BP had been around   -Goal BP < 140/90 mmHg   -continue current management     7. Wound management  -Defer it to surgery    8.  GI prophylaxis   - On PPI     9. DVT Prophylaxis  -Defer to primary team    10. ID prophylaxis  - CMV, PJP and fungal prophylaxis per  [facility] Protocol    11. Asymptomatic COVID  - Defer to primary team    * Case was presented at Multi D team round. Attending physician, consulting physician, , pharmacist, residents and fellow were present at the meeting.    For questions, please contact transplant nephrology page x [phone].    Ivon Pizano    Transplant Nephrologist

## 2024-10-21 NOTE — SIGNIFICANT EVENT
"Transplant Surgery Post-Op Check    S: Patient seen at bedside s/p  donor kidney transplant and insertion of ureteral stent for post-operative evaluation. Reports thirst. Denies f/c, n/v, SOB, CP. Pain is well-controlled.     O:  /75   Pulse 104   Temp 37.5 °C (99.5 °F)   Resp 16   Ht 1.676 m (5' 6\")   Wt 86.4 kg (190 lb 5.9 oz)   LMP  (LMP Unknown)   SpO2 98%   BMI 30.73 kg/m²      - General: Lying in bed in NAD  - CV: Tachycardic, regular rhythm  - Pulm: Normal respiratory effort on RA  - Abd: Soft, non-distended, RLQ drain on bulb suction with serosanguinous output, island dressing overlying incision without new strikethrough    UOP over last 4-hours: 600, 600, 450, 350  Drain output over last 4-hours: 60, 50, 50, 25     A/P: Patient is doing well post-operatively. No changes in management at this time.    Kori Parmar MD  PGY1 General Surgery    "

## 2024-10-21 NOTE — TELEPHONE ENCOUNTER
Pt received kidney transplant 10/20/24. Pt will get Belatacept, De Juan Carlos and maintenance therapy plan placed and sent to Dr Reid for signature along with Rx.

## 2024-10-21 NOTE — SIGNIFICANT EVENT
Rapid Response Nurse Note: RADAR alert: 7    Pager time: [phone]  Arrival time: 250  Event end time: 300  Location: [place] [id number]  [] Triage by phone or secure messaging    Rapid response initiated by:  [] Rapid response RN [] Family [] Nursing Supervisor [] Physician   [x] RADAR auto page [] Sepsis auto-page [] RN [] RT   [] NP/PA [] Other:     Primary reason for call:   [] BAT [] New CPAP/BiPAP [] Bleeding [] Change in mental status   [] Chest pain [] Code blue [] FiO2 >/= 50% [] HR </= 40 bpm   [] HR >/= 130 bpm [] Hyperglycemia [] Hypoglycemia [x] RADAR    [] RR </= 8 bpm [] RR >/= 30 bpm [] SBP </= 90 mmHg [] SpO2 < 90%   [] Seizure [] Sepsis [] Shorness of breath  [] Staff concern: see comments     Initial VS and/or RADAR VS: T 36.8 °C; ; RR 18; /84; SPO2 91% on room air.  Providers present at bedside (if applicable): n/a    Interventions:  [] None [] ABG/VBG [] Assist w/ICU transfer [] BAT paged    [] Bag mask [] Blood [] Cardioversion [] Code Blue   [] Code blue for intubation [] Code status changed [] Chest x-ray [] EKG   [] IV fluid/bolus [] KUB x-ray [] Labs/cultures [] Medication   [] Nebulizer treatment [] NIPPV (CPAP/BiPAP) [x] Oxygen [] Oral airway   [] Peripheral IV [] Palliative care consult [] CT/MRI [] Sepsis protocol    [] Suctioned [] Other:     Name of ICU Provider contacted (if applicable): n/a  Outcome:  [] Coded and  [] Code blue for intubation [] Coded and transferred to ICU []  on division   [x] Remained on division (no change) [] Remained on division + additional monitoring [] Remained in ED [] Transferred to ED   [] Transferred to ICU [] Transferred to inpatient status [] Transferred for interventions (procedure) [] Transferred to ICU stepdown    [] Transferred to surgery [] Transferred to telemetry [] Sepsis protocol [] STEMI protocol   [] Stroke protocol [x] Bedside nurse instructed to page rapid response for any concerns or acute change in condition/VS      Additional Comments: Reviewed above VS with bedside RN.  Patient recently admitted from PACU.  Oxygen not on at prescribed 3 L/m NC.  Oxygen applied as ordered.  SPO2 94-95%.  Staff to page rapid response for any concerns or acute change in condition/VS.

## 2024-10-21 NOTE — CONSULTS
"Nutrition Initial Assessment:   Nutrition Assessment    Reason for Assessment: Dietitian discretion, Provider consult order (transplant diet education)    Patient is a 79 y.o. female admitted for Kidney replaced by transplant ([facility]).    PMH: ESRD, HLD, HTN.      10/20 s/p Transplant Kidney (Left: Abdomen).     Nutrition History:  Energy Intake: Fair 50-75 %  Food and Nutrient History: Pt reports appetite has been decreased for the past several weeks because of having Shingles. Pt now Covid positive. Pt is willing to try Boost/Ensure supplements during admission.  Food Allergies/Intolerances:  None  GI Symptoms: None  Oral Problems: None       Anthropometrics:  Height: 167.6 cm (5' 6\")   Weight: 86.9 kg (191 lb 9.3 oz)   BMI (Calculated): 30.94  IBW/kg (Dietitian Calculated): 59.1 kg  Percent of IBW: 147 %       Weight History:   Wt Readings from Last 20 Encounters:   10/21/24 86.9 kg (191 lb 9.3 oz)   10/15/24 88.5 kg (195 lb) - 1.8% loss   10/10/24 86.2 kg (190 lb)   09/23/24 87.8 kg (193 lb 8 oz)   06/04/24 90 kg (198 lb 6.6 oz)   05/24/24 90.8 kg (200 lb 3.2 oz)   05/08/24 90.5 kg (199 lb 8.3 oz)   04/17/24 91.2 kg (201 lb)   03/25/24 91.8 kg (202 lb 6.4 oz)   03/22/24 92.1 kg (203 lb)   03/19/24 91.4 kg (201 lb 9.6 oz)   02/29/24 91.7 kg (202 lb 2 oz)   02/19/24 90.7 kg (199 lb 15.3 oz)   01/30/24 90.7 kg (200 lb)   12/13/23 91.2 kg (201 lb)   10/27/23 90.3 kg (199 lb 1.2 oz)   10/10/23 90.3 kg (199 lb)   09/28/23 90.7 kg (200 lb)   08/17/23 90.7 kg (200 lb)   07/19/23 91.4 kg (201 lb 8 oz)       Weight Change %:  Weight History / % Weight Change: Pt reports usual body wt of ~200lb. Does have 1.8% wt loss x1 week but suspect wt flutuations due to fluid changes at this time.    Nutrition Focused Physical Exam Findings:    Subcutaneous Fat Loss:   Orbital Fat Pads: Well nourished (slightly bulging fat pads)  Buccal Fat Pads: Well nourished (full, rounded cheeks)  Triceps: Well nourished (ample fat " tissue)  Muscle Wasting:  Temporalis: Well nourished (well-defined muscle)  Pectoralis (Clavicular Region): Well nourished (clavicle not visible)  Deltoid/Trapezius: Well nourished (rounded appearance at arm, shoulder, neck)  Edema:  Edema: none  Physical Findings:  Hair: Negative  Eyes: Negative  Mouth: Negative  Nails: Negative  Skin: Positive (Incision abdomen medial)    Nutrition Significant Labs:  CBC Trend:   Results from last 7 days   Lab Units 10/21/24  0924 10/21/24  0356 10/20/24  1948 10/20/24  1027   WBC AUTO x10*3/uL 15.4* 15.5* 4.7 4.8   RBC AUTO x10*6/uL 3.41* 3.33* 3.74* 4.02   HEMOGLOBIN g/dL 9.7* 9.7* 10.9* 11.4*   HEMATOCRIT % 31.5* 30.5* 35.8* 36.4   MCV fL 92 92 96 91   PLATELETS AUTO x10*3/uL 135* 124* 149* 198    , BMP Trend:   Results from last 7 days   Lab Units 10/21/24  0924 10/21/24  0356 10/20/24  2157 10/20/24  1027   GLUCOSE mg/dL 202* 180* 146* 94   CALCIUM mg/dL 7.5* 7.6* 8.0* 9.7   SODIUM mmol/L 142 142 142 141   POTASSIUM mmol/L 3.8 3.8 3.8 4.4   CO2 mmol/L 20* 18* 21 22   CHLORIDE mmol/L 111* 112* 111* 109*   BUN mg/dL 34* 33* 34* 45*   CREATININE mg/dL 1.65* 1.55* 1.76* 2.19*    , BG POCT trend:   Results from last 7 days   Lab Units 10/21/24  1209 10/21/24  0809 10/21/24  0245   POCT GLUCOSE mg/dL 176* 157* 147*    , Renal Lab Trend:   Results from last 7 days   Lab Units 10/21/24  0924 10/21/24  0356 10/20/24  2157 10/20/24  1027   POTASSIUM mmol/L 3.8 3.8 3.8 4.4   PHOSPHORUS mg/dL 4.0 3.7 4.1 4.0   SODIUM mmol/L 142 142 142 141   MAGNESIUM mg/dL 1.51*  --   --   --    EGFR mL/min/1.73m*2 32* 34* 30* 23*   BUN mg/dL 34* 33* 34* 45*   CREATININE mg/dL 1.65* 1.55* 1.76* 2.19*        Nutrition Specific Medications:  Scheduled medications  acetaminophen, 650 mg, oral, q6h NATALIA  aspirin, 81 mg, oral, Daily  atorvastatin, 40 mg, oral, Daily  carvedilol, 12.5 mg, oral, BID  ceFAZolin, 2 g, intravenous, q8h  clotrimazole, 10 mg, oral, TID after meals  insulin lispro, 0-10 Units,  subcutaneous, TID  [START ON 10/22/2024] methylPREDNISolone sodium succinate (PF), 125 mg, intravenous, Once  [START ON 10/23/2024] methylPREDNISolone sodium succinate (PF), 60 mg, intravenous, Once  mycophenolate, 1,000 mg, oral, q12h  oxygen, , inhalation, Continuous - Inhalation  pantoprazole, 40 mg, oral, BID AC  [START ON 10/24/2024] predniSONE, 20 mg, oral, Daily  sodium bicarbonate, 1,300 mg, oral, BID  sulfamethoxazole-trimethoprim, 80 mg of trimethoprim, oral, Daily  valGANciclovir, 450 mg, oral, q48h      Continuous medications  sodium chloride, 50 mL/hr, Last Rate: 50 mL/hr (10/21/24 1015)      PRN medications  PRN medications: [START ON 10/22/2024] acetaminophen, dextrose, dextrose, glucagon, glucagon, hydrALAZINE, naloxone, ondansetron ODT **OR** ondansetron, oxyCODONE, oxyCODONE     I/O:    ;      Dietary Orders (From admission, onward)       Start     Ordered    10/21/24 0939  Adult diet Regular  Diet effective now        Question:  Diet type  Answer:  Regular    10/21/24 0938    10/21/24 0446  May Participate in Room Service  ( ROOM SERVICE MAY PARTICIPATE)  Once        Question:  .  Answer:  Yes    10/21/24 0445                     Estimated Needs:   Total Energy Estimated Needs (kCal):  (5788-6613)  Method for Estimating Needs: 30-33kcal/kg IBW  Total Protein Estimated Needs (g):  (75-90)  Method for Estimating Needs: 1.3-1.5g/kg IBW  Total Fluid Estimated Needs (mL):  (1ml/kcal or per team)           Nutrition Diagnosis   Malnutrition Diagnosis  Patient has Malnutrition Diagnosis: No (at risk for malnutrition)    Nutrition Diagnosis  Patient has Nutrition Diagnosis: Yes  Diagnosis Status (1): New  Nutrition Diagnosis 1: Increased nutrient needs  Related to (1): increased metabolic demand  As Evidenced by (1): s/p kidney transplant  Additional Nutrition Diagnosis: Diagnosis 2  Diagnosis Status (2): New  Nutrition Diagnosis 2: Inadequate oral intake  Related to (2): decreased appetite  As  Evidenced by (2): patient report of decreased appetite for the past several weeks.       Nutrition Interventions/Recommendations         Nutrition Prescription:  Ensure High Protein BID (160kcal, 16g protein each).  Can continue with regular diet.        Nutrition Interventions:   Interventions: Meals and snacks, Medical food supplement  Goal: consume >50% of meals  Medical Food Supplement: Commercial beverage  Goal: Ensure High Protein BID       Nutrition Education:   Education Documentation  Food Safety, taught by Yasmine Salazar RDN, ANÍBAL at 10/21/2024  2:34 PM.  Learner: Patient  Readiness: Acceptance  Method: Explanation, Handout  Response: Verbalizes Understanding      Discussed food safety following transplant. Advised the patient on proper meat preparation, re-heat deli meats and hot dogs, no raw fish, no unpasteurized dairy products, wash all fruits and vegetables, no grapefruit/pomegranate/starfruit/raw sprouts, handwashing, cross-contamination, etc. Pt denies any potential struggles following this diet. Advised pt on the importance of cooking all meats, fish and eggs to the proper temperatures. Provided patient with the food safety handout packet.        Nutrition Monitoring and Evaluation   Food/Nutrient Related History Monitoring  Monitoring and Evaluation Plan: Energy intake, Amount of food  Criteria: meet >75% of estimated energy needs  Amount of Food: Medical food intake  Criteria: consume 100% ONS    Body Composition/Growth/Weight History  Monitoring and Evaluation Plan: Weight  Criteria: stable weight    Biochemical Data, Medical Tests and Procedures  Monitoring and Evaluation Plan: Electrolyte/renal panel, Glucose/endocrine profile  Criteria: WNL              Time Spent (min): 60 minutes

## 2024-10-21 NOTE — PROGRESS NOTES
Physical Therapy    Physical Therapy Evaluation    Patient Name: Liz Hamlin  MRN: [id number]  Department: [place] [address]  Room: 7007009-A  Today's Date: 10/21/2024   Time Calculation  Start Time: 0818  Stop Time: 0850  Time Calculation (min): 32 min    Assessment/Plan   PT Assessment  PT Assessment Results: Decreased strength, Decreased range of motion, Decreased endurance, Impaired balance, Decreased mobility  Rehab Prognosis: Excellent  Barriers to Discharge: none  Evaluation/Treatment Tolerance: Patient tolerated treatment well, Patient limited by fatigue, Patient limited by pain  End of Session Communication: Bedside nurse  Assessment Comment: 79 y.o. F h/o ESRD now s/p DDKT on 10/20/24. Currently demonstrating impaired strength, balance, and function. Pt is appropriate for continued PT in house and after discharge to restore function.  End of Session Patient Position: Bed, 3 rail up, Alarm on  IP OR SWING BED PT PLAN  Inpatient or Swing Bed: Inpatient  PT Plan  Treatment/Interventions: Bed mobility, Transfer training, Gait training, Balance training, Strengthening, Endurance training, Range of motion, Therapeutic exercise, Therapeutic activity  PT Plan: Ongoing PT  PT Frequency: 3 times per week  PT Discharge Recommendations: Low intensity level of continued care  Equipment Recommended upon Discharge: Wheeled walker  PT Recommended Transfer Status: Assist x1  PT - OK to Discharge: Yes    Subjective   General Visit Information:  General  Reason for Referral: ESRD  Past Medical History Relevant to Rehab: 79 y.o. female with past medical history significant for CKD 4 attributed to longstanding HTN, JOSE E, h/o renal cysts, hepatic steatosis. Now s/p DDKT on 10/20/24.  Family/Caregiver Present: No  Prior to Session Communication: Bedside nurse  Patient Position Received: Bed, 3 rail up, Alarm off, not on at start of session  Preferred Learning Style: verbal, auditory  General Comment: Pt resting in bed upon entry.  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 Has other family that can assist if needed.  Precautions:  Precautions  Medical Precautions: Fall precautions  Precautions Comment: COVID precautions. All necessary PPE donned.     Objective   Pain:  Pain Assessment  Pain Assessment: 0-10  0-10 (Numeric) Pain Score: 4  Pain Type: Surgical pain  Pain Location: Abdomen  Pain Interventions:  (Pt reports having been given medications from RN.)  Cognition:  Cognition  Overall Cognitive Status: Within Functional Limits  Attention: Within Functional Limits  Memory: Within Funtional Limits  Insight: Within function limits  Impulsive: Within functional limits  Processing Speed: Within funtional limits    General Assessments:     Activity Tolerance  Endurance: Tolerates 10 - 20 min exercise with multiple rests, Decreased tolerance for upright activites  Activity Tolerance Comments: POD #1 fatigue and soreness as well as not been able to eat any solid food since procedure.    Sensation  Light Touch: No apparent deficits    Strength  Strength Comments: BLE grossly >4,4+/5 throughout.  Strength  Strength Comments: BLE grossly >4,4+/5 throughout.    Perception  Inattention/Neglect: Appears intact    Coordination  Movements are Fluid and Coordinated: Yes    Postural Control  Postural Control: Impaired  Posture Comment: Flexed posture due to post op  soreness/pain.    Static Sitting Balance  Static Sitting-Balance Support: Bilateral upper extremity supported  Static Sitting-Level of Assistance: Contact guard  Static Sitting-Comment/Number of Minutes: EOB approximatley 12-15 minutes total.    Static Standing Balance  Static Standing-Balance Support: Bilateral upper extremity supported (on a wheeled walker)  Static Standing-Level of Assistance: Minimum assistance  Static Standing-Comment/Number of Minutes: Approximatley 2 minutes total.  Functional Assessments:     Bed Mobility  Bed Mobility: Yes  Bed Mobility 1  Bed Mobility 1: Supine to sitting, Sitting to supine  Level of Assistance 1: Moderate assistance, Minimal verbal cues, Minimal tactile cues    Transfers  Transfer: Yes  Transfer 1  Transfer From 1: Sit to, Stand to  Transfer to 1: Stand, Sit  Transfer Device 1: Walker  Transfer Level of Assistance 1: Moderate assistance, Minimal verbal cues, Minimal tactile cues    Ambulation/Gait Training  Ambulation/Gait Training Performed: Yes  Ambulation/Gait Training 1  Surface 1: Level tile  Device 1: Rolling walker  Assistance 1: Minimum assistance, Minimal verbal cues, Minimal tactile cues  Quality of Gait 1: Shuffling gait, Forward flexed posture  Comments/Distance (ft) 1: 3ft sidestep to L    Outcome Measures:  [facility] Basic Mobility  Turning from your back to your side while in a flat bed without using bedrails: A lot  Moving from lying on your back to sitting on the side of a flat bed without using bedrails: A lot  Moving to and from bed to chair (including a wheelchair): A lot  Standing up from a chair using your arms (e.g. wheelchair or bedside chair): A lot  To walk in hospital room: A lot  Climbing 3-5 steps with railing: Total  Basic Mobility - Total Score: 11    Encounter Problems       Encounter Problems (Active)       Mobility       STG - Patient will ambulate >100ft, wheeled walker, independent       Start:  10/21/24            STG - Patient will ascend  and descend four to six stairs independent       Start:  10/21/24               PT Transfers       STG - Patient to transfer to and from sit to supine independent       Start:  10/21/24            STG - Patient will transfer sit to and from stand independent       Start:  10/21/24                   Education Documentation  Precautions, taught by Leonardo Jarvis, PT at 10/21/2024  9:39 AM.  Learner: Patient  Readiness: Acceptance  Method: Explanation  Response: Verbalizes Understanding    Mobility Training, taught by Leonardo Jarvis PT at 10/21/2024  9:39 AM.  Learner: Patient  Readiness: Acceptance  Method: Explanation  Response: Verbalizes Understanding    Education Comments  No comments found.

## 2024-10-22 ENCOUNTER — DOCUMENTATION (OUTPATIENT)
Dept: TRANSPLANT | Facility: HOSPITAL | Age: 79
End: 2024-10-22

## 2024-10-22 ENCOUNTER — APPOINTMENT (OUTPATIENT)
Dept: NEPHROLOGY | Facility: CLINIC | Age: 79
End: 2024-10-22
Payer: COMMERCIAL

## 2024-10-22 LAB
ALBUMIN SERPL BCP-MCNC: 3.1 G/DL (ref 3.4–5)
ANION GAP SERPL CALC-SCNC: 12 MMOL/L (ref 10–20)
BASOPHILS # BLD AUTO: 0.01 X10*3/UL (ref 0–0.1)
BASOPHILS NFR BLD AUTO: 0.1 %
BUN SERPL-MCNC: 37 MG/DL (ref 6–23)
CA-I BLD-SCNC: 1.15 MMOL/L (ref 1.1–1.33)
CALCIUM SERPL-MCNC: 8.2 MG/DL (ref 8.6–10.6)
CHLORIDE SERPL-SCNC: 109 MMOL/L (ref 98–107)
CO2 SERPL-SCNC: 22 MMOL/L (ref 21–32)
CREAT SERPL-MCNC: 1.42 MG/DL (ref 0.5–1.05)
EGFRCR SERPLBLD CKD-EPI 2021: 38 ML/MIN/1.73M*2
EOSINOPHIL # BLD AUTO: 0 X10*3/UL (ref 0–0.4)
EOSINOPHIL NFR BLD AUTO: 0 %
ERYTHROCYTE [DISTWIDTH] IN BLOOD BY AUTOMATED COUNT: 18.3 % (ref 11.5–14.5)
GLUCOSE BLD MANUAL STRIP-MCNC: 128 MG/DL (ref 74–99)
GLUCOSE BLD MANUAL STRIP-MCNC: 155 MG/DL (ref 74–99)
GLUCOSE BLD MANUAL STRIP-MCNC: 171 MG/DL (ref 74–99)
GLUCOSE BLD MANUAL STRIP-MCNC: 220 MG/DL (ref 74–99)
GLUCOSE SERPL-MCNC: 133 MG/DL (ref 74–99)
HCT VFR BLD AUTO: 28.4 % (ref 36–46)
HCV RNA SERPL NAA+PROBE-ACNC: NOT DETECTED K[IU]/ML
HCV RNA SERPL NAA+PROBE-LOG IU: NORMAL {LOG_IU}/ML
HGB BLD-MCNC: 9.2 G/DL (ref 12–16)
IMM GRANULOCYTES # BLD AUTO: 0.1 X10*3/UL (ref 0–0.5)
IMM GRANULOCYTES NFR BLD AUTO: 0.6 % (ref 0–0.9)
LYMPHOCYTES # BLD AUTO: 0.21 X10*3/UL (ref 0.8–3)
LYMPHOCYTES NFR BLD AUTO: 1.2 %
MAGNESIUM SERPL-MCNC: 1.7 MG/DL (ref 1.6–2.4)
MCH RBC QN AUTO: 28.8 PG (ref 26–34)
MCHC RBC AUTO-ENTMCNC: 32.4 G/DL (ref 32–36)
MCV RBC AUTO: 89 FL (ref 80–100)
MONOCYTES # BLD AUTO: 0.54 X10*3/UL (ref 0.05–0.8)
MONOCYTES NFR BLD AUTO: 3.2 %
NEUTROPHILS # BLD AUTO: 16.08 X10*3/UL (ref 1.6–5.5)
NEUTROPHILS NFR BLD AUTO: 94.9 %
NRBC BLD-RTO: 0 /100 WBCS (ref 0–0)
PHOSPHATE SERPL-MCNC: 3.2 MG/DL (ref 2.5–4.9)
PLATELET # BLD AUTO: 119 X10*3/UL (ref 150–450)
POTASSIUM SERPL-SCNC: 3.8 MMOL/L (ref 3.5–5.3)
RBC # BLD AUTO: 3.19 X10*6/UL (ref 4–5.2)
SODIUM SERPL-SCNC: 139 MMOL/L (ref 136–145)
WBC # BLD AUTO: 16.9 X10*3/UL (ref 4.4–11.3)

## 2024-10-22 PROCEDURE — 86832 HLA CLASS I HIGH DEFIN QUAL: CPT

## 2024-10-22 PROCEDURE — 1200000002 HC GENERAL ROOM WITH TELEMETRY DAILY

## 2024-10-22 PROCEDURE — 97530 THERAPEUTIC ACTIVITIES: CPT | Mod: GO

## 2024-10-22 PROCEDURE — RXMED WILLOW AMBULATORY MEDICATION CHARGE

## 2024-10-22 PROCEDURE — 2500000001 HC RX 250 WO HCPCS SELF ADMINISTERED DRUGS (ALT 637 FOR MEDICARE OP): Performed by: STUDENT IN AN ORGANIZED HEALTH CARE EDUCATION/TRAINING PROGRAM

## 2024-10-22 PROCEDURE — 99232 SBSQ HOSP IP/OBS MODERATE 35: CPT | Performed by: SURGERY

## 2024-10-22 PROCEDURE — 80069 RENAL FUNCTION PANEL: CPT

## 2024-10-22 PROCEDURE — 85025 COMPLETE CBC W/AUTO DIFF WBC: CPT

## 2024-10-22 PROCEDURE — 2500000002 HC RX 250 W HCPCS SELF ADMINISTERED DRUGS (ALT 637 FOR MEDICARE OP, ALT 636 FOR OP/ED)

## 2024-10-22 PROCEDURE — 2500000004 HC RX 250 GENERAL PHARMACY W/ HCPCS (ALT 636 FOR OP/ED)

## 2024-10-22 PROCEDURE — 2500000001 HC RX 250 WO HCPCS SELF ADMINISTERED DRUGS (ALT 637 FOR MEDICARE OP)

## 2024-10-22 PROCEDURE — 97165 OT EVAL LOW COMPLEX 30 MIN: CPT | Mod: GO

## 2024-10-22 PROCEDURE — 82330 ASSAY OF CALCIUM: CPT

## 2024-10-22 PROCEDURE — 36415 COLL VENOUS BLD VENIPUNCTURE: CPT

## 2024-10-22 PROCEDURE — 83735 ASSAY OF MAGNESIUM: CPT

## 2024-10-22 PROCEDURE — 82947 ASSAY GLUCOSE BLOOD QUANT: CPT

## 2024-10-22 PROCEDURE — 2500000004 HC RX 250 GENERAL PHARMACY W/ HCPCS (ALT 636 FOR OP/ED): Performed by: STUDENT IN AN ORGANIZED HEALTH CARE EDUCATION/TRAINING PROGRAM

## 2024-10-22 PROCEDURE — 99233 SBSQ HOSP IP/OBS HIGH 50: CPT | Performed by: HOSPITALIST

## 2024-10-22 RX ORDER — HEPARIN SODIUM 5000 [USP'U]/ML
5000 INJECTION, SOLUTION INTRAVENOUS; SUBCUTANEOUS EVERY 12 HOURS
Status: DISCONTINUED | OUTPATIENT
Start: 2024-10-22 | End: 2024-10-26 | Stop reason: HOSPADM

## 2024-10-22 RX ORDER — SODIUM BICARBONATE 650 MG/1
1300 TABLET ORAL 2 TIMES DAILY
Qty: 120 TABLET | Refills: 0 | Status: SHIPPED | OUTPATIENT
Start: 2024-10-22 | End: 2024-10-28 | Stop reason: ALTCHOICE

## 2024-10-22 RX ORDER — FOLIC ACID 1 MG/1
1 TABLET ORAL DAILY
Status: DISCONTINUED | OUTPATIENT
Start: 2024-10-22 | End: 2024-10-26 | Stop reason: HOSPADM

## 2024-10-22 RX ORDER — LANOLIN ALCOHOL/MO/W.PET/CERES
1000 CREAM (GRAM) TOPICAL DAILY
Status: DISCONTINUED | OUTPATIENT
Start: 2024-10-22 | End: 2024-10-24

## 2024-10-22 RX ORDER — SODIUM BICARBONATE 650 MG/1
1300 TABLET ORAL 2 TIMES DAILY
Qty: 4 TABLET | Refills: 29 | Status: SHIPPED | OUTPATIENT
Start: 2024-10-22 | End: 2024-10-22

## 2024-10-22 RX ORDER — HYDRALAZINE HYDROCHLORIDE 50 MG/1
100 TABLET, FILM COATED ORAL 2 TIMES DAILY
Status: DISCONTINUED | OUTPATIENT
Start: 2024-10-22 | End: 2024-10-26 | Stop reason: HOSPADM

## 2024-10-22 RX ORDER — TACROLIMUS 1 MG/1
1 CAPSULE ORAL
Status: DISCONTINUED | OUTPATIENT
Start: 2024-10-22 | End: 2024-10-26 | Stop reason: HOSPADM

## 2024-10-22 RX ORDER — MAGNESIUM SULFATE HEPTAHYDRATE 40 MG/ML
2 INJECTION, SOLUTION INTRAVENOUS ONCE
Status: COMPLETED | OUTPATIENT
Start: 2024-10-22 | End: 2024-10-22

## 2024-10-22 RX ORDER — TACROLIMUS 1 MG/1
1 CAPSULE ORAL
Qty: 60 CAPSULE | Refills: 0 | Status: SHIPPED | OUTPATIENT
Start: 2024-10-22 | End: 2024-10-23

## 2024-10-22 RX ORDER — TACROLIMUS 0.5 MG/1
0.5 CAPSULE ORAL 2 TIMES DAILY
Qty: 60 CAPSULE | Refills: 11 | Status: SHIPPED | OUTPATIENT
Start: 2024-10-22 | End: 2024-10-28 | Stop reason: ALTCHOICE

## 2024-10-22 RX ORDER — ACETAMINOPHEN 325 MG/1
650 TABLET ORAL ONCE
Status: DISCONTINUED | OUTPATIENT
Start: 2024-10-22 | End: 2024-10-26 | Stop reason: HOSPADM

## 2024-10-22 RX ORDER — DIPHENHYDRAMINE HCL 50 MG
50 CAPSULE ORAL ONCE
Status: COMPLETED | OUTPATIENT
Start: 2024-10-22 | End: 2024-10-22

## 2024-10-22 RX ADMIN — MYCOPHENOLATE MOFETIL 1000 MG: 250 CAPSULE ORAL at 06:01

## 2024-10-22 RX ADMIN — HEPARIN SODIUM 5000 UNITS: 5000 INJECTION, SOLUTION INTRAVENOUS; SUBCUTANEOUS at 11:49

## 2024-10-22 RX ADMIN — MAGNESIUM SULFATE HEPTAHYDRATE 2 G: 40 INJECTION, SOLUTION INTRAVENOUS at 11:49

## 2024-10-22 RX ADMIN — CYANOCOBALAMIN TAB 1000 MCG 1000 MCG: 1000 TAB at 11:48

## 2024-10-22 RX ADMIN — TACROLIMUS 1 MG: 1 CAPSULE ORAL at 11:49

## 2024-10-22 RX ADMIN — FOLIC ACID 1 MG: 1 TABLET ORAL at 11:48

## 2024-10-22 RX ADMIN — ACETAMINOPHEN 650 MG: 325 TABLET ORAL at 17:38

## 2024-10-22 RX ADMIN — HYDRALAZINE HYDROCHLORIDE 100 MG: 50 TABLET ORAL at 20:46

## 2024-10-22 RX ADMIN — ATORVASTATIN CALCIUM 40 MG: 40 TABLET, FILM COATED ORAL at 08:48

## 2024-10-22 RX ADMIN — PANTOPRAZOLE SODIUM 40 MG: 40 TABLET, DELAYED RELEASE ORAL at 06:02

## 2024-10-22 RX ADMIN — CLOTRIMAZOLE 10 MG: 10 LOZENGE ORAL at 11:48

## 2024-10-22 RX ADMIN — MYCOPHENOLATE MOFETIL 1000 MG: 250 CAPSULE ORAL at 17:37

## 2024-10-22 RX ADMIN — PANTOPRAZOLE SODIUM 40 MG: 40 TABLET, DELAYED RELEASE ORAL at 17:37

## 2024-10-22 RX ADMIN — DIPHENHYDRAMINE HYDROCHLORIDE 50 MG: 50 CAPSULE ORAL at 13:57

## 2024-10-22 RX ADMIN — ASPIRIN 81 MG: 81 TABLET, COATED ORAL at 08:48

## 2024-10-22 RX ADMIN — CARVEDILOL 12.5 MG: 12.5 TABLET, FILM COATED ORAL at 20:46

## 2024-10-22 RX ADMIN — ACETAMINOPHEN 650 MG: 325 TABLET ORAL at 00:25

## 2024-10-22 RX ADMIN — INSULIN LISPRO 2 UNITS: 100 INJECTION, SOLUTION INTRAVENOUS; SUBCUTANEOUS at 17:38

## 2024-10-22 RX ADMIN — TACROLIMUS 1 MG: 1 CAPSULE ORAL at 17:37

## 2024-10-22 RX ADMIN — ACETAMINOPHEN 650 MG: 325 TABLET ORAL at 06:01

## 2024-10-22 RX ADMIN — SULFAMETHOXAZOLE AND TRIMETHOPRIM 80 MG OF TRIMETHOPRIM: 400; 80 TABLET ORAL at 08:48

## 2024-10-22 RX ADMIN — REMDESIVIR 200 MG: 100 INJECTION, POWDER, LYOPHILIZED, FOR SOLUTION INTRAVENOUS at 13:59

## 2024-10-22 RX ADMIN — ACETAMINOPHEN 650 MG: 325 TABLET ORAL at 23:44

## 2024-10-22 RX ADMIN — CARVEDILOL 12.5 MG: 12.5 TABLET, FILM COATED ORAL at 08:48

## 2024-10-22 RX ADMIN — SODIUM BICARBONATE 650 MG TABLET 1300 MG: at 20:46

## 2024-10-22 RX ADMIN — SODIUM BICARBONATE 650 MG TABLET 1300 MG: at 08:48

## 2024-10-22 RX ADMIN — HEPARIN SODIUM 5000 UNITS: 5000 INJECTION, SOLUTION INTRAVENOUS; SUBCUTANEOUS at 21:18

## 2024-10-22 RX ADMIN — HEPARIN SODIUM 125 MG: 1000 INJECTION INTRAVENOUS; SUBCUTANEOUS at 13:57

## 2024-10-22 RX ADMIN — ACETAMINOPHEN 650 MG: 325 TABLET ORAL at 11:49

## 2024-10-22 RX ADMIN — CLOTRIMAZOLE 10 MG: 10 LOZENGE ORAL at 17:37

## 2024-10-22 RX ADMIN — METHYLPREDNISOLONE SODIUM SUCCINATE 125 MG: 125 INJECTION, POWDER, FOR SOLUTION INTRAMUSCULAR; INTRAVENOUS at 08:48

## 2024-10-22 RX ADMIN — HYDRALAZINE HYDROCHLORIDE 100 MG: 50 TABLET ORAL at 11:48

## 2024-10-22 ASSESSMENT — COGNITIVE AND FUNCTIONAL STATUS - GENERAL
DRESSING REGULAR LOWER BODY CLOTHING: A LITTLE
DRESSING REGULAR UPPER BODY CLOTHING: A LITTLE
DAILY ACTIVITIY SCORE: 16
PERSONAL GROOMING: A LITTLE
CLIMB 3 TO 5 STEPS WITH RAILING: A LITTLE
EATING MEALS: A LITTLE
MOVING TO AND FROM BED TO CHAIR: A LITTLE
MOVING FROM LYING ON BACK TO SITTING ON SIDE OF FLAT BED WITH BEDRAILS: A LITTLE
MOBILITY SCORE: 18
TOILETING: A LITTLE
HELP NEEDED FOR BATHING: A LOT
HELP NEEDED FOR BATHING: A LITTLE
TURNING FROM BACK TO SIDE WHILE IN FLAT BAD: A LITTLE
WALKING IN HOSPITAL ROOM: A LITTLE
STANDING UP FROM CHAIR USING ARMS: A LITTLE
DRESSING REGULAR UPPER BODY CLOTHING: A LITTLE
PERSONAL GROOMING: A LITTLE
DRESSING REGULAR LOWER BODY CLOTHING: A LOT
TOILETING: A LOT
DAILY ACTIVITIY SCORE: 18

## 2024-10-22 ASSESSMENT — PAIN SCALES - GENERAL
PAINLEVEL_OUTOF10: 4
PAINLEVEL_OUTOF10: 4
PAINLEVEL_OUTOF10: 3

## 2024-10-22 ASSESSMENT — ACTIVITIES OF DAILY LIVING (ADL)
BATHING_ASSISTANCE: MODERATE
ADL_ASSISTANCE: INDEPENDENT

## 2024-10-22 ASSESSMENT — PAIN DESCRIPTION - LOCATION
LOCATION: ABDOMEN
LOCATION: ABDOMEN

## 2024-10-22 ASSESSMENT — PAIN - FUNCTIONAL ASSESSMENT: PAIN_FUNCTIONAL_ASSESSMENT: 0-10

## 2024-10-22 NOTE — PROGRESS NOTES
Occupational Therapy    Evaluation and Treatment    Patient Name: Liz Hamlin  MRN: [id number]  Today's Date: 10/22/2024  Room: 70[facility]87  Time Calculation  Start Time: 1417  Stop Time: 1451  Time Calculation (min): 34 min    Assessment  IP OT Assessment  OT Assessment: Pt's abilty to complete ADLs currently limited by deficits in functional strength, activity tolerance, and dynamic balance. The pt demos the ability to engage in ADL tasks with Mod - SBA. Pt will benefit from continued OT while admitted to increase IND and safety prior to d/c.  Prognosis: Good  Barriers to Discharge: None  Evaluation/Treatment Tolerance: Patient tolerated treatment well  Medical Staff Made Aware: Yes  End of Session Communication: Bedside nurse  End of Session Patient Position: Bed, 3 rail up, Alarm off, caregiver present  Plan:  Inpatient Plan  Treatment Interventions: ADL retraining, Functional transfer training, Endurance training, Patient/family training, Equipment evaluation/education, Compensatory technique education  OT Frequency: 3 times per week  OT Discharge Recommendations: Low intensity level of continued care (Recommending use of BSC on first floor to prevent need for going up and down steps during day, pt reports ability to have someone with her at all times to assist physically. Without 24/7 assist, pt would benefit from high intensity OT on d/c)  OT Recommended Transfer Status: Moderate assist  OT - OK to Discharge: Yes  OT Assessment  OT Assessment Results: Decreased ADL status, Decreased endurance, Decreased functional mobility, Decreased IADLs  Prognosis: Good  Barriers to Discharge: None  Evaluation/Treatment Tolerance: Patient tolerated treatment well  Medical Staff Made Aware: Yes  Strengths: Attitude of self, Support of Caregivers, Housing layout  Barriers to Participation: Comorbidities    Subjective   Current Problem:  1. Kidney transplanted ([facility])        2. ESRD (end stage renal disease) (Multi)         3. Kidney replaced by transplant ([facility])  acetaminophen (Tylenol) 325 mg tablet    carvedilol (Coreg) 12.5 mg tablet    mycophenolate (Cellcept) 250 mg capsule    pantoprazole (ProtoNix) 40 mg EC tablet    predniSONE (Deltasone) 20 mg tablet    clotrimazole (Mycelex) 10 mg dixon    sulfamethoxazole-trimethoprim (Bactrim) 400-80 mg tablet    valGANciclovir (Valcyte) 450 mg tablet    docusate sodium (Colace) 100 mg capsule    polyethylene glycol (Glycolax, Miralax) 17 gram packet    tacrolimus (Prograf) 1 mg capsule    tacrolimus (Prograf) 0.5 mg capsule    sodium bicarbonate 650 mg tablet    DISCONTINUED: sodium bicarbonate 650 mg tablet        General:  Reason for Referral: DDKT 10/20  Past Medical History Relevant to Rehab: CKD, HTN, cataracts  Prior to Session Communication: Bedside nurse  Patient Position Received: Bed, 3 rail up, Alarm off, not on at start of session  Family/Caregiver Present: Yes  Caregiver Feedback: Pt's sister present and engaged in OT eval  General Comment: Pt pleasant and agreeable to OT evaluation, reports looking forward to getting out of bed   Precautions:  Medical Precautions: Fall precautions  Post-Surgical Precautions: Abdominal surgery precautions  Precautions Comment: COVID precautions. All necessary PPE donned.  Vital Signs:  Vital Signs (Past 2hrs)        Date/Time Vitals Session Patient Position Pulse Resp SpO2 BP MAP (mmHg)    10/22/24 13:58:47 --  --  95  --  95 %  153/83  106     10/22/24 1417 During OT  Sitting  101  --  99 %  --  --                   Pain:  Pain Assessment  Pain Assessment: 0-10  0-10 (Numeric) Pain Score: 3  Pain Type: Surgical pain  Pain Location: Abdomen  Pain Orientation: Right  Pain Interventions: Repositioned  Response to Interventions: Best at rest  Lines/Tubes/Drains:  Urethral Catheter 16 Fr. (Active)   Number of days: 2       Closed/Suction Drain 1 Lateral RLQ 10 Fr. (Active)   Number of days: 2       Ureteral Drain/Stent Left ureter (Active)  "  Number of days: 1         Objective   Cognition:  Overall Cognitive Status: Within Functional Limits  Orientation Level: Oriented X4           Home Living:  Type of Home: House  Lives With: Spouse, Adult children ( with recent back surgery)  Home Adaptive Equipment: Cane, Wheelchair-manual, Walker rolling or standard  Home Layout: Two level, Laundry in basement, Bed/bath upstairs, Stairs to alternate level with rails  Alternate Level Stairs-Number of Steps: 13 - Stair lift  Home Access: Stairs to enter with rails  Entrance Stairs-Number of Steps: 3  Bathroom Shower/Tub: Tub/shower unit  Bathroom Toilet: Standard  Bathroom Equipment: Grab bars in shower, Shower chair with back, Grab bars around toilet, Bedside commode   Prior Function:  Level of [place]: Independent with ADLs and functional transfers, Independent with homemaking with ambulation  ADL Assistance: Independent  Homemaking Assistance: Independent  Ambulatory Assistance: Independent  Vocational: Retired  Leisure: Chair aerobics, go out to eat with friends/family  Hand Dominance: Right  Prior Function Comments: reports 1 fall in past year - \"tumbled\" off bed reaching for phone  IADL History:  Homemaking Responsibilities: Yes  Meal Prep Responsibility: Primary  Laundry Responsibility: Primary  Cleaning Responsibility: Primary  Bill Paying/Finance Responsibility: Primary  Shopping Responsibility: Primary   Responsibility: Primary  Homemaking Comments: Shares with spouse  Current License: No  Occupation: Retired  Leisure and Hobbies: Chair aerobics, go out to eat with friends/family  ADL:  Eating Assistance: Independent (Anticipated)  Grooming Assistance: Stand by (Anticipated)  Grooming Deficit: Supervision/safety  Bathing Assistance: Moderate (Anticipated)  UE Dressing Assistance: Minimal (Anticipated)  LE Dressing Assistance: Moderate (Anticipated)  Toileting Assistance with Device: Minimal (Anticipated)  Activity " Tolerance:  Endurance: Tolerates 10 - 20 min exercise with multiple rests  Balance:  Dynamic Standing Balance  Dynamic Standing-Balance Support: Bilateral upper extremity supported  Dynamic Standing-Level of Assistance: Minimum assistance  Dynamic Standing-Comments: FWW  Static Sitting Balance  Static Sitting-Balance Support: Feet supported, No upper extremity supported  Static Sitting-Level of Assistance: Distant supervision  Static Sitting-Comment/Number of Minutes: EOB  Static Standing Balance  Static Standing-Balance Support: Bilateral upper extremity supported  Static Standing-Level of Assistance: Contact guard  Static Standing-Comment/Number of Minutes: FWW  Bed Mobility/Transfers: Bed Mobility/Transfers: Bed Mobility  Bed Mobility: Yes  Bed Mobility 1  Bed Mobility 1: Supine to sitting, Sitting to supine  Level of Assistance 1: Minimum assistance  Bed Mobility Comments 1: Assistance with managing LEs over EOB, cues for imporved technique and log roll for safety   and Transfers  Transfer: Yes  Transfer 1  Transfer From 1: Sit to, Stand to  Transfer to 1: Stand, Sit  Technique 1: Sit to stand, Stand to sit  Transfer Device 1: Walker  Transfer Level of Assistance 1: Moderate assistance  Trials/Comments 1: Bed raised  IADL's:   Homemaking Responsibilities: Yes  Meal Prep Responsibility: Primary  Laundry Responsibility: Primary  Cleaning Responsibility: Primary  Bill Paying/Finance Responsibility: Primary  Shopping Responsibility: Primary   Responsibility: Primary  Homemaking Comments: Shares with spouse  Current License: No  Occupation: Retired  Leisure and Hobbies: Chair aerobics, go out to eat with friends/family  Vision: Vision - Basic Assessment  Current Vision: Wears glasses all the time   and    Sensation:  Light Touch: No apparent deficits  Strength:  Strength Comments: B UEs 4+/5  Perception:  Inattention/Neglect: Appears intact  Coordination:  Movements are Fluid and Coordinated: Yes   Hand  Function:  Hand Function  Gross Grasp: Functional  Coordination: Functional  Extremities:   RUE   RUE : Within Functional Limits, LUE   LUE: Within Functional Limits,  , and        Outcome Measures: [facility] Daily Activity  Putting on and taking off regular lower body clothing: A lot  Bathing (including washing, rinsing, drying): A lot  Putting on and taking off regular upper body clothing: A little  Toileting, which includes using toilet, bedpan or urinal: A lot  Taking care of personal grooming such as brushing teeth: A little  Eating Meals: None  Daily Activity - Total Score: 16         ,     OT Adult Other Outcome Measures  4AT: 4 AT -    Education Documentation  Body Mechanics, taught by Ted Jacques OT at 10/22/2024  3:49 PM.  Learner: Patient  Readiness: Acceptance  Method: Explanation, Demonstration  Response: Verbalizes Understanding    Precautions, taught by Ted Jacques OT at 10/22/2024  3:49 PM.  Learner: Patient  Readiness: Acceptance  Method: Explanation, Demonstration  Response: Verbalizes Understanding    ADL Training, taught by Ted Jacques OT at 10/22/2024  3:49 PM.  Learner: Patient  Readiness: Acceptance  Method: Explanation, Demonstration  Response: Verbalizes Understanding    Education Comments  No comments found.        Goals:   Encounter Problems       Encounter Problems (Active)       ADLs       Patient with complete lower body dressing with modified independent level of assistance donning and doffing all LE clothes  with PRN adaptive equipment while edge of bed  and standing (Progressing)       Start:  10/22/24    Expected End:  11/12/24            Patient will complete daily grooming tasks brushing teeth and washing face/hair with modified independent level of assistance and PRN adaptive equipment while standing. (Progressing)       Start:  10/22/24    Expected End:  11/12/24            Patient will complete toileting including hygiene clothing management/hygiene with modified independent  level of assistance and raised toilet seat and grab bars. (Progressing)       Start:  10/22/24    Expected End:  11/12/24               BALANCE       Pt will maintain dynamic standing balance during ADL task with modified independent level of assistance in order to demonstrate decreased risk of falling and improved postural control. (Progressing)       Start:  10/22/24    Expected End:  11/12/24               COGNITION/SAFETY       Patient will recall and adhere to ABD precautions during all functional mobility/ADL tasks in order to demonstrate improved understanding and promote healing post op (Progressing)       Start:  10/22/24    Expected End:  11/12/24               MOBILITY       Patient will perform Functional mobility mod  Household distances/Community Distances with modified independent level of assistance and least restrictive device in order to improve safety and functional mobility. (Progressing)       Start:  10/22/24    Expected End:  11/12/24                   Treatment Completed on Evaluation    Therapy/Activity:     Therapeutic Activity  Therapeutic Activity Performed: Yes  Therapeutic Activity 1: Education provided on home setup to maximize safety/IND. Therapist discussed use of BSC on first floor and modifying tasks to improve safety/IND. Pt receptive and agreeable.  Therapeutic Activity 2: Pt performed functional mobility within room using FWW to increase standing balance, activity tolerance, and safety awareness. Cues provided throughout for safety and increased IND. Pt able to ambulate ~50 feet with Min A for balance before taking a seated rest break. Pt demos Mild fatigue upon completion.  Therapeutic Activity 3: Education on ABD precautions to reduce pain and promote optimal healing. Pt receptive to modified strategies.     10/22/24 at 3:50 PM   Ted Jacques OT   Rehab Office: [phone]

## 2024-10-22 NOTE — CARE PLAN
The patient's goals for the shift include get kidney    The clinical goals for the shift include Patient will continue to have adequate urine output.

## 2024-10-22 NOTE — PROGRESS NOTES
"Liz received a kidney transplant on 10/20/22. SW reviewed the pre transplant Social Work evaluation as well as interdisciplinary notes of this admission. Attended transplant interdisciplinary rounds. SW reviewed, participated and is in agreement with MDT Plan of Care.  I met with patient who was awake, alert, oriented and able to discuss their condition.  Functional/Medical Status: SW met with patient at bedside. Pt was lying in bed during discussion with SW. Pt reported she is eating and drinking as tolerable. Pt reported she has taken a few steps.   Adaption to the Stress of Transplant: Pt denied any concerns related to transplant.   Mental Health/Substance use: Pt reported her mood as \"good\". Pt denied any MH concerns. Pt denied any recent tobacco/alcohol/or illicit drug use.   Post Discharge Supports/Recovery Plan: Pt primary support is her sister Kim. Secondary support is her granddaughter Carine and alternate support patient's  Richard.   Benefits: Medical [facility]    Impressions: SW met with patient at bedside. Pt was lying in bed during discussion with SW. Pt reported she is eating and drinking as tolerable. Pt reported she has taken a few steps. Pt denied any concerns related to transplant.  Pt denied any concerns related to transplant. Pt reported her mood as \"good\". Pt denied any MH concerns. Pt denied any recent tobacco/alcohol/or illicit drug use.  Pt primary support is her sister Kim. Secondary support is her granddaughter Carine and alternate support patient's  Richard. Patient and family/support system are in agreement and report understanding of their treatment plan. They were provided an opportunity to ask questions, though denied any issues or concerns at this time.      PLAN:  We reviewed the importance of having lab work done twice a week or as directed; scheduled post-transplant appointments; reporting alarming symptoms; restrictions of activities and importance of " adherence to medical regimen. We also discussed the precautions to take due to being immunosuppressed. Patient indicated understanding of this information along with the discharge plan and instructions. Patient was given the opportunity to discuss any issues. Patient was given social work contact information.

## 2024-10-22 NOTE — TELEPHONE ENCOUNTER
Per Dr Reid and Dr Randy Cosby - patient will not get any additional doses of Belatacept, pt will be on tacrolimus goal 6-7. Therapy plan's cancelled, [facility] notified.

## 2024-10-22 NOTE — PROGRESS NOTES
Transplant Nephrology progress note     Date of admission: 10/20/2024     Liz Hamlin is a 79 y.o.  with [facility]   Past Medical History:   Diagnosis Date    CKD (chronic kidney disease)     Encounter for general adult medical examination without abnormal findings 09/14/2021    Encounter for Medicare annual wellness exam    Glaucoma     Gout     Hyperlipidemia     Hypertension     Mitral valve regurgitation     JOSE E (obstructive sleep apnea)     Unspecified cataract     Cataracts, both eyes        SUBJECTIVE:    Denied any complaints this morning.  Saturating well on room air.  Urine output in last 24 hours is 1.8 L.      PROBLEM LIST:  Principal Problem:    Kidney replaced by transplant ([facility])  Active Problems:    ESRD (end stage renal disease) (Multi)         ALLERGIES:  Allergies   Allergen Reactions    Amlodipine Swelling     Edema    Codeine Itching    Erythromycin Itching    Nsaids (Non-Steroidal Anti-Inflammatory Drug) Itching and Nausea Only     chronic renal insufficiency    Tramadol Itching and Nausea/vomiting    Lisinopril Cough     Cough            CURRENT MEDICATIONS:  Scheduled medications  acetaminophen, 650 mg, oral, q6h NATALIA  acetaminophen, 650 mg, oral, Once  antithymocyte globulin (rabbit), 1.5 mg/kg, intravenous, Once  aspirin, 81 mg, oral, Daily  atorvastatin, 40 mg, oral, Daily  carvedilol, 12.5 mg, oral, BID  clotrimazole, 10 mg, oral, TID after meals  cyanocobalamin, 1,000 mcg, oral, Daily  diphenhydrAMINE, 50 mg, oral, Once  folic acid, 1 mg, oral, Daily  heparin, 5,000 Units, subcutaneous, q12h  hydrALAZINE, 100 mg, oral, BID  insulin lispro, 0-10 Units, subcutaneous, TID  [START ON 10/23/2024] iron sucrose, 200 mg, intravenous, Daily  magnesium sulfate, 2 g, intravenous, Once  [START ON 10/23/2024] methylPREDNISolone sodium succinate (PF), 60 mg, intravenous, Once  mycophenolate, 1,000 mg, oral, q12h  oxygen, , inhalation, Continuous - Inhalation  pantoprazole, 40 mg, oral, BID AC  [START  "ON 10/24/2024] predniSONE, 20 mg, oral, Daily  remdesivir, 200 mg, intravenous, Once   Followed by  [START ON 10/23/2024] remdesivir, 100 mg, intravenous, q24h  sodium bicarbonate, 1,300 mg, oral, BID  sulfamethoxazole-trimethoprim, 80 mg of trimethoprim, oral, Daily  tacrolimus, 1 mg, oral, q12h NATALIA  valGANciclovir, 450 mg, oral, q48h      Continuous medications     PRN medications  PRN medications: acetaminophen, dextrose, dextrose, glucagon, glucagon, hydrALAZINE, naloxone, ondansetron ODT **OR** ondansetron, oxyCODONE, oxyCODONE       OBJECTIVE:    VITALS: Visit Vitals  /80   Pulse 90   Temp 36.6 °C (97.9 °F)   Resp 18   Ht 1.676 m (5' 6\")   Wt 90.6 kg (199 lb 13.6 oz)   LMP  (LMP Unknown)   SpO2 95%   BMI 32.26 kg/m²   OB Status Postmenopausal   Smoking Status Never   BSA 2.05 m²        General: No distress   Mucosa moist   AI, AC, AF     HEENT: PEERLA  CVS: S1 S2 no murmurs  RESP:  Lungs clear to auscultation   ABDO: Soft, surgical site looks clean with staples intact and drain in place  Neuro: A + O x 3  Skin: No rash   Extremities: No edema       LABS:  Results from last 72 hours   Lab Units 10/22/24  0736   WBC AUTO x10*3/uL 16.9*   HEMOGLOBIN g/dL 9.2*   MCV fL 89   PLATELETS AUTO x10*3/uL 119*   BUN mg/dL 37*   CREATININE mg/dL 1.42*   CALCIUM mg/dL 8.2*            Intake/Output Summary (Last 24 hours) at 10/22/2024 1327  Last data filed at 10/22/2024 1200  Gross per 24 hour   Intake 950 ml   Output 1900 ml   Net -950 ml          ASSESSMENT AND PLAN:    Liz Hamlin is a 79 y.o. female who is predialysis -CKD -4 ,JOSE E,hepatic steatosis , who underwent a kidney transplant surgery on 10/20/2024 (Kidney) and was hospitalized for kidney transplant surgery.  Kidney info: KDPI: 86%,Pra :71%,HCV D-/R-,EBVD+/R+,CMV D+/R-,HBV negative,Toxo Neg.    Allograft function:  -Improving kidney function with good urine output.  No significant electrolyte abnormalities.  Ultrasound transplant is unremarkable.  Will " monitor kidney function closely.    Immunosuppression:  -Patient have B-cell and T-cell crossmatch positive.  Considering her age we will continue with the thymoglobulin 3 mg/kg and transition from belatacept to tacrolimus aiming levels 6-7 tacrolimus levels.  -Continue with the 1 g twice daily mycophenolate and prednisone tapering    Infectious prophylaxis:  -COVID-positive asymptomatic ID recommended remdesivir which starting today.  -Will consider clotrimazole, Bactrim and Valcyte prophylaxis.    Hemodynamics: Blood pressures mildly elevated this morning.  Continue with carvedilol 12.5 twice daily, started hydralazine 100 twice daily.    Anemia/leukopenia:  -Seems iron deficient started on IV iron infusion daily for 5 days 200 mg starting 10/22/2024, also started on B12 and folic acid supplementation.    Bone mineral disease: Magnesium levels need replacement.  Calcium and phosphorus levels are optimal.      Thank you for consulting .  Franklin Blake MD       Notes created by Rudy -Please excuse the Typos .

## 2024-10-23 LAB
ACANTHOCYTES BLD QL SMEAR: ABNORMAL
ALBUMIN SERPL BCP-MCNC: 3.2 G/DL (ref 3.4–5)
ANION GAP SERPL CALC-SCNC: 13 MMOL/L (ref 10–20)
BASOPHILS # BLD MANUAL: 0 X10*3/UL (ref 0–0.1)
BASOPHILS NFR BLD MANUAL: 0 %
BLOOD EXPIRATION DATE: NORMAL
BLOOD EXPIRATION DATE: NORMAL
BUN SERPL-MCNC: 35 MG/DL (ref 6–23)
BURR CELLS BLD QL SMEAR: ABNORMAL
CALCIUM SERPL-MCNC: 8.5 MG/DL (ref 8.6–10.6)
CHLORIDE SERPL-SCNC: 112 MMOL/L (ref 98–107)
CO2 SERPL-SCNC: 22 MMOL/L (ref 21–32)
CREAT SERPL-MCNC: 1.08 MG/DL (ref 0.5–1.05)
DISPENSE STATUS: NORMAL
DISPENSE STATUS: NORMAL
EGFRCR SERPLBLD CKD-EPI 2021: 53 ML/MIN/1.73M*2
EOSINOPHIL # BLD MANUAL: 0 X10*3/UL (ref 0–0.4)
EOSINOPHIL NFR BLD MANUAL: 0 %
ERYTHROCYTE [DISTWIDTH] IN BLOOD BY AUTOMATED COUNT: 18.3 % (ref 11.5–14.5)
GLUCOSE BLD MANUAL STRIP-MCNC: 125 MG/DL (ref 74–99)
GLUCOSE BLD MANUAL STRIP-MCNC: 151 MG/DL (ref 74–99)
GLUCOSE BLD MANUAL STRIP-MCNC: 152 MG/DL (ref 74–99)
GLUCOSE BLD MANUAL STRIP-MCNC: 156 MG/DL (ref 74–99)
GLUCOSE SERPL-MCNC: 150 MG/DL (ref 74–99)
HCT VFR BLD AUTO: 29.4 % (ref 36–46)
HGB BLD-MCNC: 9.6 G/DL (ref 12–16)
HLA RESULTS: NORMAL
HLA-A+B+C AB NFR SER: NORMAL %
HLA-DP+DQ+DR AB NFR SER: NORMAL %
IMM GRANULOCYTES # BLD AUTO: 0.04 X10*3/UL (ref 0–0.5)
IMM GRANULOCYTES NFR BLD AUTO: 0.6 % (ref 0–0.9)
LYMPHOCYTES # BLD MANUAL: 0.12 X10*3/UL (ref 0.8–3)
LYMPHOCYTES NFR BLD MANUAL: 1.7 %
MAGNESIUM SERPL-MCNC: 2.32 MG/DL (ref 1.6–2.4)
MCH RBC QN AUTO: 29 PG (ref 26–34)
MCHC RBC AUTO-ENTMCNC: 32.7 G/DL (ref 32–36)
MCV RBC AUTO: 89 FL (ref 80–100)
METAMYELOCYTES # BLD MANUAL: 0.06 X10*3/UL
METAMYELOCYTES NFR BLD MANUAL: 0.9 %
MONOCYTES # BLD MANUAL: 0.06 X10*3/UL (ref 0.05–0.8)
MONOCYTES NFR BLD MANUAL: 0.8 %
NEUTROPHILS # BLD MANUAL: 6.76 X10*3/UL (ref 1.6–5.5)
NEUTS BAND # BLD MANUAL: 0.18 X10*3/UL (ref 0–0.5)
NEUTS BAND NFR BLD MANUAL: 2.6 %
NEUTS SEG # BLD MANUAL: 6.58 X10*3/UL (ref 1.6–5)
NEUTS SEG NFR BLD MANUAL: 94 %
NRBC BLD-RTO: 0 /100 WBCS (ref 0–0)
OVALOCYTES BLD QL SMEAR: ABNORMAL
PHOSPHATE SERPL-MCNC: 2.2 MG/DL (ref 2.5–4.9)
PLATELET # BLD AUTO: 79 X10*3/UL (ref 150–450)
POTASSIUM SERPL-SCNC: 3.5 MMOL/L (ref 3.5–5.3)
PRODUCT BLOOD TYPE: 9500
PRODUCT BLOOD TYPE: 9500
PRODUCT CODE: NORMAL
PRODUCT CODE: NORMAL
RBC # BLD AUTO: 3.31 X10*6/UL (ref 4–5.2)
RBC MORPH BLD: ABNORMAL
SODIUM SERPL-SCNC: 143 MMOL/L (ref 136–145)
TACROLIMUS BLD-MCNC: 4.2 NG/ML
TOTAL CELLS COUNTED BLD: 117
UNIT ABO: NORMAL
UNIT ABO: NORMAL
UNIT NUMBER: NORMAL
UNIT NUMBER: NORMAL
UNIT RH: NORMAL
UNIT RH: NORMAL
UNIT VOLUME: 350
UNIT VOLUME: 350
WBC # BLD AUTO: 7 X10*3/UL (ref 4.4–11.3)
XM INTEP: NORMAL
XM INTEP: NORMAL

## 2024-10-23 PROCEDURE — 85027 COMPLETE CBC AUTOMATED: CPT

## 2024-10-23 PROCEDURE — 36415 COLL VENOUS BLD VENIPUNCTURE: CPT

## 2024-10-23 PROCEDURE — 83735 ASSAY OF MAGNESIUM: CPT

## 2024-10-23 PROCEDURE — 2500000004 HC RX 250 GENERAL PHARMACY W/ HCPCS (ALT 636 FOR OP/ED)

## 2024-10-23 PROCEDURE — 99232 SBSQ HOSP IP/OBS MODERATE 35: CPT | Performed by: SURGERY

## 2024-10-23 PROCEDURE — 1200000002 HC GENERAL ROOM WITH TELEMETRY DAILY

## 2024-10-23 PROCEDURE — 2500000005 HC RX 250 GENERAL PHARMACY W/O HCPCS

## 2024-10-23 PROCEDURE — 85007 BL SMEAR W/DIFF WBC COUNT: CPT

## 2024-10-23 PROCEDURE — 2500000001 HC RX 250 WO HCPCS SELF ADMINISTERED DRUGS (ALT 637 FOR MEDICARE OP): Performed by: STUDENT IN AN ORGANIZED HEALTH CARE EDUCATION/TRAINING PROGRAM

## 2024-10-23 PROCEDURE — 2500000002 HC RX 250 W HCPCS SELF ADMINISTERED DRUGS (ALT 637 FOR MEDICARE OP, ALT 636 FOR OP/ED)

## 2024-10-23 PROCEDURE — RXMED WILLOW AMBULATORY MEDICATION CHARGE

## 2024-10-23 PROCEDURE — 2500000004 HC RX 250 GENERAL PHARMACY W/ HCPCS (ALT 636 FOR OP/ED): Performed by: STUDENT IN AN ORGANIZED HEALTH CARE EDUCATION/TRAINING PROGRAM

## 2024-10-23 PROCEDURE — 82947 ASSAY GLUCOSE BLOOD QUANT: CPT

## 2024-10-23 PROCEDURE — 80069 RENAL FUNCTION PANEL: CPT

## 2024-10-23 PROCEDURE — 99233 SBSQ HOSP IP/OBS HIGH 50: CPT | Performed by: HOSPITALIST

## 2024-10-23 PROCEDURE — 80197 ASSAY OF TACROLIMUS: CPT

## 2024-10-23 PROCEDURE — 2500000001 HC RX 250 WO HCPCS SELF ADMINISTERED DRUGS (ALT 637 FOR MEDICARE OP)

## 2024-10-23 RX ORDER — CARVEDILOL 25 MG/1
50 TABLET ORAL 2 TIMES DAILY
Status: DISCONTINUED | OUTPATIENT
Start: 2024-10-23 | End: 2024-10-26 | Stop reason: HOSPADM

## 2024-10-23 RX ORDER — SODIUM BICARBONATE 650 MG/1
650 TABLET ORAL 2 TIMES DAILY
Status: DISCONTINUED | OUTPATIENT
Start: 2024-10-23 | End: 2024-10-24

## 2024-10-23 RX ORDER — POLYETHYLENE GLYCOL 3350 17 G/17G
17 POWDER, FOR SOLUTION ORAL DAILY
Status: DISCONTINUED | OUTPATIENT
Start: 2024-10-23 | End: 2024-10-26 | Stop reason: HOSPADM

## 2024-10-23 RX ORDER — VALGANCICLOVIR 450 MG/1
450 TABLET, FILM COATED ORAL EVERY 24 HOURS
Status: DISCONTINUED | OUTPATIENT
Start: 2024-10-23 | End: 2024-10-26 | Stop reason: HOSPADM

## 2024-10-23 RX ORDER — AMOXICILLIN 250 MG
1 CAPSULE ORAL 2 TIMES DAILY
Status: DISCONTINUED | OUTPATIENT
Start: 2024-10-23 | End: 2024-10-26 | Stop reason: HOSPADM

## 2024-10-23 RX ORDER — METHOCARBAMOL 500 MG/1
500 TABLET, FILM COATED ORAL EVERY 8 HOURS PRN
Status: DISCONTINUED | OUTPATIENT
Start: 2024-10-23 | End: 2024-10-26 | Stop reason: HOSPADM

## 2024-10-23 RX ORDER — TACROLIMUS 1 MG/1
2 CAPSULE ORAL
Qty: 120 CAPSULE | Refills: 0 | Status: SHIPPED | OUTPATIENT
Start: 2024-10-23 | End: 2024-10-28 | Stop reason: SDUPTHER

## 2024-10-23 RX ORDER — BISACODYL 10 MG/1
10 SUPPOSITORY RECTAL DAILY PRN
Status: DISCONTINUED | OUTPATIENT
Start: 2024-10-23 | End: 2024-10-26 | Stop reason: HOSPADM

## 2024-10-23 RX ADMIN — ACETAMINOPHEN 650 MG: 325 TABLET ORAL at 09:52

## 2024-10-23 RX ADMIN — SULFAMETHOXAZOLE AND TRIMETHOPRIM 80 MG OF TRIMETHOPRIM: 400; 80 TABLET ORAL at 09:52

## 2024-10-23 RX ADMIN — ONDANSETRON 4 MG: 2 INJECTION INTRAMUSCULAR; INTRAVENOUS at 09:51

## 2024-10-23 RX ADMIN — MYCOPHENOLATE MOFETIL 1000 MG: 250 CAPSULE ORAL at 05:47

## 2024-10-23 RX ADMIN — CYANOCOBALAMIN TAB 1000 MCG 1000 MCG: 1000 TAB at 09:52

## 2024-10-23 RX ADMIN — REMDESIVIR 100 MG: 100 INJECTION, POWDER, LYOPHILIZED, FOR SOLUTION INTRAVENOUS at 13:01

## 2024-10-23 RX ADMIN — INSULIN LISPRO 2 UNITS: 100 INJECTION, SOLUTION INTRAVENOUS; SUBCUTANEOUS at 08:25

## 2024-10-23 RX ADMIN — HEPARIN SODIUM 5000 UNITS: 5000 INJECTION, SOLUTION INTRAVENOUS; SUBCUTANEOUS at 09:50

## 2024-10-23 RX ADMIN — CLOTRIMAZOLE 10 MG: 10 LOZENGE ORAL at 18:17

## 2024-10-23 RX ADMIN — Medication 0 L/MIN: at 09:52

## 2024-10-23 RX ADMIN — FOLIC ACID 1 MG: 1 TABLET ORAL at 09:52

## 2024-10-23 RX ADMIN — CARVEDILOL 37.5 MG: 12.5 TABLET, FILM COATED ORAL at 09:51

## 2024-10-23 RX ADMIN — HEPARIN SODIUM 5000 UNITS: 5000 INJECTION, SOLUTION INTRAVENOUS; SUBCUTANEOUS at 22:42

## 2024-10-23 RX ADMIN — SODIUM BICARBONATE 650 MG TABLET 650 MG: at 09:52

## 2024-10-23 RX ADMIN — SODIUM BICARBONATE 650 MG TABLET 650 MG: at 20:35

## 2024-10-23 RX ADMIN — TACROLIMUS 1 MG: 1 CAPSULE ORAL at 18:17

## 2024-10-23 RX ADMIN — PANTOPRAZOLE SODIUM 40 MG: 40 TABLET, DELAYED RELEASE ORAL at 18:17

## 2024-10-23 RX ADMIN — CLOTRIMAZOLE 10 MG: 10 LOZENGE ORAL at 09:52

## 2024-10-23 RX ADMIN — ATORVASTATIN CALCIUM 40 MG: 40 TABLET, FILM COATED ORAL at 09:52

## 2024-10-23 RX ADMIN — IRON SUCROSE 200 MG: 20 INJECTION, SOLUTION INTRAVENOUS at 05:44

## 2024-10-23 RX ADMIN — POTASSIUM PHOSPHATE, MONOBASIC 500 MG: 500 TABLET, SOLUBLE ORAL at 13:01

## 2024-10-23 RX ADMIN — SENNOSIDES AND DOCUSATE SODIUM 1 TABLET: 50; 8.6 TABLET ORAL at 09:53

## 2024-10-23 RX ADMIN — TACROLIMUS 1 MG: 1 CAPSULE ORAL at 05:47

## 2024-10-23 RX ADMIN — METHYLPREDNISOLONE SODIUM SUCCINATE 60 MG: 125 INJECTION INTRAMUSCULAR; INTRAVENOUS at 09:51

## 2024-10-23 RX ADMIN — CLOTRIMAZOLE 10 MG: 10 LOZENGE ORAL at 13:01

## 2024-10-23 RX ADMIN — MYCOPHENOLATE MOFETIL 1000 MG: 250 CAPSULE ORAL at 18:17

## 2024-10-23 RX ADMIN — VALGANCICLOVIR 450 MG: 450 TABLET, FILM COATED ORAL at 09:51

## 2024-10-23 RX ADMIN — HYDRALAZINE HYDROCHLORIDE 10 MG: 20 INJECTION INTRAMUSCULAR; INTRAVENOUS at 04:02

## 2024-10-23 RX ADMIN — ASPIRIN 81 MG: 81 TABLET, COATED ORAL at 09:51

## 2024-10-23 RX ADMIN — PANTOPRAZOLE SODIUM 40 MG: 40 TABLET, DELAYED RELEASE ORAL at 05:58

## 2024-10-23 RX ADMIN — CARVEDILOL 50 MG: 25 TABLET, FILM COATED ORAL at 20:36

## 2024-10-23 RX ADMIN — SODIUM CHLORIDE 1000 ML: 9 INJECTION, SOLUTION INTRAVENOUS at 18:17

## 2024-10-23 RX ADMIN — INSULIN LISPRO 2 UNITS: 100 INJECTION, SOLUTION INTRAVENOUS; SUBCUTANEOUS at 18:18

## 2024-10-23 RX ADMIN — HYDRALAZINE HYDROCHLORIDE 100 MG: 50 TABLET ORAL at 20:35

## 2024-10-23 RX ADMIN — HYDRALAZINE HYDROCHLORIDE 100 MG: 50 TABLET ORAL at 09:51

## 2024-10-23 RX ADMIN — POLYETHYLENE GLYCOL 3350 17 G: 17 POWDER, FOR SOLUTION ORAL at 09:54

## 2024-10-23 ASSESSMENT — COGNITIVE AND FUNCTIONAL STATUS - GENERAL
MOBILITY SCORE: 18
MOVING TO AND FROM BED TO CHAIR: A LITTLE
PERSONAL GROOMING: A LITTLE
MOVING FROM LYING ON BACK TO SITTING ON SIDE OF FLAT BED WITH BEDRAILS: A LITTLE
TOILETING: A LITTLE
CLIMB 3 TO 5 STEPS WITH RAILING: A LITTLE
MOVING TO AND FROM BED TO CHAIR: A LITTLE
DRESSING REGULAR UPPER BODY CLOTHING: A LITTLE
TURNING FROM BACK TO SIDE WHILE IN FLAT BAD: A LITTLE
HELP NEEDED FOR BATHING: A LITTLE
DRESSING REGULAR LOWER BODY CLOTHING: A LITTLE
STANDING UP FROM CHAIR USING ARMS: A LITTLE
DAILY ACTIVITIY SCORE: 19
DAILY ACTIVITIY SCORE: 19
STANDING UP FROM CHAIR USING ARMS: A LITTLE
TOILETING: A LITTLE
WALKING IN HOSPITAL ROOM: A LITTLE
TURNING FROM BACK TO SIDE WHILE IN FLAT BAD: A LITTLE
CLIMB 3 TO 5 STEPS WITH RAILING: A LITTLE
WALKING IN HOSPITAL ROOM: A LITTLE
DRESSING REGULAR UPPER BODY CLOTHING: A LITTLE
DRESSING REGULAR LOWER BODY CLOTHING: A LITTLE
MOBILITY SCORE: 18
HELP NEEDED FOR BATHING: A LITTLE
MOVING FROM LYING ON BACK TO SITTING ON SIDE OF FLAT BED WITH BEDRAILS: A LITTLE
PERSONAL GROOMING: A LITTLE

## 2024-10-23 ASSESSMENT — PAIN SCALES - GENERAL
PAINLEVEL_OUTOF10: 0 - NO PAIN
PAINLEVEL_OUTOF10: 0 - NO PAIN

## 2024-10-23 ASSESSMENT — PAIN - FUNCTIONAL ASSESSMENT: PAIN_FUNCTIONAL_ASSESSMENT: 0-10

## 2024-10-23 NOTE — CARE PLAN
The patient's goals for the shift include get kidney    The clinical goals for the shift include HDS, no incidence of fall

## 2024-10-23 NOTE — PROGRESS NOTES
Pharmacist Transplant Education Note    The medications for Liz Hamlin were reviewed in conjunction with the multidisciplinary transplant team. The pharmacist is in agreement with the plan of care for medications at this time.     Approximately 60 minutes were spent with this patient providing medication education and reviewing new post-transplant medications. The patient's sister and friend was also present for this education session.    An outpatient dosing schedule was created for all oral medications. This schedule was discussed in detail with the patient, including drug name, use, dose, and the appropriate timing of self-administration (i.e. with or without meals, with or without other medications). Also discussed side effects, drug interactions, and the importance of adherence. Both verbal and written instructions were given to the patient outlining the appropriate use of all current oral medications.     The patient demonstrated an acceptable understanding of her current medication list. All questions were answered to the patient's satisfaction, and the patient and her sister and friend confirmed understanding.     Follow-up education will take place prior to discharge where a finalized list of discharge medications will be reviewed with the patient. Further educational reinforcement should be provided in the outpatient clinic.    Christy Melchor, PharmD   Solid Organ Transplant Clinical Pharmacy Specialist

## 2024-10-23 NOTE — PROGRESS NOTES
Liz Hamlin is a 79 y.o. female POD#2 from [facility] from a DBD donor. COVID+ incidentally noted on admit; asymptomatic. No cycle threshold data. XM with lymphocytes +B-cell/T-cell but no DSA; repeat with peripheral blood only +B-cell with no DSA.     - Feels well, has had headache this AM not responsive to BP titration or tylenol    Objective   Gen: A+OX3; NAD  HEENT: PERRL, sclera anicteric, MMM  Cardiac: RRR  Chest: Normal inspiratory effort  Abdomen: S/NT/ND. Incision C/D/I.  Ext: No LE edema  Drain: serosanguinous    Last Recorded Vitals  Visit Vitals  BP (!) 175/101   Pulse 89   Temp 36.2 °C (97.2 °F)   Resp 19      Intake/Output last 3 Shifts:    Intake/Output Summary (Last 24 hours) at 10/23/2024 0623  Last data filed at 10/23/2024 0614  Gross per 24 hour   Intake --   Output 2725 ml   Net -2725 ml      Vitals:    10/23/24 0600   Weight: 90.9 kg (200 lb 6.4 oz)        Scheduled medications  acetaminophen, 650 mg, oral, Once  aspirin, 81 mg, oral, Daily  atorvastatin, 40 mg, oral, Daily  carvedilol, 12.5 mg, oral, BID  clotrimazole, 10 mg, oral, TID after meals  cyanocobalamin, 1,000 mcg, oral, Daily  folic acid, 1 mg, oral, Daily  heparin, 5,000 Units, subcutaneous, q12h  hydrALAZINE, 100 mg, oral, BID  insulin lispro, 0-10 Units, subcutaneous, TID  iron sucrose, 200 mg, intravenous, Daily  methylPREDNISolone sodium succinate (PF), 60 mg, intravenous, Once  mycophenolate, 1,000 mg, oral, q12h  oxygen, , inhalation, Continuous - Inhalation  pantoprazole, 40 mg, oral, BID AC  [START ON 10/24/2024] predniSONE, 20 mg, oral, Daily  remdesivir, 100 mg, intravenous, q24h  sodium bicarbonate, 1,300 mg, oral, BID  sulfamethoxazole-trimethoprim, 80 mg of trimethoprim, oral, Daily  tacrolimus, 1 mg, oral, q12h NATALIA  valGANciclovir, 450 mg, oral, q48h    Assessment/Plan   Principal Problem:    Kidney transplant recipient  Active Problems:  Patient Active Problem List   Diagnosis    Astigmatism    Benign hypertension     Bilateral renal cysts    Chronic kidney disease (CKD) stage G4/A1, severely decreased glomerular filtration rate (GFR) between 15-29 mL/min/1.73 square meter and albuminuria creatinine ratio less than 30 mg/g (CMS/HC* (Multi)    CKD (chronic kidney disease)    Chronic right shoulder pain    Combined form of age-related cataract, both eyes    Essential hypertension    Glaucoma suspect of both eyes    Gout    Complex renal cyst    Fibroids    History of hypercholesterolemia    Hyperlipidemia    Hyperopia    Myopia with presbyopia    Mitral valve regurgitation    Hepatic steatosis    Intermittent urinary incontinence    Obstructive sleep apnea, adult    Onycholysis of toenail    Onychomycosis of toenail    Pre-ulcerative corn or callous    Pruritus    PVD (posterior vitreous detachment), both eyes    Sleep apnea    Toe deformity, right    Nocturia    CKD stage 4 secondary to hypertension (Multi)    History of anemia due to CKD    Cellulitis of right leg    Health care maintenance    Venous stasis dermatitis of both lower extremities    Diabetes mellitus screening    Screening for cardiovascular condition    Need for vaccination    Asymptomatic menopausal state    Encounter for screening mammogram for breast cancer    Pre-transplant evaluation for kidney transplant    Chronic fatigue    Long toenail    Hyperopia, bilateral    Presbyopia    Chronic diastolic heart failure    Preoperative exam for gynecologic surgery    Screening cholesterol level    Asymptomatic menopause    Visit for screening mammogram    Colon cancer screening    Kidney replaced by transplant ([facility])    ESRD (end stage renal disease) (Multi)      - Completed Thymoglobulin 3 mg/kg  - Plan weekly DSA monitoring x 4 weeks  - No further Belatacept  - Tacrolimus goal 6-7 ng/mL  - Solumedrol taper  - Heparin 5000U subcutaneous BID  - Discontinue Patel with PVRs  - ASA/Coreg  - PT/OT  - Renal diet    I spent 35 minutes in the professional and overall care  of this patient, including immunosuppression management.    Brooke Cosby MD, PHD, MPH, FACS  Chief Transplant and Hepatobiliary Surgery

## 2024-10-23 NOTE — PROGRESS NOTES
Transplant Nephrology progress note     Date of admission: 10/20/2024     Liz Hamlin is a 79 y.o.  with [facility]   Past Medical History:   Diagnosis Date    CKD (chronic kidney disease)     Encounter for general adult medical examination without abnormal findings 09/14/2021    Encounter for Medicare annual wellness exam    Glaucoma     Gout     Hyperlipidemia     Hypertension     Mitral valve regurgitation     JOSE E (obstructive sleep apnea)     Unspecified cataract     Cataracts, both eyes        SUBJECTIVE:    Complaint of some abdominal discomfort otherwise passing gas denied any bowel movement so far.  Put in last 24 hours is almost 2.7 L.  Her sister at bedside  PROBLEM LIST:  Principal Problem:    Kidney replaced by transplant ([facility])  Active Problems:    ESRD (end stage renal disease) (Multi)         ALLERGIES:  Allergies   Allergen Reactions    Amlodipine Swelling     Edema    Codeine Itching    Erythromycin Itching    Nsaids (Non-Steroidal Anti-Inflammatory Drug) Itching and Nausea Only     chronic renal insufficiency    Tramadol Itching and Nausea/vomiting    Lisinopril Cough     Cough            CURRENT MEDICATIONS:  Scheduled medications  acetaminophen, 650 mg, oral, Once  aspirin, 81 mg, oral, Daily  atorvastatin, 40 mg, oral, Daily  carvedilol, 50 mg, oral, BID  clotrimazole, 10 mg, oral, TID after meals  cyanocobalamin, 1,000 mcg, oral, Daily  folic acid, 1 mg, oral, Daily  heparin, 5,000 Units, subcutaneous, q12h  hydrALAZINE, 100 mg, oral, BID  insulin lispro, 0-10 Units, subcutaneous, TID  iron sucrose, 200 mg, intravenous, Daily  mycophenolate, 1,000 mg, oral, q12h  oxygen, , inhalation, Continuous - Inhalation  pantoprazole, 40 mg, oral, BID AC  polyethylene glycol, 17 g, oral, Daily  potassium phosphate (monobasic), 500 mg, oral, 4x daily  [START ON 10/24/2024] predniSONE, 20 mg, oral, Daily  remdesivir, 100 mg, intravenous, q24h  sennosides-docusate sodium, 1 tablet, oral, BID  sodium  "bicarbonate, 650 mg, oral, BID  sulfamethoxazole-trimethoprim, 80 mg of trimethoprim, oral, Daily  tacrolimus, 1 mg, oral, q12h NATALIA  valGANciclovir, 450 mg, oral, q24h      Continuous medications     PRN medications  PRN medications: acetaminophen, bisacodyl, dextrose, dextrose, glucagon, glucagon, hydrALAZINE, methocarbamol, naloxone, ondansetron ODT **OR** ondansetron       OBJECTIVE:    VITALS: Visit Vitals  /72   Pulse 98   Temp 36.7 °C (98.1 °F)   Resp 18   Ht 1.676 m (5' 6\")   Wt 90.9 kg (200 lb 6.4 oz)   LMP  (LMP Unknown)   SpO2 94%   BMI 32.35 kg/m²   OB Status Postmenopausal   Smoking Status Never   BSA 2.06 m²        General: No distress   Mucosa moist   AI, AC, AF     HEENT: PEERLA  CVS: S1 S2 no murmurs  RESP:  Lungs clear to auscultation   ABDO: Soft, surgical site looks clean with staples intact and drain in place  Neuro: A + O x 3  Skin: No rash   Extremities: No edema       LABS:  Results from last 72 hours   Lab Units 10/23/24  0555   WBC AUTO x10*3/uL 7.0   HEMOGLOBIN g/dL 9.6*   MCV fL 89   PLATELETS AUTO x10*3/uL 79*   BUN mg/dL 35*   CREATININE mg/dL 1.08*   CALCIUM mg/dL 8.5*            Intake/Output Summary (Last 24 hours) at 10/23/2024 1136  Last data filed at 10/23/2024 0614  Gross per 24 hour   Intake --   Output 2225 ml   Net -2225 ml          ASSESSMENT AND PLAN:    Liz Hamlin is a 79 y.o. female who is predialysis -CKD -4 ,JOSE E,hepatic steatosis , who underwent a kidney transplant surgery on 10/20/2024 (Kidney) and was hospitalized for kidney transplant surgery.  Kidney info: KDPI: 86%,Pra :71%,HCV D-/R-,EBVD+/R+,CMV D+/R-,HBV negative,Toxo Neg.    Allograft function:  -Improving kidney function with good urine output.  No significant electrolyte abnormalities.  Ultrasound transplant is unremarkable.  Will monitor kidney function closely.    Immunosuppression:  -Patient have B-cell and T-cell crossmatch positive.  Considering her age we will continue with the thymoglobulin 3 " mg/kg and transition from belatacept to tacrolimus aiming levels 6-7 tacrolimus levels.  -Continue with the 1 g twice daily mycophenolate and prednisone tapering    Infectious prophylaxis:  -COVID-positive asymptomatic ID recommended remdesivir which started on 10/22.  -Will consider clotrimazole, Bactrim and Valcyte prophylaxis.    Hemodynamics: Blood pressures mildly elevated this morning.  Increased Coreg to 50 twice daily , started hydralazine 100 twice daily.    Anemia/leukopenia:  -Hemoglobin seems to be stable.  -Seems iron deficient started on IV iron infusion daily for 5 days 200 mg starting 10/22/2024, also started on B12 and folic acid supplementation.    Bone mineral disease: .  Calcium and phosphorus levels are optimal.      Thank you for consulting .  Franklin Blake MD       Notes created by Rudy -Please excuse the Typos .

## 2024-10-23 NOTE — PROGRESS NOTES
10/21/24 1200   Discharge Planning   Living Arrangements Family members   Support Systems Spouse/significant other   Assistance Needed no   Type of Residence Private residence   Number of Stairs to Enter Residence 0   Number of Stairs Within Residence 0   Do you have animals or pets at home? No   Who is requesting discharge planning? Provider   Home or Post Acute Services None   Expected Discharge Disposition Home   Does the patient need discharge transport arranged? No   Financial Resource Strain   How hard is it for you to pay for the very basics like food, housing, medical care, and heating? Not hard   Housing Stability   In the last 12 months, was there a time when you were not able to pay the mortgage or rent on time? N   In the past 12 months, how many times have you moved where you were living? 0   At any time in the past 12 months, were you homeless or living in a shelter (including now)? N   Transportation Needs   In the past 12 months, has lack of transportation kept you from medical appointments or from getting medications? no   In the past 12 months, has lack of transportation kept you from meetings, work, or from getting things needed for daily living? No     Assessment Note:  Met with patient and Introduced myself as care coordinator and member of the Care Transitions team for discharge planning. Pt feels safe at home.   Transportation: Patient has ride home at time of discharge.  Pharmacy: [facility] on [address] in [place].  DME: No  Previous home care: No  Falls: No  PCP: Dr. Maddie Wolf      Confirmed patients address, phone number and emergency contact. All questions and concerns addressed. Will continue to follow for discharge needs.    Transitional Care Coordination Progress Note:  Patient discussed during interdisciplinary rounds.   Team members present: MD and TCC  Plan per Medical/Surgical team: Patient being treated for COVID.  Payor: Medicare  Discharge  disposition: Home with [facility].  Potential Barriers: None  ADOD: 10/24/24

## 2024-10-24 LAB
ALBUMIN SERPL BCP-MCNC: 2.9 G/DL (ref 3.4–5)
ANION GAP SERPL CALC-SCNC: 12 MMOL/L (ref 10–20)
BASOPHILS # BLD AUTO: 0 X10*3/UL (ref 0–0.1)
BASOPHILS NFR BLD AUTO: 0 %
BUN SERPL-MCNC: 38 MG/DL (ref 6–23)
CALCIUM SERPL-MCNC: 8.4 MG/DL (ref 8.6–10.6)
CHLORIDE SERPL-SCNC: 109 MMOL/L (ref 98–107)
CO2 SERPL-SCNC: 23 MMOL/L (ref 21–32)
CREAT SERPL-MCNC: 1.1 MG/DL (ref 0.5–1.05)
EGFRCR SERPLBLD CKD-EPI 2021: 52 ML/MIN/1.73M*2
EOSINOPHIL # BLD AUTO: 0 X10*3/UL (ref 0–0.4)
EOSINOPHIL NFR BLD AUTO: 0 %
ERYTHROCYTE [DISTWIDTH] IN BLOOD BY AUTOMATED COUNT: 17.9 % (ref 11.5–14.5)
GLUCOSE BLD MANUAL STRIP-MCNC: 131 MG/DL (ref 74–99)
GLUCOSE BLD MANUAL STRIP-MCNC: 135 MG/DL (ref 74–99)
GLUCOSE BLD MANUAL STRIP-MCNC: 148 MG/DL (ref 74–99)
GLUCOSE SERPL-MCNC: 113 MG/DL (ref 74–99)
HCT VFR BLD AUTO: 26.4 % (ref 36–46)
HGB BLD-MCNC: 8.7 G/DL (ref 12–16)
IMM GRANULOCYTES # BLD AUTO: 0.02 X10*3/UL (ref 0–0.5)
IMM GRANULOCYTES NFR BLD AUTO: 0.3 % (ref 0–0.9)
LYMPHOCYTES # BLD AUTO: 0.28 X10*3/UL (ref 0.8–3)
LYMPHOCYTES NFR BLD AUTO: 4.6 %
MAGNESIUM SERPL-MCNC: 2.16 MG/DL (ref 1.6–2.4)
MCH RBC QN AUTO: 29.1 PG (ref 26–34)
MCHC RBC AUTO-ENTMCNC: 33 G/DL (ref 32–36)
MCV RBC AUTO: 88 FL (ref 80–100)
MONOCYTES # BLD AUTO: 0.36 X10*3/UL (ref 0.05–0.8)
MONOCYTES NFR BLD AUTO: 5.9 %
NEUTROPHILS # BLD AUTO: 5.46 X10*3/UL (ref 1.6–5.5)
NEUTROPHILS NFR BLD AUTO: 89.2 %
NRBC BLD-RTO: 0 /100 WBCS (ref 0–0)
PHOSPHATE SERPL-MCNC: 2.1 MG/DL (ref 2.5–4.9)
PLATELET # BLD AUTO: 66 X10*3/UL (ref 150–450)
POTASSIUM SERPL-SCNC: 3.5 MMOL/L (ref 3.5–5.3)
RBC # BLD AUTO: 2.99 X10*6/UL (ref 4–5.2)
SODIUM SERPL-SCNC: 140 MMOL/L (ref 136–145)
TACROLIMUS BLD-MCNC: 5.9 NG/ML
WBC # BLD AUTO: 6.1 X10*3/UL (ref 4.4–11.3)

## 2024-10-24 PROCEDURE — 2500000004 HC RX 250 GENERAL PHARMACY W/ HCPCS (ALT 636 FOR OP/ED)

## 2024-10-24 PROCEDURE — 82947 ASSAY GLUCOSE BLOOD QUANT: CPT

## 2024-10-24 PROCEDURE — 99232 SBSQ HOSP IP/OBS MODERATE 35: CPT | Performed by: SURGERY

## 2024-10-24 PROCEDURE — 2500000002 HC RX 250 W HCPCS SELF ADMINISTERED DRUGS (ALT 637 FOR MEDICARE OP, ALT 636 FOR OP/ED)

## 2024-10-24 PROCEDURE — 83735 ASSAY OF MAGNESIUM: CPT

## 2024-10-24 PROCEDURE — 36415 COLL VENOUS BLD VENIPUNCTURE: CPT

## 2024-10-24 PROCEDURE — 85025 COMPLETE CBC W/AUTO DIFF WBC: CPT

## 2024-10-24 PROCEDURE — 2500000001 HC RX 250 WO HCPCS SELF ADMINISTERED DRUGS (ALT 637 FOR MEDICARE OP): Performed by: STUDENT IN AN ORGANIZED HEALTH CARE EDUCATION/TRAINING PROGRAM

## 2024-10-24 PROCEDURE — 2500000001 HC RX 250 WO HCPCS SELF ADMINISTERED DRUGS (ALT 637 FOR MEDICARE OP)

## 2024-10-24 PROCEDURE — 80197 ASSAY OF TACROLIMUS: CPT

## 2024-10-24 PROCEDURE — 99233 SBSQ HOSP IP/OBS HIGH 50: CPT | Performed by: HOSPITALIST

## 2024-10-24 PROCEDURE — 97535 SELF CARE MNGMENT TRAINING: CPT | Mod: GO

## 2024-10-24 PROCEDURE — RXMED WILLOW AMBULATORY MEDICATION CHARGE

## 2024-10-24 PROCEDURE — 99222 1ST HOSP IP/OBS MODERATE 55: CPT | Performed by: STUDENT IN AN ORGANIZED HEALTH CARE EDUCATION/TRAINING PROGRAM

## 2024-10-24 PROCEDURE — 1200000002 HC GENERAL ROOM WITH TELEMETRY DAILY

## 2024-10-24 PROCEDURE — 97530 THERAPEUTIC ACTIVITIES: CPT | Mod: GO

## 2024-10-24 PROCEDURE — 2500000004 HC RX 250 GENERAL PHARMACY W/ HCPCS (ALT 636 FOR OP/ED): Performed by: STUDENT IN AN ORGANIZED HEALTH CARE EDUCATION/TRAINING PROGRAM

## 2024-10-24 PROCEDURE — 80069 RENAL FUNCTION PANEL: CPT

## 2024-10-24 PROCEDURE — 2500000005 HC RX 250 GENERAL PHARMACY W/O HCPCS

## 2024-10-24 RX ORDER — FOLIC ACID 1 MG/1
1 TABLET ORAL DAILY
Qty: 30 TABLET | Refills: 0 | Status: SHIPPED | OUTPATIENT
Start: 2024-10-25 | End: 2024-10-28 | Stop reason: ALTCHOICE

## 2024-10-24 RX ORDER — VALGANCICLOVIR 450 MG/1
450 TABLET, FILM COATED ORAL EVERY 24 HOURS
Qty: 30 TABLET | Refills: 0 | Status: SHIPPED | OUTPATIENT
Start: 2024-10-25 | End: 2024-10-28 | Stop reason: SDUPTHER

## 2024-10-24 RX ORDER — CARVEDILOL 25 MG/1
50 TABLET ORAL 2 TIMES DAILY
Qty: 120 TABLET | Refills: 0 | Status: SHIPPED | OUTPATIENT
Start: 2024-10-24 | End: 2024-10-28 | Stop reason: SDUPTHER

## 2024-10-24 RX ORDER — PREDNISONE 5 MG/1
20 TABLET ORAL DAILY
Qty: 120 TABLET | Refills: 0 | Status: SHIPPED | OUTPATIENT
Start: 2024-10-24 | End: 2024-10-28 | Stop reason: SDUPTHER

## 2024-10-24 RX ORDER — METHOCARBAMOL 500 MG/1
500 TABLET, FILM COATED ORAL EVERY 8 HOURS PRN
Qty: 15 TABLET | Refills: 0 | Status: SHIPPED | OUTPATIENT
Start: 2024-10-24 | End: 2024-10-30

## 2024-10-24 RX ADMIN — PREDNISONE 20 MG: 20 TABLET ORAL at 09:09

## 2024-10-24 RX ADMIN — PANTOPRAZOLE SODIUM 40 MG: 40 TABLET, DELAYED RELEASE ORAL at 05:59

## 2024-10-24 RX ADMIN — ASPIRIN 81 MG: 81 TABLET, COATED ORAL at 09:09

## 2024-10-24 RX ADMIN — FOLIC ACID 1 MG: 1 TABLET ORAL at 09:09

## 2024-10-24 RX ADMIN — SENNOSIDES AND DOCUSATE SODIUM 1 TABLET: 50; 8.6 TABLET ORAL at 20:59

## 2024-10-24 RX ADMIN — POTASSIUM PHOSPHATE, MONOBASIC 1000 MG: 500 TABLET, SOLUBLE ORAL at 17:49

## 2024-10-24 RX ADMIN — ACETAMINOPHEN 650 MG: 325 TABLET ORAL at 17:49

## 2024-10-24 RX ADMIN — SENNOSIDES AND DOCUSATE SODIUM 1 TABLET: 50; 8.6 TABLET ORAL at 09:09

## 2024-10-24 RX ADMIN — POTASSIUM PHOSPHATE, MONOBASIC 1000 MG: 500 TABLET, SOLUBLE ORAL at 13:30

## 2024-10-24 RX ADMIN — HEPARIN SODIUM 5000 UNITS: 5000 INJECTION, SOLUTION INTRAVENOUS; SUBCUTANEOUS at 09:10

## 2024-10-24 RX ADMIN — CLOTRIMAZOLE 10 MG: 10 LOZENGE ORAL at 09:09

## 2024-10-24 RX ADMIN — VALGANCICLOVIR 450 MG: 450 TABLET, FILM COATED ORAL at 09:09

## 2024-10-24 RX ADMIN — CARVEDILOL 50 MG: 25 TABLET, FILM COATED ORAL at 20:59

## 2024-10-24 RX ADMIN — HYDRALAZINE HYDROCHLORIDE 100 MG: 50 TABLET ORAL at 20:59

## 2024-10-24 RX ADMIN — CLOTRIMAZOLE 10 MG: 10 LOZENGE ORAL at 13:30

## 2024-10-24 RX ADMIN — Medication 1 L/MIN: at 20:00

## 2024-10-24 RX ADMIN — MYCOPHENOLATE MOFETIL 1000 MG: 250 CAPSULE ORAL at 05:59

## 2024-10-24 RX ADMIN — ATORVASTATIN CALCIUM 40 MG: 40 TABLET, FILM COATED ORAL at 09:09

## 2024-10-24 RX ADMIN — POTASSIUM PHOSPHATE, MONOBASIC 1000 MG: 500 TABLET, SOLUBLE ORAL at 20:58

## 2024-10-24 RX ADMIN — CLOTRIMAZOLE 10 MG: 10 LOZENGE ORAL at 17:49

## 2024-10-24 RX ADMIN — CARVEDILOL 50 MG: 25 TABLET, FILM COATED ORAL at 09:09

## 2024-10-24 RX ADMIN — IRON SUCROSE 200 MG: 20 INJECTION, SOLUTION INTRAVENOUS at 09:10

## 2024-10-24 RX ADMIN — TACROLIMUS 1 MG: 1 CAPSULE ORAL at 17:49

## 2024-10-24 RX ADMIN — SULFAMETHOXAZOLE AND TRIMETHOPRIM 80 MG OF TRIMETHOPRIM: 400; 80 TABLET ORAL at 09:09

## 2024-10-24 RX ADMIN — PANTOPRAZOLE SODIUM 40 MG: 40 TABLET, DELAYED RELEASE ORAL at 17:49

## 2024-10-24 RX ADMIN — MYCOPHENOLATE MOFETIL 1000 MG: 250 CAPSULE ORAL at 17:48

## 2024-10-24 RX ADMIN — TACROLIMUS 1 MG: 1 CAPSULE ORAL at 05:59

## 2024-10-24 RX ADMIN — ACETAMINOPHEN 650 MG: 325 TABLET ORAL at 05:59

## 2024-10-24 RX ADMIN — CYANOCOBALAMIN TAB 1000 MCG 1000 MCG: 1000 TAB at 09:09

## 2024-10-24 RX ADMIN — HYDRALAZINE HYDROCHLORIDE 100 MG: 50 TABLET ORAL at 09:09

## 2024-10-24 RX ADMIN — Medication 0 L/MIN: at 09:10

## 2024-10-24 RX ADMIN — REMDESIVIR 100 MG: 100 INJECTION, POWDER, LYOPHILIZED, FOR SOLUTION INTRAVENOUS at 20:59

## 2024-10-24 RX ADMIN — HEPARIN SODIUM 5000 UNITS: 5000 INJECTION, SOLUTION INTRAVENOUS; SUBCUTANEOUS at 22:03

## 2024-10-24 ASSESSMENT — COGNITIVE AND FUNCTIONAL STATUS - GENERAL
DRESSING REGULAR LOWER BODY CLOTHING: A LITTLE
DAILY ACTIVITIY SCORE: 19
MOVING TO AND FROM BED TO CHAIR: A LITTLE
HELP NEEDED FOR BATHING: A LITTLE
CLIMB 3 TO 5 STEPS WITH RAILING: A LITTLE
DRESSING REGULAR LOWER BODY CLOTHING: A LITTLE
PERSONAL GROOMING: A LITTLE
HELP NEEDED FOR BATHING: A LOT
WALKING IN HOSPITAL ROOM: A LITTLE
TOILETING: A LITTLE
TURNING FROM BACK TO SIDE WHILE IN FLAT BAD: A LITTLE
MOBILITY SCORE: 18
PERSONAL GROOMING: A LITTLE
DAILY ACTIVITIY SCORE: 19
STANDING UP FROM CHAIR USING ARMS: A LITTLE
DRESSING REGULAR UPPER BODY CLOTHING: A LITTLE
HELP NEEDED FOR BATHING: A LITTLE
TURNING FROM BACK TO SIDE WHILE IN FLAT BAD: A LITTLE
DRESSING REGULAR UPPER BODY CLOTHING: A LITTLE
MOVING FROM LYING ON BACK TO SITTING ON SIDE OF FLAT BED WITH BEDRAILS: A LITTLE
MOVING TO AND FROM BED TO CHAIR: A LITTLE
STANDING UP FROM CHAIR USING ARMS: A LITTLE
TOILETING: A LITTLE
PERSONAL GROOMING: A LITTLE
DRESSING REGULAR LOWER BODY CLOTHING: A LITTLE
DRESSING REGULAR UPPER BODY CLOTHING: A LITTLE
MOBILITY SCORE: 18
MOVING FROM LYING ON BACK TO SITTING ON SIDE OF FLAT BED WITH BEDRAILS: A LITTLE
CLIMB 3 TO 5 STEPS WITH RAILING: A LITTLE
DAILY ACTIVITIY SCORE: 18
WALKING IN HOSPITAL ROOM: A LITTLE
TOILETING: A LITTLE

## 2024-10-24 ASSESSMENT — ACTIVITIES OF DAILY LIVING (ADL): HOME_MANAGEMENT_TIME_ENTRY: 23

## 2024-10-24 ASSESSMENT — PAIN SCALES - GENERAL
PAINLEVEL_OUTOF10: 0 - NO PAIN

## 2024-10-24 ASSESSMENT — PAIN - FUNCTIONAL ASSESSMENT
PAIN_FUNCTIONAL_ASSESSMENT: 0-10
PAIN_FUNCTIONAL_ASSESSMENT: 0-10

## 2024-10-24 NOTE — CARE PLAN
The patient's goals for the shift include get kidney    The clinical goals for the shift include Voiding appropriately, no incidence of fall

## 2024-10-24 NOTE — PROGRESS NOTES
HISTORY AND PHYSICAL             Date: 1/10/2022        Patient Name: Todd Grover     YOB: 1997      Age:  27 y.o. Chief Complaint     Chief Complaint   Patient presents with    Psychiatric Evaluation    Suicidal          History Obtained From   electronic medical record, reason patient could not give history:  altered mental status    History of Present Illness     Todd Grover is a 27year old male with a history of admission to psychiatric unit with agitation and psychosis in the context of drug use. He was brought to the ER with agitation, delusions and threats of violence. He was found to be positive for COVID, so he was admitted to our Psych/COVID unit. After admission, he required emergency IM medications due to assaultive behavior toward staff, threats to kill staff and find their families. This AM, he is able to be wakened briefly, answers a few questions, but fall back to sleep. He says he feels \"better,\", that \"I got mad at my Dad,\" and that he wants to speak to his father today. He still believes his family is out to harm him. He refuses to take medications because he doesn't think he needs them. He denies suicidal or homicidal ideation. 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 In 2018, he got a DUI and was diagnosed with depression, and received treatment for it as an outpatient. In 2020, he was admitted to this hospital with agitation and psychosis. According to the medication reconciliation, he is receiving medications as on outpatient, but patient will not state where he receives care.   His prescriptions have been filled in [facility] progress note     Date of admission: 10/20/2024     Liz Hamlin is a 79 y.o.  with [facility]   Past Medical History:   Diagnosis Date    CKD (chronic kidney disease)     Encounter for general adult medical examination without abnormal findings 09/14/2021    Encounter for Medicare annual wellness exam    Glaucoma     Gout     Hyperlipidemia     Hypertension     Mitral valve regurgitation     JOSE E (obstructive sleep apnea)     Unspecified cataract     Cataracts, both eyes        SUBJECTIVE:    Denied any complaints this morning.  Maintaining good urine output    PROBLEM LIST:  Principal Problem:    Kidney replaced by transplant ([facility])  Active Problems:    ESRD (end stage renal disease) ([facility])         ALLERGIES:  Allergies   Allergen Reactions    Amlodipine Swelling     Edema    Codeine Itching    Erythromycin Itching    Nsaids (Non-Steroidal Anti-Inflammatory Drug) Itching and Nausea Only     chronic renal insufficiency    Tramadol Itching and Nausea/vomiting    Lisinopril Cough     Cough            CURRENT MEDICATIONS:  Scheduled medications  acetaminophen, 650 mg, oral, Once  aspirin, 81 mg, oral, Daily  atorvastatin, 40 mg, oral, Daily  carvedilol, 50 mg, oral, BID  clotrimazole, 10 mg, oral, TID after meals  folic acid, 1 mg, oral, Daily  heparin, 5,000 Units, subcutaneous, q12h  hydrALAZINE, 100 mg, oral, BID  insulin lispro, 0-10 Units, subcutaneous, TID  iron sucrose, 200 mg, intravenous, Daily  mycophenolate, 1,000 mg, oral, q12h  oxygen, , inhalation, Continuous - Inhalation  pantoprazole, 40 mg, oral, BID AC  polyethylene glycol, 17 g, oral, Daily  potassium phosphate (monobasic), 1,000 mg, oral, 4x daily  predniSONE, 20 mg, oral, Daily  remdesivir, 100 mg, intravenous, q24h  sennosides-docusate sodium, 1 tablet, oral, BID  sulfamethoxazole-trimethoprim, 80 mg of trimethoprim, oral, Daily  tacrolimus, 1 mg, oral, q12h NATALIA  valGANciclovir, 450 mg, oral, q24h      Continuous medications    "  PRN medications  PRN medications: acetaminophen, bisacodyl, dextrose, dextrose, glucagon, glucagon, hydrALAZINE, methocarbamol, naloxone, ondansetron ODT **OR** ondansetron       OBJECTIVE:    VITALS: Visit Vitals  /64   Pulse 74   Temp 35.9 °C (96.6 °F)   Resp 19   Ht 1.676 m (5' 6\")   Wt 90.9 kg (200 lb 6.4 oz)   LMP  (LMP Unknown)   SpO2 97%   BMI 32.35 kg/m²   OB Status Postmenopausal   Smoking Status Never   BSA 2.06 m²        General: No distress   Mucosa moist   AI, AC, AF     HEENT: PEERLA  CVS: S1 S2 no murmurs  RESP:  Lungs clear to auscultation   ABDO: Soft, surgical site looks clean with staples intact and drain in place  Neuro: A + O x 3  Skin: No rash   Extremities: No edema       LABS:  Results from last 72 hours   Lab Units 10/24/24  0605   WBC AUTO x10*3/uL 6.1   HEMOGLOBIN g/dL 8.7*   MCV fL 88   PLATELETS AUTO x10*3/uL 66*   BUN mg/dL 38*   CREATININE mg/dL 1.10*   CALCIUM mg/dL 8.4*   TACROLIMUS ng/mL 5.9            Intake/Output Summary (Last 24 hours) at 10/24/2024 1332  Last data filed at 10/24/2024 0947  Gross per 24 hour   Intake 2160 ml   Output 1220 ml   Net 940 ml          ASSESSMENT AND PLAN:    Liz Hamlin is a 79 y.o. female who is predialysis -CKD -4 ,JOSE E,hepatic steatosis , who underwent a kidney transplant surgery on 10/20/2024 (Kidney) and was hospitalized for kidney transplant surgery.  Kidney info: KDPI: 86%,Pra :71%,HCV D-/R-,EBVD+/R+,CMV D+/R-,HBV negative,Toxo Neg., T-cell and B-cell crossmatch positive with virtual crossmatch negative    Allograft function:  -Improving kidney function with good urine output.  No significant electrolyte abnormalities.  Ultrasound transplant is unremarkable.  Will monitor kidney function closely.    Immunosuppression:  -Patient have B-cell and T-cell crossmatch positive.  Considering her age we will continue with the thymoglobulin 3 mg/kg and transition from belatacept to tacrolimus aiming levels 6-7 tacrolimus levels.  -Continue " and December of 2021. He has only had one suicide attempt, by hanging, in 2020, prior to his psychiatric hospitalization. Past Surgical History     Past Surgical History:   Procedure Laterality Date    TYMPANOSTOMY TUBE PLACEMENT          Medications Prior to Admission     Prior to Admission medications    Medication Sig Start Date End Date Taking? Authorizing Provider   meloxicam (MOBIC) 15 MG tablet Take 15 mg by mouth daily    Historical Provider, MD   lithium (LITHOBID) 300 MG extended release tablet Take 300 mg by mouth 2 times daily Take 2 tablets BID    Historical Provider, MD   QUEtiapine (SEROQUEL) 300 MG tablet Take 300 mg by mouth nightly    Historical Provider, MD   QUEtiapine (SEROQUEL) 100 MG tablet Take 100 mg by mouth 2 times daily    Historical Provider, MD   divalproex (DEPAKOTE) 500 MG DR tablet Take 500 mg by mouth 2 times daily    Historical Provider, MD   traZODone (DESYREL) 50 MG tablet Take 50 mg by mouth nightly    Historical Provider, MD   ARIPiprazole (ABILIFY) 5 MG tablet Take 5 mg by mouth daily    Historical Provider, MD        Allergies   Patient has no known allergies.     Social History     Social History     Tobacco History     Smoking Status  Current Every Day Smoker Smoking Frequency  For 3 years Smoking Tobacco Type  E-Cigarettes, Cigarettes    Smokeless Tobacco Use  Never Used    Tobacco Comment  handouts          Alcohol History     Alcohol Use Status  Yes Comment  socially          Drug Use     Drug Use Status  Not Currently Types  Cocaine, Marijuana (Weed), Methamphetamines (Crystal Meth) Comment  LSD, Marijuana          Sexual Activity     Sexually Active  Yes                Family History     Family History   Problem Relation Age of Onset    Seizures Mother     Asthma Father        Review of Systems   Review of Systems   Unable to perform ROS: Mental status change       Physical Exam   /70   Pulse 79   Temp 97.8 °F (36.6 °C) (Temporal)   Resp 20   Ht 5' 8\" (1.727 m)   Wt 240 lb (108.9 kg)   SpO2 97%   BMI 36.49 kg/m²     Physical Exam  Vitals and nursing note reviewed. HENT:      Mouth/Throat:      Mouth: Mucous membranes are dry. Neurological:      Mental Status: He is disoriented. Psychiatric:         Attention and Perception: He is inattentive. Mood and Affect: Affect is blunt. Speech: Speech is slurred. Behavior: Behavior is uncooperative. Cognition and Memory: Cognition is impaired. Memory is impaired. He exhibits impaired recent memory and impaired remote memory. Judgment: Judgment is impulsive and inappropriate. Comments:  Thought Content:  Paranoid delusions           Labs      Recent Results (from the past 24 hour(s))   Urine Drug Screen    Collection Time: 01/09/22  4:50 PM   Result Value Ref Range    Amphetamine Screen, Urine Neg Negative <1000ng/mL    Barbiturate Screen, Ur Neg Negative <200 ng/mL    Benzodiazepine Screen, Urine Neg Negative <200 ng/mL    Cannabinoid Scrn, Ur POSITIVE (A) Negative <50 ng/mL    Cocaine Metabolite Screen, Urine Neg Negative <300 ng/mL    Opiate Scrn, Ur Neg Negative <300 ng/mL    PCP Screen, Urine Neg Negative <25 ng/mL    Methadone Screen, Urine Neg Negative <300 ng/mL    Propoxyphene Scrn, Ur Neg Negative <300 ng/mL    Oxycodone Urine Neg Negative <100 ng/ml    pH, UA 5.0     Drug Screen Comment: see below    CBC Auto Differential    Collection Time: 01/09/22  5:00 PM   Result Value Ref Range    WBC 16.1 (H) 4.0 - 11.0 K/uL    RBC 5.42 4.20 - 5.90 M/uL    Hemoglobin 16.7 13.5 - 17.5 g/dL    Hematocrit 48.7 40.5 - 52.5 %    MCV 89.8 80.0 - 100.0 fL    MCH 30.7 26.0 - 34.0 pg    MCHC 34.2 31.0 - 36.0 g/dL    RDW 13.3 12.4 - 15.4 %    Platelets 196 061 - 934 K/uL    MPV 7.8 5.0 - 10.5 fL    Neutrophils % 81.2 %    Lymphocytes % 9.7 %    Monocytes % 8.4 %    Eosinophils % 0.2 %    Basophils % 0.5 %    Neutrophils Absolute 13.0 (H) 1.7 - 7.7 K/uL    Lymphocytes Absolute 1.6 with the 1 g twice daily mycophenolate and prednisone tapering    Infectious prophylaxis:  -COVID-positive asymptomatic ID recommended remdesivir which started on 10/22.  -Will continue  clotrimazole, Bactrim and Valcyte prophylaxis.    Hemodynamics: Blood pressures optimal this morning.  Increased Coreg to 50 twice daily , started hydralazine 100 twice daily.    Anemia/leukopenia:  -Mild drop in the hemoglobin noticed will change IV on IV to pills on discharge.    Bone mineral disease: .  Calcium and phosphorus levels are optimal.  Continue with the Phos supplementation.      Thank you for consulting .  Franklin Blake MD       Notes created by Rudy -Please excuse the Typos .                1.0 - 5.1 K/uL    Monocytes Absolute 1.4 (H) 0.0 - 1.3 K/uL    Eosinophils Absolute 0.0 0.0 - 0.6 K/uL    Basophils Absolute 0.1 0.0 - 0.2 K/uL   Comprehensive Metabolic Panel w/ Reflex to MG    Collection Time: 01/09/22  5:00 PM   Result Value Ref Range    Sodium 139 136 - 145 mmol/L    Potassium reflex Magnesium 3.8 3.5 - 5.1 mmol/L    Chloride 103 99 - 110 mmol/L    CO2 20 (L) 21 - 32 mmol/L    Anion Gap 16 3 - 16    Glucose 112 (H) 70 - 99 mg/dL    BUN 7 7 - 20 mg/dL    CREATININE 0.9 0.9 - 1.3 mg/dL    GFR Non-African American >60 >60    GFR African American >60 >60    Calcium 10.1 8.3 - 10.6 mg/dL    Total Protein 7.8 6.4 - 8.2 g/dL    Albumin 5.0 3.4 - 5.0 g/dL    Albumin/Globulin Ratio 1.8 1.1 - 2.2    Total Bilirubin 0.8 0.0 - 1.0 mg/dL    Alkaline Phosphatase 93 40 - 129 U/L    ALT 42 (H) 10 - 40 U/L    AST 33 15 - 37 U/L   Ethanol    Collection Time: 01/09/22  5:00 PM   Result Value Ref Range    Ethanol Lvl None Detected mg/dL   Salicylate    Collection Time: 01/09/22  5:00 PM   Result Value Ref Range    Salicylate, Serum <4.8 (L) 15.0 - 30.0 mg/dL   Acetaminophen Level    Collection Time: 01/09/22  5:00 PM   Result Value Ref Range    Acetaminophen Level <5 (L) 10 - 30 ug/mL   COVID-19 & Influenza Combo    Collection Time: 01/09/22  5:30 PM    Specimen: Nasopharyngeal Swab   Result Value Ref Range    SARS-CoV-2 RNA, RT PCR DETECTED (A) NOT DETECTED    INFLUENZA A NOT DETECTED NOT DETECTED    INFLUENZA B NOT DETECTED NOT DETECTED        Imaging/Diagnostics Last 24 Hours   No results found. Assessment      Hospital Problems           Last Modified POA    * (Principal) Psychotic disorder ([facility] 1/10/2022 Yes    ADHD (attention deficit hyperactivity disorder) 1/10/2022 Yes    Cannabis use disorder, severe, dependence ([facility] 1/10/2022 Yes    Amphetamine user ([facility] 1/10/2022 Yes    Tobacco use disorder 1/10/2022 Yes    Cocaine use disorder ([facility] 1/10/2022 Yes          Plan   1.  Restart Depakote, Lithium, Seroquel. 2. Hold Trazodone, Abilify, Mobic  3. Patient admitted to COVID/Psych unit.   4. Will be invited to unit groups, SHRADDHA aponte.    Consultations Ordered:  None    Electronically signed by Dakota Little MD on 1/10/22 at 10:34 AM EST WDL

## 2024-10-24 NOTE — CONSULTS
Inpatient consult to Endocrinology  Consult performed by: Megan Lorenzo MD  Consult ordered by: Danika Prater, APRN-CNP  Reason for consult: hyperglycemia on steroids      Reason For Consult  Hyperglycemia on steroids     History Of Present Illness  Liz Hamlin is a 79 y.o. female with PMHx of longstanding hypertension, JOSE E, renal cyst, hepatic steatosis and CKD stage IV s/p  donor kidney transplant (10/20/2024) during current admission.  Patient has been on immunosuppression and corticosteroids post kidney transplant.  Patient does not have history of diabetes.  A1c done on 2024 is 5.6 suggestive of prediabetes.  Of note, was on Farxiga for CKD management before transplant.  While inpatient, patient has been on sliding scale #2 with minimal requirements.  Prednisone dose has been reduced to 20 mg daily today (10/24).  Endocrine service is consulted for management of hyperglycemia in setting of steroid use for immunosuppression post DDKT.      Blood glucose trends reviewed.     Past Medical History  She has a past medical history of CKD (chronic kidney disease), Encounter for general adult medical examination without abnormal findings (2021), Glaucoma, Gout, Hyperlipidemia, Hypertension, Mitral valve regurgitation, JOSE E (obstructive sleep apnea), and Unspecified cataract.    Surgical History  She has a past surgical history that includes Other surgical history (2019); Other surgical history (2019); and Other surgical history (2019).     Social History  She reports that she has never smoked. She has never been exposed to tobacco smoke. She has never used smokeless tobacco. She reports that she does not drink alcohol and does not use drugs.    Family History  Family History   Problem Relation Name Age of Onset    Diabetes Mother      Hypertension Mother      Diabetes Son      Colon cancer Mother's Sister      Colon cancer Father's Sister          Allergies  Amlodipine, Codeine,  "Erythromycin, Nsaids (non-steroidal anti-inflammatory drug), Tramadol, and Lisinopril    ROS, PMH, FH/SH, surgical history and allergies have been reviewed.    Last Recorded Vitals  Blood pressure 146/79, pulse 78, temperature 36.9 °C (98.4 °F), resp. rate 18, height 1.676 m (5' 6\"), weight 90.9 kg (200 lb 6.4 oz), SpO2 95%, not currently breastfeeding.  Review of Systems  All systems reviewed with the patient and they were all negative, unless noted above.     Physical Exam   General: patient in NAD  HEENT: AT/NC  Neck: trachea in midline, no thyromegaly or nodules  Resp: CTA B/L  CVS: normal s1 and s2  Abdomen: soft and non tender to palpation, BS+  Skin: warm, dry and intact  Neuro: AAO x3, DTR 2+  Psych: cooperative     Relevant Results  Results from last 7 days   Lab Units 10/24/24  1611 10/24/24  1155 10/24/24  0743 10/24/24  0605 10/23/24  2104 10/23/24  1641 10/23/24  0913 10/23/24  0555 10/22/24  0903 10/22/24  0736 10/21/24  1209 10/21/24  0924 10/21/24  0809 10/21/24  0356   POCT GLUCOSE mg/dL 148* 131* 135*  --  152* 151*   < >  --    < >  --    < >  --    < >  --    GLUCOSE mg/dL  --   --   --  113*  --   --   --  150*  --  133*  --  202*  --  180*    < > = values in this interval not displayed.     Lab Results   Component Value Date    HGBA1C 5.6 04/11/2024    HGBA1C 5.5 01/30/2024    HGBA1C 5.5 09/28/2023     10/24/2024    K 3.5 10/24/2024     (H) 10/24/2024    CO2 23 10/24/2024    BUN 38 (H) 10/24/2024    CREATININE 1.10 (H) 10/24/2024    CALCIUM 8.4 (L) 10/24/2024    ALBUMIN 2.9 (L) 10/24/2024    PROT 7.4 10/20/2024    BILITOT 0.6 10/20/2024    ALKPHOS 96 10/20/2024    ALT 16 10/20/2024    AST 19 10/20/2024    GLUCOSE 113 (H) 10/24/2024    CHOL 121 04/11/2024    TRIG 120 04/11/2024    HDL 44.3 04/11/2024    CPEPTIDE 2.9 01/30/2024      Assessment and plan:  Liz Hamlin is a 79 y.o. female with PMHx of longstanding hypertension, JOSE E, renal cyst, hepatic steatosis and CKD stage IV s/p "  donor kidney transplant (10/20/2024) during current admission.  Patient has been on immunosuppression and corticosteroids post kidney transplant.  Patient does not have history of diabetes.  A1c done on 2024 is 5.6 suggestive of prediabetes.  Of note, was on Farxiga for CKD management before transplant.  While inpatient, patient has been on sliding scale #2 with minimal requirements.  Prednisone dose has been reduced to 20 mg daily today (10/24).  Endocrine service is consulted for management of hyperglycemia in setting of steroid use for immunosuppression post DDKT.      Blood glucose trends reviewed.     Recommendations:  Continue sliding scale insulin #2 while inpatient   Continue to monitor blood glucose AC/HS  Diabetic carb consistent 60 gm/meal diet  Diabetic educator consult for continuous glucose monitor placement (CGM)   Plan to send with CGM supplies for 30 days and instructions to call their transplant coordinator nurse if glucose is measuring >200 at home   Follow up with [facility] in 1 month to review CGM data     Recommendations conveyed to primary team   Endocrinology service will sign off    The patient was seen and discussed with attending .     Megan Lorenzo MD  Endocrinology fellow

## 2024-10-24 NOTE — PROGRESS NOTES
Occupational Therapy    Occupational Therapy Treatment    Name: Liz Hamlin  MRN: [id number]  : 1945  Date: 10/24/24  Room: [address] [place] [address]      Time Calculation  Start Time: 1112  Stop Time: 1150  Time Calculation (min): 38 min    Assessment:  Evaluation/Treatment Tolerance: Patient tolerated treatment well  Medical Staff Made Aware: Yes  End of Session Communication: Bedside nurse  End of Session Patient Position: Bed, 3 rail up, Alarm off, not on at start of session, Alarm off, caregiver present  Plan:  Treatment Interventions: ADL retraining, Functional transfer training, Endurance training, Patient/family training, Equipment evaluation/education, Compensatory technique education  OT Frequency: 3 times per week  OT Discharge Recommendations: Low intensity level of continued care  Equipment Recommended upon Discharge: Wheeled walker  OT Recommended Transfer Status: Minimal assist, Assist of 1  OT - OK to Discharge: Yes    Subjective   General:  OT Last Visit  OT Received On: 10/24/24  Prior to Session Communication: Bedside nurse  Patient Position Received: Bed, 3 rail up, Alarm off, not on at start of session, Alarm off, caregiver present  Family/Caregiver Present: Yes  Caregiver Feedback:  and granddaughter present  General Comment: Pt pleasant and agreeable to OT session   Precautions:  Medical Precautions: Fall precautions  Post-Surgical Precautions: Abdominal surgery precautions  Precautions Comment: COVID precautions. All necessary PPE donned.  Vitals:  Vital Signs (Past 2hrs)        Date/Time Vitals Session Patient Position Pulse Resp SpO2 BP MAP (mmHg)    10/24/24 11:55:22 --  --  74  19  97 %  137/64  88                   Lines/Tubes/Drains:  Closed/Suction Drain 1 Lateral RLQ 10 Fr. (Active)   Number of days: 4       Ureteral Drain/Stent Left ureter (Active)   Number of days: 3       Cognition:  Overall Cognitive Status: Within Functional Limits  Orientation Level: Oriented X4    Pain  Assessment:  Pain Assessment  Pain Assessment: 0-10  0-10 (Numeric) Pain Score: 0 - No pain     Objective   Activities of Daily Living:        Grooming  Grooming Level of Assistance: Close supervision  Grooming Where Assessed: Standing sinkside  Grooming Comments: Standing to complete oral hygiene and face washing at sink, cues for improved positioning and safety, increased time to complete.    UE Bathing  UE Bathing Level of Assistance: Minimum assistance  UE Bathing Where Assessed: Standing sinkside  UE Bathing Comments: Standing at sink to wash under arms with Min A for clothing management and washing R under arm. Cues for improved overall safety.    LE Bathing  LE Bathing Comments: Standing at sink to clean yvette area with bathing cloth, cues for improved positioning, CGA - IV in pt's hand dislodged during bathing and RN informed    UE Dressing  UE Dressing Level of Assistance: Minimum assistance  UE Dressing Where Assessed: Edge of bed  UE Dressing Comments: Cues for increased IND, Min A for management over UEs         Toileting  Toileting Level of Assistance: Contact guard  Where Assessed: Toilet  Toileting Comments: CGA for safety, cues for use of grab bars    Functional Standing Tolerance:     Bed Mobility/Transfers:   Bed Mobility  Bed Mobility: Yes  Bed Mobility 1  Bed Mobility 1: Supine to sitting, Sitting to supine  Level of Assistance 1: Minimum assistance  Bed Mobility Comments 1: Min A for trunk positioning and LE management    Transfers  Transfer: Yes  Transfer 1  Transfer From 1: Sit to, Stand to  Transfer to 1: Stand, Sit  Technique 1: Sit to stand, Stand to sit  Transfer Device 1: Walker  Transfer Level of Assistance 1: Minimum assistance  Trials/Comments 1: Min A for lifting to stand, cues for positioning, bed height elevated    Toilet Transfers  Toilet Transfer From: Bed  Toilet Transfer Type: To and from  Toilet Transfer to: Standard toilet  Toilet Transfer Technique: Ambulating  Toilet  Transfers: Minimal assistance  Toilet Transfers Comments: Min A for lifting to stand from toilet, use of grab bars     Toilet Transfers  Toilet Transfer From: Bed  Toilet Transfer Type: To and from  Toilet Transfer to: Standard toilet  Toilet Transfer Technique: Ambulating  Toilet Transfers: Minimal assistance  Toilet Transfers Comments: Min A for lifting to stand from toilet, use of grab bars  Therapy/Activity:      Therapeutic Activity  Therapeutic Activity Performed: Yes  Therapeutic Activity 1: Pt performed functional mobility within room using FWW to increase standing balance, activity tolerance, and safety awareness. Cues provided throughout for safety and increased IND. Pt able to ambulate approx 50 feet before taking a seated rest break. Pt demos mild fatigue upon completion.     Outcome Measures:  [facility] Daily Activity  Putting on and taking off regular lower body clothing: A little  Bathing (including washing, rinsing, drying): A lot  Putting on and taking off regular upper body clothing: A little  Toileting, which includes using toilet, bedpan or urinal: A little  Taking care of personal grooming such as brushing teeth: A little  Eating Meals: None  Daily Activity - Total Score: 18     Education Documentation  Body Mechanics, taught by Ted Jacques OT at 10/24/2024  1:53 PM.  Learner: Patient  Readiness: Acceptance  Method: Explanation, Demonstration  Response: Verbalizes Understanding    Precautions, taught by Ted Jacques OT at 10/24/2024  1:53 PM.  Learner: Patient  Readiness: Acceptance  Method: Explanation, Demonstration  Response: Verbalizes Understanding    ADL Training, taught by Ted Jacques OT at 10/24/2024  1:53 PM.  Learner: Patient  Readiness: Acceptance  Method: Explanation, Demonstration  Response: Verbalizes Understanding    Education Comments  No comments found.    Goals:  Encounter Problems       Encounter Problems (Active)       ADLs       Patient with complete lower body dressing  with modified independent level of assistance donning and doffing all LE clothes  with PRN adaptive equipment while edge of bed  and standing (Progressing)       Start:  10/22/24    Expected End:  11/12/24            Patient will complete daily grooming tasks brushing teeth and washing face/hair with modified independent level of assistance and PRN adaptive equipment while standing. (Progressing)       Start:  10/22/24    Expected End:  11/12/24            Patient will complete toileting including hygiene clothing management/hygiene with modified independent level of assistance and raised toilet seat and grab bars. (Progressing)       Start:  10/22/24    Expected End:  11/12/24               BALANCE       Pt will maintain dynamic standing balance during ADL task with modified independent level of assistance in order to demonstrate decreased risk of falling and improved postural control. (Progressing)       Start:  10/22/24    Expected End:  11/12/24               COGNITION/SAFETY       Patient will recall and adhere to ABD precautions during all functional mobility/ADL tasks in order to demonstrate improved understanding and promote healing post op (Progressing)       Start:  10/22/24    Expected End:  11/12/24               MOBILITY       Patient will perform Functional mobility mod  Household distances/Community Distances with modified independent level of assistance and least restrictive device in order to improve safety and functional mobility. (Progressing)       Start:  10/22/24    Expected End:  11/12/24                 10/24/24 at 1:53 PM   Ted Jacques, OT   [phone]

## 2024-10-24 NOTE — CONSULTS
Inpatient Diabetes Education Consult    Reason for Visit:  Liz Hamlin is a 79 y.o. female who presents for s/p DDKT for ESRD    Consulting Service/Provider: DR. Lorenzo    Visit Type: Initial visit    Visit Modality: In-person    Discharge Equipment/Supply Needs:       Patient has supplies at home:  has no supplies; no hx DM    Patient History and Assessment:  New diagnosis:  steroid induced hyperglycemia  Previous diagnosis:  pre-DM per endocrinology fellow to patient  Patient known to Diabetes Education department: No  Treatment prior to hospital admission:  n/a  Complications:  no DM diagnosis; monitoring with A1c 5.6% and use of steroids after transplant  PTA Medications:    Current Outpatient Medications   Medication Instructions    acetaminophen (Tylenol) 325 mg tablet Take 2 tablets (650 mg) by mouth every 6 hours if needed for mild pain (1 - 3) for up to 14 days. Do not start before October 22, 2024.    allopurinol (ZYLOPRIM) 100 mg, oral, Daily    aspirin 81 mg EC tablet 1 tablet, oral, Daily    atorvastatin (LIPITOR) 40 mg, oral, Daily    B complex-vitamin C-folic acid (Nephrocaps) 1 mg capsule 1 capsule, oral, Daily    calcitriol (ROCALTROL) 0.25 mcg, oral, 3 times weekly, other    carvedilol (COREG) 50 mg, oral, 2 times daily    cholecalciferol (vitamin D3) 1,000 Units, oral, 2 times weekly    cloNIDine (CATAPRES) 0.1 mg, oral, Daily    clotrimazole (MYCELEX) 10 mg, oral, 3 times daily after meals    coenzyme Q-10 100 mg capsule oral, Take as directed by mouth    dapagliflozin propanediol (FARXIGA) 10 mg, oral, Every morning    docusate sodium (Colace) 100 mg capsule TAKE 1 CAPSULE TWICE A DAY AS NEEDED FOR CONSTIPATION    docusate sodium (COLACE) 100 mg, oral, 2 times daily PRN    ferrous sulfate, 325 mg ferrous sulfate, (FeroSuL) tablet 1 tablet, oral, Daily    [START ON 10/25/2024] folic acid (FOLVITE) 1 mg, oral, Daily    furosemide (LASIX) 40 mg, oral, Daily    hydrALAZINE (APRESOLINE) 100 mg,  oral, 2 times daily    hydrOXYzine HCL (ATARAX) 25 mg, oral, 2 times daily PRN    L. acidophilus/Bifid. animalis 32 billion cell capsule 1 capsule, oral, Daily    losartan (COZAAR) 100 mg, oral, Daily, Need MD appointment for refills    metoprolol succinate XL (TOPROL-XL) 50 mg, oral, Daily, Do not crush or chew.     mycophenolate (CELLCEPT) 1,000 mg, oral, Every 12 hours    pantoprazole (PROTONIX) 40 mg, oral, Daily before breakfast, Do not crush, chew, or split.    polyethylene glycol (GLYCOLAX, MIRALAX) 17 g, oral, Daily PRN    potassium phosphate (monobasic) (K-PHOS) 1,000 mg, oral, 4 times daily    predniSONE (Deltasone) 20 mg tablet Take 1 tablet (20 mg) by mouth once daily. Do not start before October 24, 2024.    sodium bicarbonate 1,300 mg, oral, 2 times daily    sulfamethoxazole-trimethoprim (Bactrim) 400-80 mg tablet 1 tablet, oral, Daily    tacrolimus (PROGRAF) 0.5 mg, oral, 2 times daily    tacrolimus (PROGRAF) 2 mg, oral, Every 12 hours scheduled (0630,1830)    [START ON 10/25/2024] valGANciclovir (VALCYTE) 450 mg, oral, Every 24 hours, Do not crush or chew.       Glucose   Date/Time Value Ref Range Status   10/24/2024 06:05  (H) 74 - 99 mg/dL Final   10/23/2024 05:55  (H) 74 - 99 mg/dL Final   10/22/2024 07:36  (H) 74 - 99 mg/dL Final   10/21/2024 09:24  (H) 74 - 99 mg/dL Final   10/21/2024 03:56  (H) 74 - 99 mg/dL Final   10/20/2024 09:57  (H) 74 - 99 mg/dL Final   10/20/2024 10:27 AM 94 74 - 99 mg/dL Final   09/18/2024 09:17  (H) 74 - 99 mg/dL Final   04/11/2024 10:03 AM 92 74 - 99 mg/dL Final   04/11/2024 10:03 AM 88 74 - 99 mg/dL Final     C-Peptide   Date Value Ref Range Status   01/30/2024 2.9 0.7 - 3.9 ng/mL Final     Hemoglobin A1C   Date Value Ref Range Status   04/11/2024 5.6 see below % Final   01/30/2024 5.5 see below % Final   09/28/2023 5.5 % Final     Comment:          Diagnosis of Diabetes-Adults   Non-Diabetic: < or = 5.6%   Increased risk for  developing diabetes: 5.7-6.4%   Diagnostic of diabetes: > or = 6.5%  .       Monitoring of Diabetes                Age (y)     Therapeutic Goal (%)   Adults:          >18           <7.0   Pediatrics:    13-18           <7.5                   7-12           <8.0                   0- 6            7.5-8.5   American Diabetes Association. Diabetes Care 33(S1), Jan 2010.   07/19/2022 5.7 (A) % Final     Comment:          Diagnosis of Diabetes-Adults   Non-Diabetic: < or = 5.6%   Increased risk for developing diabetes: 5.7-6.4%   Diagnostic of diabetes: > or = 6.5%  .       Monitoring of Diabetes                Age (y)     Therapeutic Goal (%)   Adults:          >18           <7.0   Pediatrics:    13-18           <7.5                   7-12           <8.0                   0- 6            7.5-8.5   American Diabetes Association. Diabetes Care 33(S1), Jan 2010.     04/13/2021 5.7 % Final     Comment:          Diagnosis of Diabetes-Adults   Non-Diabetic: < or = 5.6%   Increased risk for developing diabetes: 5.7-6.4%   Diagnostic of diabetes: > or = 6.5%  .       Monitoring of Diabetes                Age (y)     Therapeutic Goal (%)   Adults:          >18           <7.0   Pediatrics:    13-18           <7.5                   7-12           <8.0                   0- 6            7.5-8.5   American Diabetes Association. Diabetes Care 33(S1), Jan 2010.         Patient Learning/Readiness Assessment:  Ms. Hamlin is agreeable to this visit for CGM placement    Interventions/Topics Covered:  See After Visit Summary for handouts/information sheets provided to patient.  Education Documentation  Hypoglycemia, taught by Arelis Ortiz RN at 10/24/2024  2:24 PM.  Learner: Patient  Readiness: Acceptance  Method: Explanation  Response: Verbalizes Understanding  Comment: education per endocrinology fellow re hypoglycemia management    Hyperglycemia, taught by Arelis Ortiz RN at 10/24/2024  2:24 PM.  Learner: Patient  Readiness:  Acceptance  Method: Explanation  Response: Verbalizes Understanding  Comment: education per endocrinology fellow re hypoglycemia management    CGM Apps Available, taught by Arelis Ortiz RN at 10/24/2024  2:23 PM.  Learner: Patient  Readiness: Acceptance  Method: Explanation, Demonstration, Handout  Response: Verbalizes Understanding, Needs Reinforcement  Comment: asked by fellow to place CGM    What is Continuous Glucose Monitoring (CGM), taught by Arelis Ortiz RN at 10/24/2024  2:23 PM.  Learner: Patient  Readiness: Acceptance  Method: Explanation, Demonstration, Handout  Response: Verbalizes Understanding, Needs Reinforcement  Comment: asked by fellow to place CGM          Additional topics covered: Education provided re the CGM.  After several attempts, unable to place the rea on her phone.  dexCom G7 reader provided for her use and sensor placed to rt upper arm.  Education provided re how the device works and how to interpret results: number, arrow, graph.  Additional materials provided: literature that comes with Dex Com materials    CGM placed today.    Education Outcome/Recommendations:        Recommendations for bedside nursing:  Made RN aware of sensor placement. Will document in [employer]    Recommendations for Providers: Follow-up w/ PCP and/or Endocrinology    Additional Comments: No further visits at this time.  Her son in law uses a CGM and will be able to assist her with this at home.  Additional sensor proivded to take home.  Time spent:  50 mins.

## 2024-10-25 ENCOUNTER — PHARMACY VISIT (OUTPATIENT)
Dept: PHARMACY | Facility: CLINIC | Age: 79
End: 2024-10-25
Payer: COMMERCIAL

## 2024-10-25 ENCOUNTER — HOME HEALTH ADMISSION (OUTPATIENT)
Dept: HOME HEALTH SERVICES | Facility: HOME HEALTH | Age: 79
End: 2024-10-25
Payer: MEDICARE

## 2024-10-25 LAB
ALBUMIN SERPL BCP-MCNC: 2.7 G/DL (ref 3.4–5)
ANION GAP SERPL CALC-SCNC: 13 MMOL/L (ref 10–20)
BASOPHILS # BLD AUTO: 0 X10*3/UL (ref 0–0.1)
BASOPHILS NFR BLD AUTO: 0 %
BUN SERPL-MCNC: 35 MG/DL (ref 6–23)
CA-I BLD-SCNC: 1.25 MMOL/L (ref 1.1–1.33)
CALCIUM SERPL-MCNC: 8.2 MG/DL (ref 8.6–10.6)
CHLORIDE SERPL-SCNC: 108 MMOL/L (ref 98–107)
CO2 SERPL-SCNC: 22 MMOL/L (ref 21–32)
CREAT SERPL-MCNC: 1.05 MG/DL (ref 0.5–1.05)
EGFRCR SERPLBLD CKD-EPI 2021: 55 ML/MIN/1.73M*2
EOSINOPHIL # BLD AUTO: 0.06 X10*3/UL (ref 0–0.4)
EOSINOPHIL NFR BLD AUTO: 1.5 %
ERYTHROCYTE [DISTWIDTH] IN BLOOD BY AUTOMATED COUNT: 18.7 % (ref 11.5–14.5)
GLUCOSE BLD MANUAL STRIP-MCNC: 113 MG/DL (ref 74–99)
GLUCOSE BLD MANUAL STRIP-MCNC: 176 MG/DL (ref 74–99)
GLUCOSE BLD MANUAL STRIP-MCNC: 190 MG/DL (ref 74–99)
GLUCOSE BLD MANUAL STRIP-MCNC: 215 MG/DL (ref 74–99)
GLUCOSE SERPL-MCNC: 99 MG/DL (ref 74–99)
HCT VFR BLD AUTO: 29.9 % (ref 36–46)
HGB BLD-MCNC: 8.9 G/DL (ref 12–16)
IMM GRANULOCYTES # BLD AUTO: 0.04 X10*3/UL (ref 0–0.5)
IMM GRANULOCYTES NFR BLD AUTO: 1 % (ref 0–0.9)
LYMPHOCYTES # BLD AUTO: 0.32 X10*3/UL (ref 0.8–3)
LYMPHOCYTES NFR BLD AUTO: 8.1 %
MAGNESIUM SERPL-MCNC: 2.15 MG/DL (ref 1.6–2.4)
MCH RBC QN AUTO: 29.4 PG (ref 26–34)
MCHC RBC AUTO-ENTMCNC: 29.8 G/DL (ref 32–36)
MCV RBC AUTO: 99 FL (ref 80–100)
MONOCYTES # BLD AUTO: 0.34 X10*3/UL (ref 0.05–0.8)
MONOCYTES NFR BLD AUTO: 8.6 %
NEUTROPHILS # BLD AUTO: 3.2 X10*3/UL (ref 1.6–5.5)
NEUTROPHILS NFR BLD AUTO: 80.8 %
NRBC BLD-RTO: 0.5 /100 WBCS (ref 0–0)
PHOSPHATE SERPL-MCNC: 2.2 MG/DL (ref 2.5–4.9)
PLATELET # BLD AUTO: 82 X10*3/UL (ref 150–450)
POTASSIUM SERPL-SCNC: 3.6 MMOL/L (ref 3.5–5.3)
RBC # BLD AUTO: 3.03 X10*6/UL (ref 4–5.2)
SODIUM SERPL-SCNC: 139 MMOL/L (ref 136–145)
WBC # BLD AUTO: 4 X10*3/UL (ref 4.4–11.3)

## 2024-10-25 PROCEDURE — 2500000004 HC RX 250 GENERAL PHARMACY W/ HCPCS (ALT 636 FOR OP/ED)

## 2024-10-25 PROCEDURE — 2500000001 HC RX 250 WO HCPCS SELF ADMINISTERED DRUGS (ALT 637 FOR MEDICARE OP)

## 2024-10-25 PROCEDURE — 83735 ASSAY OF MAGNESIUM: CPT

## 2024-10-25 PROCEDURE — 82947 ASSAY GLUCOSE BLOOD QUANT: CPT

## 2024-10-25 PROCEDURE — RXMED WILLOW AMBULATORY MEDICATION CHARGE

## 2024-10-25 PROCEDURE — 80069 RENAL FUNCTION PANEL: CPT

## 2024-10-25 PROCEDURE — 2500000004 HC RX 250 GENERAL PHARMACY W/ HCPCS (ALT 636 FOR OP/ED): Performed by: STUDENT IN AN ORGANIZED HEALTH CARE EDUCATION/TRAINING PROGRAM

## 2024-10-25 PROCEDURE — 99232 SBSQ HOSP IP/OBS MODERATE 35: CPT | Performed by: STUDENT IN AN ORGANIZED HEALTH CARE EDUCATION/TRAINING PROGRAM

## 2024-10-25 PROCEDURE — 36415 COLL VENOUS BLD VENIPUNCTURE: CPT

## 2024-10-25 PROCEDURE — 2500000002 HC RX 250 W HCPCS SELF ADMINISTERED DRUGS (ALT 637 FOR MEDICARE OP, ALT 636 FOR OP/ED)

## 2024-10-25 PROCEDURE — 80197 ASSAY OF TACROLIMUS: CPT

## 2024-10-25 PROCEDURE — 1200000002 HC GENERAL ROOM WITH TELEMETRY DAILY

## 2024-10-25 PROCEDURE — 2500000005 HC RX 250 GENERAL PHARMACY W/O HCPCS

## 2024-10-25 PROCEDURE — 2500000001 HC RX 250 WO HCPCS SELF ADMINISTERED DRUGS (ALT 637 FOR MEDICARE OP): Performed by: STUDENT IN AN ORGANIZED HEALTH CARE EDUCATION/TRAINING PROGRAM

## 2024-10-25 PROCEDURE — 85025 COMPLETE CBC W/AUTO DIFF WBC: CPT

## 2024-10-25 PROCEDURE — 82330 ASSAY OF CALCIUM: CPT

## 2024-10-25 RX ORDER — FUROSEMIDE 20 MG/1
20 TABLET ORAL DAILY
Qty: 30 TABLET | Refills: 0 | Status: SHIPPED | OUTPATIENT
Start: 2024-10-25 | End: 2024-11-24

## 2024-10-25 RX ORDER — FUROSEMIDE 20 MG/1
20 TABLET ORAL DAILY
Status: DISCONTINUED | OUTPATIENT
Start: 2024-10-25 | End: 2024-10-26 | Stop reason: HOSPADM

## 2024-10-25 RX ADMIN — TACROLIMUS 1 MG: 1 CAPSULE ORAL at 18:32

## 2024-10-25 RX ADMIN — POTASSIUM PHOSPHATE, MONOBASIC 1000 MG: 500 TABLET, SOLUBLE ORAL at 18:34

## 2024-10-25 RX ADMIN — PANTOPRAZOLE SODIUM 40 MG: 40 TABLET, DELAYED RELEASE ORAL at 06:00

## 2024-10-25 RX ADMIN — HYDRALAZINE HYDROCHLORIDE 100 MG: 50 TABLET ORAL at 11:15

## 2024-10-25 RX ADMIN — PREDNISONE 20 MG: 20 TABLET ORAL at 11:12

## 2024-10-25 RX ADMIN — ATORVASTATIN CALCIUM 40 MG: 40 TABLET, FILM COATED ORAL at 11:18

## 2024-10-25 RX ADMIN — SENNOSIDES AND DOCUSATE SODIUM 1 TABLET: 50; 8.6 TABLET ORAL at 11:15

## 2024-10-25 RX ADMIN — CARVEDILOL 50 MG: 25 TABLET, FILM COATED ORAL at 22:06

## 2024-10-25 RX ADMIN — TACROLIMUS 1 MG: 1 CAPSULE ORAL at 05:42

## 2024-10-25 RX ADMIN — MYCOPHENOLATE MOFETIL 1000 MG: 250 CAPSULE ORAL at 05:42

## 2024-10-25 RX ADMIN — POLYETHYLENE GLYCOL 3350 17 G: 17 POWDER, FOR SOLUTION ORAL at 11:11

## 2024-10-25 RX ADMIN — POTASSIUM PHOSPHATE, MONOBASIC 1000 MG: 500 TABLET, SOLUBLE ORAL at 13:57

## 2024-10-25 RX ADMIN — CLOTRIMAZOLE 10 MG: 10 LOZENGE ORAL at 11:12

## 2024-10-25 RX ADMIN — ACETAMINOPHEN 650 MG: 325 TABLET ORAL at 05:55

## 2024-10-25 RX ADMIN — CARVEDILOL 50 MG: 25 TABLET, FILM COATED ORAL at 11:14

## 2024-10-25 RX ADMIN — POTASSIUM PHOSPHATE, MONOBASIC 1000 MG: 500 TABLET, SOLUBLE ORAL at 06:00

## 2024-10-25 RX ADMIN — FOLIC ACID 1 MG: 1 TABLET ORAL at 11:15

## 2024-10-25 RX ADMIN — VALGANCICLOVIR 450 MG: 450 TABLET, FILM COATED ORAL at 11:19

## 2024-10-25 RX ADMIN — CLOTRIMAZOLE 10 MG: 10 LOZENGE ORAL at 18:31

## 2024-10-25 RX ADMIN — ASPIRIN 81 MG: 81 TABLET, COATED ORAL at 11:16

## 2024-10-25 RX ADMIN — POTASSIUM PHOSPHATE, MONOBASIC 1000 MG: 500 TABLET, SOLUBLE ORAL at 22:11

## 2024-10-25 RX ADMIN — POTASSIUM PHOSPHATE, MONOBASIC POTASSIUM PHOSPHATE, DIBASIC 15 MMOL: 224; 236 INJECTION, SOLUTION, CONCENTRATE INTRAVENOUS at 11:09

## 2024-10-25 RX ADMIN — FUROSEMIDE 20 MG: 20 TABLET ORAL at 13:57

## 2024-10-25 RX ADMIN — METHOCARBAMOL TABLETS 500 MG: 500 TABLET, COATED ORAL at 11:17

## 2024-10-25 RX ADMIN — IRON SUCROSE 200 MG: 20 INJECTION, SOLUTION INTRAVENOUS at 06:02

## 2024-10-25 RX ADMIN — SULFAMETHOXAZOLE AND TRIMETHOPRIM 80 MG OF TRIMETHOPRIM: 400; 80 TABLET ORAL at 11:14

## 2024-10-25 RX ADMIN — INSULIN LISPRO 2 UNITS: 100 INJECTION, SOLUTION INTRAVENOUS; SUBCUTANEOUS at 18:36

## 2024-10-25 RX ADMIN — SENNOSIDES AND DOCUSATE SODIUM 1 TABLET: 50; 8.6 TABLET ORAL at 22:06

## 2024-10-25 RX ADMIN — ACETAMINOPHEN 650 MG: 325 TABLET ORAL at 11:17

## 2024-10-25 RX ADMIN — MYCOPHENOLATE MOFETIL 1000 MG: 250 CAPSULE ORAL at 18:32

## 2024-10-25 RX ADMIN — HEPARIN SODIUM 5000 UNITS: 5000 INJECTION, SOLUTION INTRAVENOUS; SUBCUTANEOUS at 22:11

## 2024-10-25 RX ADMIN — HYDRALAZINE HYDROCHLORIDE 100 MG: 50 TABLET ORAL at 22:06

## 2024-10-25 RX ADMIN — CLOTRIMAZOLE 10 MG: 10 LOZENGE ORAL at 13:56

## 2024-10-25 RX ADMIN — HEPARIN SODIUM 5000 UNITS: 5000 INJECTION, SOLUTION INTRAVENOUS; SUBCUTANEOUS at 11:16

## 2024-10-25 ASSESSMENT — COGNITIVE AND FUNCTIONAL STATUS - GENERAL
HELP NEEDED FOR BATHING: A LITTLE
TURNING FROM BACK TO SIDE WHILE IN FLAT BAD: A LITTLE
DRESSING REGULAR LOWER BODY CLOTHING: A LITTLE
MOBILITY SCORE: 18
PERSONAL GROOMING: A LITTLE
MOVING FROM LYING ON BACK TO SITTING ON SIDE OF FLAT BED WITH BEDRAILS: A LITTLE
DRESSING REGULAR UPPER BODY CLOTHING: A LITTLE
MOVING TO AND FROM BED TO CHAIR: A LITTLE
MOVING TO AND FROM BED TO CHAIR: A LITTLE
TURNING FROM BACK TO SIDE WHILE IN FLAT BAD: A LITTLE
DRESSING REGULAR UPPER BODY CLOTHING: A LITTLE
TOILETING: A LITTLE
DRESSING REGULAR LOWER BODY CLOTHING: A LITTLE
TOILETING: A LITTLE
WALKING IN HOSPITAL ROOM: A LITTLE
MOBILITY SCORE: 18
CLIMB 3 TO 5 STEPS WITH RAILING: A LITTLE
MOVING FROM LYING ON BACK TO SITTING ON SIDE OF FLAT BED WITH BEDRAILS: A LITTLE
WALKING IN HOSPITAL ROOM: A LITTLE
DAILY ACTIVITIY SCORE: 19
DAILY ACTIVITIY SCORE: 19
CLIMB 3 TO 5 STEPS WITH RAILING: A LITTLE
STANDING UP FROM CHAIR USING ARMS: A LITTLE
HELP NEEDED FOR BATHING: A LITTLE
STANDING UP FROM CHAIR USING ARMS: A LITTLE
PERSONAL GROOMING: A LITTLE

## 2024-10-25 ASSESSMENT — PAIN DESCRIPTION - ORIENTATION: ORIENTATION: ANTERIOR

## 2024-10-25 ASSESSMENT — PAIN SCALES - GENERAL
PAINLEVEL_OUTOF10: 5 - MODERATE PAIN
PAINLEVEL_OUTOF10: 5 - MODERATE PAIN

## 2024-10-25 ASSESSMENT — PAIN DESCRIPTION - LOCATION: LOCATION: HEAD

## 2024-10-25 NOTE — PROGRESS NOTES
Pharmacist Transplant Discharge Note    Medications for Liz Hamlin were reviewed in conjunction with the multidisciplinary transplant team. The pharmacist is in agreement with the plan of care for medications at this time.     Approximately 10 minutes were spent with this patient providing follow-up education and reviewing her finalized list of discharge medications. An updated outpatient dosing schedule was provided to the patient. All questions were answered to the patient's satisfaction, and the patient confirmed understanding.    The patient had her medications filled at [facility] and delivered prior to discharge. Further educational reinforcement should be provided in the outpatient clinic.    Christy Melchor, PharmD  Solid Organ Transplant Clinical Pharmacy Specialist

## 2024-10-25 NOTE — PROGRESS NOTES
Liz Hamlin is a 79 y.o. female POD#3 from DDKT from a DBD donor. COVID+ incidentally noted on admit; asymptomatic. No cycle threshold data. XM with lymphocytes +B-cell/T-cell but no DSA; repeat with peripheral blood only +B-cell with no DSA.     - Feels better today    Objective   Gen: A+OX3; NAD  HEENT: PERRL, sclera anicteric, MMM  Cardiac: RRR  Chest: Normal inspiratory effort  Abdomen: S/NT/ND. Incision C/D/I.  Ext: No LE edema  Drain: serosanguinous    Last Recorded Vitals  Visit Vitals  /72   Pulse 77   Temp 36.9 °C (98.4 °F) (Temporal)   Resp 17      Intake/Output last 3 Shifts:    Intake/Output Summary (Last 24 hours) at 10/25/2024 1335  Last data filed at 10/25/2024 1220  Gross per 24 hour   Intake 120 ml   Output 1797 ml   Net -1677 ml      Vitals:    10/23/24 0600   Weight: 90.9 kg (200 lb 6.4 oz)   Scheduled medications  acetaminophen, 650 mg, oral, Once  aspirin, 81 mg, oral, Daily  atorvastatin, 40 mg, oral, Daily  carvedilol, 50 mg, oral, BID  clotrimazole, 10 mg, oral, TID after meals  folic acid, 1 mg, oral, Daily  furosemide, 20 mg, oral, Daily  heparin, 5,000 Units, subcutaneous, q12h  hydrALAZINE, 100 mg, oral, BID  insulin lispro, 0-10 Units, subcutaneous, TID  iron sucrose, 200 mg, intravenous, Daily  mycophenolate, 1,000 mg, oral, q12h  oxygen, , inhalation, Continuous - Inhalation  pantoprazole, 40 mg, oral, BID AC  polyethylene glycol, 17 g, oral, Daily  potassium phosphate (monobasic), 1,000 mg, oral, 4x daily  potassium phosphate, 15 mmol, intravenous, Once  predniSONE, 20 mg, oral, Daily  sennosides-docusate sodium, 1 tablet, oral, BID  sulfamethoxazole-trimethoprim, 80 mg of trimethoprim, oral, Daily  tacrolimus, 1 mg, oral, q12h NATALIA  valGANciclovir, 450 mg, oral, q24h    Assessment/Plan   Principal Problem:    Kidney transplant recipient  Active Problems:  Patient Active Problem List   Diagnosis    Astigmatism    Benign hypertension    Bilateral renal cysts    Chronic  kidney disease (CKD) stage G4/A1, severely decreased glomerular filtration rate (GFR) between 15-29 mL/min/1.73 square meter and albuminuria creatinine ratio less than 30 mg/g (CMS/HC* (Multi)    CKD (chronic kidney disease)    Chronic right shoulder pain    Combined form of age-related cataract, both eyes    Essential hypertension    Glaucoma suspect of both eyes    Gout    Complex renal cyst    Fibroids    History of hypercholesterolemia    Hyperlipidemia    Hyperopia    Myopia with presbyopia    Mitral valve regurgitation    Hepatic steatosis    Intermittent urinary incontinence    Obstructive sleep apnea, adult    Onycholysis of toenail    Onychomycosis of toenail    Pre-ulcerative corn or callous    Pruritus    PVD (posterior vitreous detachment), both eyes    Sleep apnea    Toe deformity, right    Nocturia    CKD stage 4 secondary to hypertension (Multi)    History of anemia due to CKD    Cellulitis of right leg    Health care maintenance    Venous stasis dermatitis of both lower extremities    Diabetes mellitus screening    Screening for cardiovascular condition    Need for vaccination    Asymptomatic menopausal state    Encounter for screening mammogram for breast cancer    Pre-transplant evaluation for kidney transplant    Chronic fatigue    Long toenail    Hyperopia, bilateral    Presbyopia    Chronic diastolic heart failure    Preoperative exam for gynecologic surgery    Screening cholesterol level    Asymptomatic menopause    Visit for screening mammogram    Colon cancer screening    Kidney replaced by transplant ([facility])    ESRD (end stage renal disease) (Multi)      - Completed Thymoglobulin 3 mg/kg  - Plan weekly DSA monitoring x 4 weeks  - No further Belatacept  - Tacrolimus goal 6-7 ng/mL  - Prednisone 20 mg  - Heparin 5000U subcutaneous BID  - ASA/Coreg  - PT/OT  - Renal diet  - DC drain  - [employer] Sat discharge    I spent 35 minutes in the professional and overall care of this patient, including  immunosuppression management.    Elder Mac MD, [facility]  Transplant & Hepatobiliary Surgery

## 2024-10-25 NOTE — PROGRESS NOTES
10/25/2024 Care Coordination  Per Transplant will discharge home tomorrow.   Renal telehealth , PT/OT and RN for drain care. Message to   [facility] regarding discharge tomorrow.  PT/OT recommends wheeled walker.  Discussed with pt  she would like a walker to take home.  Referral to [facility].

## 2024-10-25 NOTE — PROGRESS NOTES
Liz Hamlin is a 79 y.o. female POD#3 from DDKT from a DBD donor. COVID+ incidentally noted on admit; asymptomatic. No cycle threshold data. XM with lymphocytes +B-cell/T-cell but no DSA; repeat with peripheral blood only +B-cell with no DSA.     - Feels better today    Objective   Gen: A+OX3; NAD  HEENT: PERRL, sclera anicteric, MMM  Cardiac: RRR  Chest: Normal inspiratory effort  Abdomen: S/NT/ND. Incision C/D/I.  Ext: No LE edema  Drain: serosanguinous    Last Recorded Vitals  Visit Vitals  /79   Pulse 78   Temp 36.9 °C (98.4 °F)   Resp 18      Intake/Output last 3 Shifts:    Intake/Output Summary (Last 24 hours) at 10/24/2024 2101  Last data filed at 10/24/2024 1700  Gross per 24 hour   Intake 360 ml   Output 1522 ml   Net -1162 ml      Vitals:    10/23/24 0600   Weight: 90.9 kg (200 lb 6.4 oz)   Scheduled medications  acetaminophen, 650 mg, oral, Once  aspirin, 81 mg, oral, Daily  atorvastatin, 40 mg, oral, Daily  carvedilol, 50 mg, oral, BID  clotrimazole, 10 mg, oral, TID after meals  folic acid, 1 mg, oral, Daily  heparin, 5,000 Units, subcutaneous, q12h  hydrALAZINE, 100 mg, oral, BID  insulin lispro, 0-10 Units, subcutaneous, TID  iron sucrose, 200 mg, intravenous, Daily  mycophenolate, 1,000 mg, oral, q12h  oxygen, , inhalation, Continuous - Inhalation  pantoprazole, 40 mg, oral, BID AC  polyethylene glycol, 17 g, oral, Daily  potassium phosphate (monobasic), 1,000 mg, oral, 4x daily  predniSONE, 20 mg, oral, Daily  remdesivir, 100 mg, intravenous, q24h  sennosides-docusate sodium, 1 tablet, oral, BID  sulfamethoxazole-trimethoprim, 80 mg of trimethoprim, oral, Daily  tacrolimus, 1 mg, oral, q12h NATALIA  valGANciclovir, 450 mg, oral, q24h    Assessment/Plan   Principal Problem:    Kidney transplant recipient  Active Problems:  Patient Active Problem List   Diagnosis    Astigmatism    Benign hypertension    Bilateral renal cysts    Chronic kidney disease (CKD) stage G4/A1, severely decreased  glomerular filtration rate (GFR) between 15-29 mL/min/1.73 square meter and albuminuria creatinine ratio less than 30 mg/g (CMS/HC* (Multi)    CKD (chronic kidney disease)    Chronic right shoulder pain    Combined form of age-related cataract, both eyes    Essential hypertension    Glaucoma suspect of both eyes    Gout    Complex renal cyst    Fibroids    History of hypercholesterolemia    Hyperlipidemia    Hyperopia    Myopia with presbyopia    Mitral valve regurgitation    Hepatic steatosis    Intermittent urinary incontinence    Obstructive sleep apnea, adult    Onycholysis of toenail    Onychomycosis of toenail    Pre-ulcerative corn or callous    Pruritus    PVD (posterior vitreous detachment), both eyes    Sleep apnea    Toe deformity, right    Nocturia    CKD stage 4 secondary to hypertension (Multi)    History of anemia due to CKD    Cellulitis of right leg    Health care maintenance    Venous stasis dermatitis of both lower extremities    Diabetes mellitus screening    Screening for cardiovascular condition    Need for vaccination    Asymptomatic menopausal state    Encounter for screening mammogram for breast cancer    Pre-transplant evaluation for kidney transplant    Chronic fatigue    Long toenail    Hyperopia, bilateral    Presbyopia    Chronic diastolic heart failure    Preoperative exam for gynecologic surgery    Screening cholesterol level    Asymptomatic menopause    Visit for screening mammogram    Colon cancer screening    Kidney replaced by transplant ([facility])    ESRD (end stage renal disease) (Multi)      - Completed Thymoglobulin 3 mg/kg  - Plan weekly DSA monitoring x 4 weeks  - No further Belatacept  - Tacrolimus goal 6-7 ng/mL  - Prednisone 20 mg  - Heparin 5000U subcutaneous BID  - ASA/Coreg  - PT/OT  - Renal diet  - Target Sat discharge    I spent 35 minutes in the professional and overall care of this patient, including immunosuppression management.    Brooke Cosby MD, PHD, MPH,  FACS  Chief Transplant and Hepatobiliary Surgery

## 2024-10-25 NOTE — PROGRESS NOTES
Physical Therapy                 Therapy Communication Note/PT Check    Patient Name: Liz Hamlin  MRN: [id number]  Department: [place] [address]  Room: 7087/7087-A  Today's Date: 10/25/2024     Discipline: Physical Therapy    Pt with family/guests present and MD entering upon PT's exit. Pt reports up ambulating safely with a wheeled walker in her room (currently on COVID precautions). Anticipated discharge tomorrow. Pt without any concerns. Pt remains approp for LOW intensity PT an wheeled walker. No billable services performed. Will continue to follow in house if discharge is delayed.    ANGE BURNETT PT

## 2024-10-25 NOTE — NURSING NOTE
Transplant Coordinator Note    Inpatient Discharge Education Provided to patient today. The following topics were reviewed:   signs and symptoms of rejection   signs and symptoms of infection   transplant medication indications   transplant medication administration   transplant medication side effects   the importance of following post-transplant lab and appointment schedules   Patient verbalized good understanding of all information provided   Patient demonstrated ability to fill pillbox without error or difficulty   Patient successfully completed discharge education test    Provided to patient all [facility] [place] contact information for both during and after hours    Outpatient Plan:  Positive Bcell and Tcell XM - no need for weekly DSA outpatient  Tacrolimus 1 mg BID (goal 6-7); MMF 1g BID, pred 20 mg daily  Received 1 dose of Guerda on POD1 - no plans to continue further doses  Received IV venofer 300 mg x3 doses  Patient Covid + pre-txp; received Remdesivir x3 doses  Labs Tuesday/Thursday  Clinic Tuesday @ 8 am  Stent Removal 11/14  Increase phos in diet (Receiving K phos 2 tab 4x daily)  JESSICA drain removed prior to discharge  RTH at discharge    Pill box completed by patient, plan to discharge over the weekend. Patient aware of current med and POC. Transplant GUTIERREZ to update patient on any changes prior to discharge.

## 2024-10-26 ENCOUNTER — DOCUMENTATION (OUTPATIENT)
Dept: HOME HEALTH SERVICES | Facility: HOME HEALTH | Age: 79
End: 2024-10-26
Payer: COMMERCIAL

## 2024-10-26 VITALS
WEIGHT: 205.47 LBS | DIASTOLIC BLOOD PRESSURE: 54 MMHG | RESPIRATION RATE: 18 BRPM | HEIGHT: 66 IN | HEART RATE: 76 BPM | OXYGEN SATURATION: 96 % | BODY MASS INDEX: 33.02 KG/M2 | TEMPERATURE: 97.7 F | SYSTOLIC BLOOD PRESSURE: 118 MMHG

## 2024-10-26 LAB
ALBUMIN SERPL BCP-MCNC: 2.8 G/DL (ref 3.4–5)
ANION GAP SERPL CALC-SCNC: 12 MMOL/L (ref 10–20)
BASOPHILS # BLD AUTO: 0.01 X10*3/UL (ref 0–0.1)
BASOPHILS NFR BLD AUTO: 0.2 %
BUN SERPL-MCNC: 33 MG/DL (ref 6–23)
CA-I BLD-SCNC: 1.25 MMOL/L (ref 1.1–1.33)
CALCIUM SERPL-MCNC: 8.2 MG/DL (ref 8.6–10.6)
CHLORIDE SERPL-SCNC: 108 MMOL/L (ref 98–107)
CO2 SERPL-SCNC: 23 MMOL/L (ref 21–32)
CREAT SERPL-MCNC: 0.87 MG/DL (ref 0.5–1.05)
EGFRCR SERPLBLD CKD-EPI 2021: 69 ML/MIN/1.73M*2
EOSINOPHIL # BLD AUTO: 0.08 X10*3/UL (ref 0–0.4)
EOSINOPHIL NFR BLD AUTO: 1.3 %
ERYTHROCYTE [DISTWIDTH] IN BLOOD BY AUTOMATED COUNT: 18.1 % (ref 11.5–14.5)
GLUCOSE BLD MANUAL STRIP-MCNC: 114 MG/DL (ref 74–99)
GLUCOSE BLD MANUAL STRIP-MCNC: 97 MG/DL (ref 74–99)
GLUCOSE SERPL-MCNC: 101 MG/DL (ref 74–99)
HCT VFR BLD AUTO: 25.9 % (ref 36–46)
HGB BLD-MCNC: 8.7 G/DL (ref 12–16)
IMM GRANULOCYTES # BLD AUTO: 0.11 X10*3/UL (ref 0–0.5)
IMM GRANULOCYTES NFR BLD AUTO: 1.8 % (ref 0–0.9)
LYMPHOCYTES # BLD AUTO: 0.41 X10*3/UL (ref 0.8–3)
LYMPHOCYTES NFR BLD AUTO: 6.7 %
MAGNESIUM SERPL-MCNC: 1.88 MG/DL (ref 1.6–2.4)
MCH RBC QN AUTO: 29.3 PG (ref 26–34)
MCHC RBC AUTO-ENTMCNC: 33.6 G/DL (ref 32–36)
MCV RBC AUTO: 87 FL (ref 80–100)
MONOCYTES # BLD AUTO: 0.48 X10*3/UL (ref 0.05–0.8)
MONOCYTES NFR BLD AUTO: 7.9 %
NEUTROPHILS # BLD AUTO: 4.99 X10*3/UL (ref 1.6–5.5)
NEUTROPHILS NFR BLD AUTO: 82.1 %
NRBC BLD-RTO: 0.5 /100 WBCS (ref 0–0)
PHOSPHATE SERPL-MCNC: 3 MG/DL (ref 2.5–4.9)
PLATELET # BLD AUTO: 88 X10*3/UL (ref 150–450)
POTASSIUM SERPL-SCNC: 4 MMOL/L (ref 3.5–5.3)
RBC # BLD AUTO: 2.97 X10*6/UL (ref 4–5.2)
SODIUM SERPL-SCNC: 139 MMOL/L (ref 136–145)
TACROLIMUS BLD-MCNC: 6.1 NG/ML
TACROLIMUS BLD-MCNC: 6.2 NG/ML
WBC # BLD AUTO: 6.1 X10*3/UL (ref 4.4–11.3)

## 2024-10-26 PROCEDURE — 2500000005 HC RX 250 GENERAL PHARMACY W/O HCPCS

## 2024-10-26 PROCEDURE — 2500000002 HC RX 250 W HCPCS SELF ADMINISTERED DRUGS (ALT 637 FOR MEDICARE OP, ALT 636 FOR OP/ED)

## 2024-10-26 PROCEDURE — 80069 RENAL FUNCTION PANEL: CPT

## 2024-10-26 PROCEDURE — 83735 ASSAY OF MAGNESIUM: CPT

## 2024-10-26 PROCEDURE — 36415 COLL VENOUS BLD VENIPUNCTURE: CPT

## 2024-10-26 PROCEDURE — 82330 ASSAY OF CALCIUM: CPT

## 2024-10-26 PROCEDURE — 2500000004 HC RX 250 GENERAL PHARMACY W/ HCPCS (ALT 636 FOR OP/ED): Performed by: STUDENT IN AN ORGANIZED HEALTH CARE EDUCATION/TRAINING PROGRAM

## 2024-10-26 PROCEDURE — 99232 SBSQ HOSP IP/OBS MODERATE 35: CPT | Performed by: STUDENT IN AN ORGANIZED HEALTH CARE EDUCATION/TRAINING PROGRAM

## 2024-10-26 PROCEDURE — 85025 COMPLETE CBC W/AUTO DIFF WBC: CPT

## 2024-10-26 PROCEDURE — 2500000001 HC RX 250 WO HCPCS SELF ADMINISTERED DRUGS (ALT 637 FOR MEDICARE OP)

## 2024-10-26 PROCEDURE — 80197 ASSAY OF TACROLIMUS: CPT

## 2024-10-26 PROCEDURE — 2500000004 HC RX 250 GENERAL PHARMACY W/ HCPCS (ALT 636 FOR OP/ED)

## 2024-10-26 PROCEDURE — 2500000001 HC RX 250 WO HCPCS SELF ADMINISTERED DRUGS (ALT 637 FOR MEDICARE OP): Performed by: STUDENT IN AN ORGANIZED HEALTH CARE EDUCATION/TRAINING PROGRAM

## 2024-10-26 PROCEDURE — 82947 ASSAY GLUCOSE BLOOD QUANT: CPT

## 2024-10-26 RX ADMIN — IRON SUCROSE 200 MG: 20 INJECTION, SOLUTION INTRAVENOUS at 06:15

## 2024-10-26 RX ADMIN — ACETAMINOPHEN 650 MG: 325 TABLET ORAL at 06:26

## 2024-10-26 RX ADMIN — MYCOPHENOLATE MOFETIL 1000 MG: 250 CAPSULE ORAL at 06:15

## 2024-10-26 RX ADMIN — ASPIRIN 81 MG: 81 TABLET, COATED ORAL at 08:57

## 2024-10-26 RX ADMIN — Medication 95 PERCENT: at 08:58

## 2024-10-26 RX ADMIN — PREDNISONE 20 MG: 20 TABLET ORAL at 08:57

## 2024-10-26 RX ADMIN — VALGANCICLOVIR 450 MG: 450 TABLET, FILM COATED ORAL at 08:57

## 2024-10-26 RX ADMIN — FUROSEMIDE 20 MG: 20 TABLET ORAL at 09:09

## 2024-10-26 RX ADMIN — HYDRALAZINE HYDROCHLORIDE 100 MG: 50 TABLET ORAL at 08:57

## 2024-10-26 RX ADMIN — HEPARIN SODIUM 5000 UNITS: 5000 INJECTION, SOLUTION INTRAVENOUS; SUBCUTANEOUS at 08:56

## 2024-10-26 RX ADMIN — TACROLIMUS 1 MG: 1 CAPSULE ORAL at 06:15

## 2024-10-26 RX ADMIN — CLOTRIMAZOLE 10 MG: 10 LOZENGE ORAL at 09:09

## 2024-10-26 RX ADMIN — ATORVASTATIN CALCIUM 40 MG: 40 TABLET, FILM COATED ORAL at 08:57

## 2024-10-26 RX ADMIN — FOLIC ACID 1 MG: 1 TABLET ORAL at 08:57

## 2024-10-26 RX ADMIN — SULFAMETHOXAZOLE AND TRIMETHOPRIM 80 MG OF TRIMETHOPRIM: 400; 80 TABLET ORAL at 08:57

## 2024-10-26 RX ADMIN — PANTOPRAZOLE SODIUM 40 MG: 40 TABLET, DELAYED RELEASE ORAL at 06:15

## 2024-10-26 RX ADMIN — POTASSIUM PHOSPHATE, MONOBASIC 1000 MG: 500 TABLET, SOLUBLE ORAL at 06:15

## 2024-10-26 RX ADMIN — CARVEDILOL 50 MG: 25 TABLET, FILM COATED ORAL at 08:57

## 2024-10-26 RX ADMIN — SENNOSIDES AND DOCUSATE SODIUM 1 TABLET: 50; 8.6 TABLET ORAL at 08:56

## 2024-10-26 RX ADMIN — POTASSIUM PHOSPHATE, MONOBASIC 1000 MG: 500 TABLET, SOLUBLE ORAL at 13:08

## 2024-10-26 ASSESSMENT — PAIN SCALES - GENERAL: PAINLEVEL_OUTOF10: 0 - NO PAIN

## 2024-10-26 NOTE — PROGRESS NOTES
Liz Hamlin is a 79 y.o. female on day 5 of admission presenting with Kidney replaced by transplant ([facility]).    Transitional Care Coordinator Note:Per provider team patient to dc home today with [facility]( updated).  Fernanda Osborne RN TCC via Epic.

## 2024-10-26 NOTE — DISCHARGE SUMMARY
Discharge Diagnosis  Kidney replaced by transplant ([facility])    Issues Requiring Follow-Up  DDKT 10/20/24  Asymptomatic covid+ 10/20/24    Immunosuppression  Tacrolimus, MMF, prednisone    Disposition  Home with RTH    Test Results Pending At Discharge  Pending Labs       Order Current Status    Tacrolimus In process            Hospital Course  Pt is a 80 y/o female with a hx of ESRD secondary to hypertension whom received a  donor kidney transplant on 10/20/24 by Dr. Jimenez with a KDPI of 86% and PRA of 71%. Donor was Hepc - / - and has not met risk factors. EBV + / +. Left donor kidney transplanted to patient right pelvis. Admit weight was 86.4 kg (discharge weight is 93.2kg ).  Pt received a total of 3 mg/kg total of thymoglobulin induction therapy in conjunction with 500mg IV solumedrol.  Pt was initiated on Belatacept  & Cellcept immunosuppression in conjunction with IV solumedrol taper.  Pt was started on valcyte (CMV D + / R - ) and bactrim for CMV & PCP prophylaxis per protocol. She was started on clotrimazole as antifungal coverage per protocol. Operative course was uncomplicated.  Hospital course was uncomplicated. Patel catheter was removed on POD #3 and the patient is voiding spontaneously. Pt did not require dialysis and electrolytes remain stable. Patient was pre-dialysis and does not have HD access.   BP & glucose under good control. Of note, patient was asymptomatically covid positive. She was treated with remdesevir X3 doses.    Pt is tolerating a regular diet, maintaining adequate hydration with oral intake, ambulating independently and having BMs. Immunosuppression was managed by the transplant team. Patient is in stable condition for discharge home with renal telehealth today. RX delivered to bedside prior to discharge. The patient will f/u in the transplant institute and have labs drawn in the walk-in clinic.      Pertinent Physical Exam At Time of Discharge  Physical Exam  Vitals  reviewed.   HENT:      Head: Normocephalic.   Cardiovascular:      Rate and Rhythm: Normal rate.      Pulses: Normal pulses.   Pulmonary:      Effort: Pulmonary effort is normal.   Abdominal:      Palpations: Abdomen is soft.   Musculoskeletal:         General: Normal range of motion.      Cervical back: Normal range of motion.   Skin:     General: Skin is warm.      Comments: RLQ incision c/d/i   Neurological:      Mental Status: She is alert and oriented to person, place, and time.      Comments: numbness in R thigh   Psychiatric:         Mood and Affect: Mood normal.         Behavior: Behavior normal.         Home Medications     Medication List      START taking these medications     acetaminophen 325 mg tablet; Commonly known as: Tylenol; Take 2 tablets   (650 mg) by mouth every 6 hours if needed for mild pain (1 - 3) for up to   14 days. Do not start before October 22, 2024.   carvedilol 25 mg tablet; Commonly known as: Coreg; Take 2 tablets (50   mg) by mouth 2 times a day.   clotrimazole 10 mg dixon; Commonly known as: Mycelex; Take 1 tablet (10   mg) by mouth 3 times a day after meals.   folic acid 1 mg tablet; Commonly known as: Folvite; Take 1 tablet (1 mg)   by mouth once daily.   K-Phos Original 500 mg tablet; Generic drug: potassium phosphate   (monobasic); Take 2 tablets (1,000 mg) by mouth 4 times a day.   methocarbamol 500 mg tablet; Commonly known as: Robaxin; Take 1 tablet   (500 mg) by mouth every 8 hours if needed for muscle spasms for up to 5   days.   mycophenolate 250 mg capsule; Commonly known as: Cellcept; Take 4   capsules (1,000 mg) by mouth every 12 hours.   pantoprazole 40 mg EC tablet; Commonly known as: ProtoNix; Take 1 tablet   (40 mg) by mouth once daily in the morning. Take before meals. Do not   crush, chew, or split.   polyethylene glycol 17 gram packet; Commonly known as: Glycolax,   Miralax; Take 17 g by mouth once daily as needed (constipation) for up to   3 days.    predniSONE 5 mg tablet; Commonly known as: Deltasone; Take 4 tablets (20   mg) by mouth once daily.   sodium bicarbonate 650 mg tablet; Take 2 tablets (1,300 mg) by mouth 2   times a day.   sulfamethoxazole-trimethoprim 400-80 mg tablet; Commonly known as:   Bactrim; Take 1 tablet by mouth once daily.   * tacrolimus 0.5 mg capsule; Commonly known as: Prograf; Take 1 capsule   (0.5 mg) by mouth 2 times a day.   * tacrolimus 1 mg capsule; Commonly known as: Prograf; Take 2 capsules   (2 mg) by mouth every 12 hours.   valGANciclovir 450 mg tablet; Commonly known as: Valcyte; Take 1 tablet   (450 mg) by mouth once every 24 hours. Do not crush or chew.  * This list has 2 medication(s) that are the same as other medications   prescribed for you. Read the directions carefully, and ask your doctor or   other care provider to review them with you.     CHANGE how you take these medications     docusate sodium 100 mg capsule; Commonly known as: Colace; Take 1   capsule (100 mg) by mouth 2 times a day as needed for constipation for up   to 10 days.; What changed: how much to take, how to take this, when to   take this, reasons to take this, additional instructions   furosemide 20 mg tablet; Commonly known as: Lasix; Take 1 tablet (20 mg)   by mouth once daily.; What changed: medication strength, how much to take     CONTINUE taking these medications     aspirin 81 mg EC tablet   atorvastatin 40 mg tablet; Commonly known as: Lipitor; Take 1 tablet (40   mg) by mouth once daily.   ferrous sulfate (325 mg ferrous sulfate) tablet; Commonly known as:   FeroSuL; Take 1 tablet by mouth once daily.   hydrALAZINE 100 mg tablet; Commonly known as: Apresoline; Take 1 tablet   (100 mg) by mouth 2 times a day.     STOP taking these medications     allopurinol 100 mg tablet; Commonly known as: Zyloprim   calcitriol 0.25 mcg capsule; Commonly known as: Rocaltrol   cholecalciferol (vitamin D3) 25 mcg (1,000 unit) tablet,chewable   cloNIDine  0.1 mg tablet; Commonly known as: Catapres   coenzyme Q-10 100 mg capsule   dapagliflozin propanediol 10 mg; Commonly known as: Farxiga   hydrOXYzine HCL 25 mg tablet; Commonly known as: Atarax   L. acidophilus/Bifid. animalis 32 billion cell capsule   losartan 100 mg tablet; Commonly known as: Cozaar   metoprolol succinate XL 50 mg 24 hr tablet; Commonly known as: Toprol-XL   vitamin B complex-vitamin C-folic acid 1 mg capsule; Commonly known as:   Nephrocaps       Outpatient Follow-Up  Future Appointments   Date Time Provider Department Center   10/29/2024  8:00 AM TXP [facility]   11/5/2024 10:00 [facility]   11/12/2024  9:30 AM TXP [facility]OM[facility] SURGE[facility]   11/12/2024 10:00 AM Renae Steiner RD[facility], LD [facility]   11/14/2024  1:00 PM Edgardo Jackson MD [facility]   11/19/2024 10:00 AM TXP ABDOMINAL SURGEON [facility]   1/20/2025  9:00 AM Theresa Cummings DPM [id number] [facility]   2/17/2025  1:15 PM Eloise Edwards MD [id number] [facility]   3/19/2025  2:00 PM Dillan Palmer PA-C [id number] [facility]       Agustina Chong APRN-CNP

## 2024-10-26 NOTE — PROGRESS NOTES
Liz Hamlin is a 79 y.o. female POD#4 from DDKT from a DBD donor. COVID+ incidentally noted on admit; asymptomatic. No cycle threshold data. XM with lymphocytes +B-cell/T-cell but no DSA; repeat with peripheral blood only +B-cell with no DSA.     - Feels better today    Objective   Gen: A+OX3; NAD  HEENT: PERRL, sclera anicteric, MMM  Cardiac: RRR  Chest: Normal inspiratory effort  Abdomen: S/NT/ND. Incision C/D/I.  Ext: No LE edema  Drain: serosanguinous    Last Recorded Vitals  Visit Vitals  /86   Pulse 76   Temp 36.6 °C (97.9 °F)   Resp 18      Intake/Output last 3 Shifts:    Intake/Output Summary (Last 24 hours) at 10/26/2024 1053  Last data filed at 10/26/2024 0845  Gross per 24 hour   Intake 1000 ml   Output 2625 ml   Net -1625 ml      Vitals:    10/26/24 0346   Weight: 93.2 kg (205 lb 7.5 oz)   Scheduled medications  acetaminophen, 650 mg, oral, Once  aspirin, 81 mg, oral, Daily  atorvastatin, 40 mg, oral, Daily  carvedilol, 50 mg, oral, BID  clotrimazole, 10 mg, oral, TID after meals  folic acid, 1 mg, oral, Daily  furosemide, 20 mg, oral, Daily  heparin, 5,000 Units, subcutaneous, q12h  hydrALAZINE, 100 mg, oral, BID  insulin lispro, 0-10 Units, subcutaneous, TID  iron sucrose, 200 mg, intravenous, Daily  mycophenolate, 1,000 mg, oral, q12h  oxygen, , inhalation, Continuous - Inhalation  pantoprazole, 40 mg, oral, BID AC  polyethylene glycol, 17 g, oral, Daily  potassium phosphate (monobasic), 1,000 mg, oral, 4x daily  predniSONE, 20 mg, oral, Daily  sennosides-docusate sodium, 1 tablet, oral, BID  sulfamethoxazole-trimethoprim, 80 mg of trimethoprim, oral, Daily  tacrolimus, 1 mg, oral, q12h NATALIA  valGANciclovir, 450 mg, oral, q24h    Assessment/Plan   Principal Problem:    Kidney transplant recipient  Active Problems:  Patient Active Problem List   Diagnosis    Astigmatism    Benign hypertension    Bilateral renal cysts    Chronic kidney disease (CKD) stage G4/A1, severely decreased glomerular  filtration rate (GFR) between 15-29 mL/min/1.73 square meter and albuminuria creatinine ratio less than 30 mg/g (CMS/HC* (Multi)    CKD (chronic kidney disease)    Chronic right shoulder pain    Combined form of age-related cataract, both eyes    Essential hypertension    Glaucoma suspect of both eyes    Gout    Complex renal cyst    Fibroids    History of hypercholesterolemia    Hyperlipidemia    Hyperopia    Myopia with presbyopia    Mitral valve regurgitation    Hepatic steatosis    Intermittent urinary incontinence    Obstructive sleep apnea, adult    Onycholysis of toenail    Onychomycosis of toenail    Pre-ulcerative corn or callous    Pruritus    PVD (posterior vitreous detachment), both eyes    Sleep apnea    Toe deformity, right    Nocturia    CKD stage 4 secondary to hypertension (Multi)    History of anemia due to CKD    Cellulitis of right leg    Health care maintenance    Venous stasis dermatitis of both lower extremities    Diabetes mellitus screening    Screening for cardiovascular condition    Need for vaccination    Asymptomatic menopausal state    Encounter for screening mammogram for breast cancer    Pre-transplant evaluation for kidney transplant    Chronic fatigue    Long toenail    Hyperopia, bilateral    Presbyopia    Chronic diastolic heart failure    Preoperative exam for gynecologic surgery    Screening cholesterol level    Asymptomatic menopause    Visit for screening mammogram    Colon cancer screening    Kidney replaced by transplant ([facility])    ESRD (end stage renal disease) (Multi)      - Completed Thymoglobulin 3 mg/kg  - Plan weekly DSA monitoring x 4 weeks  - No further Belatacept  - Tacrolimus goal 6-7 ng/mL  - Prednisone 20 mg  - Heparin 5000U subcutaneous BID  - ASA/Coreg  - PT/OT  - Renal diet  - DC home today, early next week in clinic    I spent 35 minutes in the professional and overall care of this patient, including immunosuppression management.    Elder Mac MD,  DABS  Transplant & Hepatobiliary Surgery

## 2024-10-26 NOTE — CARE PLAN
The patient's goals for the shift include get kidney    The clinical goals for the shift include safety      Problem: Skin  Goal: Decreased wound size/increased tissue granulation at next dressing change  10/25/2024 2035 by Cris Larkin RN  Outcome: Progressing  10/25/2024 2034 by Cris Larkin RN  Flowsheets (Taken 10/25/2024 2034)  Decreased wound size/increased tissue granulation at next dressing change: Promote sleep for wound healing  Goal: Participates in plan/prevention/treatment measures  10/25/2024 2035 by Cris Larkin RN  Outcome: Progressing  10/25/2024 2034 by Cris Larkin RN  Flowsheets (Taken 10/25/2024 2034)  Participates in plan/prevention/treatment measures: Elevate heels  Goal: Prevent/manage excess moisture  10/25/2024 2035 by Cris Larkin RN  Outcome: Progressing  10/25/2024 2034 by Cris Larkin RN  Flowsheets (Taken 10/25/2024 2034)  Prevent/manage excess moisture: Monitor for/manage infection if present  Goal: Prevent/minimize sheer/friction injuries  10/25/2024 2035 by Cris Larkin RN  Outcome: Progressing  10/25/2024 2034 by Cris Larkin RN  Flowsheets (Taken 10/25/2024 2034)  Prevent/minimize sheer/friction injuries: Increase activity/out of bed for meals  Goal: Promote/optimize nutrition  10/25/2024 2035 by Cris Larkin RN  Outcome: Progressing  10/25/2024 2034 by Cris Larkin RN  Flowsheets (Taken 10/25/2024 2034)  Promote/optimize nutrition: Monitor/record intake including meals  Goal: Promote skin healing  10/25/2024 2035 by Cris Larkin RN  Outcome: Progressing  10/25/2024 2034 by Cris Larkin RN  Flowsheets (Taken 10/25/2024 2034)  Promote skin healing: Protective dressings over bony prominences     Problem: Safety - Adult  Goal: Free from fall injury  Outcome: Progressing     Problem: Discharge Planning  Goal: Discharge to home or other facility with appropriate resources  Outcome: Progressing     Problem: Chronic Conditions and Co-morbidities  Goal:  Patient's chronic conditions and co-morbidity symptoms are monitored and maintained or improved  Outcome: Progressing     Problem: Pain  Goal: Takes deep breaths with improved pain control throughout the shift  Outcome: Progressing  Goal: Turns in bed with improved pain control throughout the shift  Outcome: Progressing  Goal: Walks with improved pain control throughout the shift  Outcome: Progressing  Goal: Performs ADL's with improved pain control throughout shift  Outcome: Progressing  Goal: Participates in PT with improved pain control throughout the shift  Outcome: Progressing  Goal: Free from opioid side effects throughout the shift  Outcome: Progressing  Goal: Free from acute confusion related to pain meds throughout the shift  Outcome: Progressing     Problem: Fall/Injury  Goal: Not fall by end of shift  Outcome: Progressing  Goal: Be free from injury by end of the shift  Outcome: Progressing  Goal: Verbalize understanding of personal risk factors for fall in the hospital  Outcome: Progressing  Goal: Verbalize understanding of risk factor reduction measures to prevent injury from fall in the home  Outcome: Progressing  Goal: Use assistive devices by end of the shift  Outcome: Progressing  Goal: Pace activities to prevent fatigue by end of the shift  Outcome: Progressing     Problem: Nutrition  Goal: Less than 5 days NPO/clear liquids  Outcome: Progressing  Goal: Oral intake greater than 50%  Outcome: Progressing  Goal: Oral intake greater 75%  Outcome: Progressing  Goal: Consume prescribed supplement  Outcome: Progressing  Goal: Adequate PO fluid intake  Outcome: Progressing  Goal: Nutrition support goals are met within 48 hrs  Outcome: Progressing  Goal: Nutrition support is meeting 75% of nutrient needs  Outcome: Progressing  Goal: Tube feed tolerance  Outcome: Progressing  Goal: BG  mg/dL  Outcome: Progressing  Goal: Lab values WNL  Outcome: Progressing  Goal: Electrolytes WNL  Outcome:  Progressing  Goal: Promote healing  Outcome: Progressing  Goal: Maintain stable weight  Outcome: Progressing  Goal: Reduce weight from edema/fluid  Outcome: Progressing  Goal: Gradual weight gain  Outcome: Progressing  Goal: Improve ostomy output  Outcome: Progressing

## 2024-10-26 NOTE — HH CARE COORDINATION
Home Care received a Referral for Nursing, Physical Therapy, and Occupational Therapy. We have processed the referral for a Start of Care on 10/29/24.     If you have any questions or concerns, please feel free to contact us at [phone]. Follow the prompts, enter your five digit zip code, and you will be directed to your care team on CENTL 2.

## 2024-10-28 ENCOUNTER — HOME CARE VISIT (OUTPATIENT)
Dept: HOME HEALTH SERVICES | Facility: HOME HEALTH | Age: 79
End: 2024-10-28
Payer: MEDICARE

## 2024-10-28 PROCEDURE — G0299 HHS/HOSPICE OF RN EA 15 MIN: HCPCS | Mod: HHH

## 2024-10-28 PROCEDURE — 169592 NO-PAY CLAIM PROCEDURE

## 2024-10-28 ASSESSMENT — ENCOUNTER SYMPTOMS
BOWEL PATTERN NORMAL: 1
APPETITE LEVEL: GOOD
HYPERTENSION: 1
PERSON REPORTING PAIN: PATIENT
PAIN LOCATION: ABDOMEN
PAIN: 1
STOOL FREQUENCY: LESS THAN DAILY
LAST BOWEL MOVEMENT: 67139

## 2024-10-28 ASSESSMENT — ACTIVITIES OF DAILY LIVING (ADL)
ENTERING_EXITING_HOME: NEEDS ASSISTANCE
OASIS_M1830: 03

## 2024-10-29 ENCOUNTER — TELEPHONE (OUTPATIENT)
Dept: PRIMARY CARE | Facility: CLINIC | Age: 79
End: 2024-10-29

## 2024-10-29 ENCOUNTER — LAB (OUTPATIENT)
Dept: LAB | Facility: LAB | Age: 79
End: 2024-10-29
Payer: COMMERCIAL

## 2024-10-29 ENCOUNTER — PHARMACY VISIT (OUTPATIENT)
Dept: PHARMACY | Facility: CLINIC | Age: 79
End: 2024-10-29
Payer: COMMERCIAL

## 2024-10-29 ENCOUNTER — OFFICE VISIT (OUTPATIENT)
Dept: TRANSPLANT | Facility: HOSPITAL | Age: 79
End: 2024-10-29
Payer: COMMERCIAL

## 2024-10-29 VITALS
BODY MASS INDEX: 33.56 KG/M2 | WEIGHT: 207.9 LBS | SYSTOLIC BLOOD PRESSURE: 123 MMHG | HEART RATE: 78 BPM | TEMPERATURE: 97.1 F | DIASTOLIC BLOOD PRESSURE: 73 MMHG | OXYGEN SATURATION: 98 %

## 2024-10-29 DIAGNOSIS — Z94.0 KIDNEY REPLACED BY TRANSPLANT (HHS-HCC): ICD-10-CM

## 2024-10-29 DIAGNOSIS — E83.39 HYPOPHOSPHATEMIA: ICD-10-CM

## 2024-10-29 DIAGNOSIS — D84.9 IMMUNOSUPPRESSION: Primary | ICD-10-CM

## 2024-10-29 DIAGNOSIS — Z94.0 KIDNEY TRANSPLANTED (HHS-HCC): ICD-10-CM

## 2024-10-29 DIAGNOSIS — I10 ESSENTIAL HYPERTENSION: ICD-10-CM

## 2024-10-29 LAB
ALBUMIN SERPL BCP-MCNC: 3.3 G/DL (ref 3.4–5)
ANION GAP SERPL CALC-SCNC: 12 MMOL/L (ref 10–20)
BUN SERPL-MCNC: 20 MG/DL (ref 6–23)
CALCIUM SERPL-MCNC: 8.7 MG/DL (ref 8.6–10.6)
CHLORIDE SERPL-SCNC: 105 MMOL/L (ref 98–107)
CO2 SERPL-SCNC: 25 MMOL/L (ref 21–32)
CREAT SERPL-MCNC: 0.93 MG/DL (ref 0.5–1.05)
EGFRCR SERPLBLD CKD-EPI 2021: 63 ML/MIN/1.73M*2
ERYTHROCYTE [DISTWIDTH] IN BLOOD BY AUTOMATED COUNT: 19.2 % (ref 11.5–14.5)
GLUCOSE SERPL-MCNC: 88 MG/DL (ref 74–99)
HCT VFR BLD AUTO: 26.8 % (ref 36–46)
HGB BLD-MCNC: 8.6 G/DL (ref 12–16)
MAGNESIUM SERPL-MCNC: 1.63 MG/DL (ref 1.6–2.4)
MCH RBC QN AUTO: 28.8 PG (ref 26–34)
MCHC RBC AUTO-ENTMCNC: 32.1 G/DL (ref 32–36)
MCV RBC AUTO: 90 FL (ref 80–100)
NRBC BLD-RTO: 0 /100 WBCS (ref 0–0)
PHOSPHATE SERPL-MCNC: 2.7 MG/DL (ref 2.5–4.9)
PLATELET # BLD AUTO: 126 X10*3/UL (ref 150–450)
POTASSIUM SERPL-SCNC: 4.3 MMOL/L (ref 3.5–5.3)
RBC # BLD AUTO: 2.99 X10*6/UL (ref 4–5.2)
SODIUM SERPL-SCNC: 138 MMOL/L (ref 136–145)
WBC # BLD AUTO: 6.9 X10*3/UL (ref 4.4–11.3)

## 2024-10-29 PROCEDURE — 36415 COLL VENOUS BLD VENIPUNCTURE: CPT

## 2024-10-29 PROCEDURE — 99214 OFFICE O/P EST MOD 30 MIN: CPT | Mod: 24 | Performed by: TRANSPLANT SURGERY

## 2024-10-29 PROCEDURE — 85027 COMPLETE CBC AUTOMATED: CPT

## 2024-10-29 PROCEDURE — 80069 RENAL FUNCTION PANEL: CPT

## 2024-10-29 PROCEDURE — RXMED WILLOW AMBULATORY MEDICATION CHARGE

## 2024-10-29 PROCEDURE — 1111F DSCHRG MED/CURRENT MED MERGE: CPT | Performed by: TRANSPLANT SURGERY

## 2024-10-29 PROCEDURE — 83735 ASSAY OF MAGNESIUM: CPT

## 2024-10-29 PROCEDURE — 99214 OFFICE O/P EST MOD 30 MIN: CPT | Performed by: TRANSPLANT SURGERY

## 2024-10-29 PROCEDURE — 3078F DIAST BP <80 MM HG: CPT | Performed by: TRANSPLANT SURGERY

## 2024-10-29 PROCEDURE — 1126F AMNT PAIN NOTED NONE PRSNT: CPT | Performed by: TRANSPLANT SURGERY

## 2024-10-29 PROCEDURE — 3074F SYST BP LT 130 MM HG: CPT | Performed by: TRANSPLANT SURGERY

## 2024-10-29 RX ORDER — PREDNISONE 5 MG/1
20 TABLET ORAL DAILY
Qty: 120 TABLET | Refills: 11 | Status: SHIPPED | OUTPATIENT
Start: 2024-10-29 | End: 2025-10-24

## 2024-10-29 RX ORDER — CLOTRIMAZOLE 10 MG/1
10 LOZENGE ORAL
Qty: 90 TROCHE | Refills: 1 | Status: SHIPPED | OUTPATIENT
Start: 2024-10-29 | End: 2024-11-29

## 2024-10-29 RX ORDER — CARVEDILOL 25 MG/1
50 TABLET ORAL 2 TIMES DAILY
Qty: 120 TABLET | Refills: 11 | Status: SHIPPED | OUTPATIENT
Start: 2024-10-29 | End: 2025-10-24

## 2024-10-29 RX ORDER — MYCOPHENOLATE MOFETIL 250 MG/1
1000 CAPSULE ORAL EVERY 12 HOURS
Qty: 240 CAPSULE | Refills: 11 | Status: SHIPPED | OUTPATIENT
Start: 2024-10-29 | End: 2025-10-24

## 2024-10-29 RX ORDER — VALGANCICLOVIR 450 MG/1
900 TABLET, FILM COATED ORAL EVERY 24 HOURS
Qty: 60 TABLET | Refills: 4 | Status: SHIPPED | OUTPATIENT
Start: 2024-10-29 | End: 2025-03-28

## 2024-10-29 RX ORDER — TACROLIMUS 1 MG/1
1 CAPSULE ORAL
Qty: 60 CAPSULE | Refills: 11 | Status: SHIPPED | OUTPATIENT
Start: 2024-10-29 | End: 2025-10-24

## 2024-10-29 RX ORDER — SULFAMETHOXAZOLE AND TRIMETHOPRIM 400; 80 MG/1; MG/1
1 TABLET ORAL DAILY
Qty: 30 TABLET | Refills: 4 | Status: SHIPPED | OUTPATIENT
Start: 2024-10-29 | End: 2025-03-28

## 2024-10-29 ASSESSMENT — ENCOUNTER SYMPTOMS
ADENOPATHY: 0
WEAKNESS: 0
HALLUCINATIONS: 0
DIZZINESS: 0
EYES NEGATIVE: 1
FEVER: 0
ABDOMINAL PAIN: 0
SHORTNESS OF BREATH: 0
AGITATION: 0
CHILLS: 0
CONFUSION: 0
HEMATURIA: 0
CONSTIPATION: 0
ARTHRALGIAS: 0
FREQUENCY: 0
ABDOMINAL DISTENTION: 0
DYSURIA: 0
LIGHT-HEADEDNESS: 0
DIARRHEA: 0
COUGH: 0
COLOR CHANGE: 0

## 2024-10-29 ASSESSMENT — PAIN SCALES - GENERAL: PAINLEVEL_OUTOF10: 0-NO PAIN

## 2024-10-30 ENCOUNTER — HOME CARE VISIT (OUTPATIENT)
Dept: HOME HEALTH SERVICES | Facility: HOME HEALTH | Age: 79
End: 2024-10-30
Payer: MEDICARE

## 2024-10-30 PROCEDURE — G0152 HHCP-SERV OF OT,EA 15 MIN: HCPCS | Mod: HHH

## 2024-10-31 ENCOUNTER — LAB (OUTPATIENT)
Dept: LAB | Facility: LAB | Age: 79
End: 2024-10-31
Payer: COMMERCIAL

## 2024-10-31 ENCOUNTER — LAB REQUISITION (OUTPATIENT)
Dept: LAB | Facility: CLINIC | Age: 79
End: 2024-10-31
Payer: COMMERCIAL

## 2024-10-31 DIAGNOSIS — N18.6 END STAGE RENAL DISEASE (MULTI): ICD-10-CM

## 2024-10-31 DIAGNOSIS — Z94.0 KIDNEY REPLACED BY TRANSPLANT (HHS-HCC): ICD-10-CM

## 2024-10-31 LAB
ALBUMIN SERPL BCP-MCNC: 3.2 G/DL (ref 3.4–5)
ANION GAP SERPL CALC-SCNC: 10 MMOL/L (ref 10–20)
ATRIAL RATE: 79 BPM
BUN SERPL-MCNC: 20 MG/DL (ref 6–23)
CALCIUM SERPL-MCNC: 8.8 MG/DL (ref 8.6–10.6)
CHLORIDE SERPL-SCNC: 104 MMOL/L (ref 98–107)
CO2 SERPL-SCNC: 24 MMOL/L (ref 21–32)
CREAT SERPL-MCNC: 0.99 MG/DL (ref 0.5–1.05)
EGFRCR SERPLBLD CKD-EPI 2021: 59 ML/MIN/1.73M*2
ERYTHROCYTE [DISTWIDTH] IN BLOOD BY AUTOMATED COUNT: 19.6 % (ref 11.5–14.5)
FLOW ALLOCROSSMATCH PEAK: NORMAL
FLOW ALLOCROSSMATCH PEAK: NORMAL
GLUCOSE SERPL-MCNC: 96 MG/DL (ref 74–99)
HCT VFR BLD AUTO: 25.5 % (ref 36–46)
HGB BLD-MCNC: 8.3 G/DL (ref 12–16)
HLA CLS I TYP PNL BLD/T DONR HIGH RES: NORMAL
HLA RESULTS: NORMAL
HLA-DP2 QL: NORMAL
HLA-DQB1 HIGH RES: NORMAL
HLA-DRB1 HIGH RES: NORMAL
MAGNESIUM SERPL-MCNC: 1.62 MG/DL (ref 1.6–2.4)
MCH RBC QN AUTO: 29.3 PG (ref 26–34)
MCHC RBC AUTO-ENTMCNC: 32.5 G/DL (ref 32–36)
MCV RBC AUTO: 90 FL (ref 80–100)
NRBC BLD-RTO: ABNORMAL /100{WBCS}
P AXIS: 71 DEGREES
P OFFSET: 211 MS
P ONSET: 145 MS
PHOSPHATE SERPL-MCNC: 2.4 MG/DL (ref 2.5–4.9)
PLATELET # BLD AUTO: 155 X10*3/UL (ref 150–450)
POTASSIUM SERPL-SCNC: 4.5 MMOL/L (ref 3.5–5.3)
PR INTERVAL: 162 MS
Q ONSET: 226 MS
QRS COUNT: 13 BEATS
QRS DURATION: 70 MS
QT INTERVAL: 388 MS
QTC CALCULATION(BAZETT): 444 MS
QTC FREDERICIA: 425 MS
R AXIS: 33 DEGREES
RBC # BLD AUTO: 2.83 X10*6/UL (ref 4–5.2)
SODIUM SERPL-SCNC: 133 MMOL/L (ref 136–145)
T AXIS: 51 DEGREES
T OFFSET: 420 MS
VENTRICULAR RATE: 79 BPM
WBC # BLD AUTO: 8 X10*3/UL (ref 4.4–11.3)

## 2024-10-31 PROCEDURE — 80069 RENAL FUNCTION PANEL: CPT

## 2024-10-31 PROCEDURE — 36415 COLL VENOUS BLD VENIPUNCTURE: CPT

## 2024-10-31 PROCEDURE — 86833 HLA CLASS II HIGH DEFIN QUAL: CPT

## 2024-10-31 PROCEDURE — 85027 COMPLETE CBC AUTOMATED: CPT

## 2024-10-31 PROCEDURE — 86832 HLA CLASS I HIGH DEFIN QUAL: CPT

## 2024-10-31 PROCEDURE — 83735 ASSAY OF MAGNESIUM: CPT

## 2024-10-31 ASSESSMENT — ENCOUNTER SYMPTOMS
PAIN LOCATION - PAIN QUALITY: ACHE
HIGHEST PAIN SEVERITY IN PAST 24 HOURS: 4/10
PAIN: 1
PAIN LOCATION - EXACERBATING FACTORS: MOBILITY
SUBJECTIVE PAIN PROGRESSION: WAXING AND WANING
PAIN LOCATION: RIGHT LEG
PAIN LOCATION - RELIEVING FACTORS: REST, MEDS
PAIN LOCATION - PAIN DURATION: CONSTANT
PAIN SEVERITY GOAL: 0/10
LOWEST PAIN SEVERITY IN PAST 24 HOURS: 4/10
PAIN LOCATION - PAIN FREQUENCY: CONSTANT
PERSON REPORTING PAIN: PATIENT
PAIN LOCATION - PAIN SEVERITY: 4/10

## 2024-11-01 ENCOUNTER — HOME CARE VISIT (OUTPATIENT)
Dept: HOME HEALTH SERVICES | Facility: HOME HEALTH | Age: 79
End: 2024-11-01
Payer: MEDICARE

## 2024-11-01 ENCOUNTER — TELEPHONE (OUTPATIENT)
Dept: TRANSPLANT | Facility: HOSPITAL | Age: 79
End: 2024-11-01
Payer: COMMERCIAL

## 2024-11-01 PROCEDURE — G0152 HHCP-SERV OF OT,EA 15 MIN: HCPCS | Mod: HHH

## 2024-11-01 PROCEDURE — G0151 HHCP-SERV OF PT,EA 15 MIN: HCPCS | Mod: HHH

## 2024-11-01 ASSESSMENT — ACTIVITIES OF DAILY LIVING (ADL)
DRESSING_LB_CURRENT_FUNCTION: MINIMUM ASSIST
BATHING_CURRENT_FUNCTION: MINIMUM ASSIST
DRESSING_UB_CURRENT_FUNCTION: CONTACT GUARD ASSIST
BATHING ASSESSED: 1

## 2024-11-01 ASSESSMENT — ENCOUNTER SYMPTOMS: DENIES PAIN: 1

## 2024-11-02 VITALS
OXYGEN SATURATION: 96 % | HEART RATE: 76 BPM | SYSTOLIC BLOOD PRESSURE: 116 MMHG | DIASTOLIC BLOOD PRESSURE: 70 MMHG | TEMPERATURE: 96 F

## 2024-11-02 ASSESSMENT — ACTIVITIES OF DAILY LIVING (ADL)
AMBULATION ASSISTANCE: STAND BY ASSIST
CURRENT_FUNCTION: STAND BY ASSIST
AMBULATION_DISTANCE/DURATION_TOLERATED: 75 FEET X 2

## 2024-11-02 ASSESSMENT — ENCOUNTER SYMPTOMS
MUSCLE WEAKNESS: 1
HIGHEST PAIN SEVERITY IN PAST 24 HOURS: 4/10
SUBJECTIVE PAIN PROGRESSION: RAPIDLY IMPROVING
DENIES PAIN: 1
LOWEST PAIN SEVERITY IN PAST 24 HOURS: 0/10
PERSON REPORTING PAIN: PATIENT

## 2024-11-03 ENCOUNTER — APPOINTMENT (OUTPATIENT)
Dept: HOME HEALTH SERVICES | Facility: HOME HEALTH | Age: 79
End: 2024-11-03
Payer: COMMERCIAL

## 2024-11-04 ENCOUNTER — HOME CARE VISIT (OUTPATIENT)
Dept: HOME HEALTH SERVICES | Facility: HOME HEALTH | Age: 79
End: 2024-11-04
Payer: MEDICARE

## 2024-11-04 LAB
HLA RESULTS: NORMAL
HLA-A+B+C AB NFR SER: NORMAL %
HLA-DP+DQ+DR AB NFR SER: NORMAL %

## 2024-11-04 PROCEDURE — G0152 HHCP-SERV OF OT,EA 15 MIN: HCPCS | Mod: HHH

## 2024-11-05 ENCOUNTER — HOME CARE VISIT (OUTPATIENT)
Dept: HOME HEALTH SERVICES | Facility: HOME HEALTH | Age: 79
End: 2024-11-05
Payer: MEDICARE

## 2024-11-05 ENCOUNTER — OFFICE VISIT (OUTPATIENT)
Dept: TRANSPLANT | Facility: HOSPITAL | Age: 79
End: 2024-11-05
Payer: COMMERCIAL

## 2024-11-05 ENCOUNTER — LAB (OUTPATIENT)
Dept: LAB | Facility: LAB | Age: 79
End: 2024-11-05
Payer: COMMERCIAL

## 2024-11-05 VITALS
HEART RATE: 70 BPM | DIASTOLIC BLOOD PRESSURE: 76 MMHG | SYSTOLIC BLOOD PRESSURE: 128 MMHG | RESPIRATION RATE: 16 BRPM | OXYGEN SATURATION: 97 % | TEMPERATURE: 97.9 F

## 2024-11-05 VITALS
TEMPERATURE: 97.2 F | DIASTOLIC BLOOD PRESSURE: 60 MMHG | OXYGEN SATURATION: 98 % | BODY MASS INDEX: 32.28 KG/M2 | SYSTOLIC BLOOD PRESSURE: 132 MMHG | HEART RATE: 71 BPM | WEIGHT: 200 LBS

## 2024-11-05 DIAGNOSIS — Z94.0 KIDNEY REPLACED BY TRANSPLANT (HHS-HCC): ICD-10-CM

## 2024-11-05 DIAGNOSIS — D84.9 IMMUNOSUPPRESSION: Primary | ICD-10-CM

## 2024-11-05 DIAGNOSIS — Z94.0 KIDNEY TRANSPLANTED (HHS-HCC): ICD-10-CM

## 2024-11-05 LAB
ALBUMIN SERPL BCP-MCNC: 3.6 G/DL (ref 3.4–5)
ANION GAP SERPL CALC-SCNC: 11 MMOL/L (ref 10–20)
BUN SERPL-MCNC: 21 MG/DL (ref 6–23)
CALCIUM SERPL-MCNC: 9 MG/DL (ref 8.6–10.6)
CHLORIDE SERPL-SCNC: 101 MMOL/L (ref 98–107)
CO2 SERPL-SCNC: 28 MMOL/L (ref 21–32)
CREAT SERPL-MCNC: 0.98 MG/DL (ref 0.5–1.05)
EGFRCR SERPLBLD CKD-EPI 2021: 60 ML/MIN/1.73M*2
ERYTHROCYTE [DISTWIDTH] IN BLOOD BY AUTOMATED COUNT: 20.3 % (ref 11.5–14.5)
FLOW ALLOCROSSMATCH: NORMAL
GLUCOSE SERPL-MCNC: 101 MG/DL (ref 74–99)
HCT VFR BLD AUTO: 27.9 % (ref 36–46)
HGB BLD-MCNC: 8.8 G/DL (ref 12–16)
HLA RESULTS: NORMAL
MAGNESIUM SERPL-MCNC: 1.56 MG/DL (ref 1.6–2.4)
MCH RBC QN AUTO: 29.4 PG (ref 26–34)
MCHC RBC AUTO-ENTMCNC: 31.5 G/DL (ref 32–36)
MCV RBC AUTO: 93 FL (ref 80–100)
NRBC BLD-RTO: 0 /100 WBCS (ref 0–0)
PHOSPHATE SERPL-MCNC: 2.4 MG/DL (ref 2.5–4.9)
PLATELET # BLD AUTO: 164 X10*3/UL (ref 150–450)
POTASSIUM SERPL-SCNC: 3.7 MMOL/L (ref 3.5–5.3)
RBC # BLD AUTO: 2.99 X10*6/UL (ref 4–5.2)
SODIUM SERPL-SCNC: 136 MMOL/L (ref 136–145)
WBC # BLD AUTO: 4.9 X10*3/UL (ref 4.4–11.3)

## 2024-11-05 PROCEDURE — G0299 HHS/HOSPICE OF RN EA 15 MIN: HCPCS | Mod: HHH

## 2024-11-05 PROCEDURE — 3078F DIAST BP <80 MM HG: CPT | Performed by: TRANSPLANT SURGERY

## 2024-11-05 PROCEDURE — 36415 COLL VENOUS BLD VENIPUNCTURE: CPT

## 2024-11-05 PROCEDURE — 86833 HLA CLASS II HIGH DEFIN QUAL: CPT

## 2024-11-05 PROCEDURE — 80069 RENAL FUNCTION PANEL: CPT

## 2024-11-05 PROCEDURE — 3075F SYST BP GE 130 - 139MM HG: CPT | Performed by: TRANSPLANT SURGERY

## 2024-11-05 PROCEDURE — 99214 OFFICE O/P EST MOD 30 MIN: CPT | Mod: 24 | Performed by: TRANSPLANT SURGERY

## 2024-11-05 PROCEDURE — 83735 ASSAY OF MAGNESIUM: CPT

## 2024-11-05 PROCEDURE — 85027 COMPLETE CBC AUTOMATED: CPT

## 2024-11-05 PROCEDURE — 86832 HLA CLASS I HIGH DEFIN QUAL: CPT

## 2024-11-05 PROCEDURE — 1111F DSCHRG MED/CURRENT MED MERGE: CPT | Performed by: TRANSPLANT SURGERY

## 2024-11-05 PROCEDURE — G0157 HHC PT ASSISTANT EA 15: HCPCS | Mod: CQ,HHH

## 2024-11-05 PROCEDURE — 1159F MED LIST DOCD IN RCRD: CPT | Performed by: TRANSPLANT SURGERY

## 2024-11-05 PROCEDURE — 99214 OFFICE O/P EST MOD 30 MIN: CPT | Performed by: TRANSPLANT SURGERY

## 2024-11-05 RX ORDER — EPOETIN ALFA-EPBX 10000 [IU]/ML
10000 INJECTION, SOLUTION INTRAVENOUS; SUBCUTANEOUS WEEKLY
Qty: 4 ML | Refills: 11 | Status: SHIPPED | OUTPATIENT
Start: 2024-11-05

## 2024-11-05 RX ORDER — PREDNISONE 5 MG/1
15 TABLET ORAL DAILY
Qty: 90 TABLET | Refills: 11 | Status: SHIPPED | OUTPATIENT
Start: 2024-11-05 | End: 2025-10-31

## 2024-11-05 SDOH — ECONOMIC STABILITY: GENERAL

## 2024-11-05 ASSESSMENT — ENCOUNTER SYMPTOMS
FEVER: 0
PAIN LOCATION - PAIN SEVERITY: 4/10
CONSTIPATION: 0
LIGHT-HEADEDNESS: 0
PAIN LOCATION - PAIN FREQUENCY: FREQUENT
COLOR CHANGE: 0
CHILLS: 0
FREQUENCY: 0
HALLUCINATIONS: 0
MUSCLE WEAKNESS: 1
ADENOPATHY: 0
DIARRHEA: 0
HEMATURIA: 0
AGITATION: 0
DIZZINESS: 0
PAIN LOCATION: LEFT HIP
SHORTNESS OF BREATH: 0
PAIN LOCATION - PAIN QUALITY: ACHING
COUGH: 0
PAIN LOCATION - PAIN DURATION: LESS THAN HOUR
EYES NEGATIVE: 1
PERSON REPORTING PAIN: PATIENT
PAIN LOCATION - RELIEVING FACTORS: REST/MEDS
HIGHEST PAIN SEVERITY IN PAST 24 HOURS: 5/10
PAIN: 1
ABDOMINAL PAIN: 0
PAIN LOCATION - EXACERBATING FACTORS: WITH INCREASE ACTIVITY
CONFUSION: 0
ARTHRALGIAS: 0
ABDOMINAL DISTENTION: 0
APPETITE LEVEL: FAIR
DYSURIA: 0
WEAKNESS: 0

## 2024-11-05 ASSESSMENT — ACTIVITIES OF DAILY LIVING (ADL)
AMBULATION ASSISTANCE: STAND BY ASSIST
CURRENT_FUNCTION: STAND BY ASSIST
MONEY MANAGEMENT (EXPENSES/BILLS): INDEPENDENT

## 2024-11-05 NOTE — PATIENT INSTRUCTIONS
Decrease prednisone 15 mg (3 tablets) daily  We sent a prescription for a once a week injection called Retacrit to the pharmacy for approval, this will take a few weeks. This injection will help increase your hemoglobin  We will monitor your blood sugar in your labs, no need for another Dexcom at this time  Labs weekly  RTC 1 week

## 2024-11-05 NOTE — PROGRESS NOTES
Subjective   Liz Hamlin is a 79 y.o. 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 Here today for follow-up.    Pt is a 78 y/o female with a hx of ESRD secondary to hypertension whom received a  donor kidney transplant on 10/20/24 by Dr. Jimenez with a KDPI of 86% and PRA of 71%. Donor was Hepc - / - and has not met risk factors. EBV + / +. Left donor kidney transplanted to patient right pelvis. Admit weight was 86.4 kg (discharge weight is 93.2kg ).  Pt received a total of 3 mg/kg total of thymoglobulin induction therapy in conjunction with 500mg IV solumedrol.  Pt was initiated on Belatacept  & Cellcept immunosuppression in conjunction with IV solumedrol taper.  Pt was started on valcyte (CMV D + / R - ) and bactrim for CMV & PCP prophylaxis per protocol. She was started on clotrimazole as antifungal coverage per protocol. Operative course was uncomplicated.  Hospital course was uncomplicated. Patel catheter was removed on POD #3 and the patient is voiding spontaneously. Pt did not require dialysis and electrolytes remain stable. Patient was pre-dialysis and does not have HD access.   BP & glucose under good control. Of note, patient was asymptomatically covid positive. She was treated with remdesevir X3 doses.     Review of Systems   Constitutional:  Negative for chills and fever.   HENT: Negative.  Negative for congestion.    Eyes: Negative.    Respiratory:  Negative for cough and shortness of breath.    Cardiovascular:  Negative for chest pain.   Gastrointestinal:  Negative for abdominal distention, abdominal pain, constipation and diarrhea.   Endocrine: Negative for cold intolerance and heat intolerance.   Genitourinary:  Negative for dysuria, frequency, hematuria and urgency.   Musculoskeletal:  Negative for arthralgias.   Skin:  Negative for color change.   Allergic/Immunologic: Negative for environmental allergies.   Neurological:  Negative for dizziness, weakness and light-headedness.    Hematological:  Negative for adenopathy.   Psychiatric/Behavioral:  Negative for agitation, confusion and hallucinations.         Objective   Vitals:    11/05/24 1025   BP: 132/60   Pulse: 71   Temp: 36.2 °C (97.2 °F)   SpO2: 98%       Physical Exam  Constitutional:       Appearance: Normal appearance.   Eyes:      Pupils: Pupils are equal, round, and reactive to light.   Cardiovascular:      Rate and Rhythm: Normal rate.   Pulmonary:      Effort: Pulmonary effort is normal. No respiratory distress.   Abdominal:      General: There is no distension.      Palpations: Abdomen is soft.      Comments: Incision clean, dry, intact   Musculoskeletal:         General: Normal range of motion.      Right lower leg: No edema.      Left lower leg: No edema.   Skin:     General: Skin is warm and dry.   Neurological:      General: No focal deficit present.      Mental Status: She is alert and oriented to person, place, and time.   Psychiatric:         Mood and Affect: Mood normal.         Behavior: Behavior normal.       Lab Results   Component Value Date    GLUCOSE 96 10/31/2024    CALCIUM 8.8 10/31/2024     (L) 10/31/2024    K 4.5 10/31/2024    CO2 24 10/31/2024     10/31/2024    BUN 20 10/31/2024    CREATININE 0.99 10/31/2024     Lab Results   Component Value Date    WBC 8.0 10/31/2024    HGB 8.3 (L) 10/31/2024    HCT 25.5 (L) 10/31/2024    MCV 90 10/31/2024     10/31/2024       Current Outpatient Medications:     acetaminophen (Tylenol) 325 mg tablet, Take 2 tablets (650 mg) by mouth every 6 hours if needed for mild pain (1 - 3) for up to 14 days. Do not start before October 22, 2024., Disp: 30 tablet, Rfl: 0    aspirin 81 mg EC tablet, Take 1 tablet (81 mg) by mouth once daily., Disp: , Rfl:     atorvastatin (Lipitor) 40 mg tablet, Take 1 tablet (40 mg) by mouth once daily., Disp: 90 tablet, Rfl: 1    carvedilol (Coreg) 25 mg tablet, Take 2 tablets (50 mg) by mouth 2 times a day., Disp: 120 tablet, Rfl:  11    clotrimazole (Mycelex) 10 mg nino, Take 1 tablet (10 mg) by mouth 3 times a day after meals., Disp: 90 Nino, Rfl: 1    ferrous sulfate, 325 mg ferrous sulfate, (FeroSuL) tablet, Take 1 tablet by mouth once daily., Disp: 90 tablet, Rfl: 1    furosemide (Lasix) 20 mg tablet, Take 1 tablet (20 mg) by mouth once daily., Disp: 30 tablet, Rfl: 0    hydrALAZINE (Apresoline) 100 mg tablet, Take 1 tablet (100 mg) by mouth 2 times a day., Disp: 180 tablet, Rfl: 1    methocarbamol (Robaxin) 500 mg tablet, Take 1 tablet (500 mg) by mouth every 8 hours if needed for muscle spasms for up to 5 days., Disp: 15 tablet, Rfl: 0    mycophenolate (Cellcept) 250 mg capsule, Take 4 capsules (1,000 mg) by mouth every 12 hours., Disp: 240 capsule, Rfl: 11    pantoprazole (ProtoNix) 40 mg EC tablet, Take 1 tablet (40 mg) by mouth once daily in the morning. Take before meals. Do not crush, chew, or split., Disp: 30 tablet, Rfl: 0    potassium phosphate, monobasic, (K-Phos) 500 mg tablet, Take 1 tablet (500 mg) by mouth 2 times a day., Disp: 60 tablet, Rfl: 2    predniSONE (Deltasone) 5 mg tablet, Take 4 tablets (20 mg) by mouth once daily., Disp: 120 tablet, Rfl: 11    SITagliptin phosphate (Januvia) 100 mg tablet, Take 1 tablet (100 mg) by mouth once daily., Disp: 30 tablet, Rfl: 11    sulfamethoxazole-trimethoprim (Bactrim) 400-80 mg tablet, Take 1 tablet by mouth once daily., Disp: 30 tablet, Rfl: 4    tacrolimus (Prograf) 1 mg capsule, Take 1 capsule (1 mg) by mouth every 12 hours., Disp: 60 capsule, Rfl: 11    valGANciclovir (Valcyte) 450 mg tablet, Take 2 tablets (900 mg) by mouth once every 24 hours. Do not crush or chew., Disp: 60 tablet, Rfl: 4    Assessment/Plan     DDKT: 10/20/2024  KDPI: 86  PRA 71  VXM negative, XM T&B cell positive, no DSA  Covid (+) incidentally at the time of transplant    Kidney allograft function    Excellent function, creatinine < 1     Immunosuppression   Thymo induction 3mg/kg  Tacrolimus 1/1  (goal 6-8)   MMF 1000/1000   Pred 20, decrease to 15   Valcyte (D+/R-)    Anemia   Epo     Covid   No symptoms at the time of transplant   Received Remdesevir x 3

## 2024-11-06 ENCOUNTER — HOME CARE VISIT (OUTPATIENT)
Dept: HOME HEALTH SERVICES | Facility: HOME HEALTH | Age: 79
End: 2024-11-06
Payer: MEDICARE

## 2024-11-06 ENCOUNTER — PATIENT MESSAGE (OUTPATIENT)
Dept: TRANSPLANT | Facility: HOSPITAL | Age: 79
End: 2024-11-06

## 2024-11-06 DIAGNOSIS — Z94.0 KIDNEY REPLACED BY TRANSPLANT (HHS-HCC): ICD-10-CM

## 2024-11-06 LAB
HLA RESULTS: NORMAL
HLA-A+B+C AB NFR SER: NORMAL %
HLA-DP+DQ+DR AB NFR SER: NORMAL %

## 2024-11-06 PROCEDURE — G0156 HHCP-SVS OF AIDE,EA 15 MIN: HCPCS | Mod: HHH

## 2024-11-06 RX ORDER — CALCIUM CARBONATE 300MG(750)
400 TABLET,CHEWABLE ORAL DAILY
Qty: 30 TABLET | Refills: 2 | Status: SHIPPED | OUTPATIENT
Start: 2024-11-06 | End: 2024-11-07 | Stop reason: SDUPTHER

## 2024-11-07 ENCOUNTER — TELEPHONE (OUTPATIENT)
Dept: TRANSPLANT | Facility: HOSPITAL | Age: 79
End: 2024-11-07
Payer: COMMERCIAL

## 2024-11-07 ENCOUNTER — HOME CARE VISIT (OUTPATIENT)
Dept: HOME HEALTH SERVICES | Facility: HOME HEALTH | Age: 79
End: 2024-11-07
Payer: MEDICARE

## 2024-11-07 DIAGNOSIS — Z94.0 KIDNEY REPLACED BY TRANSPLANT (HHS-HCC): ICD-10-CM

## 2024-11-07 DIAGNOSIS — E83.39 HYPOPHOSPHATEMIA: ICD-10-CM

## 2024-11-07 PROCEDURE — RXMED WILLOW AMBULATORY MEDICATION CHARGE

## 2024-11-07 PROCEDURE — G0157 HHC PT ASSISTANT EA 15: HCPCS | Mod: CQ,HHH

## 2024-11-07 RX ORDER — LANOLIN ALCOHOL/MO/W.PET/CERES
400 CREAM (GRAM) TOPICAL DAILY
Qty: 30 TABLET | Refills: 2 | Status: SHIPPED | OUTPATIENT
Start: 2024-11-07

## 2024-11-07 NOTE — TELEPHONE ENCOUNTER
Returned call to patient, she received my [employer] message and is aware of changes. She is already on a phos supplement - advised her to increase to 2 tab BID. She will  Mag Ox today and start it. NFQ.

## 2024-11-08 ENCOUNTER — PHARMACY VISIT (OUTPATIENT)
Dept: PHARMACY | Facility: CLINIC | Age: 79
End: 2024-11-08
Payer: COMMERCIAL

## 2024-11-08 ENCOUNTER — HOME CARE VISIT (OUTPATIENT)
Dept: HOME HEALTH SERVICES | Facility: HOME HEALTH | Age: 79
End: 2024-11-08
Payer: MEDICARE

## 2024-11-08 VITALS
RESPIRATION RATE: 16 BRPM | TEMPERATURE: 98.6 F | SYSTOLIC BLOOD PRESSURE: 110 MMHG | OXYGEN SATURATION: 98 % | DIASTOLIC BLOOD PRESSURE: 72 MMHG

## 2024-11-08 PROCEDURE — G0152 HHCP-SERV OF OT,EA 15 MIN: HCPCS | Mod: HHH

## 2024-11-08 ASSESSMENT — ENCOUNTER SYMPTOMS
SUBJECTIVE PAIN PROGRESSION: WAXING AND WANING
PAIN: 1
HIGHEST PAIN SEVERITY IN PAST 24 HOURS: 5/10
PAIN LOCATION: RIGHT SHOULDER
PERSON REPORTING PAIN: PATIENT
PAIN SEVERITY GOAL: 0/10
LOWEST PAIN SEVERITY IN PAST 24 HOURS: 3/10
PAIN LOCATION: RIGHT LEG

## 2024-11-12 ENCOUNTER — OFFICE VISIT (OUTPATIENT)
Dept: TRANSPLANT | Facility: HOSPITAL | Age: 79
End: 2024-11-12
Payer: COMMERCIAL

## 2024-11-12 ENCOUNTER — PHARMACY VISIT (OUTPATIENT)
Dept: PHARMACY | Facility: CLINIC | Age: 79
End: 2024-11-12
Payer: COMMERCIAL

## 2024-11-12 ENCOUNTER — HOME CARE VISIT (OUTPATIENT)
Dept: HOME HEALTH SERVICES | Facility: HOME HEALTH | Age: 79
End: 2024-11-12
Payer: MEDICARE

## 2024-11-12 ENCOUNTER — SPECIALTY PHARMACY (OUTPATIENT)
Dept: PHARMACY | Facility: CLINIC | Age: 79
End: 2024-11-12

## 2024-11-12 ENCOUNTER — NUTRITION (OUTPATIENT)
Dept: TRANSPLANT | Facility: HOSPITAL | Age: 79
End: 2024-11-12
Payer: COMMERCIAL

## 2024-11-12 ENCOUNTER — TELEPHONE (OUTPATIENT)
Dept: TRANSPLANT | Facility: HOSPITAL | Age: 79
End: 2024-11-12

## 2024-11-12 ENCOUNTER — LAB (OUTPATIENT)
Dept: LAB | Facility: LAB | Age: 79
End: 2024-11-12
Payer: COMMERCIAL

## 2024-11-12 VITALS
TEMPERATURE: 97.7 F | WEIGHT: 195.5 LBS | HEART RATE: 75 BPM | DIASTOLIC BLOOD PRESSURE: 83 MMHG | OXYGEN SATURATION: 98 % | SYSTOLIC BLOOD PRESSURE: 152 MMHG | BODY MASS INDEX: 31.55 KG/M2

## 2024-11-12 DIAGNOSIS — Z94.0 KIDNEY REPLACED BY TRANSPLANT (HHS-HCC): ICD-10-CM

## 2024-11-12 DIAGNOSIS — D84.9 IMMUNOSUPPRESSION: Primary | ICD-10-CM

## 2024-11-12 LAB
ALBUMIN SERPL BCP-MCNC: 3.5 G/DL (ref 3.4–5)
ANION GAP SERPL CALC-SCNC: 11 MMOL/L (ref 10–20)
BUN SERPL-MCNC: 20 MG/DL (ref 6–23)
CALCIUM SERPL-MCNC: 9 MG/DL (ref 8.6–10.6)
CHLORIDE SERPL-SCNC: 104 MMOL/L (ref 98–107)
CO2 SERPL-SCNC: 28 MMOL/L (ref 21–32)
CREAT SERPL-MCNC: 1.01 MG/DL (ref 0.5–1.05)
EGFRCR SERPLBLD CKD-EPI 2021: 57 ML/MIN/1.73M*2
ERYTHROCYTE [DISTWIDTH] IN BLOOD BY AUTOMATED COUNT: 20.5 % (ref 11.5–14.5)
GLUCOSE SERPL-MCNC: 96 MG/DL (ref 74–99)
HCT VFR BLD AUTO: 29.2 % (ref 36–46)
HGB BLD-MCNC: 9.2 G/DL (ref 12–16)
MAGNESIUM SERPL-MCNC: 1.84 MG/DL (ref 1.6–2.4)
MCH RBC QN AUTO: 29.8 PG (ref 26–34)
MCHC RBC AUTO-ENTMCNC: 31.5 G/DL (ref 32–36)
MCV RBC AUTO: 95 FL (ref 80–100)
NRBC BLD-RTO: 0 /100 WBCS (ref 0–0)
PHOSPHATE SERPL-MCNC: 2.5 MG/DL (ref 2.5–4.9)
PLATELET # BLD AUTO: 146 X10*3/UL (ref 150–450)
POTASSIUM SERPL-SCNC: 3.6 MMOL/L (ref 3.5–5.3)
RBC # BLD AUTO: 3.09 X10*6/UL (ref 4–5.2)
SODIUM SERPL-SCNC: 139 MMOL/L (ref 136–145)
TACROLIMUS BLD-MCNC: 2.8 NG/ML
WBC # BLD AUTO: 4.4 X10*3/UL (ref 4.4–11.3)

## 2024-11-12 PROCEDURE — RXMED WILLOW AMBULATORY MEDICATION CHARGE

## 2024-11-12 PROCEDURE — 85027 COMPLETE CBC AUTOMATED: CPT

## 2024-11-12 PROCEDURE — 99214 OFFICE O/P EST MOD 30 MIN: CPT | Performed by: TRANSPLANT SURGERY

## 2024-11-12 PROCEDURE — G0157 HHC PT ASSISTANT EA 15: HCPCS | Mod: CQ,HHH

## 2024-11-12 PROCEDURE — 80069 RENAL FUNCTION PANEL: CPT

## 2024-11-12 PROCEDURE — 3077F SYST BP >= 140 MM HG: CPT | Performed by: TRANSPLANT SURGERY

## 2024-11-12 PROCEDURE — 83735 ASSAY OF MAGNESIUM: CPT

## 2024-11-12 PROCEDURE — 3079F DIAST BP 80-89 MM HG: CPT | Performed by: TRANSPLANT SURGERY

## 2024-11-12 PROCEDURE — 36415 COLL VENOUS BLD VENIPUNCTURE: CPT

## 2024-11-12 PROCEDURE — 99214 OFFICE O/P EST MOD 30 MIN: CPT | Mod: 24 | Performed by: TRANSPLANT SURGERY

## 2024-11-12 PROCEDURE — 1111F DSCHRG MED/CURRENT MED MERGE: CPT | Performed by: TRANSPLANT SURGERY

## 2024-11-12 PROCEDURE — 1126F AMNT PAIN NOTED NONE PRSNT: CPT | Performed by: TRANSPLANT SURGERY

## 2024-11-12 PROCEDURE — 80197 ASSAY OF TACROLIMUS: CPT

## 2024-11-12 RX ORDER — TACROLIMUS 1 MG/1
2 CAPSULE ORAL
Qty: 120 CAPSULE | Refills: 11 | Status: SHIPPED | OUTPATIENT
Start: 2024-11-12 | End: 2025-11-12

## 2024-11-12 ASSESSMENT — ENCOUNTER SYMPTOMS
FEVER: 0
CHILLS: 0
CONSTIPATION: 0
LIGHT-HEADEDNESS: 0
HEMATURIA: 0
COUGH: 0
DIZZINESS: 0
SHORTNESS OF BREATH: 0
FREQUENCY: 0
EYES NEGATIVE: 1
CONFUSION: 0
DIARRHEA: 0
AGITATION: 0
HALLUCINATIONS: 0
ABDOMINAL DISTENTION: 0
ARTHRALGIAS: 0
DYSURIA: 0
ABDOMINAL PAIN: 0
COLOR CHANGE: 0
WEAKNESS: 0
ADENOPATHY: 0

## 2024-11-12 ASSESSMENT — PAIN SCALES - GENERAL: PAINLEVEL_OUTOF10: 0-NO PAIN

## 2024-11-12 NOTE — TELEPHONE ENCOUNTER
Per primary coordinator Wendi and Dr. Jimenez,   Increase patient tac dose to 2 mg BID.     Called to update patient, verbalized understanding of plan.  Wanted to let Wendi know she received her Valcyte today.

## 2024-11-12 NOTE — PATIENT INSTRUCTIONS
Medication Changes:  Stop lasix - call us if you notice any swelling, weight gain, or any shortness of breath  We'll call you with any other changes after we review all of your lab results    Plan:  Staples were removed and steri-strips were put on  Take care of them the same way you did the staples. They will fall off on their own  [facility] to [facility] to request they deliver your valcyte today  Labs once a week  Next clinic appt in 1 week - please stop at the desk to make this appt on your way out

## 2024-11-12 NOTE — PROGRESS NOTES
Subjective   Liz Hamlin is a 79 y.o. 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 Here today for follow-up.    Pt is a 78 y/o female with a hx of ESRD secondary to hypertension whom received a  donor kidney transplant on 10/20/24 by Dr. Jimenez with a KDPI of 86% and PRA of 71%. Donor was Hepc - / - and has not met risk factors. EBV + / +. Left donor kidney transplanted to patient right pelvis. Admit weight was 86.4 kg (discharge weight is 93.2kg ).  Pt received a total of 3 mg/kg total of thymoglobulin induction therapy in conjunction with 500mg IV solumedrol.  Pt was initiated on Belatacept  & Cellcept immunosuppression in conjunction with IV solumedrol taper.  Pt was started on valcyte (CMV D + / R - ) and bactrim for CMV & PCP prophylaxis per protocol. She was started on clotrimazole as antifungal coverage per protocol. Operative course was uncomplicated.  Hospital course was uncomplicated. Patel catheter was removed on POD #3 and the patient is voiding spontaneously. Pt did not require dialysis and electrolytes remain stable. Patient was pre-dialysis and does not have HD access.   BP & glucose under good control. Of note, patient was asymptomatically covid positive. She was treated with remdesevir X3 doses.     Review of Systems   Constitutional:  Negative for chills and fever.   HENT: Negative.  Negative for congestion.    Eyes: Negative.    Respiratory:  Negative for cough and shortness of breath.    Cardiovascular:  Negative for chest pain.   Gastrointestinal:  Negative for abdominal distention, abdominal pain, constipation and diarrhea.   Endocrine: Negative for cold intolerance and heat intolerance.   Genitourinary:  Negative for dysuria, frequency, hematuria and urgency.   Musculoskeletal:  Negative for arthralgias.   Skin:  Negative for color change.   Allergic/Immunologic: Negative for environmental allergies.   Neurological:  Negative for dizziness, weakness and light-headedness.    Hematological:  Negative for adenopathy.   Psychiatric/Behavioral:  Negative for agitation, confusion and hallucinations.         Objective   There were no vitals filed for this visit.      Physical Exam  Constitutional:       Appearance: Normal appearance.   Eyes:      Pupils: Pupils are equal, round, and reactive to light.   Cardiovascular:      Rate and Rhythm: Normal rate.   Pulmonary:      Effort: Pulmonary effort is normal. No respiratory distress.   Abdominal:      General: There is no distension.      Palpations: Abdomen is soft.      Comments: Incision clean, dry, intact   Musculoskeletal:         General: Normal range of motion.      Right lower leg: No edema.      Left lower leg: No edema.   Skin:     General: Skin is warm and dry.   Neurological:      General: No focal deficit present.      Mental Status: She is alert and oriented to person, place, and time.   Psychiatric:         Mood and Affect: Mood normal.         Behavior: Behavior normal.       Lab Results   Component Value Date    GLUCOSE 101 (H) 11/05/2024    CALCIUM 9.0 11/05/2024     11/05/2024    K 3.7 11/05/2024    CO2 28 11/05/2024     11/05/2024    BUN 21 11/05/2024    CREATININE 0.98 11/05/2024     Lab Results   Component Value Date    WBC 4.9 11/05/2024    HGB 8.8 (L) 11/05/2024    HCT 27.9 (L) 11/05/2024    MCV 93 11/05/2024     11/05/2024       Current Outpatient Medications:     aspirin 81 mg EC tablet, Take 1 tablet (81 mg) by mouth once daily., Disp: , Rfl:     atorvastatin (Lipitor) 40 mg tablet, Take 1 tablet (40 mg) by mouth once daily., Disp: 90 tablet, Rfl: 1    carvedilol (Coreg) 25 mg tablet, Take 2 tablets (50 mg) by mouth 2 times a day., Disp: 120 tablet, Rfl: 11    clotrimazole (Mycelex) 10 mg nino, Take 1 tablet (10 mg) by mouth 3 times a day after meals., Disp: 90 Nino, Rfl: 1    epoetin valerio-epbx (Retacrit) 10,000 unit/mL injection, Inject 1 mL (10,000 Units) under the skin 1 (one) time per  week., Disp: 4 mL, Rfl: 11    ferrous sulfate, 325 mg ferrous sulfate, (FeroSuL) tablet, Take 1 tablet by mouth once daily., Disp: 90 tablet, Rfl: 1    furosemide (Lasix) 20 mg tablet, Take 1 tablet (20 mg) by mouth once daily., Disp: 30 tablet, Rfl: 0    hydrALAZINE (Apresoline) 100 mg tablet, Take 1 tablet (100 mg) by mouth 2 times a day., Disp: 180 tablet, Rfl: 1    magnesium oxide (Mag-Ox) 400 mg (241.3 mg magnesium) tablet, Take 1 tablet (400 mg) by mouth once daily., Disp: 30 tablet, Rfl: 2    mycophenolate (Cellcept) 250 mg capsule, Take 4 capsules (1,000 mg) by mouth every 12 hours., Disp: 240 capsule, Rfl: 11    pantoprazole (ProtoNix) 40 mg EC tablet, Take 1 tablet (40 mg) by mouth once daily in the morning. Take before meals. Do not crush, chew, or split., Disp: 30 tablet, Rfl: 0    potassium phosphate, monobasic, (K-Phos) 500 mg tablet, Take 2 tablets (1,000 mg) by mouth 2 times a day., Disp: 120 tablet, Rfl: 11    predniSONE (Deltasone) 5 mg tablet, Take 3 tablets (15 mg) by mouth once daily., Disp: 90 tablet, Rfl: 11    SITagliptin phosphate (Januvia) 100 mg tablet, Take 1 tablet (100 mg) by mouth once daily., Disp: 30 tablet, Rfl: 11    sulfamethoxazole-trimethoprim (Bactrim) 400-80 mg tablet, Take 1 tablet by mouth once daily., Disp: 30 tablet, Rfl: 4    tacrolimus (Prograf) 1 mg capsule, Take 1 capsule (1 mg) by mouth every 12 hours., Disp: 60 capsule, Rfl: 11    valGANciclovir (Valcyte) 450 mg tablet, Take 2 tablets (900 mg) by mouth once every 24 hours. Do not crush or chew., Disp: 60 tablet, Rfl: 4    Assessment/Plan     DDKT: 10/20/2024  KDPI: 86  PRA 71  VXM negative, XM T&B cell positive, no DSA  Covid (+) incidentally at the time of transplant    Kidney allograft function   Excellent function, creatinine < 1   Labs pending today   DSA negative x 2, Check again at 1 month     Immunosuppression   Thymo induction 3mg/kg  Tacrolimus 1/1 (goal 6-8)   MMF 1000/1000   Pred 15   Valcyte  (D+/R-)    Anemia   Epo     Covid   No symptoms at the time of transplant   Received Remdesevir x 3

## 2024-11-12 NOTE — PROGRESS NOTES
Reason for Nutrition Visit:  Pt is a 79 y.o. female referred for food safety education s/p kidney transplant on 10/20/24.    Transplant Coordinator: Karen Milton RN    Past Medical Hx:  Patient Active Problem List   Diagnosis    Astigmatism    Benign hypertension    Bilateral renal cysts    Chronic kidney disease (CKD) stage G4/A1, severely decreased glomerular filtration rate (GFR) between 15-29 mL/min/1.73 square meter and albuminuria creatinine ratio less than 30 mg/g (CMS/HC* (Multi)    CKD (chronic kidney disease)    Chronic right shoulder pain    Combined form of age-related cataract, both eyes    Essential hypertension    Glaucoma suspect of both eyes    Gout    Complex renal cyst    Fibroids    History of hypercholesterolemia    Hyperlipidemia    Hyperopia    Myopia with presbyopia    Mitral valve regurgitation    Hepatic steatosis    Intermittent urinary incontinence    Obstructive sleep apnea, adult    Onycholysis of toenail    Onychomycosis of toenail    Pre-ulcerative corn or callous    Pruritus    PVD (posterior vitreous detachment), both eyes    Sleep apnea    Toe deformity, right    Nocturia    CKD stage 4 secondary to hypertension (Multi)    History of anemia due to CKD    Cellulitis of right leg    Health care maintenance    Venous stasis dermatitis of both lower extremities    Diabetes mellitus screening    Screening for cardiovascular condition    Need for vaccination    Asymptomatic menopausal state    Encounter for screening mammogram for breast cancer    Pre-transplant evaluation for kidney transplant    Chronic fatigue    Long toenail    Hyperopia, bilateral    Presbyopia    Chronic diastolic heart failure    Preoperative exam for gynecologic surgery    Screening cholesterol level    Asymptomatic menopause    Visit for screening mammogram    Colon cancer screening    Kidney replaced by transplant ([facility])    ESRD (end stage renal disease) (Multi)        Lab Results   Component Value Date     HGBA1C 5.6 04/11/2024    HGBA1C 5.5 01/30/2024    HGBA1C 5.5 09/28/2023     11/05/2024    K 3.7 11/05/2024    PHOS 2.4 (L) 11/05/2024     11/05/2024    CO2 28 11/05/2024    BUN 21 11/05/2024    CREATININE 0.98 11/05/2024    CALCIUM 9.0 11/05/2024    ALBUMIN 3.6 11/05/2024    PROT 7.4 10/20/2024    BILITOT 0.6 10/20/2024    ALKPHOS 96 10/20/2024    ALT 16 10/20/2024    AST 19 10/20/2024    GLUCOSE 101 (H) 11/05/2024    CHOL 121 04/11/2024    TRIG 120 04/11/2024    HDL 44.3 04/11/2024    CPEPTIDE 2.9 01/30/2024       Lab Results   Component Value Date    HGB 8.8 (L) 11/05/2024     Lab Results   Component Value Date    VITD25 50 10/20/2024     Lab Results   Component Value Date    IRON <10 (L) 10/21/2024    TIBC  10/21/2024      Comment:      One or more of the analytes used in this calculation is outside of the analytical measurement range.      FERRITIN 270 (H) 07/19/2022        Appetite: Good  Intake: >75%  GI Symptoms : None   Swallowing Difficulty: No problems with swallowing  Dentition : own  Allergies: None  Intolerance: None    Food and Nutrition Hx:    24 Diet Recall:  Meal 1: Boost, muffin, scrambled eggs, meat, cinnamon roll, coffee  AM Snack: none  Meal 2: soup, crackers, chicken, grapes  PM Snack: none  Meal 3: fish, meatloaf, turkey chili, pork chops, noodles, salads, baked beans, potato salad  Late Snack: none  Beverages: coffee, Boost, juice, carmencita-aid, Sprite, lots of water    WEIGHT HISTORY  CW: 90.7 kg  BMI: 32.28 kg/m2  Weight change:  4.3 kg gain since transplant  10/20/24 86.4 kg    Food Preparation: Friends and Family  Cooking Skills/Barriers: None reported  Grocery Shopping: Children    Types of Activities: House/Yard Work and Walking     Sleep duration/quality : 5-6 hours getting up to go to the bathroom, naps in the day    Supplements: Taking iron daily    Nutrition Focused Physical Exam:    Performed/Deferred: Deferred as pt visually appears well-nourished with no signs of  malnutrition    Nutrition Diagnosis:    Diagnosis Status: Ongoing  Diagnosis : Food and nutrition related knowledge deficit related to lack of or limited prior nutrition-related education as evidenced by no prior knowledge of need for food- and nutrition-related recommendations    Nutrition Interventions and Education:  Food Safety Guide Highlights    Cook meat, poultry and seafood to a safe internal temperature. (Temps found on p. 22 of your Food Safety Booklet.) Do not eat any meat, poultry or seafood that is raw or undercooked. (No pink meats, no sushi, no raw oysters.) Using a meat thermometer for accuracy is recommended.    Do not eat deli meats or hot dogs cold. Reheat them in the microwave until they are steaming hot. This typically takes 30 seconds, depending on the microwave.    Eggs need to have a firm yolk. No runny eggs. No raw cookie dough.    Do not consume any milk or milk products including cheese that is not pasteurized. Pasteurized milk is ok. Hard cheese or soft cheese from pasteurized milk is also ok.    Avoid cross-contamination of foods while cooking by having a cutting board for meats and a separate cutting board for produce.    Please never consume grapefruit, pomegranate or star fruit as they are not compatible with your medications.    Do not eat unwashed fruits and vegetables. Do not eat raw sprouts. If you want to eat bean sprouts or alfalfa sprouts for example, they will need to be cooked.     It is not safe for you to eat from buffets.    Eat leftovers within 2 days of being cooked.     10. If you are a tea drinker...        *Plain black tea or kiersten tea are preferred tea types        *Matcha or green tea (Limit to 1-2 cups per day)        *Most food type flavored tea is fine (apple, lemon, mint, caramel)  *Avoid: green tea extract, chamomile, tumeric, Bergamot flavoring (Dat Puentes, Sariah),   Chamomile, Lemongrass, Valerian, [place], Rooibos, Gabriela, Turmeric (Curcumin), Cordyceps  "(Cordyceps mushroom), Dandelion, Echinacea (Coneflower, Black  [place]), Ginseng and Feverfew  *Some are meant to boost the immune system, so we avoid due to the risk of causing rejection. Some, like turmeric and irene, have drug interactions with immunosuppression.    11. Avoid turmeric in all forms due to the interactions with the immunosuppressives. Avoid irene supplements but the amount of irene that would be used in cooking is okay.    Nutrition Goals:  Compliance with food safety guidance.    Nutrition Recommendations/Referrals:  Reviewed \"Food Safety: A Need-to-Know Guide for Those at Risk\" education booklet with patient.     Follow up: It was a pleasure speaking with you today, Ms. Hamlin! If you would like to meet again at any point in the future, or if you have any questions, please ask your coordinator to put you on my schedule. I would be happy to meet with you! I am available for both in-person and virtual appointments. Many thanks!     "

## 2024-11-13 ENCOUNTER — HOME CARE VISIT (OUTPATIENT)
Dept: HOME HEALTH SERVICES | Facility: HOME HEALTH | Age: 79
End: 2024-11-13
Payer: MEDICARE

## 2024-11-13 VITALS
HEART RATE: 57 BPM | TEMPERATURE: 98.4 F | RESPIRATION RATE: 16 BRPM | OXYGEN SATURATION: 100 % | SYSTOLIC BLOOD PRESSURE: 122 MMHG | DIASTOLIC BLOOD PRESSURE: 70 MMHG

## 2024-11-13 PROCEDURE — G0152 HHCP-SERV OF OT,EA 15 MIN: HCPCS | Mod: HHH

## 2024-11-13 PROCEDURE — G0299 HHS/HOSPICE OF RN EA 15 MIN: HCPCS | Mod: HHH

## 2024-11-13 SDOH — ECONOMIC STABILITY: GENERAL

## 2024-11-13 ASSESSMENT — ENCOUNTER SYMPTOMS
DENIES PAIN: 1
PERSON REPORTING PAIN: PATIENT
APPETITE LEVEL: FAIR
LAST BOWEL MOVEMENT: 67157
FATIGUES EASILY: 1

## 2024-11-13 ASSESSMENT — ACTIVITIES OF DAILY LIVING (ADL): MONEY MANAGEMENT (EXPENSES/BILLS): NEEDS ASSISTANCE

## 2024-11-14 ENCOUNTER — HOME CARE VISIT (OUTPATIENT)
Dept: HOME HEALTH SERVICES | Facility: HOME HEALTH | Age: 79
End: 2024-11-14
Payer: MEDICARE

## 2024-11-14 ENCOUNTER — PROCEDURE VISIT (OUTPATIENT)
Dept: UROLOGY | Facility: HOSPITAL | Age: 79
End: 2024-11-14
Payer: COMMERCIAL

## 2024-11-14 VITALS
OXYGEN SATURATION: 97 % | RESPIRATION RATE: 18 BRPM | DIASTOLIC BLOOD PRESSURE: 70 MMHG | SYSTOLIC BLOOD PRESSURE: 122 MMHG | TEMPERATURE: 98.4 F | HEART RATE: 98 BPM

## 2024-11-14 DIAGNOSIS — Z96.0 RETAINED URETERAL STENT: ICD-10-CM

## 2024-11-14 DIAGNOSIS — Z94.0 KIDNEY REPLACED BY TRANSPLANT (HHS-HCC): ICD-10-CM

## 2024-11-14 DIAGNOSIS — R39.9 LOWER URINARY TRACT SYMPTOMS (LUTS): Primary | ICD-10-CM

## 2024-11-14 LAB
POC APPEARANCE, URINE: CLEAR
POC BILIRUBIN, URINE: NEGATIVE
POC BLOOD, URINE: NEGATIVE
POC COLOR, URINE: YELLOW
POC GLUCOSE, URINE: NEGATIVE MG/DL
POC KETONES, URINE: NEGATIVE MG/DL
POC LEUKOCYTES, URINE: NEGATIVE
POC NITRITE,URINE: NEGATIVE
POC PH, URINE: 6 PH
POC PROTEIN, URINE: NEGATIVE MG/DL
POC SPECIFIC GRAVITY, URINE: 1.01
POC UROBILINOGEN, URINE: 0.2 EU/DL

## 2024-11-14 PROCEDURE — 52310 CYSTOSCOPY AND TREATMENT: CPT | Performed by: UROLOGY

## 2024-11-14 PROCEDURE — 81003 URINALYSIS AUTO W/O SCOPE: CPT | Performed by: UROLOGY

## 2024-11-14 ASSESSMENT — ENCOUNTER SYMPTOMS
PAIN: 1
PAIN LOCATION: RIGHT SHOULDER
PERSON REPORTING PAIN: PATIENT
PAIN LOCATION - PAIN SEVERITY: 4/10
SUBJECTIVE PAIN PROGRESSION: WAXING AND WANING
PAIN SEVERITY GOAL: 0/10
LOWEST PAIN SEVERITY IN PAST 24 HOURS: 0/10
HIGHEST PAIN SEVERITY IN PAST 24 HOURS: 4/10

## 2024-11-14 NOTE — PROGRESS NOTES
[facility] - Transplant Cysto, stent removal    HISTORY OF PRESENT ILLNESS:   Liz Hamlin is a 79 y.o. female who is being seen today for cysto stent removal    DDKT 10/20/24  Asymptomatic covid+ 10/20/24    PAST MEDICAL HISTORY:  Past Medical History:   Diagnosis Date    CKD (chronic kidney disease)     Encounter for general adult medical examination without abnormal findings 2021    Encounter for Medicare annual wellness exam    Glaucoma     Gout     Hyperlipidemia     Hypertension     Mitral valve regurgitation     JOSE E (obstructive sleep apnea)     Unspecified cataract     Cataracts, both eyes       PAST SURGICAL HISTORY:  Past Surgical History:   Procedure Laterality Date    OTHER SURGICAL HISTORY  2019    Carpal tunnel surgery    OTHER SURGICAL HISTORY  2019     section    OTHER SURGICAL HISTORY  2019    Hernia repair        ALLERGIES:   Allergies   Allergen Reactions    Amlodipine Swelling     Edema    Codeine Itching    Erythromycin Itching    Nsaids (Non-Steroidal Anti-Inflammatory Drug) Itching and Nausea Only     chronic renal insufficiency    Tramadol Itching and Nausea/vomiting    Lisinopril Cough     Cough        MEDICATIONS:   Current Outpatient Medications   Medication Instructions    aspirin 81 mg EC tablet 1 tablet, oral, Daily    atorvastatin (LIPITOR) 40 mg, oral, Daily    carvedilol (COREG) 50 mg, oral, 2 times daily    clotrimazole (Mycelex) 10 mg dixon Dissolve 1 dixon (10 mg) by mouth 3 times a day after meals.    ferrous sulfate, 325 mg ferrous sulfate, (FeroSuL) tablet 1 tablet, oral, Daily    hydrALAZINE (APRESOLINE) 100 mg, oral, 2 times daily    Januvia 100 mg, oral, Daily    magnesium oxide (MAG-OX) 400 mg, oral, Daily    mycophenolate (CELLCEPT) 1,000 mg, oral, Every 12 hours    pantoprazole (PROTONIX) 40 mg, oral, Daily before breakfast, Do not crush, chew, or split.    potassium phosphate (monobasic) (K-PHOS) 1,000 mg, oral, 2 times daily    predniSONE  (DELTASONE) 15 mg, oral, Daily    Retacrit 10,000 Units, subcutaneous, Weekly    sulfamethoxazole-trimethoprim (Bactrim) 400-80 mg tablet 1 tablet, oral, Daily    tacrolimus (PROGRAF) 2 mg, oral, Every 12 hours scheduled (0630,1830)    valGANciclovir (VALCYTE) 900 mg, oral, Every 24 hours, Do not crush or chew.        PHYSICAL EXAM:  not currently breastfeeding.  Constitutional: Patient appears well-developed and well-nourished. No distress.    Pulmonary/Chest: Effort normal. No respiratory distress.   Musculoskeletal: Normal range of motion.    Neurological: Alert and oriented to person, place, and time.  Psychiatric: Normal mood and affect. Behavior is normal. Thought content normal.      Labs  Lab Results   Component Value Date    CREATININE 1.01 11/12/2024       Procedures  Cystoscopy   After informed consent was obtained, the patient was taken to the procedure room for cystoscopy for ureteral stent removal.    Procedure Note:   A sterile prep and drape was performed in standard fashion. Lidocaine jelly was injected into the urethra. A flexible cystoscope was inserted into the bladder without difficulty.    The ureteral stent was seen and grasped using forceps.  The stent was completely removed.      Post-Procedure:  The cystoscope was removed. The vital signs were stable. The patient tolerated the procedure well. There were no complications.     Assessment:    No diagnosis found.       Plan:   1)  FU with Transplant  2) Post cysto, stent removal instructions given, warning signs discussed

## 2024-11-15 ENCOUNTER — HOME CARE VISIT (OUTPATIENT)
Dept: HOME HEALTH SERVICES | Facility: HOME HEALTH | Age: 79
End: 2024-11-15
Payer: MEDICARE

## 2024-11-15 PROCEDURE — G0156 HHCP-SVS OF AIDE,EA 15 MIN: HCPCS | Mod: HHH

## 2024-11-18 ENCOUNTER — HOME CARE VISIT (OUTPATIENT)
Dept: HOME HEALTH SERVICES | Facility: HOME HEALTH | Age: 79
End: 2024-11-18
Payer: MEDICARE

## 2024-11-18 VITALS
HEART RATE: 76 BPM | OXYGEN SATURATION: 100 % | DIASTOLIC BLOOD PRESSURE: 60 MMHG | SYSTOLIC BLOOD PRESSURE: 110 MMHG | TEMPERATURE: 98.3 F

## 2024-11-18 PROCEDURE — G0152 HHCP-SERV OF OT,EA 15 MIN: HCPCS | Mod: HHH

## 2024-11-18 PROCEDURE — RXMED WILLOW AMBULATORY MEDICATION CHARGE

## 2024-11-18 ASSESSMENT — ENCOUNTER SYMPTOMS
HIGHEST PAIN SEVERITY IN PAST 24 HOURS: 4/10
PAIN: 1
LOWEST PAIN SEVERITY IN PAST 24 HOURS: 0/10
PAIN SEVERITY GOAL: 0/10
PERSON REPORTING PAIN: PATIENT
PAIN LOCATION - PAIN SEVERITY: 4/10
PAIN LOCATION: RIGHT SHOULDER

## 2024-11-19 ENCOUNTER — OFFICE VISIT (OUTPATIENT)
Dept: TRANSPLANT | Facility: HOSPITAL | Age: 79
End: 2024-11-19
Payer: COMMERCIAL

## 2024-11-19 ENCOUNTER — LAB (OUTPATIENT)
Dept: LAB | Facility: LAB | Age: 79
End: 2024-11-19
Payer: COMMERCIAL

## 2024-11-19 ENCOUNTER — HOME CARE VISIT (OUTPATIENT)
Dept: HOME HEALTH SERVICES | Facility: HOME HEALTH | Age: 79
End: 2024-11-19
Payer: MEDICARE

## 2024-11-19 ENCOUNTER — TELEPHONE (OUTPATIENT)
Dept: TRANSPLANT | Facility: HOSPITAL | Age: 79
End: 2024-11-19

## 2024-11-19 VITALS
WEIGHT: 201.1 LBS | OXYGEN SATURATION: 98 % | HEART RATE: 53 BPM | SYSTOLIC BLOOD PRESSURE: 137 MMHG | BODY MASS INDEX: 32.46 KG/M2 | DIASTOLIC BLOOD PRESSURE: 56 MMHG | TEMPERATURE: 97.1 F

## 2024-11-19 DIAGNOSIS — R29.898 MUSCULAR DECONDITIONING: Primary | ICD-10-CM

## 2024-11-19 DIAGNOSIS — Z91.89 AT RISK FOR INFECTION TRANSMITTED FROM DONOR: ICD-10-CM

## 2024-11-19 DIAGNOSIS — Z94.0 KIDNEY REPLACED BY TRANSPLANT (HHS-HCC): ICD-10-CM

## 2024-11-19 DIAGNOSIS — I12.9 HYPERTENSIVE KIDNEY DISEASE WITH STAGE 4 CHRONIC KIDNEY DISEASE (MULTI): ICD-10-CM

## 2024-11-19 DIAGNOSIS — N18.4 HYPERTENSIVE KIDNEY DISEASE WITH STAGE 4 CHRONIC KIDNEY DISEASE (MULTI): ICD-10-CM

## 2024-11-19 DIAGNOSIS — N18.9 ANEMIA IN CHRONIC KIDNEY DISEASE, UNSPECIFIED CKD STAGE: ICD-10-CM

## 2024-11-19 DIAGNOSIS — D63.1 ANEMIA IN CHRONIC KIDNEY DISEASE, UNSPECIFIED CKD STAGE: ICD-10-CM

## 2024-11-19 DIAGNOSIS — D84.9 IMMUNOSUPPRESSION: Primary | ICD-10-CM

## 2024-11-19 LAB
ALBUMIN SERPL BCP-MCNC: 3.5 G/DL (ref 3.4–5)
ANION GAP SERPL CALC-SCNC: 11 MMOL/L (ref 10–20)
APPEARANCE UR: CLEAR
BILIRUB UR STRIP.AUTO-MCNC: NEGATIVE MG/DL
BUN SERPL-MCNC: 19 MG/DL (ref 6–23)
CALCIUM SERPL-MCNC: 9.2 MG/DL (ref 8.6–10.6)
CHLORIDE SERPL-SCNC: 106 MMOL/L (ref 98–107)
CO2 SERPL-SCNC: 24 MMOL/L (ref 21–32)
COLOR UR: ABNORMAL
CREAT SERPL-MCNC: 1.06 MG/DL (ref 0.5–1.05)
CREAT UR-MCNC: 116.9 MG/DL (ref 20–320)
CREAT UR-MCNC: 116.9 MG/DL (ref 20–320)
EGFRCR SERPLBLD CKD-EPI 2021: 54 ML/MIN/1.73M*2
ERYTHROCYTE [DISTWIDTH] IN BLOOD BY AUTOMATED COUNT: 19.9 % (ref 11.5–14.5)
GLUCOSE SERPL-MCNC: 86 MG/DL (ref 74–99)
GLUCOSE UR STRIP.AUTO-MCNC: NORMAL MG/DL
HCT VFR BLD AUTO: 30.4 % (ref 36–46)
HGB BLD-MCNC: 9.6 G/DL (ref 12–16)
HYALINE CASTS #/AREA URNS AUTO: ABNORMAL /LPF
KETONES UR STRIP.AUTO-MCNC: NEGATIVE MG/DL
LEUKOCYTE ESTERASE UR QL STRIP.AUTO: ABNORMAL
MAGNESIUM SERPL-MCNC: 1.86 MG/DL (ref 1.6–2.4)
MCH RBC QN AUTO: 30.5 PG (ref 26–34)
MCHC RBC AUTO-ENTMCNC: 31.6 G/DL (ref 32–36)
MCV RBC AUTO: 97 FL (ref 80–100)
MICROALBUMIN UR-MCNC: 251.5 MG/L
MICROALBUMIN/CREAT UR: 215.1 UG/MG CREAT
MUCOUS THREADS #/AREA URNS AUTO: ABNORMAL /LPF
NITRITE UR QL STRIP.AUTO: NEGATIVE
NRBC BLD-RTO: 0 /100 WBCS (ref 0–0)
PH UR STRIP.AUTO: 5.5 [PH]
PHOSPHATE SERPL-MCNC: 2.5 MG/DL (ref 2.5–4.9)
PLATELET # BLD AUTO: 141 X10*3/UL (ref 150–450)
POTASSIUM SERPL-SCNC: 4.2 MMOL/L (ref 3.5–5.3)
PROT UR STRIP.AUTO-MCNC: ABNORMAL MG/DL
PROT UR-ACNC: 41 MG/DL (ref 5–24)
PROT/CREAT UR: 0.35 MG/MG CREAT (ref 0–0.17)
RBC # BLD AUTO: 3.15 X10*6/UL (ref 4–5.2)
RBC # UR STRIP.AUTO: NEGATIVE /UL
RBC #/AREA URNS AUTO: ABNORMAL /HPF
SODIUM SERPL-SCNC: 137 MMOL/L (ref 136–145)
SP GR UR STRIP.AUTO: 1.02
TRANS CELLS #/AREA UR COMP ASSIST: ABNORMAL /HPF
UROBILINOGEN UR STRIP.AUTO-MCNC: NORMAL MG/DL
WBC # BLD AUTO: 3.5 X10*3/UL (ref 4.4–11.3)
WBC #/AREA URNS AUTO: ABNORMAL /HPF

## 2024-11-19 PROCEDURE — 36415 COLL VENOUS BLD VENIPUNCTURE: CPT

## 2024-11-19 PROCEDURE — 99214 OFFICE O/P EST MOD 30 MIN: CPT | Mod: 24 | Performed by: TRANSPLANT SURGERY

## 2024-11-19 PROCEDURE — 3075F SYST BP GE 130 - 139MM HG: CPT | Performed by: TRANSPLANT SURGERY

## 2024-11-19 PROCEDURE — 1126F AMNT PAIN NOTED NONE PRSNT: CPT | Performed by: TRANSPLANT SURGERY

## 2024-11-19 PROCEDURE — 82570 ASSAY OF URINE CREATININE: CPT

## 2024-11-19 PROCEDURE — 3078F DIAST BP <80 MM HG: CPT | Performed by: TRANSPLANT SURGERY

## 2024-11-19 PROCEDURE — 85027 COMPLETE CBC AUTOMATED: CPT

## 2024-11-19 PROCEDURE — 87385 HISTOPLASMA CAPSUL AG IA: CPT

## 2024-11-19 PROCEDURE — 82043 UR ALBUMIN QUANTITATIVE: CPT

## 2024-11-19 PROCEDURE — 99214 OFFICE O/P EST MOD 30 MIN: CPT | Performed by: TRANSPLANT SURGERY

## 2024-11-19 PROCEDURE — 83735 ASSAY OF MAGNESIUM: CPT

## 2024-11-19 PROCEDURE — G0157 HHC PT ASSISTANT EA 15: HCPCS | Mod: CQ,HHH

## 2024-11-19 PROCEDURE — 1111F DSCHRG MED/CURRENT MED MERGE: CPT | Performed by: TRANSPLANT SURGERY

## 2024-11-19 PROCEDURE — 80069 RENAL FUNCTION PANEL: CPT

## 2024-11-19 PROCEDURE — 84156 ASSAY OF PROTEIN URINE: CPT

## 2024-11-19 PROCEDURE — 87801 DETECT AGNT MULT DNA AMPLI: CPT

## 2024-11-19 PROCEDURE — 81001 URINALYSIS AUTO W/SCOPE: CPT

## 2024-11-19 ASSESSMENT — ENCOUNTER SYMPTOMS
CONSTIPATION: 0
CONFUSION: 0
COUGH: 0
COLOR CHANGE: 0
FEVER: 0
HALLUCINATIONS: 0
FREQUENCY: 0
AGITATION: 0
ABDOMINAL PAIN: 0
ARTHRALGIAS: 0
CHILLS: 0
DYSURIA: 0
DIARRHEA: 0
EYES NEGATIVE: 1
LIGHT-HEADEDNESS: 0
SHORTNESS OF BREATH: 0
WEAKNESS: 0
ADENOPATHY: 0
ABDOMINAL DISTENTION: 0
DIZZINESS: 0
HEMATURIA: 0

## 2024-11-19 ASSESSMENT — PAIN SCALES - GENERAL: PAINLEVEL_OUTOF10: 0-NO PAIN

## 2024-11-19 ASSESSMENT — ACTIVITIES OF DAILY LIVING (ADL)
BATHING_CURRENT_FUNCTION: MINIMUM ASSIST
DRESSING_LB_CURRENT_FUNCTION: INDEPENDENT
BATHING ASSESSED: 1
DRESSING_UB_CURRENT_FUNCTION: INDEPENDENT

## 2024-11-19 NOTE — TELEPHONE ENCOUNTER
PT was on her mychart and saw that there are some labs ordered for her today. She is unable to download and print off these orders. Wants us to send the orders to the labs

## 2024-11-19 NOTE — PATIENT INSTRUCTIONS
Medication Changes:  No changes yet. I'll call you if we need to adjust once we see your tacrolimus level    Plan:  You're doing great!!!   Labs once a week  Next clinic appt in 2 weeks     Nursing

## 2024-11-19 NOTE — PROGRESS NOTES
An order for rollator device was printed today.  We will have the medical assistant fax it to [facility]. Fax number is [phone]

## 2024-11-19 NOTE — PROGRESS NOTES
Subjective   Liz Hamlin is a 79 y.o. 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 Here today for follow-up.    Pt is a 80 y/o female with a hx of ESRD secondary to hypertension whom received a  donor kidney transplant on 10/20/24 by Dr. Jimenez with a KDPI of 86% and PRA of 71%. Donor was Hepc - / - and has not met risk factors. EBV + / +. Left donor kidney transplanted to patient right pelvis. Admit weight was 86.4 kg (discharge weight is 93.2kg ).  Pt received a total of 3 mg/kg total of thymoglobulin induction therapy in conjunction with 500mg IV solumedrol.  Pt was initiated on Belatacept  & Cellcept immunosuppression in conjunction with IV solumedrol taper.  Pt was started on valcyte (CMV D + / R - ) and bactrim for CMV & PCP prophylaxis per protocol. She was started on clotrimazole as antifungal coverage per protocol. Operative course was uncomplicated.  Hospital course was uncomplicated. Patel catheter was removed on POD #3 and the patient is voiding spontaneously. Pt did not require dialysis and electrolytes remain stable. Patient was pre-dialysis and does not have HD access.   BP & glucose under good control. Of note, patient was asymptomatically covid positive. She was treated with remdesevir X3 doses.     Review of Systems   Constitutional:  Negative for chills and fever.   HENT: Negative.  Negative for congestion.    Eyes: Negative.    Respiratory:  Negative for cough and shortness of breath.    Cardiovascular:  Negative for chest pain.   Gastrointestinal:  Negative for abdominal distention, abdominal pain, constipation and diarrhea.   Endocrine: Negative for cold intolerance and heat intolerance.   Genitourinary:  Negative for dysuria, frequency, hematuria and urgency.   Musculoskeletal:  Negative for arthralgias.   Skin:  Negative for color change.   Allergic/Immunologic: Negative for environmental allergies.   Neurological:  Negative for dizziness, weakness and light-headedness.    Hematological:  Negative for adenopathy.   Psychiatric/Behavioral:  Negative for agitation, confusion and hallucinations.         Objective   There were no vitals filed for this visit.      Physical Exam  Constitutional:       Appearance: Normal appearance.   Eyes:      Pupils: Pupils are equal, round, and reactive to light.   Cardiovascular:      Rate and Rhythm: Normal rate.   Pulmonary:      Effort: Pulmonary effort is normal. No respiratory distress.   Abdominal:      General: There is no distension.      Palpations: Abdomen is soft.      Comments: Incision clean, dry, intact   Musculoskeletal:         General: Normal range of motion.      Right lower leg: No edema.      Left lower leg: No edema.   Skin:     General: Skin is warm and dry.   Neurological:      General: No focal deficit present.      Mental Status: She is alert and oriented to person, place, and time.   Psychiatric:         Mood and Affect: Mood normal.         Behavior: Behavior normal.       Lab Results   Component Value Date    GLUCOSE 96 11/12/2024    CALCIUM 9.0 11/12/2024     11/12/2024    K 3.6 11/12/2024    CO2 28 11/12/2024     11/12/2024    BUN 20 11/12/2024    CREATININE 1.01 11/12/2024     Lab Results   Component Value Date    WBC 4.4 11/12/2024    HGB 9.2 (L) 11/12/2024    HCT 29.2 (L) 11/12/2024    MCV 95 11/12/2024     (L) 11/12/2024       Current Outpatient Medications:     aspirin 81 mg EC tablet, Take 1 tablet (81 mg) by mouth once daily., Disp: , Rfl:     atorvastatin (Lipitor) 40 mg tablet, Take 1 tablet (40 mg) by mouth once daily., Disp: 90 tablet, Rfl: 1    carvedilol (Coreg) 25 mg tablet, Take 2 tablets (50 mg) by mouth 2 times a day., Disp: 120 tablet, Rfl: 11    clotrimazole (Mycelex) 10 mg nino, Dissolve 1 nino (10 mg) by mouth 3 times a day after meals., Disp: 90 Nino, Rfl: 1    epoetin valerio-epbx (Retacrit) 10,000 unit/mL injection, Inject 1 mL (10,000 Units) under the skin 1 (one) time per  week., Disp: 4 mL, Rfl: 11    ferrous sulfate, 325 mg ferrous sulfate, (FeroSuL) tablet, Take 1 tablet by mouth once daily., Disp: 90 tablet, Rfl: 1    hydrALAZINE (Apresoline) 100 mg tablet, Take 1 tablet (100 mg) by mouth 2 times a day., Disp: 180 tablet, Rfl: 1    magnesium oxide (Mag-Ox) 400 mg (241.3 mg magnesium) tablet, Take 1 tablet (400 mg) by mouth once daily., Disp: 30 tablet, Rfl: 2    mycophenolate (Cellcept) 250 mg capsule, Take 4 capsules (1,000 mg) by mouth every 12 hours., Disp: 240 capsule, Rfl: 11    pantoprazole (ProtoNix) 40 mg EC tablet, Take 1 tablet (40 mg) by mouth once daily in the morning. Take before meals. Do not crush, chew, or split., Disp: 30 tablet, Rfl: 0    potassium phosphate, monobasic, (K-Phos) 500 mg tablet, Take 2 tablets (1,000 mg) by mouth 2 times a day., Disp: 120 tablet, Rfl: 11    predniSONE (Deltasone) 5 mg tablet, Take 3 tablets (15 mg) by mouth once daily., Disp: 90 tablet, Rfl: 11    SITagliptin phosphate (Januvia) 100 mg tablet, Take 1 tablet (100 mg) by mouth once daily., Disp: 30 tablet, Rfl: 11    sulfamethoxazole-trimethoprim (Bactrim) 400-80 mg tablet, Take 1 tablet by mouth once daily., Disp: 30 tablet, Rfl: 4    tacrolimus (Prograf) 1 mg capsule, Take 2 capsules (2 mg) by mouth every 12 hours., Disp: 120 capsule, Rfl: 11    valGANciclovir (Valcyte) 450 mg tablet, Take 2 tablets (900 mg) by mouth once every 24 hours. Do not crush or chew., Disp: 60 tablet, Rfl: 4    walker misc, 1 Device once daily as needed (As needed)., Disp: 1 each, Rfl: 0    Assessment/Plan     DDKT: 10/20/2024  KDPI: 86  PRA 71  VXM negative, XM T&B cell positive, no DSA  Covid (+) incidentally at the time of transplant    Kidney allograft function   Excellent function, creatinine ~ 1   DSA negative x 2, Check again at 1 month     Immunosuppression   Thymo induction 3mg/kg  Tacrolimus 2/2 (goal 6-8)   MMF 1000/1000   Pred 15   Valcyte (D+/R-), mild leukopenia    Anemia   Epo

## 2024-11-20 ENCOUNTER — SPECIALTY PHARMACY (OUTPATIENT)
Dept: PHARMACY | Facility: CLINIC | Age: 79
End: 2024-11-20

## 2024-11-20 ENCOUNTER — HOME CARE VISIT (OUTPATIENT)
Dept: HOME HEALTH SERVICES | Facility: HOME HEALTH | Age: 79
End: 2024-11-20
Payer: MEDICARE

## 2024-11-20 DIAGNOSIS — Z94.0 KIDNEY REPLACED BY TRANSPLANT (HHS-HCC): ICD-10-CM

## 2024-11-20 PROCEDURE — G0156 HHCP-SVS OF AIDE,EA 15 MIN: HCPCS | Mod: HHH

## 2024-11-20 PROCEDURE — RXMED WILLOW AMBULATORY MEDICATION CHARGE

## 2024-11-20 RX ORDER — PANTOPRAZOLE SODIUM 40 MG/1
40 TABLET, DELAYED RELEASE ORAL
Qty: 30 TABLET | Refills: 0 | Status: CANCELLED | OUTPATIENT
Start: 2024-11-20 | End: 2024-12-20

## 2024-11-21 ENCOUNTER — HOME CARE VISIT (OUTPATIENT)
Dept: HOME HEALTH SERVICES | Facility: HOME HEALTH | Age: 79
End: 2024-11-21
Payer: MEDICARE

## 2024-11-21 ENCOUNTER — PHARMACY VISIT (OUTPATIENT)
Dept: PHARMACY | Facility: CLINIC | Age: 79
End: 2024-11-21
Payer: COMMERCIAL

## 2024-11-21 LAB — HIV1 RNA SERPL DONR QL PROBE AMP: NORMAL

## 2024-11-21 PROCEDURE — G0157 HHC PT ASSISTANT EA 15: HCPCS | Mod: CQ,HHH

## 2024-11-21 ASSESSMENT — ENCOUNTER SYMPTOMS
PERSON REPORTING PAIN: PATIENT
APPETITE LEVEL: FAIR
DENIES PAIN: 1

## 2024-11-22 ENCOUNTER — LAB (OUTPATIENT)
Dept: LAB | Facility: LAB | Age: 79
End: 2024-11-22
Payer: COMMERCIAL

## 2024-11-22 ENCOUNTER — SPECIALTY PHARMACY (OUTPATIENT)
Dept: PHARMACY | Facility: CLINIC | Age: 79
End: 2024-11-22

## 2024-11-22 DIAGNOSIS — Z94.0 KIDNEY TRANSPLANT STATUS: ICD-10-CM

## 2024-11-22 DIAGNOSIS — Z94.0 KIDNEY REPLACED BY TRANSPLANT (HHS-HCC): ICD-10-CM

## 2024-11-22 DIAGNOSIS — Z94.0 KIDNEY REPLACED BY TRANSPLANT (HHS-HCC): Primary | ICD-10-CM

## 2024-11-22 LAB
ALBUMIN SERPL BCP-MCNC: 3.7 G/DL (ref 3.4–5)
ANION GAP SERPL CALC-SCNC: 11 MMOL/L (ref 10–20)
BUN SERPL-MCNC: 21 MG/DL (ref 6–23)
CALCIUM SERPL-MCNC: 9.2 MG/DL (ref 8.6–10.6)
CHLORIDE SERPL-SCNC: 105 MMOL/L (ref 98–107)
CO2 SERPL-SCNC: 24 MMOL/L (ref 21–32)
CREAT SERPL-MCNC: 1.03 MG/DL (ref 0.5–1.05)
EGFRCR SERPLBLD CKD-EPI 2021: 56 ML/MIN/1.73M*2
ERYTHROCYTE [DISTWIDTH] IN BLOOD BY AUTOMATED COUNT: 19.7 % (ref 11.5–14.5)
GLUCOSE SERPL-MCNC: 107 MG/DL (ref 74–99)
H CAPSUL AG UR QL: NOT DETECTED
HCT VFR BLD AUTO: 30.5 % (ref 36–46)
HGB BLD-MCNC: 9.5 G/DL (ref 12–16)
MAGNESIUM SERPL-MCNC: 1.67 MG/DL (ref 1.6–2.4)
MCH RBC QN AUTO: 30.3 PG (ref 26–34)
MCHC RBC AUTO-ENTMCNC: 31.1 G/DL (ref 32–36)
MCV RBC AUTO: 97 FL (ref 80–100)
NRBC BLD-RTO: ABNORMAL /100{WBCS}
PHOSPHATE SERPL-MCNC: 2.4 MG/DL (ref 2.5–4.9)
PLATELET # BLD AUTO: 154 X10*3/UL (ref 150–450)
POTASSIUM SERPL-SCNC: 4.1 MMOL/L (ref 3.5–5.3)
RBC # BLD AUTO: 3.14 X10*6/UL (ref 4–5.2)
SCAN RESULT: NORMAL
SODIUM SERPL-SCNC: 136 MMOL/L (ref 136–145)
WBC # BLD AUTO: 3.1 X10*3/UL (ref 4.4–11.3)

## 2024-11-22 PROCEDURE — 83735 ASSAY OF MAGNESIUM: CPT

## 2024-11-22 PROCEDURE — 87385 HISTOPLASMA CAPSUL AG IA: CPT

## 2024-11-22 PROCEDURE — 86833 HLA CLASS II HIGH DEFIN QUAL: CPT

## 2024-11-22 PROCEDURE — 36415 COLL VENOUS BLD VENIPUNCTURE: CPT

## 2024-11-22 PROCEDURE — 86832 HLA CLASS I HIGH DEFIN QUAL: CPT

## 2024-11-22 PROCEDURE — 80069 RENAL FUNCTION PANEL: CPT

## 2024-11-22 PROCEDURE — 85027 COMPLETE CBC AUTOMATED: CPT

## 2024-11-25 ENCOUNTER — TELEPHONE (OUTPATIENT)
Dept: TRANSPLANT | Facility: HOSPITAL | Age: 79
End: 2024-11-25
Payer: COMMERCIAL

## 2024-11-25 DIAGNOSIS — Z94.0 KIDNEY REPLACED BY TRANSPLANT (HHS-HCC): ICD-10-CM

## 2024-11-25 DIAGNOSIS — D84.9 IMMUNOSUPPRESSION: ICD-10-CM

## 2024-11-25 NOTE — TELEPHONE ENCOUNTER
Patient called requesting to speak with coordinator about results.  Patient call back number is   [phone]

## 2024-11-25 NOTE — TELEPHONE ENCOUNTER
Returned call to patient, she is inquiring if she needs labs this week, informed her yes she is on weekly. She is also requesting refill of pantoprazole, she denies heart burn. Instructed pt to try stopping it, if heartburn starts to let us know.

## 2024-11-26 ENCOUNTER — TELEPHONE (OUTPATIENT)
Dept: PRIMARY CARE | Facility: CLINIC | Age: 79
End: 2024-11-26
Payer: COMMERCIAL

## 2024-11-26 ENCOUNTER — HOME CARE VISIT (OUTPATIENT)
Dept: HOME HEALTH SERVICES | Facility: HOME HEALTH | Age: 79
End: 2024-11-26
Payer: MEDICARE

## 2024-11-26 LAB
HLA RESULTS: NORMAL
HLA-A+B+C AB NFR SER: NORMAL %
HLA-DP+DQ+DR AB NFR SER: NORMAL %

## 2024-11-26 PROCEDURE — G0157 HHC PT ASSISTANT EA 15: HCPCS | Mod: CQ,HHH

## 2024-11-27 ENCOUNTER — LAB (OUTPATIENT)
Dept: LAB | Facility: LAB | Age: 79
End: 2024-11-27
Payer: COMMERCIAL

## 2024-11-27 DIAGNOSIS — Z94.0 KIDNEY REPLACED BY TRANSPLANT (HHS-HCC): ICD-10-CM

## 2024-11-27 DIAGNOSIS — D84.9 IMMUNOSUPPRESSION: ICD-10-CM

## 2024-11-27 LAB
ALBUMIN SERPL BCP-MCNC: 3.8 G/DL (ref 3.4–5)
ANION GAP SERPL CALC-SCNC: 12 MMOL/L (ref 10–20)
BUN SERPL-MCNC: 20 MG/DL (ref 6–23)
CALCIUM SERPL-MCNC: 9.5 MG/DL (ref 8.6–10.6)
CHLORIDE SERPL-SCNC: 106 MMOL/L (ref 98–107)
CO2 SERPL-SCNC: 26 MMOL/L (ref 21–32)
CREAT SERPL-MCNC: 0.98 MG/DL (ref 0.5–1.05)
CREAT UR-MCNC: 97.1 MG/DL (ref 20–320)
EGFRCR SERPLBLD CKD-EPI 2021: 60 ML/MIN/1.73M*2
ERYTHROCYTE [DISTWIDTH] IN BLOOD BY AUTOMATED COUNT: 19 % (ref 11.5–14.5)
GLUCOSE SERPL-MCNC: 85 MG/DL (ref 74–99)
H CAPSUL AG UR QL: NOT DETECTED
HCT VFR BLD AUTO: 31.5 % (ref 36–46)
HGB BLD-MCNC: 10 G/DL (ref 12–16)
MAGNESIUM SERPL-MCNC: 1.77 MG/DL (ref 1.6–2.4)
MCH RBC QN AUTO: 31.2 PG (ref 26–34)
MCHC RBC AUTO-ENTMCNC: 31.7 G/DL (ref 32–36)
MCV RBC AUTO: 98 FL (ref 80–100)
NRBC BLD-RTO: ABNORMAL /100{WBCS}
PHOSPHATE SERPL-MCNC: 3.4 MG/DL (ref 2.5–4.9)
PLATELET # BLD AUTO: 135 X10*3/UL (ref 150–450)
POTASSIUM SERPL-SCNC: 4.2 MMOL/L (ref 3.5–5.3)
PROT UR-ACNC: 20 MG/DL (ref 5–24)
PROT/CREAT UR: 0.21 MG/MG CREAT (ref 0–0.17)
RBC # BLD AUTO: 3.21 X10*6/UL (ref 4–5.2)
SCAN RESULT: NORMAL
SODIUM SERPL-SCNC: 140 MMOL/L (ref 136–145)
WBC # BLD AUTO: 3.3 X10*3/UL (ref 4.4–11.3)

## 2024-11-27 PROCEDURE — 80069 RENAL FUNCTION PANEL: CPT

## 2024-11-27 PROCEDURE — 87385 HISTOPLASMA CAPSUL AG IA: CPT

## 2024-11-27 PROCEDURE — 82570 ASSAY OF URINE CREATININE: CPT

## 2024-11-27 PROCEDURE — 36415 COLL VENOUS BLD VENIPUNCTURE: CPT

## 2024-11-27 PROCEDURE — 84156 ASSAY OF PROTEIN URINE: CPT

## 2024-11-27 PROCEDURE — 83735 ASSAY OF MAGNESIUM: CPT

## 2024-11-27 PROCEDURE — 85027 COMPLETE CBC AUTOMATED: CPT

## 2024-11-27 PROCEDURE — 87799 DETECT AGENT NOS DNA QUANT: CPT

## 2024-11-27 RX ORDER — PANTOPRAZOLE SODIUM 40 MG/1
40 TABLET, DELAYED RELEASE ORAL
Qty: 30 TABLET | Refills: 0 | OUTPATIENT
Start: 2024-11-27 | End: 2024-12-27

## 2024-11-27 NOTE — TELEPHONE ENCOUNTER
I do not do refills and check the box that states do not contact me for refill requests. Please have patient contact coordinator if refills are needed

## 2024-11-28 ENCOUNTER — HOME CARE VISIT (OUTPATIENT)
Dept: HOME HEALTH SERVICES | Facility: HOME HEALTH | Age: 79
End: 2024-11-28
Payer: MEDICARE

## 2024-11-28 LAB
BKV DNA SERPL NAA+PROBE-LOG#: NORMAL {LOG_COPIES}/ML
EBV DNA SPEC NAA+PROBE-LOG#: NORMAL {LOG_COPIES}/ML
LABORATORY COMMENT REPORT: NOT DETECTED
LABORATORY COMMENT REPORT: NOT DETECTED

## 2024-11-29 ENCOUNTER — HOME CARE VISIT (OUTPATIENT)
Dept: HOME HEALTH SERVICES | Facility: HOME HEALTH | Age: 79
End: 2024-11-29
Payer: MEDICARE

## 2024-12-02 LAB
HLA RESULTS: NORMAL
HLA-A+B+C AB NFR SER: NORMAL %
HLA-DP+DQ+DR AB NFR SER: NORMAL %

## 2024-12-02 PROCEDURE — RXMED WILLOW AMBULATORY MEDICATION CHARGE

## 2024-12-02 RX ORDER — PREDNISONE 5 MG/1
15 TABLET ORAL DAILY
Qty: 90 TABLET | Refills: 11 | Status: CANCELLED | OUTPATIENT
Start: 2024-12-02 | End: 2025-11-27

## 2024-12-03 ENCOUNTER — LAB (OUTPATIENT)
Dept: LAB | Facility: LAB | Age: 79
End: 2024-12-03
Payer: COMMERCIAL

## 2024-12-03 ENCOUNTER — HOME CARE VISIT (OUTPATIENT)
Dept: HOME HEALTH SERVICES | Facility: HOME HEALTH | Age: 79
End: 2024-12-03
Payer: MEDICARE

## 2024-12-03 ENCOUNTER — OFFICE VISIT (OUTPATIENT)
Dept: TRANSPLANT | Facility: HOSPITAL | Age: 79
End: 2024-12-03
Payer: COMMERCIAL

## 2024-12-03 VITALS
TEMPERATURE: 97.8 F | BODY MASS INDEX: 32.57 KG/M2 | WEIGHT: 201.8 LBS | HEART RATE: 80 BPM | DIASTOLIC BLOOD PRESSURE: 67 MMHG | SYSTOLIC BLOOD PRESSURE: 133 MMHG | OXYGEN SATURATION: 98 %

## 2024-12-03 DIAGNOSIS — Z94.0 KIDNEY REPLACED BY TRANSPLANT (HHS-HCC): ICD-10-CM

## 2024-12-03 DIAGNOSIS — D84.9 IMMUNOSUPPRESSION: ICD-10-CM

## 2024-12-03 DIAGNOSIS — Z94.0 KIDNEY TRANSPLANTED (HHS-HCC): ICD-10-CM

## 2024-12-03 LAB
ALBUMIN SERPL BCP-MCNC: 3.9 G/DL (ref 3.4–5)
ANION GAP SERPL CALC-SCNC: 13 MMOL/L (ref 10–20)
BUN SERPL-MCNC: 20 MG/DL (ref 6–23)
CALCIUM SERPL-MCNC: 9.8 MG/DL (ref 8.6–10.6)
CHLORIDE SERPL-SCNC: 107 MMOL/L (ref 98–107)
CO2 SERPL-SCNC: 26 MMOL/L (ref 21–32)
CREAT SERPL-MCNC: 0.89 MG/DL (ref 0.5–1.05)
CREAT UR-MCNC: 93.8 MG/DL (ref 20–320)
EGFRCR SERPLBLD CKD-EPI 2021: 67 ML/MIN/1.73M*2
ERYTHROCYTE [DISTWIDTH] IN BLOOD BY AUTOMATED COUNT: 18.2 % (ref 11.5–14.5)
GLUCOSE SERPL-MCNC: 95 MG/DL (ref 74–99)
H CAPSUL AG UR QL: NOT DETECTED
HCT VFR BLD AUTO: 32.4 % (ref 36–46)
HGB BLD-MCNC: 10.5 G/DL (ref 12–16)
MAGNESIUM SERPL-MCNC: 1.76 MG/DL (ref 1.6–2.4)
MCH RBC QN AUTO: 31.3 PG (ref 26–34)
MCHC RBC AUTO-ENTMCNC: 32.4 G/DL (ref 32–36)
MCV RBC AUTO: 97 FL (ref 80–100)
NRBC BLD-RTO: 0 /100 WBCS (ref 0–0)
PHOSPHATE SERPL-MCNC: 3.3 MG/DL (ref 2.5–4.9)
PLATELET # BLD AUTO: 145 X10*3/UL (ref 150–450)
POTASSIUM SERPL-SCNC: 4.5 MMOL/L (ref 3.5–5.3)
PROT UR-ACNC: 29 MG/DL (ref 5–24)
PROT/CREAT UR: 0.31 MG/MG CREAT (ref 0–0.17)
RBC # BLD AUTO: 3.35 X10*6/UL (ref 4–5.2)
SCAN RESULT: NORMAL
SODIUM SERPL-SCNC: 141 MMOL/L (ref 136–145)
TACROLIMUS BLD-MCNC: 8.7 NG/ML
WBC # BLD AUTO: 4.3 X10*3/UL (ref 4.4–11.3)

## 2024-12-03 PROCEDURE — 83735 ASSAY OF MAGNESIUM: CPT

## 2024-12-03 PROCEDURE — 82570 ASSAY OF URINE CREATININE: CPT

## 2024-12-03 PROCEDURE — 87799 DETECT AGENT NOS DNA QUANT: CPT

## 2024-12-03 PROCEDURE — 80069 RENAL FUNCTION PANEL: CPT

## 2024-12-03 PROCEDURE — 80197 ASSAY OF TACROLIMUS: CPT

## 2024-12-03 PROCEDURE — 84156 ASSAY OF PROTEIN URINE: CPT

## 2024-12-03 PROCEDURE — 36415 COLL VENOUS BLD VENIPUNCTURE: CPT

## 2024-12-03 PROCEDURE — 99215 OFFICE O/P EST HI 40 MIN: CPT | Performed by: STUDENT IN AN ORGANIZED HEALTH CARE EDUCATION/TRAINING PROGRAM

## 2024-12-03 PROCEDURE — 3075F SYST BP GE 130 - 139MM HG: CPT | Performed by: STUDENT IN AN ORGANIZED HEALTH CARE EDUCATION/TRAINING PROGRAM

## 2024-12-03 PROCEDURE — 1126F AMNT PAIN NOTED NONE PRSNT: CPT | Performed by: STUDENT IN AN ORGANIZED HEALTH CARE EDUCATION/TRAINING PROGRAM

## 2024-12-03 PROCEDURE — 85027 COMPLETE CBC AUTOMATED: CPT

## 2024-12-03 PROCEDURE — 3078F DIAST BP <80 MM HG: CPT | Performed by: STUDENT IN AN ORGANIZED HEALTH CARE EDUCATION/TRAINING PROGRAM

## 2024-12-03 RX ORDER — PANTOPRAZOLE SODIUM 40 MG/1
40 TABLET, DELAYED RELEASE ORAL
Qty: 30 TABLET | Refills: 0 | Status: SHIPPED | OUTPATIENT
Start: 2024-12-03 | End: 2025-01-02

## 2024-12-03 ASSESSMENT — ENCOUNTER SYMPTOMS
CONFUSION: 0
ABDOMINAL DISTENTION: 0
HEMATURIA: 0
DIARRHEA: 0
CONSTIPATION: 0
COLOR CHANGE: 0
FEVER: 0
WEAKNESS: 0
SHORTNESS OF BREATH: 0
DIZZINESS: 0
CHILLS: 0
ABDOMINAL PAIN: 0
LIGHT-HEADEDNESS: 0
EYES NEGATIVE: 1
ADENOPATHY: 0
FREQUENCY: 0
AGITATION: 0
ARTHRALGIAS: 0
DYSURIA: 0
HALLUCINATIONS: 0
COUGH: 0
DENIES PAIN: 1
PERSON REPORTING PAIN: PATIENT

## 2024-12-03 ASSESSMENT — PAIN SCALES - GENERAL: PAINLEVEL_OUTOF10: 0-NO PAIN

## 2024-12-03 ASSESSMENT — ACTIVITIES OF DAILY LIVING (ADL)
OASIS_M1830: 01
HOME_HEALTH_OASIS: 00

## 2024-12-03 NOTE — PATIENT INSTRUCTIONS
Medication Changes:  Hold Bactrim due to itching for 3 days and call office to discuss  [facility]  We'll let you know if we need to change your tacrolimus dose once we review the result    Plan:  We'll get your set up with endocrinology to see if you need the Januvia long-term  Labs once a week.  Next clinic appt in 2 weeks

## 2024-12-03 NOTE — PROGRESS NOTES
Subjective   Liz Hamlin is a 79 y.o. 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 Here today for follow-up.    Pt is a 80 y/o female with a hx of ESRD secondary to hypertension whom received a  donor kidney transplant on 10/20/24 by Dr. Jimenez with a KDPI of 86% and PRA of 71%. Donor was Hepc - / - and has not met risk factors. EBV + / +. Left donor kidney transplanted to patient right pelvis. Admit weight was 86.4 kg (discharge weight is 93.2kg ).  Pt received a total of 3 mg/kg total of thymoglobulin induction therapy in conjunction with 500mg IV solumedrol.  Pt was initiated on Belatacept  & Cellcept immunosuppression in conjunction with IV solumedrol taper.  Pt was started on valcyte (CMV D + / R - ) and bactrim for CMV & PCP prophylaxis per protocol. She was started on clotrimazole as antifungal coverage per protocol. Operative course was uncomplicated.  Hospital course was uncomplicated. Patel catheter was removed on POD #3 and the patient is voiding spontaneously. Pt did not require dialysis and electrolytes remain stable. Patient was pre-dialysis and does not have HD access.   BP & glucose under good control. Of note, patient was asymptomatically covid positive. She was treated with remdesevir X3 doses.       Feeling fatigued but overall well.  No fevers    Review of Systems   Constitutional:  Negative for chills and fever.   HENT: Negative.  Negative for congestion.    Eyes: Negative.    Respiratory:  Negative for cough and shortness of breath.    Cardiovascular:  Negative for chest pain.   Gastrointestinal:  Negative for abdominal distention, abdominal pain, constipation and diarrhea.   Endocrine: Negative for cold intolerance and heat intolerance.   Genitourinary:  Negative for dysuria, frequency, hematuria and urgency.   Musculoskeletal:  Negative for arthralgias.   Skin:  Negative for color change.   Allergic/Immunologic: Negative for environmental allergies.   Neurological:  Negative for  dizziness, weakness and light-headedness.   Hematological:  Negative for adenopathy.   Psychiatric/Behavioral:  Negative for agitation, confusion and hallucinations.         Objective   Vitals:    12/03/24 0932   BP: 133/67   Pulse: 80   Temp: 36.6 °C (97.8 °F)   SpO2: 98%         Physical Exam  Constitutional:       Appearance: Normal appearance.   Eyes:      Pupils: Pupils are equal, round, and reactive to light.   Cardiovascular:      Rate and Rhythm: Normal rate.   Pulmonary:      Effort: Pulmonary effort is normal. No respiratory distress.   Abdominal:      General: There is no distension.      Palpations: Abdomen is soft.      Comments: Incision clean, dry, intact   Musculoskeletal:         General: Normal range of motion.      Right lower leg: No edema.      Left lower leg: No edema.   Skin:     General: Skin is warm and dry.   Neurological:      General: No focal deficit present.      Mental Status: She is alert and oriented to person, place, and time.   Psychiatric:         Mood and Affect: Mood normal.         Behavior: Behavior normal.     Lab Results   Component Value Date    GLUCOSE 85 11/27/2024    CALCIUM 9.5 11/27/2024     11/27/2024    K 4.2 11/27/2024    CO2 26 11/27/2024     11/27/2024    BUN 20 11/27/2024    CREATININE 0.98 11/27/2024     Lab Results   Component Value Date    WBC 3.3 (L) 11/27/2024    HGB 10.0 (L) 11/27/2024    HCT 31.5 (L) 11/27/2024    MCV 98 11/27/2024     (L) 11/27/2024       Current Outpatient Medications:     aspirin 81 mg EC tablet, Take 1 tablet (81 mg) by mouth once daily., Disp: , Rfl:     atorvastatin (Lipitor) 40 mg tablet, Take 1 tablet (40 mg) by mouth once daily., Disp: 90 tablet, Rfl: 1    carvedilol (Coreg) 25 mg tablet, Take 2 tablets (50 mg) by mouth 2 times a day., Disp: 120 tablet, Rfl: 11    clotrimazole (Mycelex) 10 mg nino, Dissolve 1 nino (10 mg) by mouth 3 times a day after meals., Disp: 90 Nino, Rfl: 1    epoetin valerio 10,000  unit/mL injection, Inject 1 mL (10,000 Units) under the skin 1 (one) time per week. HOLD dose if hemoglobin is 11 or higher Do not fill before November 20, 2024., Disp: 4 mL, Rfl: 11    ferrous sulfate, 325 mg ferrous sulfate, (FeroSuL) tablet, Take 1 tablet by mouth once daily., Disp: 90 tablet, Rfl: 1    hydrALAZINE (Apresoline) 100 mg tablet, Take 1 tablet (100 mg) by mouth 2 times a day., Disp: 180 tablet, Rfl: 1    magnesium oxide (Mag-Ox) 400 mg (241.3 mg magnesium) tablet, Take 1 tablet (400 mg) by mouth once daily., Disp: 30 tablet, Rfl: 2    mycophenolate (Cellcept) 250 mg capsule, Take 4 capsules (1,000 mg) by mouth every 12 hours., Disp: 240 capsule, Rfl: 11    pantoprazole (ProtoNix) 40 mg EC tablet, Take 1 tablet (40 mg) by mouth once daily in the morning. Take before meals. Do not crush, chew, or split., Disp: 30 tablet, Rfl: 0    potassium phosphate, monobasic, (K-Phos) 500 mg tablet, Take 2 tablets (1,000 mg) by mouth 2 times a day., Disp: 120 tablet, Rfl: 11    predniSONE (Deltasone) 5 mg tablet, Take 3 tablets (15 mg) by mouth once daily., Disp: 90 tablet, Rfl: 11    SITagliptin phosphate (Januvia) 100 mg tablet, Take 1 tablet (100 mg) by mouth once daily., Disp: 30 tablet, Rfl: 11    sulfamethoxazole-trimethoprim (Bactrim) 400-80 mg tablet, Take 1 tablet by mouth once daily., Disp: 30 tablet, Rfl: 4    tacrolimus (Prograf) 1 mg capsule, Take 2 capsules (2 mg) by mouth every 12 hours., Disp: 120 capsule, Rfl: 11    valGANciclovir (Valcyte) 450 mg tablet, Take 2 tablets (900 mg) by mouth once every 24 hours. Do not crush or chew., Disp: 60 tablet, Rfl: 4    walker misc, 1 Device once daily as needed (As needed)., Disp: 1 each, Rfl: 0    Assessment/Plan     DDKT: 10/20/2024  KDPI: 86  PRA 71  VXM negative, XM T&B cell positive, no DSA  Covid (+) incidentally at the time of transplant    Kidney allograft function   Excellent function, creatinine ~ 1   DSA negative x 3 (last 11/22/24) - check  monthly   Allosure pending     Immunosuppression   Thymo induction 3mg/kg  Tacrolimus 2/2 (goal 6-8) - send level today, none in several weeks   MMF 1000/1000   Pred 15 - consider decreasing once tac level back   Valcyte (D+/R-), mild leukopenia   Concerned bactrim causing itching - will trial holding for a few days and reassess    Anemia   Improving, holding epo       Follow up 2 weeks

## 2024-12-03 NOTE — CASE COMMUNICATION
DISCHARGE SUMMARY:    DISCIPLINE: PT  DATE OF DISCIPLINE DISCHARGE: 54765  REASON FOR DISCHARGE: Goals met   EVALUATION OF GOALS: yes  SUMMARY OF CARE PROVIDED: hep, ambulation and fall prevention   DISCHARGE INSTRUCTIONS GIVEN: yes  SERVICES REMAINING: none  NOMNC OBTAINED: y

## 2024-12-04 ENCOUNTER — PHARMACY VISIT (OUTPATIENT)
Dept: PHARMACY | Facility: CLINIC | Age: 79
End: 2024-12-04
Payer: COMMERCIAL

## 2024-12-04 PROBLEM — M17.10 ARTHRITIS OF KNEE: Status: ACTIVE | Noted: 2024-12-04

## 2024-12-10 ENCOUNTER — SPECIALTY PHARMACY (OUTPATIENT)
Dept: PHARMACY | Facility: CLINIC | Age: 79
End: 2024-12-10

## 2024-12-10 ENCOUNTER — LAB (OUTPATIENT)
Dept: LAB | Facility: LAB | Age: 79
End: 2024-12-10
Payer: COMMERCIAL

## 2024-12-10 DIAGNOSIS — D84.9 IMMUNOSUPPRESSION: ICD-10-CM

## 2024-12-10 DIAGNOSIS — Z94.0 KIDNEY REPLACED BY TRANSPLANT (HHS-HCC): ICD-10-CM

## 2024-12-10 LAB
ALBUMIN SERPL BCP-MCNC: 4 G/DL (ref 3.4–5)
ANION GAP SERPL CALC-SCNC: 13 MMOL/L (ref 10–20)
BUN SERPL-MCNC: 22 MG/DL (ref 6–23)
CALCIUM SERPL-MCNC: 9.6 MG/DL (ref 8.6–10.6)
CHLORIDE SERPL-SCNC: 109 MMOL/L (ref 98–107)
CO2 SERPL-SCNC: 25 MMOL/L (ref 21–32)
CREAT SERPL-MCNC: 1.11 MG/DL (ref 0.5–1.05)
CREAT UR-MCNC: 114.7 MG/DL (ref 20–320)
EGFRCR SERPLBLD CKD-EPI 2021: 51 ML/MIN/1.73M*2
ERYTHROCYTE [DISTWIDTH] IN BLOOD BY AUTOMATED COUNT: 17.3 % (ref 11.5–14.5)
GLUCOSE SERPL-MCNC: 108 MG/DL (ref 74–99)
HCT VFR BLD AUTO: 33.1 % (ref 36–46)
HGB BLD-MCNC: 10.2 G/DL (ref 12–16)
MAGNESIUM SERPL-MCNC: 1.65 MG/DL (ref 1.6–2.4)
MCH RBC QN AUTO: 30.6 PG (ref 26–34)
MCHC RBC AUTO-ENTMCNC: 30.8 G/DL (ref 32–36)
MCV RBC AUTO: 99 FL (ref 80–100)
NRBC BLD-RTO: ABNORMAL /100{WBCS}
PHOSPHATE SERPL-MCNC: 2.8 MG/DL (ref 2.5–4.9)
PLATELET # BLD AUTO: 149 X10*3/UL (ref 150–450)
POTASSIUM SERPL-SCNC: 4.4 MMOL/L (ref 3.5–5.3)
PROT UR-ACNC: 18 MG/DL (ref 5–24)
PROT/CREAT UR: 0.16 MG/MG CREAT (ref 0–0.17)
RBC # BLD AUTO: 3.33 X10*6/UL (ref 4–5.2)
SODIUM SERPL-SCNC: 143 MMOL/L (ref 136–145)
TACROLIMUS BLD-MCNC: 13.1 NG/ML
WBC # BLD AUTO: 3.9 X10*3/UL (ref 4.4–11.3)

## 2024-12-10 PROCEDURE — 83735 ASSAY OF MAGNESIUM: CPT

## 2024-12-10 PROCEDURE — 85027 COMPLETE CBC AUTOMATED: CPT

## 2024-12-10 PROCEDURE — 80069 RENAL FUNCTION PANEL: CPT

## 2024-12-10 PROCEDURE — 84156 ASSAY OF PROTEIN URINE: CPT

## 2024-12-10 PROCEDURE — 87385 HISTOPLASMA CAPSUL AG IA: CPT

## 2024-12-10 PROCEDURE — 87799 DETECT AGENT NOS DNA QUANT: CPT

## 2024-12-10 PROCEDURE — 36415 COLL VENOUS BLD VENIPUNCTURE: CPT

## 2024-12-10 PROCEDURE — RXMED WILLOW AMBULATORY MEDICATION CHARGE

## 2024-12-10 PROCEDURE — 80197 ASSAY OF TACROLIMUS: CPT

## 2024-12-10 PROCEDURE — 82570 ASSAY OF URINE CREATININE: CPT

## 2024-12-11 ENCOUNTER — TELEPHONE (OUTPATIENT)
Dept: PRIMARY CARE | Facility: CLINIC | Age: 79
End: 2024-12-11
Payer: COMMERCIAL

## 2024-12-11 ENCOUNTER — PHARMACY VISIT (OUTPATIENT)
Dept: PHARMACY | Facility: CLINIC | Age: 79
End: 2024-12-11
Payer: COMMERCIAL

## 2024-12-11 ENCOUNTER — TELEPHONE (OUTPATIENT)
Dept: TRANSPLANT | Facility: HOSPITAL | Age: 79
End: 2024-12-11
Payer: COMMERCIAL

## 2024-12-11 DIAGNOSIS — Z94.0 KIDNEY TRANSPLANTED (HHS-HCC): ICD-10-CM

## 2024-12-11 DIAGNOSIS — Z94.0 KIDNEY REPLACED BY TRANSPLANT (HHS-HCC): ICD-10-CM

## 2024-12-11 RX ORDER — PREDNISONE 5 MG/1
5 TABLET ORAL DAILY
Qty: 30 TABLET | Refills: 11 | Status: SHIPPED | OUTPATIENT
Start: 2024-12-11 | End: 2025-12-06

## 2024-12-11 RX ORDER — TACROLIMUS 1 MG/1
1 CAPSULE ORAL
Qty: 60 CAPSULE | Refills: 11 | Status: SHIPPED | OUTPATIENT
Start: 2024-12-11 | End: 2025-12-11

## 2024-12-11 NOTE — TELEPHONE ENCOUNTER
Labs reviewed by Dr Jimenez - decrease tac to 1 mg BID and decrease pred 5 mg daily. Call placed to patient, she was informed of the changes and verbalized understanding.

## 2024-12-13 LAB
H CAPSUL AG UR QL: NOT DETECTED
SCAN RESULT: NORMAL

## 2024-12-17 ENCOUNTER — APPOINTMENT (OUTPATIENT)
Dept: TRANSPLANT | Facility: HOSPITAL | Age: 79
End: 2024-12-17
Payer: COMMERCIAL

## 2024-12-17 ENCOUNTER — LAB (OUTPATIENT)
Dept: LAB | Facility: LAB | Age: 79
End: 2024-12-17
Payer: COMMERCIAL

## 2024-12-17 VITALS
RESPIRATION RATE: 20 BRPM | HEIGHT: 66 IN | BODY MASS INDEX: 32.47 KG/M2 | OXYGEN SATURATION: 100 % | DIASTOLIC BLOOD PRESSURE: 61 MMHG | HEART RATE: 66 BPM | WEIGHT: 202 LBS | TEMPERATURE: 98.3 F | SYSTOLIC BLOOD PRESSURE: 124 MMHG

## 2024-12-17 DIAGNOSIS — I15.1 HYPERTENSION SECONDARY TO OTHER RENAL DISORDERS: ICD-10-CM

## 2024-12-17 DIAGNOSIS — D72.819 LEUKOPENIA, UNSPECIFIED TYPE: ICD-10-CM

## 2024-12-17 DIAGNOSIS — L29.9 ITCHING: ICD-10-CM

## 2024-12-17 DIAGNOSIS — Z94.0 KIDNEY REPLACED BY TRANSPLANT (HHS-HCC): ICD-10-CM

## 2024-12-17 DIAGNOSIS — N18.6 ESRD (END STAGE RENAL DISEASE) (MULTI): ICD-10-CM

## 2024-12-17 DIAGNOSIS — D84.9 IMMUNOSUPPRESSION: ICD-10-CM

## 2024-12-17 DIAGNOSIS — Z94.0 KIDNEY REPLACED BY TRANSPLANT (HHS-HCC): Primary | ICD-10-CM

## 2024-12-17 DIAGNOSIS — Z48.298 AFTERCARE FOLLOWING ORGAN TRANSPLANT: ICD-10-CM

## 2024-12-17 DIAGNOSIS — E87.70 HYPERVOLEMIA, UNSPECIFIED HYPERVOLEMIA TYPE: ICD-10-CM

## 2024-12-17 LAB
ALBUMIN SERPL BCP-MCNC: 3.8 G/DL (ref 3.4–5)
ANION GAP SERPL CALC-SCNC: 12 MMOL/L (ref 10–20)
BUN SERPL-MCNC: 26 MG/DL (ref 6–23)
CALCIUM SERPL-MCNC: 9.4 MG/DL (ref 8.6–10.6)
CHLORIDE SERPL-SCNC: 106 MMOL/L (ref 98–107)
CO2 SERPL-SCNC: 24 MMOL/L (ref 21–32)
CREAT SERPL-MCNC: 1.12 MG/DL (ref 0.5–1.05)
CREAT UR-MCNC: 125.9 MG/DL (ref 20–320)
EGFRCR SERPLBLD CKD-EPI 2021: 51 ML/MIN/1.73M*2
ERYTHROCYTE [DISTWIDTH] IN BLOOD BY AUTOMATED COUNT: 16.1 % (ref 11.5–14.5)
GLUCOSE SERPL-MCNC: 97 MG/DL (ref 74–99)
HCT VFR BLD AUTO: 31.8 % (ref 36–46)
HGB BLD-MCNC: 9.9 G/DL (ref 12–16)
MAGNESIUM SERPL-MCNC: 1.87 MG/DL (ref 1.6–2.4)
MCH RBC QN AUTO: 30.7 PG (ref 26–34)
MCHC RBC AUTO-ENTMCNC: 31.1 G/DL (ref 32–36)
MCV RBC AUTO: 99 FL (ref 80–100)
NRBC BLD-RTO: 0 /100 WBCS (ref 0–0)
PHOSPHATE SERPL-MCNC: 2.7 MG/DL (ref 2.5–4.9)
PLATELET # BLD AUTO: 132 X10*3/UL (ref 150–450)
POTASSIUM SERPL-SCNC: 4.2 MMOL/L (ref 3.5–5.3)
PROT UR-ACNC: 21 MG/DL (ref 5–24)
PROT/CREAT UR: 0.17 MG/MG CREAT (ref 0–0.17)
RBC # BLD AUTO: 3.23 X10*6/UL (ref 4–5.2)
SODIUM SERPL-SCNC: 138 MMOL/L (ref 136–145)
TACROLIMUS BLD-MCNC: 6.7 NG/ML
WBC # BLD AUTO: 3.4 X10*3/UL (ref 4.4–11.3)

## 2024-12-17 PROCEDURE — 99215 OFFICE O/P EST HI 40 MIN: CPT | Performed by: INTERNAL MEDICINE

## 2024-12-17 PROCEDURE — 3074F SYST BP LT 130 MM HG: CPT | Performed by: INTERNAL MEDICINE

## 2024-12-17 PROCEDURE — 84156 ASSAY OF PROTEIN URINE: CPT

## 2024-12-17 PROCEDURE — 87385 HISTOPLASMA CAPSUL AG IA: CPT

## 2024-12-17 PROCEDURE — 3078F DIAST BP <80 MM HG: CPT | Performed by: INTERNAL MEDICINE

## 2024-12-17 PROCEDURE — 1159F MED LIST DOCD IN RCRD: CPT | Performed by: INTERNAL MEDICINE

## 2024-12-17 PROCEDURE — 85027 COMPLETE CBC AUTOMATED: CPT

## 2024-12-17 PROCEDURE — 80197 ASSAY OF TACROLIMUS: CPT

## 2024-12-17 PROCEDURE — 80069 RENAL FUNCTION PANEL: CPT

## 2024-12-17 PROCEDURE — 36415 COLL VENOUS BLD VENIPUNCTURE: CPT

## 2024-12-17 PROCEDURE — 82570 ASSAY OF URINE CREATININE: CPT

## 2024-12-17 PROCEDURE — 83735 ASSAY OF MAGNESIUM: CPT

## 2024-12-17 RX ORDER — HYDROXYZINE HYDROCHLORIDE 10 MG/1
5 TABLET, FILM COATED ORAL DAILY
Qty: 15 TABLET | Refills: 0 | Status: SHIPPED | OUTPATIENT
Start: 2024-12-17 | End: 2025-01-19

## 2024-12-17 RX ORDER — FUROSEMIDE 20 MG/1
20 TABLET ORAL DAILY
Qty: 30 TABLET | Refills: 0 | Status: SHIPPED | OUTPATIENT
Start: 2024-12-17 | End: 2025-12-17

## 2024-12-17 NOTE — PATIENT INSTRUCTIONS
No changes in IS changes   Started Hydroxyzine 5 mg daily  Weekly labs   RTC 3 weeks   Lasix 20 mg daily for swelling

## 2024-12-17 NOTE — PROGRESS NOTES
TRANSPLANT NEPHROLOGY :   OUTPATIENT CLINIC NOTE      SERVICE DATE : 12/17/2024     REASON FOR VISIT/CHIEF COMPLAINT:  S/P  TRANSPLANT SURGERY  IMMUNOSUPPRESSIVE MEDICATION MANAGEMENT  BLOOD PRESSURE MANAGEMENT    HPI:    Ms. Hamlin is a 79 y.o. female with past medical history significant for ESRD secondary to hypertension who is s/p pre-emptive DDKT on 10/20/24 (Dr. Jimenez, KDPI 86%, PRA 71%. Hepc - / -, has not met risk factors, EBV + / +., CMV D+/R-). Left donor kidney transplanted to patient right pelvis. Admit weight was 86.4 kg (discharge weight is 93.2kg ).  Thymo induction 3 mg/kg. Pt was initiated on Belatacept  & Cellcept along with pred taper protocol. VXM negative, XM T&B cell positive, no DSA.    Immediate post-txp course was uncomplicated. Patel catheter was removed on POD #3. No h/o DGF. Of note, patient was asymptomatically covid positive. She was treated with remdesevir X3 doses.     Last seen 2 weeks ago by Dr. Hogan. Here for follow up.     Ureteral stent removed 11/14/24.    Post-txp labs with Cr 0.9-1. Most recent labs with Cr 1.1. Fk slightly high (13). Dose reduced recently.     Pt is doing well overall. Does report generalized itching without any specific rash or lesions. Stopped Bactrim for few days without any change in symptoms, so has resumed Bactrim.   Pt reports pruritus pre-dates txp surgery and med changes.     Incision well healed,  no abd complaints.     C/o worsening LE edema, assoc dyspnea with mod exertion.     No other complaints. Denied chest pain, SOB, STORM, Palpitation. Normal urination and bowel movement. Normal gait and no weakness of arms/legs. No cough, runny nose, sore throat, cold symptoms, or rash. No hearing loss. Normal vision.No problems with his sleep, mood and function. No recent infection, hospitalization, surgery or ER visits.    ROS:  Review of  14 systems was performed system by system. See HPI. Otherwise, the symptoms were negative.    PAST MEDICAL  HISTORY:  Past Medical History:   Diagnosis Date    CKD (chronic kidney disease)     Encounter for general adult medical examination without abnormal findings 2021    Encounter for Medicare annual wellness exam    Glaucoma     Gout     Hyperlipidemia     Hypertension     Mitral valve regurgitation     JOSE E (obstructive sleep apnea)     Unspecified cataract     Cataracts, both eyes        PAST SURGICAL HISTORY:  Past Surgical History:   Procedure Laterality Date    OTHER SURGICAL HISTORY  2019    Carpal tunnel surgery    OTHER SURGICAL HISTORY  2019     section    OTHER SURGICAL HISTORY  2019    Hernia repair        SOCIAL HISTORY:  Social History     Socioeconomic History    Marital status:      Spouse name: Not on file    Number of children: Not on file    Years of education: Not on file    Highest education level: Not on file   Occupational History    Not on file   Tobacco Use    Smoking status: Never     Passive exposure: Never    Smokeless tobacco: Never   Vaping Use    Vaping status: Never Used   Substance and Sexual Activity    Alcohol use: Never    Drug use: Never    Sexual activity: Yes     Partners: Male   Other Topics Concern    Not on file   Social History Narrative    Not on file     Social Drivers of Health     Financial Resource Strain: Low Risk  (10/21/2024)    Overall Financial Resource Strain (CARDIA)     Difficulty of Paying Living Expenses: Not hard at all   Food Insecurity: No Food Insecurity (10/20/2024)    Hunger Vital Sign     Worried About Running Out of Food in the Last Year: Never true     Ran Out of Food in the Last Year: Never true   Transportation Needs: No Transportation Needs (12/3/2024)    OASIS : Transportation     Lack of Transportation (Medical): No     Lack of Transportation (Non-Medical): No     Patient Unable or Declines to Respond: No   Physical Activity: Not on file   Stress: Not on file   Social Connections: Feeling Socially  Integrated (12/3/2024)    OASIS : Social Isolation     Frequency of experiencing loneliness or isolation: Never   Intimate Partner Violence: Not At Risk (10/20/2024)    Humiliation, Afraid, Rape, and Kick questionnaire     Fear of Current or Ex-Partner: No     Emotionally Abused: No     Physically Abused: No     Sexually Abused: No   Housing Stability: Low Risk  (10/21/2024)    Housing Stability Vital Sign     Unable to Pay for Housing in the Last Year: No     Number of Times Moved in the Last Year: 0     Homeless in the Last Year: No       FAMILY HISTORY:  Family History   Problem Relation Name Age of Onset    Diabetes Mother      Hypertension Mother      Diabetes Son      Colon cancer Mother's Sister      Colon cancer Father's Sister         MEDICATION LIST:  Current Outpatient Medications   Medication Instructions    aspirin 81 mg EC tablet 1 tablet, oral, Daily    atorvastatin (LIPITOR) 40 mg, oral, Daily    carvedilol (COREG) 50 mg, oral, 2 times daily    epoetin valerio 10,000 unit/mL injection Inject 1 mL (10,000 Units) under the skin 1 (one) time per week. HOLD dose if hemoglobin is 11 or higher Do not fill before November 20, 2024.    ferrous sulfate, 325 mg ferrous sulfate, (FeroSuL) tablet 1 tablet, oral, Daily    hydrALAZINE (APRESOLINE) 100 mg, oral, 2 times daily    Januvia 100 mg, oral, Daily    K-Phos Original 1,000 mg, oral, 2 times daily    magnesium oxide (MAG-OX) 400 mg, oral, Daily    mycophenolate (CELLCEPT) 1,000 mg, oral, Every 12 hours    pantoprazole (PROTONIX) 40 mg, oral, Daily before breakfast, Do not crush, chew, or split.    predniSONE (DELTASONE) 5 mg, oral, Daily    sulfamethoxazole-trimethoprim (Bactrim) 400-80 mg tablet 1 tablet, oral, Daily    tacrolimus (PROGRAF) 1 mg, oral, Every 12 hours scheduled (0630,1830)    valGANciclovir (VALCYTE) 900 mg, oral, Every 24 hours, Do not crush or chew.    walker misc 1 Device, miscellaneous, Daily    walker misc 1 Device, miscellaneous,  "Daily PRN       ALLERGY  Allergies   Allergen Reactions    Amlodipine Swelling     Edema    Codeine Itching    Erythromycin Itching    Nsaids (Non-Steroidal Anti-Inflammatory Drug) Itching and Nausea Only     chronic renal insufficiency    Tramadol Itching and Nausea/vomiting    Lisinopril Cough     Cough       PHYSICAL EXAM:    Visit Vitals  /61 (Patient Position: Sitting)   Pulse 66   Temp 36.8 °C (98.3 °F)   Resp 20   Ht 1.676 m (5' 6\")   Wt 91.6 kg (202 lb)   LMP  (LMP Unknown)   SpO2 100%   BMI 32.60 kg/m²   OB Status Postmenopausal   Smoking Status Never   BSA 2.07 m²          General Appearance - NAD, A&Ox3   HEENT - Supple. Not pale. No jaundice. No JVD or LAD  CVS - RRR. Normal S1/S2. No murmur, rub or gallop  Lungs- clear to auscultation bilaterally  Abdomen - soft , NT, ND, no guarding. No hepatosplenomegaly. No allograft tenderness, incision well healed.  Musculoskeletal /Extremities - no edema. Full ROM. No joint tenderness.   Neuro/Psych - appropriate mood and affect. Motor power V/V all extremities. CN I -XII were grossly intact.  Skin - No visible rash      LABS:    Lab Results   Component Value Date    CREATININE 1.11 (H) 12/10/2024    BUN 22 12/10/2024     12/10/2024    K 4.4 12/10/2024     (H) 12/10/2024    CO2 25 12/10/2024     Lab Results   Component Value Date    .2 (H) 10/21/2024    CALCIUM 9.6 12/10/2024    CAION 1.25 10/26/2024    PHOS 2.8 12/10/2024     Lab Results   Component Value Date    WBC 3.9 (L) 12/10/2024    HGB 10.2 (L) 12/10/2024    HCT 33.1 (L) 12/10/2024    MCV 99 12/10/2024     (L) 12/10/2024     Lab Results   Component Value Date    IRON <10 (L) 10/21/2024    TIBC  10/21/2024      Comment:      One or more of the analytes used in this calculation is outside of the analytical measurement range.      FERRITIN 270 (H) 07/19/2022     Lab Results   Component Value Date    TACROLIMUS 13.1 12/10/2024    EBVDNAPCR Not Detected 12/10/2024     Lab Results "   Component Value Date    BKVDNAPCR Not Detected 12/10/2024    EBVDNAPCR Not Detected 12/10/2024         ASSESSMENT AND PLAN:    Ms. Hamlin is a 79 y.o. female  who is here for follow up s/p kidney transplant.    TRANSPLANT DATE: 10/20/2024 (Kidney)      1. ESRD S/P kidney transplant   - Creatinine last check was 1.11, slightly up from 0.9. likely in the setting of supratherapeutic Fk.  - Renal allograft function is stable   -Random urine protein/creatinine ratio is 0.16 g/g  -Allosure pending  -Ensure adequate hydration  - Avoid nephrotoxic medications, NSAIDs, and IV contrast.    2. Immunosuppression  -Tacrolimus level last check was 13.1, dose recently reduced to 1mg q12. Follow rpt level today. Goal 8-10.  -Continue MMF 1000 mg q12, Pred 5 mg/d   - EBV, CMV, BK PCR neg 12/10/24    3. Electrolytes  -Acceptable from last lab drawn    4. Hypertension  -recent BPs acceptable. Hypervolemic on exam. Resume Lasix 20 mg/d, titrate dose as indicated  -cont to monitor home BP, call if trends concerning  -no other changes to meds today    5. Bone Mineral Disease/Osteoporosis  - Ca, phos acceptable.  10/2024. Recheck with next labs  - Consider DEXA every 2-3 years , defer to PCP    6.Anemia/Leukopenia:  - Hb 10.2, acceptable. No indication for DURGA. check iron panel, ferritin.   - WBC 3.9k, mild leukopenia. Monitor.    7.Health maintenance and vaccination  - Flu shot during flu season annually  - Cancer screening is up to date per the patient    Lab : Routine transplant lab ( CBC, RFP, and anti-rejection trough level ) every 1 week  Additional labs:  VIT D, PTH Q3 months  Viral screening PCR, Allosure and UPC per protocol.    Additional Plan :  - hydroxyzine prn for itching. Derm referral if symptoms persist or worse.    RTC 3 weeks

## 2024-12-18 PROCEDURE — RXMED WILLOW AMBULATORY MEDICATION CHARGE

## 2024-12-20 PROCEDURE — RXMED WILLOW AMBULATORY MEDICATION CHARGE

## 2024-12-23 ENCOUNTER — PHARMACY VISIT (OUTPATIENT)
Dept: PHARMACY | Facility: CLINIC | Age: 79
End: 2024-12-23
Payer: COMMERCIAL

## 2024-12-25 LAB
ALLOSURE SCORE - KIDNEY: 1.2 %
CAREDX_ORDER_ID: NORMAL
CENTER_ORDER_ID: NORMAL
CLIENT SPECIMEN ID - ALLOSURE: NORMAL
DONOR RELATION - ALLOSURE: NORMAL
NOTES - ALLOSURE: NORMAL
RELATIVE CHANGE VALUE - KIDNEY: NORMAL
TEST COMMENTS - ALLOSURE: NORMAL
TIME POST TX - ALLOSURE: NORMAL
TRANSPLANTED ORGAN - ALLOSURE: NORMAL
TX DATE - ALLOSURE/ALLOMAP: NORMAL
WP_ORDER_ID: NORMAL

## 2024-12-26 ENCOUNTER — LAB (OUTPATIENT)
Dept: LAB | Facility: LAB | Age: 79
End: 2024-12-26
Payer: COMMERCIAL

## 2024-12-26 DIAGNOSIS — Z94.0 KIDNEY REPLACED BY TRANSPLANT (HHS-HCC): ICD-10-CM

## 2024-12-26 DIAGNOSIS — D84.9 IMMUNOSUPPRESSION: ICD-10-CM

## 2024-12-26 LAB
ALBUMIN SERPL BCP-MCNC: 3.8 G/DL (ref 3.4–5)
ANION GAP SERPL CALC-SCNC: 9 MMOL/L (ref 10–20)
BUN SERPL-MCNC: 23 MG/DL (ref 6–23)
CALCIUM SERPL-MCNC: 9.6 MG/DL (ref 8.6–10.6)
CHLORIDE SERPL-SCNC: 109 MMOL/L (ref 98–107)
CO2 SERPL-SCNC: 28 MMOL/L (ref 21–32)
CREAT SERPL-MCNC: 1.13 MG/DL (ref 0.5–1.05)
CREAT UR-MCNC: 128.3 MG/DL (ref 20–320)
EGFRCR SERPLBLD CKD-EPI 2021: 50 ML/MIN/1.73M*2
ERYTHROCYTE [DISTWIDTH] IN BLOOD BY AUTOMATED COUNT: 14.8 % (ref 11.5–14.5)
GLUCOSE SERPL-MCNC: 100 MG/DL (ref 74–99)
HCT VFR BLD AUTO: 32.8 % (ref 36–46)
HGB BLD-MCNC: 10.3 G/DL (ref 12–16)
MAGNESIUM SERPL-MCNC: 1.84 MG/DL (ref 1.6–2.4)
MCH RBC QN AUTO: 31.3 PG (ref 26–34)
MCHC RBC AUTO-ENTMCNC: 31.4 G/DL (ref 32–36)
MCV RBC AUTO: 100 FL (ref 80–100)
NRBC BLD-RTO: 0 /100 WBCS (ref 0–0)
PHOSPHATE SERPL-MCNC: 3.2 MG/DL (ref 2.5–4.9)
PLATELET # BLD AUTO: 133 X10*3/UL (ref 150–450)
POTASSIUM SERPL-SCNC: 4.4 MMOL/L (ref 3.5–5.3)
PROT UR-ACNC: 24 MG/DL (ref 5–24)
PROT/CREAT UR: 0.19 MG/MG CREAT (ref 0–0.17)
RBC # BLD AUTO: 3.29 X10*6/UL (ref 4–5.2)
SODIUM SERPL-SCNC: 142 MMOL/L (ref 136–145)
TACROLIMUS BLD-MCNC: 7.1 NG/ML
WBC # BLD AUTO: 3.1 X10*3/UL (ref 4.4–11.3)

## 2024-12-26 PROCEDURE — 82570 ASSAY OF URINE CREATININE: CPT

## 2024-12-26 PROCEDURE — 87385 HISTOPLASMA CAPSUL AG IA: CPT

## 2024-12-26 PROCEDURE — 36415 COLL VENOUS BLD VENIPUNCTURE: CPT

## 2024-12-26 PROCEDURE — 83735 ASSAY OF MAGNESIUM: CPT

## 2024-12-26 PROCEDURE — 80197 ASSAY OF TACROLIMUS: CPT

## 2024-12-26 PROCEDURE — 80069 RENAL FUNCTION PANEL: CPT

## 2024-12-26 PROCEDURE — 85027 COMPLETE CBC AUTOMATED: CPT

## 2024-12-26 PROCEDURE — 84156 ASSAY OF PROTEIN URINE: CPT

## 2024-12-27 ENCOUNTER — TELEPHONE (OUTPATIENT)
Facility: HOSPITAL | Age: 79
End: 2024-12-27
Payer: COMMERCIAL

## 2024-12-27 DIAGNOSIS — Z94.0 KIDNEY REPLACED BY TRANSPLANT (HHS-HCC): ICD-10-CM

## 2024-12-27 NOTE — TELEPHONE ENCOUNTER
Natalie from client service called regarding cancelled  labs test.  Patient's demographics verified, including NAME, D.O.B, MRN.   Cancelled lab test of  homocysteine due to not being recived.  Laboratory call back number is [phone].

## 2024-12-29 DIAGNOSIS — Z01.812 BLOOD TESTS PRIOR TO TREATMENT OR PROCEDURE: ICD-10-CM

## 2024-12-29 DIAGNOSIS — Z94.0 KIDNEY REPLACED BY TRANSPLANT (HHS-HCC): ICD-10-CM

## 2024-12-30 ENCOUNTER — PATIENT MESSAGE (OUTPATIENT)
Facility: HOSPITAL | Age: 79
End: 2024-12-30
Payer: COMMERCIAL

## 2024-12-30 DIAGNOSIS — T86.19 DELAYED GRAFT FUNCTION OF KIDNEY (HHS-HCC): ICD-10-CM

## 2024-12-30 LAB
H CAPSUL AG UR QL: NOT DETECTED
SCAN RESULT: NORMAL

## 2024-12-30 PROCEDURE — RXMED WILLOW AMBULATORY MEDICATION CHARGE

## 2025-01-02 ENCOUNTER — LAB (OUTPATIENT)
Dept: LAB | Facility: LAB | Age: 80
End: 2025-01-02
Payer: COMMERCIAL

## 2025-01-02 ENCOUNTER — PHARMACY VISIT (OUTPATIENT)
Dept: PHARMACY | Facility: CLINIC | Age: 80
End: 2025-01-02
Payer: COMMERCIAL

## 2025-01-02 DIAGNOSIS — Z01.812 BLOOD TESTS PRIOR TO TREATMENT OR PROCEDURE: ICD-10-CM

## 2025-01-02 DIAGNOSIS — Z94.0 KIDNEY REPLACED BY TRANSPLANT (HHS-HCC): ICD-10-CM

## 2025-01-02 LAB
ALBUMIN SERPL BCP-MCNC: 4 G/DL (ref 3.4–5)
ANION GAP SERPL CALC-SCNC: 11 MMOL/L (ref 10–20)
APTT PPP: 40 SECONDS (ref 27–38)
BUN SERPL-MCNC: 25 MG/DL (ref 6–23)
CALCIUM SERPL-MCNC: 9.6 MG/DL (ref 8.6–10.6)
CHLORIDE SERPL-SCNC: 107 MMOL/L (ref 98–107)
CO2 SERPL-SCNC: 27 MMOL/L (ref 21–32)
CREAT SERPL-MCNC: 1.05 MG/DL (ref 0.5–1.05)
EGFRCR SERPLBLD CKD-EPI 2021: 55 ML/MIN/1.73M*2
ERYTHROCYTE [DISTWIDTH] IN BLOOD BY AUTOMATED COUNT: 14.3 % (ref 11.5–14.5)
GLUCOSE SERPL-MCNC: 108 MG/DL (ref 74–99)
HCT VFR BLD AUTO: 34 % (ref 36–46)
HGB BLD-MCNC: 10.7 G/DL (ref 12–16)
INR PPP: 1.1 (ref 0.9–1.1)
MAGNESIUM SERPL-MCNC: 1.82 MG/DL (ref 1.6–2.4)
MCH RBC QN AUTO: 31 PG (ref 26–34)
MCHC RBC AUTO-ENTMCNC: 31.5 G/DL (ref 32–36)
MCV RBC AUTO: 99 FL (ref 80–100)
NRBC BLD-RTO: ABNORMAL /100{WBCS}
PHOSPHATE SERPL-MCNC: 3.2 MG/DL (ref 2.5–4.9)
PLATELET # BLD AUTO: 163 X10*3/UL (ref 150–450)
POTASSIUM SERPL-SCNC: 4.3 MMOL/L (ref 3.5–5.3)
PROTHROMBIN TIME: 12.3 SECONDS (ref 9.8–12.8)
RBC # BLD AUTO: 3.45 X10*6/UL (ref 4–5.2)
SODIUM SERPL-SCNC: 141 MMOL/L (ref 136–145)
TACROLIMUS BLD-MCNC: 9.2 NG/ML
WBC # BLD AUTO: 3.2 X10*3/UL (ref 4.4–11.3)

## 2025-01-02 PROCEDURE — 83735 ASSAY OF MAGNESIUM: CPT

## 2025-01-02 PROCEDURE — 86832 HLA CLASS I HIGH DEFIN QUAL: CPT

## 2025-01-02 PROCEDURE — 80069 RENAL FUNCTION PANEL: CPT

## 2025-01-02 PROCEDURE — 85027 COMPLETE CBC AUTOMATED: CPT

## 2025-01-02 PROCEDURE — 36415 COLL VENOUS BLD VENIPUNCTURE: CPT

## 2025-01-02 PROCEDURE — 85730 THROMBOPLASTIN TIME PARTIAL: CPT

## 2025-01-02 PROCEDURE — 86833 HLA CLASS II HIGH DEFIN QUAL: CPT

## 2025-01-02 PROCEDURE — 85610 PROTHROMBIN TIME: CPT

## 2025-01-02 PROCEDURE — 80197 ASSAY OF TACROLIMUS: CPT

## 2025-01-03 DIAGNOSIS — Z94.0 KIDNEY REPLACED BY TRANSPLANT (HHS-HCC): ICD-10-CM

## 2025-01-03 DIAGNOSIS — L29.9 ITCHING: ICD-10-CM

## 2025-01-03 RX ORDER — HYDROXYZINE HYDROCHLORIDE 10 MG/1
5 TABLET, FILM COATED ORAL DAILY
Qty: 15 TABLET | Refills: 0 | Status: CANCELLED | OUTPATIENT
Start: 2025-01-03 | End: 2025-02-02

## 2025-01-03 RX ORDER — CLOTRIMAZOLE 10 MG/1
10 LOZENGE ORAL
Qty: 90 TROCHE | Refills: 1 | Status: CANCELLED | OUTPATIENT
Start: 2025-01-03 | End: 2025-02-02

## 2025-01-03 RX ORDER — FUROSEMIDE 20 MG/1
20 TABLET ORAL DAILY
Qty: 30 TABLET | Refills: 0 | Status: CANCELLED | OUTPATIENT
Start: 2025-01-03 | End: 2026-01-03

## 2025-01-06 ENCOUNTER — SPECIALTY PHARMACY (OUTPATIENT)
Dept: PHARMACY | Facility: CLINIC | Age: 80
End: 2025-01-06

## 2025-01-06 PROCEDURE — RXMED WILLOW AMBULATORY MEDICATION CHARGE

## 2025-01-07 ENCOUNTER — OFFICE VISIT (OUTPATIENT)
Facility: HOSPITAL | Age: 80
End: 2025-01-07
Payer: COMMERCIAL

## 2025-01-07 ENCOUNTER — PHARMACY VISIT (OUTPATIENT)
Dept: PHARMACY | Facility: CLINIC | Age: 80
End: 2025-01-07
Payer: COMMERCIAL

## 2025-01-07 ENCOUNTER — LAB (OUTPATIENT)
Dept: LAB | Facility: LAB | Age: 80
End: 2025-01-07
Payer: COMMERCIAL

## 2025-01-07 ENCOUNTER — TELEPHONE (OUTPATIENT)
Facility: HOSPITAL | Age: 80
End: 2025-01-07

## 2025-01-07 VITALS
DIASTOLIC BLOOD PRESSURE: 75 MMHG | OXYGEN SATURATION: 98 % | WEIGHT: 200.1 LBS | SYSTOLIC BLOOD PRESSURE: 147 MMHG | HEART RATE: 80 BPM | BODY MASS INDEX: 32.3 KG/M2 | TEMPERATURE: 97.8 F

## 2025-01-07 DIAGNOSIS — Z94.0 KIDNEY REPLACED BY TRANSPLANT (HHS-HCC): ICD-10-CM

## 2025-01-07 DIAGNOSIS — L29.9 ITCHING: ICD-10-CM

## 2025-01-07 DIAGNOSIS — N18.6 ESRD (END STAGE RENAL DISEASE) (MULTI): ICD-10-CM

## 2025-01-07 DIAGNOSIS — D72.819 LEUKOPENIA, UNSPECIFIED TYPE: Primary | ICD-10-CM

## 2025-01-07 DIAGNOSIS — I10 PRIMARY HYPERTENSION: ICD-10-CM

## 2025-01-07 DIAGNOSIS — D84.9 IMMUNOSUPPRESSION: ICD-10-CM

## 2025-01-07 LAB
25(OH)D3 SERPL-MCNC: 35 NG/ML (ref 30–100)
ALBUMIN SERPL BCP-MCNC: 4.3 G/DL (ref 3.4–5)
ANION GAP SERPL CALC-SCNC: 13 MMOL/L (ref 10–20)
BUN SERPL-MCNC: 23 MG/DL (ref 6–23)
CALCIUM SERPL-MCNC: 9.9 MG/DL (ref 8.6–10.6)
CHLORIDE SERPL-SCNC: 107 MMOL/L (ref 98–107)
CO2 SERPL-SCNC: 26 MMOL/L (ref 21–32)
CREAT SERPL-MCNC: 1.03 MG/DL (ref 0.5–1.05)
CREAT UR-MCNC: 96.7 MG/DL (ref 20–320)
EGFRCR SERPLBLD CKD-EPI 2021: 56 ML/MIN/1.73M*2
ERYTHROCYTE [DISTWIDTH] IN BLOOD BY AUTOMATED COUNT: 13.8 % (ref 11.5–14.5)
GLUCOSE SERPL-MCNC: 91 MG/DL (ref 74–99)
HCT VFR BLD AUTO: 34.5 % (ref 36–46)
HGB BLD-MCNC: 10.9 G/DL (ref 12–16)
HLA RESULTS: NORMAL
HLA-A+B+C AB NFR SER: NORMAL %
HLA-DP+DQ+DR AB NFR SER: NORMAL %
MAGNESIUM SERPL-MCNC: 1.82 MG/DL (ref 1.6–2.4)
MCH RBC QN AUTO: 30.4 PG (ref 26–34)
MCHC RBC AUTO-ENTMCNC: 31.6 G/DL (ref 32–36)
MCV RBC AUTO: 96 FL (ref 80–100)
NRBC BLD-RTO: 0 /100 WBCS (ref 0–0)
PHOSPHATE SERPL-MCNC: 3.4 MG/DL (ref 2.5–4.9)
PLATELET # BLD AUTO: 187 X10*3/UL (ref 150–450)
POTASSIUM SERPL-SCNC: 4.6 MMOL/L (ref 3.5–5.3)
PROT UR-ACNC: 16 MG/DL (ref 5–24)
PROT/CREAT UR: 0.17 MG/MG CREAT (ref 0–0.17)
PTH-INTACT SERPL-MCNC: 116.7 PG/ML (ref 18.5–88)
RBC # BLD AUTO: 3.58 X10*6/UL (ref 4–5.2)
SODIUM SERPL-SCNC: 141 MMOL/L (ref 136–145)
TACROLIMUS BLD-MCNC: 6.7 NG/ML
WBC # BLD AUTO: 4.3 X10*3/UL (ref 4.4–11.3)

## 2025-01-07 PROCEDURE — 82570 ASSAY OF URINE CREATININE: CPT

## 2025-01-07 PROCEDURE — 1159F MED LIST DOCD IN RCRD: CPT | Performed by: HOSPITALIST

## 2025-01-07 PROCEDURE — 84156 ASSAY OF PROTEIN URINE: CPT

## 2025-01-07 PROCEDURE — 85027 COMPLETE CBC AUTOMATED: CPT

## 2025-01-07 PROCEDURE — 82306 VITAMIN D 25 HYDROXY: CPT

## 2025-01-07 PROCEDURE — 87799 DETECT AGENT NOS DNA QUANT: CPT

## 2025-01-07 PROCEDURE — 3078F DIAST BP <80 MM HG: CPT | Performed by: HOSPITALIST

## 2025-01-07 PROCEDURE — 99214 OFFICE O/P EST MOD 30 MIN: CPT | Performed by: HOSPITALIST

## 2025-01-07 PROCEDURE — 3077F SYST BP >= 140 MM HG: CPT | Performed by: HOSPITALIST

## 2025-01-07 PROCEDURE — 83970 ASSAY OF PARATHORMONE: CPT

## 2025-01-07 PROCEDURE — 80197 ASSAY OF TACROLIMUS: CPT

## 2025-01-07 PROCEDURE — 87385 HISTOPLASMA CAPSUL AG IA: CPT

## 2025-01-07 PROCEDURE — 1160F RVW MEDS BY RX/DR IN RCRD: CPT | Performed by: HOSPITALIST

## 2025-01-07 PROCEDURE — 83735 ASSAY OF MAGNESIUM: CPT

## 2025-01-07 PROCEDURE — 80069 RENAL FUNCTION PANEL: CPT

## 2025-01-07 PROCEDURE — 36415 COLL VENOUS BLD VENIPUNCTURE: CPT

## 2025-01-07 PROCEDURE — 1126F AMNT PAIN NOTED NONE PRSNT: CPT | Performed by: HOSPITALIST

## 2025-01-07 RX ORDER — HYDROXYZINE HYDROCHLORIDE 10 MG/1
5 TABLET, FILM COATED ORAL DAILY
Qty: 15 TABLET | Refills: 0 | Status: SHIPPED | OUTPATIENT
Start: 2025-01-07 | End: 2025-02-07

## 2025-01-07 RX ORDER — FUROSEMIDE 20 MG/1
20 TABLET ORAL DAILY
Qty: 30 TABLET | Refills: 0 | Status: SHIPPED | OUTPATIENT
Start: 2025-01-07 | End: 2026-01-07

## 2025-01-07 RX ORDER — CLOTRIMAZOLE 10 MG/1
10 LOZENGE ORAL
Qty: 90 TROCHE | Refills: 1 | OUTPATIENT
Start: 2025-01-07 | End: 2025-02-06

## 2025-01-07 ASSESSMENT — ENCOUNTER SYMPTOMS
RESPIRATORY NEGATIVE: 1
CHEST TIGHTNESS: 0
CARDIOVASCULAR NEGATIVE: 1
CONSTIPATION: 0
NEUROLOGICAL NEGATIVE: 1
POLYDIPSIA: 0
COUGH: 0
HEMATOLOGIC/LYMPHATIC NEGATIVE: 1
ABDOMINAL DISTENTION: 0
DYSURIA: 0
BLOOD IN STOOL: 0
FLANK PAIN: 0
SHORTNESS OF BREATH: 0
PSYCHIATRIC NEGATIVE: 1
HEMATURIA: 0
POLYPHAGIA: 0

## 2025-01-07 ASSESSMENT — PAIN SCALES - GENERAL: PAINLEVEL_OUTOF10: 0-NO PAIN

## 2025-01-07 NOTE — PATIENT INSTRUCTIONS
Stop Clotrimazole by 90 days   K phos, Iron 1 tab twice a day   Labs weekly   Today labs with Allosure   Bx scheduled for 1/17  RTC in 3 weeks

## 2025-01-07 NOTE — PROGRESS NOTES
Liz Hamlin  79 y.o. with preemptive kidney transplant secondary to hypertension status post DDKT on 10/28/2024 with KDPI 86%, PRA 77%, EBV status D+/R+, CMV status D+/R- hepatitis C status D-/R-.  Patient was induced by 3 mg/kg of Thymoglobulin initiated on belatacept CellCept and prednisone taper.  Transition from belatacept to tacrolimus in the setting of T-cell and B-cell crossmatch positive.    Interim history: Last encounter patient was started on Lasix for the lower extremity swelling-still having significant swelling.  Discussed with her to talk to her cardiologist.    Review of Systems   HENT: Negative.     Respiratory: Negative.  Negative for cough, chest tightness and shortness of breath.    Cardiovascular: Negative.  Negative for leg swelling.   Gastrointestinal:  Negative for abdominal distention, blood in stool and constipation.   Endocrine: Negative for polydipsia, polyphagia and polyuria.   Genitourinary:  Negative for decreased urine volume, dysuria, flank pain, hematuria and urgency.   Neurological: Negative.    Hematological: Negative.    Psychiatric/Behavioral: Negative.          Objective:  Visit Vitals  /75   Pulse 80   Temp 36.6 °C (97.8 °F) (Temporal)   Wt 90.8 kg (200 lb 1.6 oz)   LMP  (LMP Unknown)   SpO2 98%   BMI 32.30 kg/m²   OB Status Postmenopausal   Smoking Status Never   BSA 2.06 m²      Physical Exam  HENT:      Head: Normocephalic.   Eyes:      Pupils: Pupils are equal, round, and reactive to light.   Cardiovascular:      Rate and Rhythm: Normal rate and regular rhythm.   Pulmonary:      Effort: Pulmonary effort is normal. No respiratory distress.      Breath sounds: No wheezing or rales.   Abdominal:      General: There is no distension.      Tenderness: There is no abdominal tenderness. There is no rebound.   Musculoskeletal:         General: Normal range of motion.   Skin:     General: Skin is warm.      Findings: No bruising, lesion or rash.   Neurological:       General: No focal deficit present.   Psychiatric:         Mood and Affect: Mood normal.            Current Outpatient Medications:     aspirin 81 mg EC tablet, Take 1 tablet (81 mg) by mouth once daily., Disp: , Rfl:     atorvastatin (Lipitor) 40 mg tablet, Take 1 tablet (40 mg) by mouth once daily., Disp: 90 tablet, Rfl: 1    carvedilol (Coreg) 25 mg tablet, Take 2 tablets (50 mg) by mouth 2 times a day., Disp: 120 tablet, Rfl: 11    clotrimazole (Mycelex) 10 mg nino, Dissolve 1 nino (10 mg) by mouth 3 times a day after meals., Disp: 90 Nino, Rfl: 1    epoetin valerio 10,000 unit/mL injection, Inject 1 mL (10,000 Units) under the skin 1 (one) time per week. HOLD dose if hemoglobin is 11 or higher Do not fill before November 20, 2024., Disp: 4 mL, Rfl: 11    ferrous sulfate, 325 mg ferrous sulfate, (FeroSuL) tablet, Take 1 tablet by mouth once daily., Disp: 90 tablet, Rfl: 1    furosemide (Lasix) 20 mg tablet, Take 1 tablet (20 mg) by mouth once daily., Disp: 30 tablet, Rfl: 0    hydrALAZINE (Apresoline) 100 mg tablet, Take 1 tablet (100 mg) by mouth 2 times a day., Disp: 180 tablet, Rfl: 1    hydrOXYzine HCL (Atarax) 10 mg tablet, Take 0.5 tablets (5 mg) by mouth once daily., Disp: 15 tablet, Rfl: 0    magnesium oxide (Mag-Ox) 400 mg (241.3 mg magnesium) tablet, Take 1 tablet (400 mg) by mouth once daily., Disp: 30 tablet, Rfl: 2    mycophenolate (Cellcept) 250 mg capsule, Take 4 capsules (1,000 mg) by mouth every 12 hours., Disp: 240 capsule, Rfl: 11    potassium phosphate, monobasic, (K-Phos) 500 mg tablet, Take 2 tablets (1,000 mg) by mouth 2 times a day., Disp: 120 tablet, Rfl: 11    predniSONE (Deltasone) 5 mg tablet, Take 1 tablet (5 mg) by mouth once daily., Disp: 30 tablet, Rfl: 11    SITagliptin phosphate (Januvia) 100 mg tablet, Take 1 tablet (100 mg) by mouth once daily., Disp: 30 tablet, Rfl: 11    sulfamethoxazole-trimethoprim (Bactrim) 400-80 mg tablet, Take 1 tablet by mouth once daily., Disp: 30  tablet, Rfl: 4    tacrolimus (Prograf) 1 mg capsule, Take 1 capsule (1 mg) by mouth every 12 hours., Disp: 60 capsule, Rfl: 11    valGANciclovir (Valcyte) 450 mg tablet, Take 2 tablets (900 mg) by mouth once every 24 hours. Do not crush or chew., Disp: 60 tablet, Rfl: 4    pantoprazole (ProtoNix) 40 mg EC tablet, Take 1 tablet (40 mg) by mouth once daily in the morning. Take before meals. Do not crush, chew, or split., Disp: 30 tablet, Rfl: 0    walker misc, 1 Device once daily., Disp: 1 each, Rfl: 0    walker misc, 1 Device once daily as needed (As needed)., Disp: 1 each, Rfl: 0     [unfilled]     No images are attached to the encounter.     Assessment and Plan :Liz Hamlin 79 y.o. with preemptive kidney transplant secondary to hypertension status post DDKT on 10/28/2024 with KDPI 86%, PRA 77%, EBV status D+/R+, CMV status D+/R- hepatitis C status D-/R-.  Patient was induced by 3 mg/kg of Thymoglobulin initiated on belatacept CellCept and prednisone taper.  Transition from belatacept to tacrolimus in the setting of T-cell and B-cell crossmatch positive.    Interim history: Last encounter patient was started on Lasix for the lower extremity swelling-still having significant swelling.  Discussed with her to talk to her cardiologist.    Allograft function: Stable creatinine in the range of 1.0-0.9, currently around 1.05, stable electrolytes.  Last UPC 0.19.  AlloSure bumped up to 1.2 that is her first AlloSure will follow-up with repeat and planned biopsy on 17 January.  -DSA was negative as of 11/22/2024  -BK, EBV negative as of 12/10/2024.    Immunosuppression: Both B-cell and T-cell crossmatch positive transition from belatacept to tacrolimus.  Aim tacrolimus levels are 8-10 continue with the current dose of tacrolimus recent levels are around 9.2, full dose mycophenolate, prednisone 5 mg daily.      Hemodynamics : Blood pressures are mildly elevated continue with the Lasix 20 mg daily, carvedilol  50 twice daily, hydralazine 100 mg -2times daily.    Anemia/leukopenia: Mild leukopenia noticed will check viral's like CMV and EBV and monitor closely for now.    Bone mineral disease: Calcium and phosphorus levels are optimal recent vitamin D levels are 15  we will continue with the current management.    General health care: Recommended age-appropriate screening, COVID shots annual dermatology visits,DEXA scan 2-3 yrs while on steroids .    Labs weekly once and follow-up in 3 weeks after the biopsy    Franklin Blake MD    Notes created by Rudy -Please excuse the Typos .

## 2025-01-10 ENCOUNTER — PATIENT MESSAGE (OUTPATIENT)
Facility: HOSPITAL | Age: 80
End: 2025-01-10
Payer: COMMERCIAL

## 2025-01-10 LAB
H CAPSUL AG UR QL: NOT DETECTED
SCAN RESULT: NORMAL

## 2025-01-13 LAB
ALLOSURE SCORE - KIDNEY: 0.41 %
CAREDX_ORDER_ID: NORMAL
CENTER_ORDER_ID: NORMAL
CLIENT SPECIMEN ID - ALLOSURE: NORMAL
DONOR RELATION - ALLOSURE: NORMAL
NOTES - ALLOSURE: NORMAL
RELATIVE CHANGE VALUE - KIDNEY: -66 %
TEST COMMENTS - ALLOSURE: NORMAL
TIME POST TX - ALLOSURE: NORMAL
TRANSPLANTED ORGAN - ALLOSURE: NORMAL
TX DATE - ALLOSURE/ALLOMAP: NORMAL
WP_ORDER_ID: NORMAL

## 2025-01-15 ENCOUNTER — LAB (OUTPATIENT)
Dept: LAB | Facility: LAB | Age: 80
End: 2025-01-15
Payer: COMMERCIAL

## 2025-01-15 DIAGNOSIS — Z94.0 KIDNEY REPLACED BY TRANSPLANT (HHS-HCC): ICD-10-CM

## 2025-01-15 LAB
ALBUMIN SERPL BCP-MCNC: 4.2 G/DL (ref 3.4–5)
ANION GAP SERPL CALC-SCNC: 14 MMOL/L (ref 10–20)
BUN SERPL-MCNC: 25 MG/DL (ref 6–23)
CALCIUM SERPL-MCNC: 9.7 MG/DL (ref 8.6–10.6)
CHLORIDE SERPL-SCNC: 106 MMOL/L (ref 98–107)
CO2 SERPL-SCNC: 25 MMOL/L (ref 21–32)
CREAT SERPL-MCNC: 1.05 MG/DL (ref 0.5–1.05)
EGFRCR SERPLBLD CKD-EPI 2021: 55 ML/MIN/1.73M*2
ERYTHROCYTE [DISTWIDTH] IN BLOOD BY AUTOMATED COUNT: 13.6 % (ref 11.5–14.5)
GLUCOSE SERPL-MCNC: 99 MG/DL (ref 74–99)
HCT VFR BLD AUTO: 34.4 % (ref 36–46)
HGB BLD-MCNC: 10.6 G/DL (ref 12–16)
MAGNESIUM SERPL-MCNC: 1.87 MG/DL (ref 1.6–2.4)
MCH RBC QN AUTO: 30.2 PG (ref 26–34)
MCHC RBC AUTO-ENTMCNC: 30.8 G/DL (ref 32–36)
MCV RBC AUTO: 98 FL (ref 80–100)
NRBC BLD-RTO: ABNORMAL /100{WBCS}
PHOSPHATE SERPL-MCNC: 3.1 MG/DL (ref 2.5–4.9)
PLATELET # BLD AUTO: 169 X10*3/UL (ref 150–450)
POTASSIUM SERPL-SCNC: 4.4 MMOL/L (ref 3.5–5.3)
RBC # BLD AUTO: 3.51 X10*6/UL (ref 4–5.2)
SODIUM SERPL-SCNC: 141 MMOL/L (ref 136–145)
TACROLIMUS BLD-MCNC: 7.5 NG/ML
WBC # BLD AUTO: 3.4 X10*3/UL (ref 4.4–11.3)

## 2025-01-15 PROCEDURE — 80197 ASSAY OF TACROLIMUS: CPT

## 2025-01-15 PROCEDURE — 87385 HISTOPLASMA CAPSUL AG IA: CPT

## 2025-01-15 PROCEDURE — 36415 COLL VENOUS BLD VENIPUNCTURE: CPT

## 2025-01-15 PROCEDURE — 80069 RENAL FUNCTION PANEL: CPT

## 2025-01-15 PROCEDURE — 85027 COMPLETE CBC AUTOMATED: CPT

## 2025-01-15 PROCEDURE — 83735 ASSAY OF MAGNESIUM: CPT

## 2025-01-17 ENCOUNTER — HOSPITAL ENCOUNTER (OUTPATIENT)
Dept: RADIOLOGY | Facility: HOSPITAL | Age: 80
Discharge: HOME | End: 2025-01-17
Payer: COMMERCIAL

## 2025-01-17 VITALS
SYSTOLIC BLOOD PRESSURE: 125 MMHG | DIASTOLIC BLOOD PRESSURE: 84 MMHG | OXYGEN SATURATION: 100 % | TEMPERATURE: 97.5 F | HEART RATE: 83 BPM | RESPIRATION RATE: 16 BRPM

## 2025-01-17 DIAGNOSIS — T86.19 DELAYED GRAFT FUNCTION OF KIDNEY (HHS-HCC): ICD-10-CM

## 2025-01-17 PROCEDURE — 2500000004 HC RX 250 GENERAL PHARMACY W/ HCPCS (ALT 636 FOR OP/ED): Performed by: RADIOLOGY

## 2025-01-17 PROCEDURE — 7100000010 HC PHASE TWO TIME - EACH INCREMENTAL 1 MINUTE

## 2025-01-17 PROCEDURE — 50200 RENAL BIOPSY PERQ: CPT

## 2025-01-17 PROCEDURE — 99152 MOD SED SAME PHYS/QHP 5/>YRS: CPT | Performed by: RADIOLOGY

## 2025-01-17 PROCEDURE — 7100000009 HC PHASE TWO TIME - INITIAL BASE CHARGE

## 2025-01-17 PROCEDURE — 2720000007 HC OR 272 NO HCPCS

## 2025-01-17 PROCEDURE — 99152 MOD SED SAME PHYS/QHP 5/>YRS: CPT

## 2025-01-17 RX ORDER — FENTANYL CITRATE 50 UG/ML
INJECTION, SOLUTION INTRAMUSCULAR; INTRAVENOUS
Status: COMPLETED | OUTPATIENT
Start: 2025-01-17 | End: 2025-01-17

## 2025-01-17 RX ORDER — MIDAZOLAM HYDROCHLORIDE 1 MG/ML
INJECTION INTRAMUSCULAR; INTRAVENOUS
Status: COMPLETED | OUTPATIENT
Start: 2025-01-17 | End: 2025-01-17

## 2025-01-17 RX ADMIN — FENTANYL CITRATE 50 MCG: 50 INJECTION, SOLUTION INTRAMUSCULAR; INTRAVENOUS at 11:57

## 2025-01-17 RX ADMIN — MIDAZOLAM HYDROCHLORIDE 1 MG: 1 INJECTION, SOLUTION INTRAMUSCULAR; INTRAVENOUS at 11:58

## 2025-01-17 ASSESSMENT — PAIN - FUNCTIONAL ASSESSMENT
PAIN_FUNCTIONAL_ASSESSMENT: 0-10

## 2025-01-17 ASSESSMENT — PAIN SCALES - GENERAL
PAINLEVEL_OUTOF10: 0 - NO PAIN
PAINLEVEL_OUTOF10: 2
PAINLEVEL_OUTOF10: 0 - NO PAIN
PAINLEVEL_OUTOF10: 4
PAINLEVEL_OUTOF10: 0 - NO PAIN

## 2025-01-17 NOTE — PRE-PROCEDURE NOTE
INTERVENTIONAL RADIOLOGY PRE-PROCEDURE NOTE    Liz Hamlin is a 79 y.o. female with PMHx of renal transplant with c/f rejection who presents to the interventional radiology department for renal transplant biopsy.    Procedure: renal transplant    Indication for procedure: The encounter diagnosis was Delayed graft function of kidney (HHS-HCC).    Past Medical History:   Diagnosis Date    CKD (chronic kidney disease)     Encounter for general adult medical examination without abnormal findings 2021    Encounter for Medicare annual wellness exam    Glaucoma     Gout     Hyperlipidemia     Hypertension     Mitral valve regurgitation     JOSE E (obstructive sleep apnea)     Unspecified cataract     Cataracts, both eyes      Past Surgical History:   Procedure Laterality Date    OTHER SURGICAL HISTORY  2019    Carpal tunnel surgery    OTHER SURGICAL HISTORY  2019     section    OTHER SURGICAL HISTORY  2019    Hernia repair       Relevant Labs:   Lab Results   Component Value Date    CREATININE 1.05 01/15/2025    EGFR 55 (L) 01/15/2025    INR 1.1 2025    PROTIME 12.3 2025     Planned Sedation/Anesthesia: Moderate    Directed physical examination:    General: Normal appearance, behavior, cognition and NAD  Heart: Heart regular rate and rhythm  Lungs: No increased work of breathing  Abdomen: soft and nontender  Psych: oriented to time, place and person    Current Outpatient Medications:     aspirin 81 mg EC tablet, Take 1 tablet (81 mg) by mouth once daily., Disp: , Rfl:     atorvastatin (Lipitor) 40 mg tablet, Take 1 tablet (40 mg) by mouth once daily., Disp: 90 tablet, Rfl: 1    carvedilol (Coreg) 25 mg tablet, Take 2 tablets (50 mg) by mouth 2 times a day., Disp: 120 tablet, Rfl: 11    clotrimazole (Mycelex) 10 mg dxion, Dissolve 1 dixon (10 mg) by mouth 3 times a day after meals., Disp: 90 Dixon, Rfl: 1    epoetin valerio 10,000 unit/mL injection, Inject 1 mL (10,000 Units)  under the skin 1 (one) time per week. HOLD dose if hemoglobin is 11 or higher Do not fill before November 20, 2024., Disp: 4 mL, Rfl: 11    ferrous sulfate, 325 mg ferrous sulfate, (FeroSuL) tablet, Take 1 tablet by mouth once daily., Disp: 90 tablet, Rfl: 1    furosemide (Lasix) 20 mg tablet, Take 1 tablet (20 mg) by mouth once daily., Disp: 30 tablet, Rfl: 0    hydrALAZINE (Apresoline) 100 mg tablet, Take 1 tablet (100 mg) by mouth 2 times a day., Disp: 180 tablet, Rfl: 1    hydrOXYzine HCL (Atarax) 10 mg tablet, Take 0.5 tablets (5 mg) by mouth once daily., Disp: 15 tablet, Rfl: 0    magnesium oxide (Mag-Ox) 400 mg (241.3 mg magnesium) tablet, Take 1 tablet (400 mg) by mouth once daily., Disp: 30 tablet, Rfl: 2    mycophenolate (Cellcept) 250 mg capsule, Take 4 capsules (1,000 mg) by mouth every 12 hours., Disp: 240 capsule, Rfl: 11    pantoprazole (ProtoNix) 40 mg EC tablet, Take 1 tablet (40 mg) by mouth once daily in the morning. Take before meals. Do not crush, chew, or split., Disp: 30 tablet, Rfl: 0    potassium phosphate, monobasic, (K-Phos) 500 mg tablet, Take 2 tablets (1,000 mg) by mouth 2 times a day., Disp: 120 tablet, Rfl: 11    predniSONE (Deltasone) 5 mg tablet, Take 1 tablet (5 mg) by mouth once daily., Disp: 30 tablet, Rfl: 11    SITagliptin phosphate (Januvia) 100 mg tablet, Take 1 tablet (100 mg) by mouth once daily., Disp: 30 tablet, Rfl: 11    sulfamethoxazole-trimethoprim (Bactrim) 400-80 mg tablet, Take 1 tablet by mouth once daily., Disp: 30 tablet, Rfl: 4    tacrolimus (Prograf) 1 mg capsule, Take 1 capsule (1 mg) by mouth every 12 hours., Disp: 60 capsule, Rfl: 11    valGANciclovir (Valcyte) 450 mg tablet, Take 2 tablets (900 mg) by mouth once every 24 hours. Do not crush or chew., Disp: 60 tablet, Rfl: 4    walker misc, 1 Device once daily as needed (As needed)., Disp: 1 each, Rfl: 0     Mallampati: III (soft and hard palate and base of uvula visible)    ASA Score: ASA 3 - Patient with  moderate systemic disease with functional limitations    Benefits, risks and alternatives of procedure and planned sedation have been discussed with the patient and/or their representative. All questions answered and they agree to proceed.     Eric Palomino MD, PGY-6  Vascular & Interventional Radiology  IR pager: [phone]    NON-Urgent on call weekends and after hours weekdays (5pm - 5am) IR pager: [phone]  Urgent & emergent on call weekends and after hours weekdays (5pm-7am) IR pager: [phone]

## 2025-01-17 NOTE — DISCHARGE INSTRUCTIONS
You received moderate sedation:  - Do not drive a car, or operate any machinery or power tools of any kind.  - Do not drink any alcoholic drinks.  - Do not take any over the counter medications that may cause drowsiness.  - Do not make any important decisions or sign any legal documents.  - You need to have a responsible adult accompany you home.  - You may resume your normal diet.  - We strongly suggest that a responsible adult be with you for the rest of the day and also during the night. This is for your protection and safety.     For questions related to your procedure:  Please call [phone] between the hours of 7:00am-5:00pm Monday through Friday.  Please call [phone] after 5:00pm and on weekends and holidays.     In the event of an emergency call 911 or go to your nearest emergency room.

## 2025-01-17 NOTE — POST-PROCEDURE NOTE
Interventional Radiology Post-Procedure Note    Right iliac fossa renal transplant biopsy    Procedure Details:  Technically successful and uncomplicated right iliac fossa renal transplant biopsy.  Please see PACS for full procedural details.    Patient Tolerance: good  Complications: None    Indication for procedure: The encounter diagnosis was Delayed graft function of kidney (HHS-HCC).    Pre-Procedure Verification and Time Out:  · Procedure Location procedure area   · HUDDLE - Pre-procedure Verification completed   · TIME OUT - Final Verification completed immediately prior to procedure start   · DEBRIEF completed     General Information:  Date/Time of Procedure: 01/17/25 at 12:13 PM  Indication(s): allosure 1.2%  Findings: See PACS  Procedure performed by: Eric Palomino MD   Assistant(s): Dr. Allison Al MD  Estimated Blood Loss (mL): minimal  Specimen: Yes, 3 core samples  Informed Consent: written consent obtained    Prep:  Ultrasound Guided Insertion: Yes  Large Drape, Hand Hygiene, Surgical Cap, Surgical Mask, Sterile Gown, Sterile Gloves, Glasses, and Scrubs  Patient Position: Supine  Site Prep: chlorhexidine, draped, usual sterile procedure followed    Anesthesia/Medications:  Procedural Sedation:  Fentanyl: 50 mcg  Versed: 1 mg  1% Lidocaine: 8 mL    Eric Palomino MD, PGY-6  Interventional Radiology  IR pager: [phone]    NON-Urgent on call weekends and after hours weekdays (5pm - 5am) IR pager: [phone]  Urgent & emergent on call weekends and after hours weekdays (5pm-7am) IR pager: [phone]

## 2025-01-18 LAB
H CAPSUL AG UR QL: NOT DETECTED
SCAN RESULT: NORMAL

## 2025-01-20 ENCOUNTER — APPOINTMENT (OUTPATIENT)
Dept: PODIATRY | Facility: CLINIC | Age: 80
End: 2025-01-20
Payer: COMMERCIAL

## 2025-01-22 ENCOUNTER — PATIENT MESSAGE (OUTPATIENT)
Facility: HOSPITAL | Age: 80
End: 2025-01-22
Payer: COMMERCIAL

## 2025-01-22 LAB
LAB AP ASR DISCLAIMER: NORMAL
LABORATORY COMMENT REPORT: NORMAL
PATH REPORT.COMMENTS IMP SPEC: NORMAL
PATH REPORT.FINAL DX SPEC: NORMAL
PATH REPORT.GROSS SPEC: NORMAL
PATH REPORT.MICROSCOPIC SPEC OTHER STN: NORMAL
PATH REPORT.RELEVANT HX SPEC: NORMAL
PATH REPORT.TOTAL CANCER: NORMAL

## 2025-01-23 ENCOUNTER — LAB (OUTPATIENT)
Dept: LAB | Facility: LAB | Age: 80
End: 2025-01-23
Payer: COMMERCIAL

## 2025-01-23 DIAGNOSIS — D72.819 LEUKOPENIA, UNSPECIFIED TYPE: ICD-10-CM

## 2025-01-23 DIAGNOSIS — Z94.0 KIDNEY REPLACED BY TRANSPLANT (HHS-HCC): ICD-10-CM

## 2025-01-23 LAB
ALBUMIN SERPL BCP-MCNC: 4.2 G/DL (ref 3.4–5)
ANION GAP SERPL CALC-SCNC: 13 MMOL/L (ref 10–20)
BASOPHILS # BLD MANUAL: 0.12 X10*3/UL (ref 0–0.1)
BASOPHILS NFR BLD MANUAL: 5 %
BUN SERPL-MCNC: 21 MG/DL (ref 6–23)
BURR CELLS BLD QL SMEAR: ABNORMAL
CALCIUM SERPL-MCNC: 9.9 MG/DL (ref 8.6–10.6)
CHLORIDE SERPL-SCNC: 106 MMOL/L (ref 98–107)
CO2 SERPL-SCNC: 27 MMOL/L (ref 21–32)
CREAT SERPL-MCNC: 1.1 MG/DL (ref 0.5–1.05)
EGFRCR SERPLBLD CKD-EPI 2021: 52 ML/MIN/1.73M*2
EOSINOPHIL # BLD MANUAL: 0.02 X10*3/UL (ref 0–0.4)
EOSINOPHIL NFR BLD MANUAL: 1 %
ERYTHROCYTE [DISTWIDTH] IN BLOOD BY AUTOMATED COUNT: 13.4 % (ref 11.5–14.5)
ERYTHROCYTE [DISTWIDTH] IN BLOOD BY AUTOMATED COUNT: 13.4 % (ref 11.5–14.5)
GLUCOSE SERPL-MCNC: 95 MG/DL (ref 74–99)
HCT VFR BLD AUTO: 35.1 % (ref 36–46)
HCT VFR BLD AUTO: 35.1 % (ref 36–46)
HGB BLD-MCNC: 10.9 G/DL (ref 12–16)
HGB BLD-MCNC: 10.9 G/DL (ref 12–16)
IMM GRANULOCYTES # BLD AUTO: 0.18 X10*3/UL (ref 0–0.5)
IMM GRANULOCYTES NFR BLD AUTO: 7.9 % (ref 0–0.9)
LYMPHOCYTES # BLD MANUAL: 0.31 X10*3/UL (ref 0.8–3)
LYMPHOCYTES NFR BLD MANUAL: 13 %
MAGNESIUM SERPL-MCNC: 1.99 MG/DL (ref 1.6–2.4)
MCH RBC QN AUTO: 30 PG (ref 26–34)
MCH RBC QN AUTO: 30 PG (ref 26–34)
MCHC RBC AUTO-ENTMCNC: 31.1 G/DL (ref 32–36)
MCHC RBC AUTO-ENTMCNC: 31.1 G/DL (ref 32–36)
MCV RBC AUTO: 97 FL (ref 80–100)
MCV RBC AUTO: 97 FL (ref 80–100)
MONOCYTES # BLD MANUAL: 0.05 X10*3/UL (ref 0.05–0.8)
MONOCYTES NFR BLD MANUAL: 2 %
NEUTS HYPERSEG BLD QL SMEAR: PRESENT
NEUTS SEG # BLD MANUAL: 1.9 X10*3/UL (ref 1.6–5)
NEUTS SEG NFR BLD MANUAL: 79 %
NRBC BLD-RTO: ABNORMAL /100{WBCS}
NRBC BLD-RTO: ABNORMAL /100{WBCS}
OVALOCYTES BLD QL SMEAR: ABNORMAL
PHOSPHATE SERPL-MCNC: 3.2 MG/DL (ref 2.5–4.9)
PLATELET # BLD AUTO: 152 X10*3/UL (ref 150–450)
PLATELET # BLD AUTO: 152 X10*3/UL (ref 150–450)
POTASSIUM SERPL-SCNC: 4.2 MMOL/L (ref 3.5–5.3)
RBC # BLD AUTO: 3.63 X10*6/UL (ref 4–5.2)
RBC # BLD AUTO: 3.63 X10*6/UL (ref 4–5.2)
RBC MORPH BLD: ABNORMAL
SCHISTOCYTES BLD QL SMEAR: ABNORMAL
SODIUM SERPL-SCNC: 142 MMOL/L (ref 136–145)
TACROLIMUS BLD-MCNC: 3.4 NG/ML
TOTAL CELLS COUNTED BLD: 100
WBC # BLD AUTO: 2.4 X10*3/UL (ref 4.4–11.3)
WBC # BLD AUTO: 2.4 X10*3/UL (ref 4.4–11.3)

## 2025-01-23 PROCEDURE — 36415 COLL VENOUS BLD VENIPUNCTURE: CPT

## 2025-01-23 PROCEDURE — 83735 ASSAY OF MAGNESIUM: CPT

## 2025-01-23 PROCEDURE — 80069 RENAL FUNCTION PANEL: CPT

## 2025-01-23 PROCEDURE — 80197 ASSAY OF TACROLIMUS: CPT

## 2025-01-24 ENCOUNTER — PATIENT MESSAGE (OUTPATIENT)
Facility: HOSPITAL | Age: 80
End: 2025-01-24
Payer: COMMERCIAL

## 2025-01-24 DIAGNOSIS — Z94.0 KIDNEY REPLACED BY TRANSPLANT (HHS-HCC): ICD-10-CM

## 2025-01-24 LAB
CMV DNA SERPL NAA+PROBE-LOG IU: NORMAL {LOG_IU}/ML
LABORATORY COMMENT REPORT: NOT DETECTED

## 2025-01-24 PROCEDURE — RXMED WILLOW AMBULATORY MEDICATION CHARGE

## 2025-01-24 RX ORDER — TACROLIMUS 1 MG/1
2 CAPSULE ORAL EVERY 12 HOURS
Qty: 120 CAPSULE | Refills: 11 | Status: SHIPPED | OUTPATIENT
Start: 2025-01-24 | End: 2026-01-24

## 2025-01-25 ENCOUNTER — PHARMACY VISIT (OUTPATIENT)
Dept: PHARMACY | Facility: CLINIC | Age: 80
End: 2025-01-25
Payer: COMMERCIAL

## 2025-01-25 ENCOUNTER — SPECIALTY PHARMACY (OUTPATIENT)
Dept: PHARMACY | Facility: CLINIC | Age: 80
End: 2025-01-25

## 2025-01-25 PROCEDURE — RXMED WILLOW AMBULATORY MEDICATION CHARGE

## 2025-01-27 ENCOUNTER — APPOINTMENT (OUTPATIENT)
Dept: PODIATRY | Facility: CLINIC | Age: 80
End: 2025-01-27
Payer: COMMERCIAL

## 2025-01-27 ENCOUNTER — PHARMACY VISIT (OUTPATIENT)
Dept: PHARMACY | Facility: CLINIC | Age: 80
End: 2025-01-27
Payer: COMMERCIAL

## 2025-01-27 DIAGNOSIS — L60.2 LONG TOENAIL: Primary | ICD-10-CM

## 2025-01-27 DIAGNOSIS — M79.89 LEG SWELLING: ICD-10-CM

## 2025-01-27 DIAGNOSIS — M79.671 PAIN IN BOTH FEET: ICD-10-CM

## 2025-01-27 DIAGNOSIS — M79.672 PAIN IN BOTH FEET: ICD-10-CM

## 2025-01-27 PROCEDURE — 99212 OFFICE O/P EST SF 10 MIN: CPT | Performed by: PODIATRIST

## 2025-01-27 PROCEDURE — 1160F RVW MEDS BY RX/DR IN RCRD: CPT | Performed by: PODIATRIST

## 2025-01-27 PROCEDURE — 1036F TOBACCO NON-USER: CPT | Performed by: PODIATRIST

## 2025-01-27 PROCEDURE — 1159F MED LIST DOCD IN RCRD: CPT | Performed by: PODIATRIST

## 2025-01-27 NOTE — PROGRESS NOTES
Patient is a 78 y/o female who presents to the office complaining of left big toenail lifting. Patient relates has been noticing thick and discolored big toenails for many years but not painful. Has tried multiple topical antifungal solution with no improvement.   Has thick and discolored nails  Presents with son for appt     Is on blood thinner, recently had kidney transplant    Past Medical History  Past Medical History:   Diagnosis Date    Encounter for general adult medical examination without abnormal findings 09/14/2021    Encounter for Medicare annual wellness exam    Unspecified cataract     Cataracts, both eyes       Medications and Allergies have been reviewed.    Review Of Systems:  GENERAL: No weight loss, malaise or fevers.  HEENT: Negative for frequent or significant headaches,   RESPIRATORY: Negative for cough, wheezing or shortness of breath.  CARDIOVASCULAR: Negative for chest pain, leg swelling or palpitations.    Physical Exam:  Vascular  CFT is 3 seconds to hallux bilaterally. Hair growth is noted to the digits bilaterally.      Neurology  Gross sensation intact to b/l foot.      Dermatology  Nails 1-5 bilaterally are within normal limits of length. Left hallux toenail with significant lysis and discolotration. Webspaces 1-4 bilaterally are clean, dry, and intact without any debris or maceration noted. Skin appears well hydrated.      Musculoskeletal  Pain on palpation to right 3rd toe distal aspect. No pain on palpation to L hallux.    1. Leg swelling        2. Long toenail  Referral to Podiatry      3. Pain in both feet          Patient was evaluated and examined.  Nails debrided.   Keep nails trimmed and filed down  Discussed discoloration in legs and venous staining. No SOI noted. Compression will help with continued swelling  Left big toenail debrided to attached surface. No open lesions noted.  Patient to return to the office on a as needed basis.     Theresa Cummings,  MEY  [phone]  Option [address]  Fax: [phone]

## 2025-01-28 ENCOUNTER — LAB (OUTPATIENT)
Dept: LAB | Facility: HOSPITAL | Age: 80
End: 2025-01-28
Payer: COMMERCIAL

## 2025-01-28 ENCOUNTER — APPOINTMENT (OUTPATIENT)
Facility: HOSPITAL | Age: 80
End: 2025-01-28
Payer: COMMERCIAL

## 2025-01-28 VITALS
HEART RATE: 78 BPM | WEIGHT: 198.4 LBS | TEMPERATURE: 97.8 F | SYSTOLIC BLOOD PRESSURE: 162 MMHG | OXYGEN SATURATION: 98 % | BODY MASS INDEX: 32.02 KG/M2 | DIASTOLIC BLOOD PRESSURE: 82 MMHG

## 2025-01-28 DIAGNOSIS — E21.3 HYPERPARATHYROIDISM (MULTI): ICD-10-CM

## 2025-01-28 DIAGNOSIS — E55.9 VITAMIN D DEFICIENCY: ICD-10-CM

## 2025-01-28 DIAGNOSIS — Z79.899 IMMUNOSUPPRESSIVE MANAGEMENT ENCOUNTER FOLLOWING KIDNEY TRANSPLANT: ICD-10-CM

## 2025-01-28 DIAGNOSIS — Z94.0 IMMUNOSUPPRESSIVE MANAGEMENT ENCOUNTER FOLLOWING KIDNEY TRANSPLANT: ICD-10-CM

## 2025-01-28 DIAGNOSIS — I10 ESSENTIAL HYPERTENSION: ICD-10-CM

## 2025-01-28 DIAGNOSIS — Z94.0 KIDNEY REPLACED BY TRANSPLANT (HHS-HCC): ICD-10-CM

## 2025-01-28 DIAGNOSIS — Z94.0 KIDNEY REPLACED BY TRANSPLANT (HHS-HCC): Primary | ICD-10-CM

## 2025-01-28 LAB
25(OH)D3 SERPL-MCNC: 39 NG/ML (ref 30–100)
ALBUMIN SERPL BCP-MCNC: 4.4 G/DL (ref 3.4–5)
ANION GAP SERPL CALC-SCNC: 12 MMOL/L (ref 10–20)
BASOPHILS # BLD MANUAL: 0.04 X10*3/UL (ref 0–0.1)
BASOPHILS NFR BLD MANUAL: 2.3 %
BUN SERPL-MCNC: 21 MG/DL (ref 6–23)
BURR CELLS BLD QL SMEAR: ABNORMAL
CALCIUM SERPL-MCNC: 9.9 MG/DL (ref 8.6–10.6)
CHLORIDE SERPL-SCNC: 105 MMOL/L (ref 98–107)
CO2 SERPL-SCNC: 27 MMOL/L (ref 21–32)
CREAT SERPL-MCNC: 1.03 MG/DL (ref 0.5–1.05)
CREAT UR-MCNC: 70.6 MG/DL (ref 20–320)
EGFRCR SERPLBLD CKD-EPI 2021: 56 ML/MIN/1.73M*2
EOSINOPHIL # BLD MANUAL: 0.07 X10*3/UL (ref 0–0.4)
EOSINOPHIL NFR BLD MANUAL: 3.9 %
ERYTHROCYTE [DISTWIDTH] IN BLOOD BY AUTOMATED COUNT: 13.2 % (ref 11.5–14.5)
GLUCOSE SERPL-MCNC: 79 MG/DL (ref 74–99)
HCT VFR BLD AUTO: 37 % (ref 36–46)
HGB BLD-MCNC: 11 G/DL (ref 12–16)
IMM GRANULOCYTES # BLD AUTO: 0.15 X10*3/UL (ref 0–0.5)
IMM GRANULOCYTES NFR BLD AUTO: 8.1 % (ref 0–0.9)
LYMPHOCYTES # BLD MANUAL: 0.27 X10*3/UL (ref 0.8–3)
LYMPHOCYTES NFR BLD MANUAL: 14 %
MAGNESIUM SERPL-MCNC: 2.03 MG/DL (ref 1.6–2.4)
MCH RBC QN AUTO: 29.4 PG (ref 26–34)
MCHC RBC AUTO-ENTMCNC: 29.7 G/DL (ref 32–36)
MCV RBC AUTO: 99 FL (ref 80–100)
MONOCYTES # BLD MANUAL: 0 X10*3/UL (ref 0.05–0.8)
MONOCYTES NFR BLD MANUAL: 0 %
NEUTS SEG # BLD MANUAL: 1.49 X10*3/UL (ref 1.6–5)
NEUTS SEG NFR BLD MANUAL: 78.3 %
NRBC BLD-RTO: 0 /100 WBCS (ref 0–0)
OVALOCYTES BLD QL SMEAR: ABNORMAL
PHOSPHATE SERPL-MCNC: 3.4 MG/DL (ref 2.5–4.9)
PLATELET # BLD AUTO: 154 X10*3/UL (ref 150–450)
POTASSIUM SERPL-SCNC: 4.4 MMOL/L (ref 3.5–5.3)
PROT UR-ACNC: 9 MG/DL (ref 5–24)
PROT/CREAT UR: 0.13 MG/MG CREAT (ref 0–0.17)
PTH-INTACT SERPL-MCNC: 96.5 PG/ML (ref 18.5–88)
RBC # BLD AUTO: 3.74 X10*6/UL (ref 4–5.2)
RBC MORPH BLD: ABNORMAL
SODIUM SERPL-SCNC: 140 MMOL/L (ref 136–145)
TACROLIMUS BLD-MCNC: 5.8 NG/ML
TOTAL CELLS COUNTED BLD: 129
VARIANT LYMPHS # BLD MANUAL: 0.03 X10*3/UL (ref 0–0.3)
VARIANT LYMPHS NFR BLD: 1.5 %
WBC # BLD AUTO: 1.9 X10*3/UL (ref 4.4–11.3)

## 2025-01-28 PROCEDURE — 82570 ASSAY OF URINE CREATININE: CPT

## 2025-01-28 PROCEDURE — 82570 ASSAY OF URINE CREATININE: CPT | Performed by: INTERNAL MEDICINE

## 2025-01-28 PROCEDURE — 99214 OFFICE O/P EST MOD 30 MIN: CPT | Performed by: INTERNAL MEDICINE

## 2025-01-28 PROCEDURE — 85007 BL SMEAR W/DIFF WBC COUNT: CPT

## 2025-01-28 PROCEDURE — 80069 RENAL FUNCTION PANEL: CPT

## 2025-01-28 PROCEDURE — 83735 ASSAY OF MAGNESIUM: CPT | Performed by: INTERNAL MEDICINE

## 2025-01-28 PROCEDURE — 83735 ASSAY OF MAGNESIUM: CPT

## 2025-01-28 PROCEDURE — 84156 ASSAY OF PROTEIN URINE: CPT

## 2025-01-28 PROCEDURE — 85027 COMPLETE CBC AUTOMATED: CPT

## 2025-01-28 PROCEDURE — 80197 ASSAY OF TACROLIMUS: CPT

## 2025-01-28 PROCEDURE — 84100 ASSAY OF PHOSPHORUS: CPT | Performed by: INTERNAL MEDICINE

## 2025-01-28 PROCEDURE — 82306 VITAMIN D 25 HYDROXY: CPT | Performed by: INTERNAL MEDICINE

## 2025-01-28 PROCEDURE — 3078F DIAST BP <80 MM HG: CPT | Performed by: INTERNAL MEDICINE

## 2025-01-28 PROCEDURE — 83970 ASSAY OF PARATHORMONE: CPT | Performed by: INTERNAL MEDICINE

## 2025-01-28 PROCEDURE — 82306 VITAMIN D 25 HYDROXY: CPT

## 2025-01-28 PROCEDURE — 80197 ASSAY OF TACROLIMUS: CPT | Performed by: INTERNAL MEDICINE

## 2025-01-28 PROCEDURE — 83970 ASSAY OF PARATHORMONE: CPT

## 2025-01-28 PROCEDURE — 85027 COMPLETE CBC AUTOMATED: CPT | Performed by: INTERNAL MEDICINE

## 2025-01-28 PROCEDURE — 3077F SYST BP >= 140 MM HG: CPT | Performed by: INTERNAL MEDICINE

## 2025-01-28 PROCEDURE — 36415 COLL VENOUS BLD VENIPUNCTURE: CPT | Performed by: INTERNAL MEDICINE

## 2025-01-28 PROCEDURE — 85007 BL SMEAR W/DIFF WBC COUNT: CPT | Performed by: INTERNAL MEDICINE

## 2025-01-28 PROCEDURE — 1126F AMNT PAIN NOTED NONE PRSNT: CPT | Performed by: INTERNAL MEDICINE

## 2025-01-28 ASSESSMENT — PAIN SCALES - GENERAL: PAINLEVEL_OUTOF10: 0-NO PAIN

## 2025-01-28 NOTE — PROGRESS NOTES
TRANSPLANT NEPHROLOGY :   OUTPATIENT CLINIC NOTE      SERVICE DATE : 2025    REASON FOR VISIT/CHIEF COMPLAINT:  S/P  TRANSPLANT SURGERY  IMMUNOSUPPRESSIVE MEDICATION MANAGEMENT  BLOOD PRESSURE MANAGEMENT    HPI:    Ms. Hamlin is a 79 y.o. 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 Patient was induced by 3 mg/kg of Thymoglobulin initiated on belatacept CellCept and prednisone taper. Transition from belatacept to tacrolimus in the setting of T-cell and B-cell crossmatch positive (during hospital index stay).     Patient is here for follow up s/p kidney transplant.    Patient is doing well overall. No new complaints. Denied chest pain, SOB, STORM, Palpitation. Normal urination and bowel movement. Normal gait and no weakness of arms/legs. No cough, runny nose, sore throat, cold symptoms, or rash. No hearing loss. Normal vision.No problems with his sleep, mood and function. No recent infection, hospitalization, surgery or ER visits.      ROS:  Review of  14 systems was performed system by system. See HPI. Otherwise, the symptoms were negative.    PAST MEDICAL HISTORY:  Past Medical History:   Diagnosis Date    CKD (chronic kidney disease)     Encounter for general adult medical examination without abnormal findings 2021    Encounter for Medicare annual wellness exam    Glaucoma     Gout     Hyperlipidemia     Hypertension     Mitral valve regurgitation     JOSE E (obstructive sleep apnea)     Unspecified cataract     Cataracts, both eyes        PAST SURGICAL HISTORY:  Past Surgical History:   Procedure Laterality Date    OTHER SURGICAL HISTORY  2019    Carpal tunnel surgery    OTHER SURGICAL HISTORY  2019     section    OTHER SURGICAL HISTORY  2019    Hernia repair        SOCIAL HISTORY:  Social History      Socioeconomic History    Marital status:      Spouse name: Not on file    Number of children: Not on file    Years of education: Not on file    Highest education level: Not on file   Occupational History    Not on file   Tobacco Use    Smoking status: Never     Passive exposure: Never    Smokeless tobacco: Never   Vaping Use    Vaping status: Never Used   Substance and Sexual Activity    Alcohol use: Never    Drug use: Never    Sexual activity: Yes     Partners: Male   Other Topics Concern    Not on file   Social History Narrative    Not on file     Social Drivers of Health     Financial Resource Strain: Low Risk  (10/21/2024)    Overall Financial Resource Strain (CARDIA)     Difficulty of Paying Living Expenses: Not hard at all   Food Insecurity: No Food Insecurity (10/20/2024)    Hunger Vital Sign     Worried About Running Out of Food in the Last Year: Never true     Ran Out of Food in the Last Year: Never true   Transportation Needs: No Transportation Needs (12/3/2024)    OASIS : Transportation     Lack of Transportation (Medical): No     Lack of Transportation (Non-Medical): No     Patient Unable or Declines to Respond: No   Physical Activity: Not on file   Stress: Not on file   Social Connections: Feeling Socially Integrated (12/3/2024)    OASIS : Social Isolation     Frequency of experiencing loneliness or isolation: Never   Intimate Partner Violence: Not At Risk (10/20/2024)    Humiliation, Afraid, Rape, and Kick questionnaire     Fear of Current or Ex-Partner: No     Emotionally Abused: No     Physically Abused: No     Sexually Abused: No   Housing Stability: Low Risk  (10/21/2024)    Housing Stability Vital Sign     Unable to Pay for Housing in the Last Year: No     Number of Times Moved in the Last Year: 0     Homeless in the Last Year: No       FAMILY HISTORY:  Family History   Problem Relation Name Age of Onset    Diabetes Mother      Hypertension Mother      Diabetes Son      Colon  cancer Mother's Sister      Colon cancer Father's Sister         MEDICATION LIST:  Current Outpatient Medications   Medication Instructions    aspirin 81 mg EC tablet 1 tablet, Daily    atorvastatin (LIPITOR) 40 mg, oral, Daily    carvedilol (COREG) 50 mg, oral, 2 times daily    epoetin valerio 10,000 unit/mL injection Inject 1 mL (10,000 Units) under the skin 1 (one) time per week. HOLD dose if hemoglobin is 11 or higher Do not fill before November 20, 2024.    ferrous sulfate, 325 mg ferrous sulfate, (FeroSuL) tablet 1 tablet, oral, Daily    furosemide (LASIX) 20 mg, oral, Daily    hydrALAZINE (APRESOLINE) 100 mg, oral, 2 times daily    hydrOXYzine HCL (ATARAX) 5 mg, oral, Daily    Januvia 100 mg, oral, Daily    K-Phos Original 1,000 mg, oral, 2 times daily    magnesium oxide (MAG-OX) 400 mg, oral, Daily    mycophenolate (CELLCEPT) 1,000 mg, oral, Every 12 hours    pantoprazole (PROTONIX) 40 mg, oral, Daily before breakfast, Do not crush, chew, or split.    predniSONE (DELTASONE) 5 mg, oral, Daily    sulfamethoxazole-trimethoprim (Bactrim) 400-80 mg tablet 1 tablet, oral, Daily    tacrolimus (PROGRAF) 2 mg, oral, Every 12 hours    valGANciclovir (VALCYTE) 900 mg, oral, Every 24 hours, Do not crush or chew.    walker misc 1 Device, miscellaneous, Daily PRN       ALLERGY  Allergies   Allergen Reactions    Amlodipine Swelling     Edema    Codeine Itching    Erythromycin Itching    Nsaids (Non-Steroidal Anti-Inflammatory Drug) Itching and Nausea Only     chronic renal insufficiency    Tramadol Itching and Nausea/vomiting    Lisinopril Cough     Cough       PHYSICAL EXAM:    Visit Vitals  LMP  (LMP Unknown)   OB Status Postmenopausal   Smoking Status Never          Vital signs - reviewed. Acceptable BP at this office visit.   General Appearance - NAD, Good speech, oriented and alert  HEENT - Supple. Not pale. No jaundice. No cervical lymphadenopathy. Pharynx and tonsils are not injected.  CVS - RRR. Normal S1/S2. No  murmur, click , rub or gallop  Lungs- clear to auscultation bilaterally  Abdomen - soft , not tender, no guarding, no rigidity. No hepatosplenomegaly. Normal bowel sounds. No masses and ascites. S/P Kidney transplant .  Transplanted kidney is not tender.   Musculoskeletal /Extremities - no edema. Full ROM. No joint tenderness.   Neuro/Psych - appropriate mood and affect. Motor power V/V all extremities. CN I -XII were grossly intact.  Skin - No visible rash      LABS:    Lab Results   Component Value Date    WBC 2.4 (L) 01/23/2025    WBC 2.4 (L) 01/23/2025    HGB 10.9 (L) 01/23/2025    HGB 10.9 (L) 01/23/2025    HCT 35.1 (L) 01/23/2025    HCT 35.1 (L) 01/23/2025     01/23/2025     01/23/2025    CHOL 121 04/11/2024    TRIG 120 04/11/2024    HDL 44.3 04/11/2024    ALT 16 10/20/2024    AST 19 10/20/2024     01/23/2025    K 4.2 01/23/2025     01/23/2025    CREATININE 1.10 (H) 01/23/2025    BUN 21 01/23/2025    CO2 27 01/23/2025    TSH 2.34 04/11/2024    INR 1.1 01/02/2025    HGBA1C 5.6 04/11/2024     par    ASSESSMENT AND PLAN:    Ms. Hamlin is a 79 y.o. female  who is here for follow up s/p kidney transplant.    TRANSPLANT DATE: 10/20/2024 (Kidney)      1. ESRD S/P kidney transplant   - Creatinine last check was :  Lab Results   Component Value Date    CREATININE 1.10 (H) 01/23/2025       - Renal allograft function is stable.   -Allosure last check was 0.4  -Ensure adequate hydration  - Avoid nephrotoxic medications, NSAIDs, and IV contrast.    2. Immunosuppression  -Tacrolimus level last check was low and dosage was increased to 2 mg bid last Friday.  -Continue current immunosuppression regimen.    3. Electrolytes  Lab Results   Component Value Date    GLUCOSE 95 01/23/2025    CALCIUM 9.9 01/23/2025     01/23/2025    K 4.2 01/23/2025    CO2 27 01/23/2025     01/23/2025    BUN 21 01/23/2025    CREATININE 1.10 (H) 01/23/2025     -Acceptable from last lab drawn    4.  Hypertension  Blood Pressures    No data found in the last 1 encounters.       -Home  BP had been acceptable  -Encourage to monitor home BP  -Continue current anti hypertensive medication    5. Bone Mineral Disease/Osteoporosis  Lab Results   Component Value Date    .7 (H) 01/07/2025    CALCIUM 9.9 01/23/2025    CAION 1.25 10/26/2024    PHOS 3.2 01/23/2025    VITD25 35 01/07/2025     -check VIT D, PTH with next lab  - Consider DEXA every 2-3 years , defer to PCP  - May consider initiation of Sensipar when PTH >300 AND Ca >8.4    6.Anemia  Lab Results   Component Value Date    WBC 2.4 (L) 01/23/2025    WBC 2.4 (L) 01/23/2025    HGB 10.9 (L) 01/23/2025    HGB 10.9 (L) 01/23/2025    HCT 35.1 (L) 01/23/2025    HCT 35.1 (L) 01/23/2025    MCV 97 01/23/2025    MCV 97 01/23/2025     01/23/2025     01/23/2025     Lab Results   Component Value Date    IRON <10 (L) 10/21/2024    TIBC  10/21/2024      Comment:      One or more of the analytes used in this calculation is outside of the analytical measurement range.      FERRITIN 270 (H) 07/19/2022     -asymptomatic  - Continue to monitor  -check iron studies and ferritin. Will consider DURGA as needed.  - No indications for blood transfusion     7.Health maintenance and vaccination  - Flu shot during flu season annually  - Cancer screening is up to date per the patient    Summary  Blood test and urine test today. Took tacrolimus last dose at 10 pm.   Increase hydralazine to 3 times a day. [facility].   Monitor blood pressure at home  Routine blood test every week until tacrolimus level is stable and at therapeutic level  Next follow up 2-3 weeks    Ivon Pizano MD    Transplant Nephrology

## 2025-01-28 NOTE — PATIENT INSTRUCTIONS
Blood test and urine test today. Took tacrolimus last dose at 10 pm.   Increase hydralazine to 3 times a day. [facility] was [facility].   Monitor blood pressure at home  Routine blood test every week until tacrolimus level is stable and at therapeutic level  Next follow up 2-3 weeks

## 2025-01-29 LAB — HOLD SPECIMEN: NORMAL

## 2025-01-30 ENCOUNTER — TELEPHONE (OUTPATIENT)
Facility: HOSPITAL | Age: 80
End: 2025-01-30
Payer: COMMERCIAL

## 2025-01-30 DIAGNOSIS — D84.9 IMMUNOSUPPRESSION: ICD-10-CM

## 2025-01-30 DIAGNOSIS — Z94.0 KIDNEY TRANSPLANTED (HHS-HCC): ICD-10-CM

## 2025-01-31 DIAGNOSIS — Z94.0 KIDNEY TRANSPLANTED (HHS-HCC): ICD-10-CM

## 2025-02-03 PROCEDURE — RXMED WILLOW AMBULATORY MEDICATION CHARGE

## 2025-02-05 ENCOUNTER — TELEPHONE (OUTPATIENT)
Facility: HOSPITAL | Age: 80
End: 2025-02-05
Payer: COMMERCIAL

## 2025-02-05 ENCOUNTER — SPECIALTY PHARMACY (OUTPATIENT)
Dept: PHARMACY | Facility: CLINIC | Age: 80
End: 2025-02-05

## 2025-02-05 DIAGNOSIS — Z94.0 KIDNEY REPLACED BY TRANSPLANT (HHS-HCC): ICD-10-CM

## 2025-02-05 PROCEDURE — RXMED WILLOW AMBULATORY MEDICATION CHARGE

## 2025-02-05 RX ORDER — LANOLIN ALCOHOL/MO/W.PET/CERES
400 CREAM (GRAM) TOPICAL DAILY
Qty: 30 TABLET | Refills: 2 | Status: SHIPPED | OUTPATIENT
Start: 2025-02-05

## 2025-02-05 NOTE — TELEPHONE ENCOUNTER
Call placed to patient to clarify what she needs - States need Mag Ox Rx but directions are for Mycophenolate. Pt did not answer, left detailed VM informing her we will send Mag Ox to [facility] but if she needs MMF or anything additional, call back. Rx pended and sent to MD to sign.

## 2025-02-05 NOTE — TELEPHONE ENCOUNTER
Patient called to request refills of the below medication(s) and dose(s).  Please send prescription for the checked items below to           [facility]  [address], [place] [id number]  Phone: [phone] Fax: [phone]         magnesium oxide (Mag-Ox) 400 mg (241.3 mg magnesium) tablet Take 1 tablet (400 mg) by mouth once daily     Patient only has enough meds to last her for today

## 2025-02-05 NOTE — TELEPHONE ENCOUNTER
Patient called to request refills of the below medication(s) and dose(s).  Please send prescription for the checked items below to       [facility]  [address], [place] [id number]  Phone: [phone] Fax: [phone]         magnesium oxide (Mag-Ox) 400 mg (241.3 mg magnesium) tablet Take 4 capsules (1,000 mg) by mouth every 12 hours.     Patient only has enough meds to last her for today

## 2025-02-06 ENCOUNTER — PHARMACY VISIT (OUTPATIENT)
Dept: PHARMACY | Facility: CLINIC | Age: 80
End: 2025-02-06
Payer: COMMERCIAL

## 2025-02-06 ENCOUNTER — PATIENT MESSAGE (OUTPATIENT)
Facility: HOSPITAL | Age: 80
End: 2025-02-06

## 2025-02-06 ENCOUNTER — SPECIALTY PHARMACY (OUTPATIENT)
Dept: PHARMACY | Facility: CLINIC | Age: 80
End: 2025-02-06

## 2025-02-06 ENCOUNTER — HOSPITAL ENCOUNTER (OUTPATIENT)
Dept: RADIOLOGY | Facility: CLINIC | Age: 80
Discharge: HOME | End: 2025-02-06
Payer: COMMERCIAL

## 2025-02-06 ENCOUNTER — LAB (OUTPATIENT)
Dept: LAB | Facility: HOSPITAL | Age: 80
End: 2025-02-06
Payer: COMMERCIAL

## 2025-02-06 DIAGNOSIS — Z94.0 KIDNEY REPLACED BY TRANSPLANT (HHS-HCC): ICD-10-CM

## 2025-02-06 DIAGNOSIS — Z12.31 VISIT FOR SCREENING MAMMOGRAM: ICD-10-CM

## 2025-02-06 DIAGNOSIS — D84.9 IMMUNOSUPPRESSION: ICD-10-CM

## 2025-02-06 LAB
ALBUMIN SERPL BCP-MCNC: 4.2 G/DL (ref 3.4–5)
ANION GAP SERPL CALC-SCNC: 12 MMOL/L (ref 10–20)
BASOPHILS # BLD MANUAL: 0.03 X10*3/UL (ref 0–0.1)
BASOPHILS NFR BLD MANUAL: 2 %
BUN SERPL-MCNC: 26 MG/DL (ref 6–23)
BURR CELLS BLD QL SMEAR: ABNORMAL
CALCIUM SERPL-MCNC: 9.7 MG/DL (ref 8.6–10.3)
CHLORIDE SERPL-SCNC: 109 MMOL/L (ref 98–107)
CO2 SERPL-SCNC: 25 MMOL/L (ref 21–32)
CREAT SERPL-MCNC: 1.02 MG/DL (ref 0.5–1.05)
EGFRCR SERPLBLD CKD-EPI 2021: 57 ML/MIN/1.73M*2
EOSINOPHIL # BLD MANUAL: 0.01 X10*3/UL (ref 0–0.4)
EOSINOPHIL NFR BLD MANUAL: 1 %
ERYTHROCYTE [DISTWIDTH] IN BLOOD BY AUTOMATED COUNT: 12.9 % (ref 11.5–14.5)
ERYTHROCYTE [DISTWIDTH] IN BLOOD BY AUTOMATED COUNT: 12.9 % (ref 11.5–14.5)
GLUCOSE SERPL-MCNC: 103 MG/DL (ref 74–99)
HCT VFR BLD AUTO: 35.1 % (ref 36–46)
HCT VFR BLD AUTO: 35.1 % (ref 36–46)
HGB BLD-MCNC: 11.1 G/DL (ref 12–16)
HGB BLD-MCNC: 11.1 G/DL (ref 12–16)
IMM GRANULOCYTES # BLD AUTO: 0.07 X10*3/UL (ref 0–0.5)
IMM GRANULOCYTES NFR BLD AUTO: 5.7 % (ref 0–0.9)
LYMPHOCYTES # BLD MANUAL: 0.22 X10*3/UL (ref 0.8–3)
LYMPHOCYTES NFR BLD MANUAL: 17 %
MAGNESIUM SERPL-MCNC: 1.75 MG/DL (ref 1.6–2.4)
MCH RBC QN AUTO: 29.8 PG (ref 26–34)
MCH RBC QN AUTO: 29.8 PG (ref 26–34)
MCHC RBC AUTO-ENTMCNC: 31.6 G/DL (ref 32–36)
MCHC RBC AUTO-ENTMCNC: 31.6 G/DL (ref 32–36)
MCV RBC AUTO: 94 FL (ref 80–100)
MCV RBC AUTO: 94 FL (ref 80–100)
MONOCYTES # BLD MANUAL: 0 X10*3/UL (ref 0.05–0.8)
MONOCYTES NFR BLD MANUAL: 0 %
NEUTROPHILS # BLD MANUAL: 0.98 X10*3/UL (ref 1.6–5.5)
NEUTS BAND # BLD MANUAL: 0.07 X10*3/UL (ref 0–0.5)
NEUTS BAND NFR BLD MANUAL: 5 %
NEUTS SEG # BLD MANUAL: 0.91 X10*3/UL (ref 1.6–5)
NEUTS SEG NFR BLD MANUAL: 70 %
NRBC BLD-RTO: 0 /100 WBCS (ref 0–0)
NRBC BLD-RTO: 0 /100 WBCS (ref 0–0)
OVALOCYTES BLD QL SMEAR: ABNORMAL
PHOSPHATE SERPL-MCNC: 3 MG/DL (ref 2.5–4.9)
PLATELET # BLD AUTO: 160 X10*3/UL (ref 150–450)
PLATELET # BLD AUTO: 160 X10*3/UL (ref 150–450)
POTASSIUM SERPL-SCNC: 4.5 MMOL/L (ref 3.5–5.3)
RBC # BLD AUTO: 3.73 X10*6/UL (ref 4–5.2)
RBC # BLD AUTO: 3.73 X10*6/UL (ref 4–5.2)
RBC MORPH BLD: ABNORMAL
SODIUM SERPL-SCNC: 141 MMOL/L (ref 136–145)
TOTAL CELLS COUNTED BLD: 100
VARIANT LYMPHS # BLD MANUAL: 0.07 X10*3/UL (ref 0–0.3)
VARIANT LYMPHS NFR BLD: 5 %
WBC # BLD AUTO: 1.3 X10*3/UL (ref 4.4–11.3)
WBC # BLD AUTO: 1.3 X10*3/UL (ref 4.4–11.3)

## 2025-02-06 PROCEDURE — 80069 RENAL FUNCTION PANEL: CPT

## 2025-02-06 PROCEDURE — 77067 SCR MAMMO BI INCL CAD: CPT | Performed by: RADIOLOGY

## 2025-02-06 PROCEDURE — 85007 BL SMEAR W/DIFF WBC COUNT: CPT

## 2025-02-06 PROCEDURE — 77063 BREAST TOMOSYNTHESIS BI: CPT | Performed by: RADIOLOGY

## 2025-02-06 PROCEDURE — 77067 SCR MAMMO BI INCL CAD: CPT

## 2025-02-06 PROCEDURE — 83735 ASSAY OF MAGNESIUM: CPT

## 2025-02-06 PROCEDURE — 87385 HISTOPLASMA CAPSUL AG IA: CPT

## 2025-02-06 PROCEDURE — 85027 COMPLETE CBC AUTOMATED: CPT

## 2025-02-07 LAB
EBV DNA SPEC NAA+PROBE-LOG#: NORMAL {LOG_COPIES}/ML
LABORATORY COMMENT REPORT: NOT DETECTED

## 2025-02-08 LAB
BKV DNA SERPL NAA+PROBE-LOG#: NORMAL {LOG_COPIES}/ML
LABORATORY COMMENT REPORT: NOT DETECTED

## 2025-02-10 ENCOUNTER — APPOINTMENT (OUTPATIENT)
Dept: CARDIOLOGY | Facility: HOSPITAL | Age: 80
End: 2025-02-10
Payer: COMMERCIAL

## 2025-02-10 ENCOUNTER — TELEPHONE (OUTPATIENT)
Dept: PRIMARY CARE | Facility: CLINIC | Age: 80
End: 2025-02-10
Payer: COMMERCIAL

## 2025-02-10 DIAGNOSIS — I10 BENIGN HYPERTENSION: Primary | ICD-10-CM

## 2025-02-10 LAB
H CAPSUL AG UR QL: NOT DETECTED
SCAN RESULT: NORMAL

## 2025-02-12 ENCOUNTER — LAB (OUTPATIENT)
Dept: LAB | Facility: HOSPITAL | Age: 80
End: 2025-02-12
Payer: COMMERCIAL

## 2025-02-12 DIAGNOSIS — Z94.0 KIDNEY REPLACED BY TRANSPLANT (HHS-HCC): ICD-10-CM

## 2025-02-12 LAB
ALBUMIN SERPL BCP-MCNC: 4.3 G/DL (ref 3.4–5)
ANION GAP SERPL CALC-SCNC: 11 MMOL/L (ref 10–20)
BUN SERPL-MCNC: 30 MG/DL (ref 6–23)
CALCIUM SERPL-MCNC: 9.8 MG/DL (ref 8.6–10.6)
CHLORIDE SERPL-SCNC: 105 MMOL/L (ref 98–107)
CO2 SERPL-SCNC: 29 MMOL/L (ref 21–32)
CREAT SERPL-MCNC: 0.87 MG/DL (ref 0.5–1.05)
EGFRCR SERPLBLD CKD-EPI 2021: 69 ML/MIN/1.73M*2
ERYTHROCYTE [DISTWIDTH] IN BLOOD BY AUTOMATED COUNT: 12.8 % (ref 11.5–14.5)
GLUCOSE SERPL-MCNC: 102 MG/DL (ref 74–99)
HCT VFR BLD AUTO: 33.8 % (ref 36–46)
HGB BLD-MCNC: 10.5 G/DL (ref 12–16)
HOLD SPECIMEN: NORMAL
MAGNESIUM SERPL-MCNC: 1.85 MG/DL (ref 1.6–2.4)
MCH RBC QN AUTO: 29.9 PG (ref 26–34)
MCHC RBC AUTO-ENTMCNC: 31.1 G/DL (ref 32–36)
MCV RBC AUTO: 96 FL (ref 80–100)
NRBC BLD-RTO: 0 /100 WBCS (ref 0–0)
PHOSPHATE SERPL-MCNC: 3.6 MG/DL (ref 2.5–4.9)
PLATELET # BLD AUTO: 160 X10*3/UL (ref 150–450)
POTASSIUM SERPL-SCNC: 4.5 MMOL/L (ref 3.5–5.3)
RBC # BLD AUTO: 3.51 X10*6/UL (ref 4–5.2)
SODIUM SERPL-SCNC: 140 MMOL/L (ref 136–145)
TACROLIMUS BLD-MCNC: 7.2 NG/ML
WBC # BLD AUTO: 1.4 X10*3/UL (ref 4.4–11.3)

## 2025-02-12 PROCEDURE — 80069 RENAL FUNCTION PANEL: CPT

## 2025-02-12 PROCEDURE — 83735 ASSAY OF MAGNESIUM: CPT

## 2025-02-12 PROCEDURE — 85025 COMPLETE CBC W/AUTO DIFF WBC: CPT

## 2025-02-12 PROCEDURE — 80197 ASSAY OF TACROLIMUS: CPT

## 2025-02-13 ENCOUNTER — PATIENT MESSAGE (OUTPATIENT)
Facility: HOSPITAL | Age: 80
End: 2025-02-13
Payer: COMMERCIAL

## 2025-02-13 DIAGNOSIS — Z94.0 KIDNEY REPLACED BY TRANSPLANT (HHS-HCC): ICD-10-CM

## 2025-02-13 LAB
BASOPHILS # BLD AUTO: 0.04 X10*3/UL (ref 0–0.1)
BASOPHILS NFR BLD AUTO: 2.9 %
BURR CELLS BLD QL SMEAR: NORMAL
EOSINOPHIL # BLD AUTO: 0.04 X10*3/UL (ref 0–0.4)
EOSINOPHIL NFR BLD AUTO: 2.9 %
ERYTHROCYTE [DISTWIDTH] IN BLOOD BY AUTOMATED COUNT: 12.8 % (ref 11.5–14.5)
HCT VFR BLD AUTO: 33.8 % (ref 36–46)
HGB BLD-MCNC: 10.5 G/DL (ref 12–16)
IMM GRANULOCYTES # BLD AUTO: 0.13 X10*3/UL (ref 0–0.5)
IMM GRANULOCYTES NFR BLD AUTO: 9.3 % (ref 0–0.9)
LYMPHOCYTES # BLD AUTO: 0.19 X10*3/UL (ref 0.8–3)
LYMPHOCYTES NFR BLD AUTO: 13.6 %
MCH RBC QN AUTO: 29.9 PG (ref 26–34)
MCHC RBC AUTO-ENTMCNC: 31.1 G/DL (ref 32–36)
MCV RBC AUTO: 96 FL (ref 80–100)
MONOCYTES # BLD AUTO: 0.08 X10*3/UL (ref 0.05–0.8)
MONOCYTES NFR BLD AUTO: 5.7 %
NEUTROPHILS # BLD AUTO: 0.92 X10*3/UL (ref 1.6–5.5)
NEUTROPHILS NFR BLD AUTO: 65.6 %
NRBC BLD-RTO: 0 /100 WBCS (ref 0–0)
OVALOCYTES BLD QL SMEAR: NORMAL
PLATELET # BLD AUTO: 160 X10*3/UL (ref 150–450)
RBC # BLD AUTO: 3.51 X10*6/UL (ref 4–5.2)
RBC MORPH BLD: NORMAL
WBC # BLD AUTO: 1.4 X10*3/UL (ref 4.4–11.3)

## 2025-02-13 RX ORDER — MYCOPHENOLATE MOFETIL 250 MG/1
500 CAPSULE ORAL EVERY 12 HOURS
Qty: 120 CAPSULE | Refills: 11 | Status: SHIPPED | OUTPATIENT
Start: 2025-02-13 | End: 2026-02-08

## 2025-02-17 ENCOUNTER — APPOINTMENT (OUTPATIENT)
Dept: OPHTHALMOLOGY | Facility: CLINIC | Age: 80
End: 2025-02-17
Payer: COMMERCIAL

## 2025-02-17 DIAGNOSIS — Z94.0 KIDNEY REPLACED BY TRANSPLANT (HHS-HCC): ICD-10-CM

## 2025-02-17 RX ORDER — FUROSEMIDE 20 MG/1
20 TABLET ORAL DAILY
Qty: 30 TABLET | Refills: 0 | Status: SHIPPED | OUTPATIENT
Start: 2025-02-17 | End: 2026-02-17

## 2025-02-19 ENCOUNTER — SPECIALTY PHARMACY (OUTPATIENT)
Dept: PHARMACY | Facility: CLINIC | Age: 80
End: 2025-02-19

## 2025-02-19 PROCEDURE — RXMED WILLOW AMBULATORY MEDICATION CHARGE

## 2025-02-20 ENCOUNTER — SPECIALTY PHARMACY (OUTPATIENT)
Dept: PHARMACY | Facility: CLINIC | Age: 80
End: 2025-02-20

## 2025-02-20 PROCEDURE — RXMED WILLOW AMBULATORY MEDICATION CHARGE

## 2025-02-21 ENCOUNTER — NURSE ONLY (OUTPATIENT)
Facility: HOSPITAL | Age: 80
End: 2025-02-21
Payer: COMMERCIAL

## 2025-02-21 ENCOUNTER — PHARMACY VISIT (OUTPATIENT)
Dept: PHARMACY | Facility: CLINIC | Age: 80
End: 2025-02-21
Payer: COMMERCIAL

## 2025-02-21 ENCOUNTER — APPOINTMENT (OUTPATIENT)
Facility: HOSPITAL | Age: 80
End: 2025-02-21
Payer: COMMERCIAL

## 2025-02-21 VITALS
TEMPERATURE: 96.5 F | BODY MASS INDEX: 32.73 KG/M2 | SYSTOLIC BLOOD PRESSURE: 160 MMHG | DIASTOLIC BLOOD PRESSURE: 86 MMHG | WEIGHT: 202.8 LBS | OXYGEN SATURATION: 98 % | HEART RATE: 80 BPM

## 2025-02-21 DIAGNOSIS — Z48.298 AFTERCARE FOLLOWING ORGAN TRANSPLANT: ICD-10-CM

## 2025-02-21 DIAGNOSIS — D84.9 IMMUNOSUPPRESSION: ICD-10-CM

## 2025-02-21 DIAGNOSIS — Z94.0 KIDNEY REPLACED BY TRANSPLANT (HHS-HCC): Primary | ICD-10-CM

## 2025-02-21 DIAGNOSIS — Z94.0 KIDNEY TRANSPLANTED (HHS-HCC): ICD-10-CM

## 2025-02-21 DIAGNOSIS — I10 ESSENTIAL HYPERTENSION: ICD-10-CM

## 2025-02-21 DIAGNOSIS — Z94.0 KIDNEY REPLACED BY TRANSPLANT (HHS-HCC): ICD-10-CM

## 2025-02-21 DIAGNOSIS — E21.3 HYPERPARATHYROIDISM (MULTI): ICD-10-CM

## 2025-02-21 LAB
ALBUMIN SERPL BCP-MCNC: 4.3 G/DL (ref 3.4–5)
ANION GAP SERPL CALC-SCNC: 12 MMOL/L (ref 10–20)
BUN SERPL-MCNC: 26 MG/DL (ref 6–23)
CALCIUM SERPL-MCNC: 9.7 MG/DL (ref 8.6–10.6)
CHLORIDE SERPL-SCNC: 107 MMOL/L (ref 98–107)
CO2 SERPL-SCNC: 25 MMOL/L (ref 21–32)
CREAT SERPL-MCNC: 0.92 MG/DL (ref 0.5–1.05)
CREAT UR-MCNC: 80.7 MG/DL (ref 20–320)
EGFRCR SERPLBLD CKD-EPI 2021: 64 ML/MIN/1.73M*2
ERYTHROCYTE [DISTWIDTH] IN BLOOD BY AUTOMATED COUNT: 13.2 % (ref 11.5–14.5)
GLUCOSE SERPL-MCNC: 109 MG/DL (ref 74–99)
HCT VFR BLD AUTO: 35.6 % (ref 36–46)
HGB BLD-MCNC: 10.9 G/DL (ref 12–16)
MAGNESIUM SERPL-MCNC: 1.88 MG/DL (ref 1.6–2.4)
MCH RBC QN AUTO: 29.3 PG (ref 26–34)
MCHC RBC AUTO-ENTMCNC: 30.6 G/DL (ref 32–36)
MCV RBC AUTO: 96 FL (ref 80–100)
NRBC BLD-RTO: 0 /100 WBCS (ref 0–0)
PHOSPHATE SERPL-MCNC: 3.4 MG/DL (ref 2.5–4.9)
PLATELET # BLD AUTO: 164 X10*3/UL (ref 150–450)
POTASSIUM SERPL-SCNC: 4.1 MMOL/L (ref 3.5–5.3)
PROT UR-ACNC: 10 MG/DL (ref 5–24)
PROT/CREAT UR: 0.12 MG/MG CREAT (ref 0–0.17)
RBC # BLD AUTO: 3.72 X10*6/UL (ref 4–5.2)
SODIUM SERPL-SCNC: 140 MMOL/L (ref 136–145)
TACROLIMUS BLD-MCNC: 6.8 NG/ML
WBC # BLD AUTO: 1.4 X10*3/UL (ref 4.4–11.3)

## 2025-02-21 PROCEDURE — 3079F DIAST BP 80-89 MM HG: CPT | Performed by: STUDENT IN AN ORGANIZED HEALTH CARE EDUCATION/TRAINING PROGRAM

## 2025-02-21 PROCEDURE — G2211 COMPLEX E/M VISIT ADD ON: HCPCS

## 2025-02-21 PROCEDURE — 82570 ASSAY OF URINE CREATININE: CPT

## 2025-02-21 PROCEDURE — 36415 COLL VENOUS BLD VENIPUNCTURE: CPT

## 2025-02-21 PROCEDURE — 87799 DETECT AGENT NOS DNA QUANT: CPT

## 2025-02-21 PROCEDURE — 1125F AMNT PAIN NOTED PAIN PRSNT: CPT | Performed by: STUDENT IN AN ORGANIZED HEALTH CARE EDUCATION/TRAINING PROGRAM

## 2025-02-21 PROCEDURE — 87385 HISTOPLASMA CAPSUL AG IA: CPT

## 2025-02-21 PROCEDURE — 99215 OFFICE O/P EST HI 40 MIN: CPT

## 2025-02-21 PROCEDURE — 80197 ASSAY OF TACROLIMUS: CPT

## 2025-02-21 PROCEDURE — 85027 COMPLETE CBC AUTOMATED: CPT

## 2025-02-21 PROCEDURE — 80069 RENAL FUNCTION PANEL: CPT

## 2025-02-21 PROCEDURE — 83735 ASSAY OF MAGNESIUM: CPT

## 2025-02-21 PROCEDURE — 86833 HLA CLASS II HIGH DEFIN QUAL: CPT

## 2025-02-21 PROCEDURE — 3077F SYST BP >= 140 MM HG: CPT | Performed by: STUDENT IN AN ORGANIZED HEALTH CARE EDUCATION/TRAINING PROGRAM

## 2025-02-21 ASSESSMENT — PAIN SCALES - GENERAL: PAINLEVEL_OUTOF10: 6

## 2025-02-21 NOTE — PROGRESS NOTES
TRANSPLANT NEPHROLOGY :   OUTPATIENT CLINIC NOTE      SERVICE DATE : 02/21/2025    REASON FOR VISIT/CHIEF COMPLAINT:  S/P  TRANSPLANT SURGERY  IMMUNOSUPPRESSIVE MEDICATION MANAGEMENT  BLOOD PRESSURE MANAGEMENT    HPI:    Ms. Hamlin is a 79 y.o. 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 Patient was induced by 3 mg/kg of Thymoglobulin initiated on belatacept CellCept and prednisone taper. 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     124 days out  Doing well. No fever or chills.   No voiding problems. No diarrhea.   Compliant with meds.  Good appetite.  Home -110s no orthostasis  She has been taking hydralazine 100 mg TID --> asked to decrease to BID    Patient is doing well overall. No new complaints. Denied chest pain, SOB, STORM, Palpitation. Normal urination and bowel movement. Normal gait and no weakness of arms/legs. No cough, runny nose, sore throat, cold symptoms, or rash. No hearing loss. Normal vision.No problems with his sleep, mood and function. No recent infection, hospitalization, surgery or ER visits.      ROS:  Review of  14 systems was performed system by system. See HPI. Otherwise, the symptoms were negative.    PAST MEDICAL HISTORY:  Past Medical History:   Diagnosis Date    CKD (chronic kidney disease)     Encounter for general adult medical examination without abnormal findings 09/14/2021    Encounter for Medicare annual wellness exam    Glaucoma     Gout     Hyperlipidemia     Hypertension     Mitral valve regurgitation     JOSE E (obstructive sleep apnea)     Unspecified cataract     Cataracts, both eyes        PAST SURGICAL HISTORY:  Past Surgical History:   Procedure Laterality Date    OTHER  SURGICAL HISTORY  2019    Carpal tunnel surgery    OTHER SURGICAL HISTORY  2019     section    OTHER SURGICAL HISTORY  2019    Hernia repair        SOCIAL HISTORY:  Social History     Socioeconomic History    Marital status:      Spouse name: Not on file    Number of children: Not on file    Years of education: Not on file    Highest education level: Not on file   Occupational History    Not on file   Tobacco Use    Smoking status: Never     Passive exposure: Never    Smokeless tobacco: Never   Vaping Use    Vaping status: Never Used   Substance and Sexual Activity    Alcohol use: Never    Drug use: Never    Sexual activity: Yes     Partners: Male   Other Topics Concern    Not on file   Social History Narrative    Not on file     Social Drivers of Health     Financial Resource Strain: Low Risk  (10/21/2024)    Overall Financial Resource Strain (CARDIA)     Difficulty of Paying Living Expenses: Not hard at all   Food Insecurity: No Food Insecurity (10/20/2024)    Hunger Vital Sign     Worried About Running Out of Food in the Last Year: Never true     Ran Out of Food in the Last Year: Never true   Transportation Needs: No Transportation Needs (12/3/2024)    OASIS : Transportation     Lack of Transportation (Medical): No     Lack of Transportation (Non-Medical): No     Patient Unable or Declines to Respond: No   Physical Activity: Not on file   Stress: Not on file   Social Connections: Feeling Socially Integrated (12/3/2024)    OASIS : Social Isolation     Frequency of experiencing loneliness or isolation: Never   Intimate Partner Violence: Not At Risk (10/20/2024)    Humiliation, Afraid, Rape, and Kick questionnaire     Fear of Current or Ex-Partner: No     Emotionally Abused: No     Physically Abused: No     Sexually Abused: No   Housing Stability: Low Risk  (10/21/2024)    Housing Stability Vital Sign     Unable to Pay for Housing in the Last Year: No     Number of Times  Moved in the Last Year: 0     Homeless in the Last Year: No       FAMILY HISTORY:  Family History   Problem Relation Name Age of Onset    Diabetes Mother      Hypertension Mother      Diabetes Son      Colon cancer Mother's Sister      Colon cancer Father's Sister         MEDICATION LIST:  Current Outpatient Medications   Medication Instructions    aspirin 81 mg EC tablet 1 tablet, Daily    atorvastatin (LIPITOR) 40 mg, oral, Daily    carvedilol (COREG) 50 mg, oral, 2 times daily    ferrous sulfate, 325 mg ferrous sulfate, (FeroSuL) tablet 1 tablet, oral, Daily    furosemide (LASIX) 20 mg, oral, Daily    hydrALAZINE (APRESOLINE) 100 mg, oral, 2 times daily    K-Phos Original 1,000 mg, oral, 2 times daily    magnesium oxide (MAG-OX) 400 mg, oral, Daily    mycophenolate (CELLCEPT) 500 mg, oral, Every 12 hours    pantoprazole (PROTONIX) 40 mg, oral, Daily before breakfast, Do not crush, chew, or split.    predniSONE (DELTASONE) 5 mg, oral, Daily    SITagliptin phosphate (JANUVIA) 100 mg, oral, Daily    sulfamethoxazole-trimethoprim (Bactrim) 400-80 mg tablet 1 tablet, oral, Daily    tacrolimus (PROGRAF) 2 mg, oral, Every 12 hours    valGANciclovir (VALCYTE) 900 mg, oral, Every 24 hours, Do not crush or chew.       ALLERGY  Allergies   Allergen Reactions    Amlodipine Swelling     Edema    Codeine Itching    Erythromycin Itching    Nsaids (Non-Steroidal Anti-Inflammatory Drug) Itching and Nausea Only     chronic renal insufficiency    Tramadol Itching and Nausea/vomiting    Lisinopril Cough     Cough       PHYSICAL EXAM:      Vital signs - reviewed. Acceptable BP at this office visit.   General Appearance - NAD, Good speech, oriented and alert  HEENT - Supple. Not pale. No jaundice.   CVS - RRR. Normal S1/S2. No murmur, click , rub or gallop  Lungs- clear to auscultation bilaterally  Abdomen - soft , not tender, no guarding, no rigidity.  No masses and ascites. S/P Kidney transplant .  Transplanted kidney is not tender.    Musculoskeletal /Extremities - no edema. Full ROM. No joint tenderness.   Neuro/Psych - appropriate mood and affect. Motor power V/V all extremities. CN I -XII were grossly intact.  Skin - No visible rash      LABS:    Lab Results   Component Value Date    WBC 1.4 (L) 02/12/2025    WBC 1.4 (L) 02/12/2025    HGB 10.5 (L) 02/12/2025    HGB 10.5 (L) 02/12/2025    HCT 33.8 (L) 02/12/2025    HCT 33.8 (L) 02/12/2025     02/12/2025     02/12/2025    CHOL 121 04/11/2024    TRIG 120 04/11/2024    HDL 44.3 04/11/2024    ALT 16 10/20/2024    AST 19 10/20/2024     02/12/2025    K 4.5 02/12/2025     02/12/2025    CREATININE 0.87 02/12/2025    BUN 30 (H) 02/12/2025    CO2 29 02/12/2025    TSH 2.34 04/11/2024    INR 1.1 01/02/2025    HGBA1C 5.6 04/11/2024       ASSESSMENT AND PLAN:    Ms. Hamlin is a 79 y.o. female  who is here for follow up s/p kidney transplant.    TRANSPLANT DATE: 10/20/2024 (Kidney)    1. ESRD S/P kidney transplant   - Creatinine last check was :  Lab Results   Component Value Date    CREATININE 0.87 02/12/2025     - Renal allograft function is excellent. Cr 0.8-1. No proteinuria.  -Allosure last check was 0.41% 1/7/25  Biopsy 1/17/25: no rafi rejection  -Ensure adequate hydration  - Avoid nephrotoxic medications, NSAIDs, and IV contrast.    2. Immunosuppression  -Tacrolimus level last check was 7.2 (goal 6-8)  -Continue current immunosuppression regimen.  TAC 2 mg BID   mg BID  Pred 5 mg/day    Prophylaxis:  TMP-SMX SS daily x 6 mo - held 2/21- due to leukopenia  Valcyte 900 mg daily x 6 mo    Surveillance:  EBV, BK neg 2/6/25  CMV neg 1/23/25  DSA neg 1/2/25    3. Electrolytes  Lab Results   Component Value Date    GLUCOSE 102 (H) 02/12/2025    CALCIUM 9.8 02/12/2025     02/12/2025    K 4.5 02/12/2025    CO2 29 02/12/2025     02/12/2025    BUN 30 (H) 02/12/2025    CREATININE 0.87 02/12/2025   Mg ok  -Acceptable from last lab drawn    4. Hypertension    -Home   BP had been acceptable  -Encourage to monitor home BP  -Continue current anti hypertensive medication  Hydralazine 100 mg BID  Coreg 50 mg BID  Lasix 20 mg daily    5. Bone Mineral Disease/Osteoporosis  Lab Results   Component Value Date    PTH 96.5 (H) 01/28/2025    CALCIUM 9.8 02/12/2025    CAION 1.25 10/26/2024    PHOS 3.6 02/12/2025    VITD25 39 01/28/2025   Stop phos supplement  Ca/Phos/PTH acceptable    6.Anemia  Lab Results   Component Value Date    WBC 1.4 (L) 02/12/2025    WBC 1.4 (L) 02/12/2025    HGB 10.5 (L) 02/12/2025    HGB 10.5 (L) 02/12/2025    HCT 33.8 (L) 02/12/2025    HCT 33.8 (L) 02/12/2025    MCV 96 02/12/2025    MCV 96 02/12/2025     02/12/2025     02/12/2025   Leukopenia and mild neutropenia; mild anemia  - PO iron    7.Health maintenance and vaccination  - Flu shot during flu season annually  - Cancer screening is up to date per the patient  - CVD prevention: Lipitor 40 mg/day + ASA 81 mg./day  - DM2: sitagliptin 100 mg/day    Lab : Routine transplant lab ( CBC, RFP, and anti-rejection trough level, CMV) every week  Additional labs:  VIT D, PTH q3mo  Viral screening PCR, Allosure and UPC per protocol.    Additional Plan :  Hold TMP-SMX  Stop phos supplement    RTC 1 month    Hilario Baltazar MD    Transplant Nephrology

## 2025-02-23 LAB
ALLOSURE SCORE - KIDNEY: 0.18 %
BKV DNA SERPL NAA+PROBE-LOG#: NORMAL {LOG_COPIES}/ML
CAREDX_ORDER_ID: NORMAL
CENTER_ORDER_ID: NORMAL
CLIENT SPECIMEN ID - ALLOSURE: NORMAL
DONOR RELATION - ALLOSURE: NORMAL
EBV DNA SPEC NAA+PROBE-LOG#: NORMAL {LOG_COPIES}/ML
LABORATORY COMMENT REPORT: NOT DETECTED
LABORATORY COMMENT REPORT: NOT DETECTED
NOTES - ALLOSURE: NORMAL
RELATIVE CHANGE VALUE - KIDNEY: NORMAL
TEST COMMENTS - ALLOSURE: NORMAL
TIME POST TX - ALLOSURE: NORMAL
TRANSPLANTED ORGAN - ALLOSURE: NORMAL
TX DATE - ALLOSURE/ALLOMAP: NORMAL
WP_ORDER_ID: NORMAL

## 2025-02-25 LAB
HLA RESULTS: NORMAL
HLA-A+B+C AB NFR SER: NORMAL %
HLA-DP+DQ+DR AB NFR SER: NORMAL %

## 2025-02-26 ENCOUNTER — LAB (OUTPATIENT)
Dept: LAB | Facility: HOSPITAL | Age: 80
End: 2025-02-26
Payer: COMMERCIAL

## 2025-02-26 ENCOUNTER — HOSPITAL ENCOUNTER (OUTPATIENT)
Dept: RADIOLOGY | Facility: HOSPITAL | Age: 80
Discharge: HOME | End: 2025-02-26
Payer: COMMERCIAL

## 2025-02-26 ENCOUNTER — OFFICE VISIT (OUTPATIENT)
Dept: CARDIOLOGY | Facility: HOSPITAL | Age: 80
End: 2025-02-26
Payer: COMMERCIAL

## 2025-02-26 VITALS
SYSTOLIC BLOOD PRESSURE: 121 MMHG | DIASTOLIC BLOOD PRESSURE: 71 MMHG | WEIGHT: 199.1 LBS | BODY MASS INDEX: 32 KG/M2 | HEART RATE: 72 BPM | HEIGHT: 66 IN

## 2025-02-26 DIAGNOSIS — I10 ESSENTIAL HYPERTENSION: ICD-10-CM

## 2025-02-26 DIAGNOSIS — I34.0 NONRHEUMATIC MITRAL VALVE REGURGITATION: ICD-10-CM

## 2025-02-26 DIAGNOSIS — E78.5 HYPERLIPIDEMIA, UNSPECIFIED HYPERLIPIDEMIA TYPE: ICD-10-CM

## 2025-02-26 DIAGNOSIS — Z94.0 KIDNEY TRANSPLANT STATUS: Primary | ICD-10-CM

## 2025-02-26 DIAGNOSIS — Z94.0 KIDNEY TRANSPLANTED (HHS-HCC): ICD-10-CM

## 2025-02-26 DIAGNOSIS — I50.33 ACUTE ON CHRONIC HEART FAILURE WITH PRESERVED EJECTION FRACTION: ICD-10-CM

## 2025-02-26 DIAGNOSIS — I50.32 CHRONIC DIASTOLIC HEART FAILURE: Primary | ICD-10-CM

## 2025-02-26 PROBLEM — Z86.39 HISTORY OF HYPERCHOLESTEROLEMIA: Status: RESOLVED | Noted: 2023-03-23 | Resolved: 2025-02-26

## 2025-02-26 LAB
ATRIAL RATE: 72 BPM
H CAPSUL AG UR QL: NOT DETECTED
P AXIS: 71 DEGREES
P OFFSET: 216 MS
P ONSET: 145 MS
PR INTERVAL: 150 MS
Q ONSET: 220 MS
QRS COUNT: 12 BEATS
QRS DURATION: 78 MS
QT INTERVAL: 404 MS
QTC CALCULATION(BAZETT): 442 MS
QTC FREDERICIA: 429 MS
R AXIS: 55 DEGREES
SCAN RESULT: NORMAL
T AXIS: 56 DEGREES
T OFFSET: 422 MS
VENTRICULAR RATE: 72 BPM

## 2025-02-26 PROCEDURE — 3074F SYST BP LT 130 MM HG: CPT | Performed by: INTERNAL MEDICINE

## 2025-02-26 PROCEDURE — 1036F TOBACCO NON-USER: CPT | Performed by: INTERNAL MEDICINE

## 2025-02-26 PROCEDURE — 99215 OFFICE O/P EST HI 40 MIN: CPT | Mod: 25 | Performed by: INTERNAL MEDICINE

## 2025-02-26 PROCEDURE — 3078F DIAST BP <80 MM HG: CPT | Performed by: INTERNAL MEDICINE

## 2025-02-26 PROCEDURE — 93005 ELECTROCARDIOGRAM TRACING: CPT | Performed by: INTERNAL MEDICINE

## 2025-02-26 PROCEDURE — 71046 X-RAY EXAM CHEST 2 VIEWS: CPT

## 2025-02-26 PROCEDURE — 1159F MED LIST DOCD IN RCRD: CPT | Performed by: INTERNAL MEDICINE

## 2025-02-26 PROCEDURE — 99205 OFFICE O/P NEW HI 60 MIN: CPT | Performed by: INTERNAL MEDICINE

## 2025-02-26 ASSESSMENT — ENCOUNTER SYMPTOMS
DEPRESSION: 0
OCCASIONAL FEELINGS OF UNSTEADINESS: 0
LOSS OF SENSATION IN FEET: 0

## 2025-02-26 NOTE — PROGRESS NOTES
"Chief Complaint:   New Patient Visit (Re establish care)     History Of Present Illness:    Liz Hamlin is a 79 y.o. female here to reestablish care regarding a history of hypertension, hyperlipidemia, and for symptoms of shortness of breath + leg edema. She has a CKD /ESRD now s/p renal transplant. She also has a history of HFpEF and mitral regurgitation of variable severity (mild to mild-moderate) both diagnosed since her last visit with me 9/2020.       She reports a few months of leg edema, exertional shortness of breath, and orthopnea. She reports symptoms starting following her discharge for renal transplant last year. She had her SGLT2-i and furosemide stopped at that time. She has since had her furosemide restarted but her symptoms persist. She notes robust urine output on her present furosemide regimen.           Last Recorded Vitals:  Vitals:    02/26/25 0940   BP: 121/71   BP Location: Right arm   Patient Position: Sitting   Pulse: 72   Weight: 90.3 kg (199 lb 1.6 oz)   Height: 1.676 m (5' 6\")              Allergies:  Amlodipine, Codeine, Erythromycin, Nsaids (non-steroidal anti-inflammatory drug), Tramadol, and Lisinopril    Outpatient Medications:  Current Outpatient Medications   Medication Instructions    aspirin 81 mg EC tablet 1 tablet, Daily    atorvastatin (LIPITOR) 40 mg, oral, Daily    carvedilol (COREG) 50 mg, oral, 2 times daily    ferrous sulfate, 325 mg ferrous sulfate, (FeroSuL) tablet 1 tablet, oral, Daily    furosemide (LASIX) 20 mg, oral, Daily    hydrALAZINE (APRESOLINE) 100 mg, oral, 2 times daily    magnesium oxide (MAG-OX) 400 mg, oral, Daily    mycophenolate (CELLCEPT) 500 mg, oral, Every 12 hours    pantoprazole (PROTONIX) 40 mg, oral, Daily before breakfast, Do not crush, chew, or split.    predniSONE (DELTASONE) 5 mg, oral, Daily    SITagliptin phosphate (JANUVIA) 100 mg, oral, Daily    sulfamethoxazole-trimethoprim (Bactrim) 400-80 mg tablet 1 tablet, oral, Daily    " tacrolimus (PROGRAF) 2 mg, oral, Every 12 hours    valGANciclovir (VALCYTE) 900 mg, oral, Every 24 hours, Do not crush or chew.         Physical Exam:  Gen Well nourished septuagenarian female sitting up in NAD. Body mass index is 32.14 kg/m².  Eyes sclera nonicteric. Pupils normal size and symmetric.  Neck supple with no masses or thyromegaly.  CV rrr. No m/r/g appreciated. + JVD (slight). 2+ bilateral leg edema. No carotid bruits appreciated.  Pulm Lungs clear with normal respiratory effort.  Skin No bruises rashes or jaundice. No induration or nodules.  Psych Appropriate affect and mood. Normal judgement and insight.  Neuro Alert and conversant. Grossly nonfocal.       I reviewed the patient's ECG - NSR. Non-specific T wave abnormality. PVC's.    I reviewed most recent imaging / labs / and office notes as well as details of any recent hospitalizations or ED visits.    Assessment/Plan   1.  Acute on chronic HFpEF  Volume+ Off her SGLT2-i since her renal transplant. Restarting given the separate indication herein. Will have her take her furosemide BID until her swelling has resolved then she will go back to once daily dosing. She will call in two weeks if her leg swelling persists.    2. Mitral regurgitation  Variable being more mild by cardiac MRI. For reassessment when more euvolemic.    3. Hypertension   BP well controlled. Her present regimen is to continue.     4. Hyperlipidemia   Lipids well controlled. Last CAC score 0 (2/2024). Her present regimen is to continue.         Follow-up 6 weeks        Dov Encinas MD

## 2025-02-27 PROCEDURE — RXMED WILLOW AMBULATORY MEDICATION CHARGE

## 2025-02-28 ENCOUNTER — PHARMACY VISIT (OUTPATIENT)
Dept: PHARMACY | Facility: CLINIC | Age: 80
End: 2025-02-28
Payer: COMMERCIAL

## 2025-03-04 PROCEDURE — RXMED WILLOW AMBULATORY MEDICATION CHARGE

## 2025-03-05 ENCOUNTER — LAB (OUTPATIENT)
Dept: LAB | Facility: HOSPITAL | Age: 80
End: 2025-03-05
Payer: COMMERCIAL

## 2025-03-05 DIAGNOSIS — Z94.0 KIDNEY TRANSPLANT STATUS: Primary | ICD-10-CM

## 2025-03-05 DIAGNOSIS — Z94.0 KIDNEY TRANSPLANTED (HHS-HCC): ICD-10-CM

## 2025-03-06 ENCOUNTER — PHARMACY VISIT (OUTPATIENT)
Dept: PHARMACY | Facility: CLINIC | Age: 80
End: 2025-03-06
Payer: COMMERCIAL

## 2025-03-07 ENCOUNTER — PATIENT MESSAGE (OUTPATIENT)
Facility: HOSPITAL | Age: 80
End: 2025-03-07
Payer: COMMERCIAL

## 2025-03-11 ENCOUNTER — NURSE ONLY (OUTPATIENT)
Facility: HOSPITAL | Age: 80
End: 2025-03-11
Payer: COMMERCIAL

## 2025-03-11 DIAGNOSIS — D84.9 IMMUNOSUPPRESSION: ICD-10-CM

## 2025-03-11 DIAGNOSIS — Z94.0 KIDNEY REPLACED BY TRANSPLANT (HHS-HCC): ICD-10-CM

## 2025-03-11 DIAGNOSIS — Z94.0 KIDNEY TRANSPLANTED (HHS-HCC): ICD-10-CM

## 2025-03-11 LAB
ALBUMIN SERPL BCP-MCNC: 4.2 G/DL (ref 3.4–5)
ANION GAP SERPL CALC-SCNC: 12 MMOL/L (ref 10–20)
ATRIAL RATE: 72 BPM
BASOPHILS # BLD MANUAL: 0.05 X10*3/UL (ref 0–0.1)
BASOPHILS NFR BLD MANUAL: 3.2 %
BNP SERPL-MCNC: 304 PG/ML (ref 0–99)
BUN SERPL-MCNC: 21 MG/DL (ref 6–23)
BURR CELLS BLD QL SMEAR: ABNORMAL
CALCIUM SERPL-MCNC: 9.7 MG/DL (ref 8.6–10.6)
CHLORIDE SERPL-SCNC: 107 MMOL/L (ref 98–107)
CO2 SERPL-SCNC: 27 MMOL/L (ref 21–32)
CREAT SERPL-MCNC: 1.01 MG/DL (ref 0.5–1.05)
CREAT UR-MCNC: 60.8 MG/DL (ref 20–320)
EGFRCR SERPLBLD CKD-EPI 2021: 57 ML/MIN/1.73M*2
EOSINOPHIL # BLD MANUAL: 0.03 X10*3/UL (ref 0–0.4)
EOSINOPHIL NFR BLD MANUAL: 1.6 %
ERYTHROCYTE [DISTWIDTH] IN BLOOD BY AUTOMATED COUNT: 13.1 % (ref 11.5–14.5)
ERYTHROCYTE [DISTWIDTH] IN BLOOD BY AUTOMATED COUNT: 13.1 % (ref 11.5–14.5)
GLUCOSE SERPL-MCNC: 89 MG/DL (ref 74–99)
HCT VFR BLD AUTO: 36.2 % (ref 36–46)
HCT VFR BLD AUTO: 36.2 % (ref 36–46)
HGB BLD-MCNC: 11.4 G/DL (ref 12–16)
HGB BLD-MCNC: 11.4 G/DL (ref 12–16)
IMM GRANULOCYTES # BLD AUTO: 0.22 X10*3/UL (ref 0–0.5)
IMM GRANULOCYTES NFR BLD AUTO: 12.6 % (ref 0–0.9)
LYMPHOCYTES # BLD MANUAL: 0.37 X10*3/UL (ref 0.8–3)
LYMPHOCYTES NFR BLD MANUAL: 21.8 %
MAGNESIUM SERPL-MCNC: 1.83 MG/DL (ref 1.6–2.4)
MCH RBC QN AUTO: 29 PG (ref 26–34)
MCH RBC QN AUTO: 29 PG (ref 26–34)
MCHC RBC AUTO-ENTMCNC: 31.5 G/DL (ref 32–36)
MCHC RBC AUTO-ENTMCNC: 31.5 G/DL (ref 32–36)
MCV RBC AUTO: 92 FL (ref 80–100)
MCV RBC AUTO: 92 FL (ref 80–100)
METAMYELOCYTES # BLD MANUAL: 0.03 X10*3/UL
METAMYELOCYTES NFR BLD MANUAL: 1.6 %
MONOCYTES # BLD MANUAL: 0.04 X10*3/UL (ref 0.05–0.8)
MONOCYTES NFR BLD MANUAL: 2.4 %
MYELOCYTES # BLD MANUAL: 0.03 X10*3/UL
MYELOCYTES NFR BLD MANUAL: 1.6 %
NEUTS SEG # BLD MANUAL: 1.15 X10*3/UL (ref 1.6–5)
NEUTS SEG NFR BLD MANUAL: 67.8 %
NRBC BLD-RTO: 0 /100 WBCS (ref 0–0)
NRBC BLD-RTO: 0 /100 WBCS (ref 0–0)
OVALOCYTES BLD QL SMEAR: ABNORMAL
P AXIS: 71 DEGREES
P OFFSET: 216 MS
P ONSET: 145 MS
PHOSPHATE SERPL-MCNC: 3.1 MG/DL (ref 2.5–4.9)
PLATELET # BLD AUTO: 148 X10*3/UL (ref 150–450)
PLATELET # BLD AUTO: 148 X10*3/UL (ref 150–450)
POTASSIUM SERPL-SCNC: 3.8 MMOL/L (ref 3.5–5.3)
PR INTERVAL: 150 MS
PROT UR-ACNC: 9 MG/DL (ref 5–24)
PROT/CREAT UR: 0.15 MG/MG CREAT (ref 0–0.17)
Q ONSET: 220 MS
QRS COUNT: 12 BEATS
QRS DURATION: 78 MS
QT INTERVAL: 404 MS
QTC CALCULATION(BAZETT): 442 MS
QTC FREDERICIA: 429 MS
R AXIS: 55 DEGREES
RBC # BLD AUTO: 3.93 X10*6/UL (ref 4–5.2)
RBC # BLD AUTO: 3.93 X10*6/UL (ref 4–5.2)
RBC MORPH BLD: ABNORMAL
SODIUM SERPL-SCNC: 142 MMOL/L (ref 136–145)
T AXIS: 56 DEGREES
T OFFSET: 422 MS
TACROLIMUS BLD-MCNC: 10 NG/ML
TOTAL CELLS COUNTED BLD: 124
VENTRICULAR RATE: 72 BPM
WBC # BLD AUTO: 1.7 X10*3/UL (ref 4.4–11.3)
WBC # BLD AUTO: 1.7 X10*3/UL (ref 4.4–11.3)

## 2025-03-11 PROCEDURE — 36415 COLL VENOUS BLD VENIPUNCTURE: CPT

## 2025-03-11 PROCEDURE — 80197 ASSAY OF TACROLIMUS: CPT

## 2025-03-11 PROCEDURE — 84156 ASSAY OF PROTEIN URINE: CPT

## 2025-03-11 PROCEDURE — 83735 ASSAY OF MAGNESIUM: CPT

## 2025-03-11 PROCEDURE — 85027 COMPLETE CBC AUTOMATED: CPT

## 2025-03-11 PROCEDURE — 83880 ASSAY OF NATRIURETIC PEPTIDE: CPT | Performed by: INTERNAL MEDICINE

## 2025-03-11 PROCEDURE — 85007 BL SMEAR W/DIFF WBC COUNT: CPT

## 2025-03-11 PROCEDURE — 87799 DETECT AGENT NOS DNA QUANT: CPT

## 2025-03-11 PROCEDURE — 87385 HISTOPLASMA CAPSUL AG IA: CPT

## 2025-03-11 PROCEDURE — 84100 ASSAY OF PHOSPHORUS: CPT

## 2025-03-11 PROCEDURE — 86833 HLA CLASS II HIGH DEFIN QUAL: CPT

## 2025-03-11 RX ORDER — FUROSEMIDE 20 MG/1
20 TABLET ORAL DAILY
Qty: 30 TABLET | Refills: 0 | Status: SHIPPED | OUTPATIENT
Start: 2025-03-11 | End: 2026-03-11

## 2025-03-12 ENCOUNTER — PATIENT MESSAGE (OUTPATIENT)
Facility: HOSPITAL | Age: 80
End: 2025-03-12
Payer: COMMERCIAL

## 2025-03-12 DIAGNOSIS — Z94.0 KIDNEY REPLACED BY TRANSPLANT (HHS-HCC): ICD-10-CM

## 2025-03-13 LAB
HLA RESULTS: NORMAL
HLA-A+B+C AB NFR SER: NORMAL %
HLA-DP+DQ+DR AB NFR SER: NORMAL %

## 2025-03-14 DIAGNOSIS — Z94.0 KIDNEY REPLACED BY TRANSPLANT (HHS-HCC): ICD-10-CM

## 2025-03-14 LAB
H CAPSUL AG UR QL: NOT DETECTED
SCAN RESULT: NORMAL

## 2025-03-18 ENCOUNTER — TELEPHONE (OUTPATIENT)
Facility: HOSPITAL | Age: 80
End: 2025-03-18

## 2025-03-18 ENCOUNTER — OFFICE VISIT (OUTPATIENT)
Facility: HOSPITAL | Age: 80
End: 2025-03-18
Payer: COMMERCIAL

## 2025-03-18 ENCOUNTER — NURSE ONLY (OUTPATIENT)
Facility: HOSPITAL | Age: 80
End: 2025-03-18
Payer: COMMERCIAL

## 2025-03-18 VITALS
SYSTOLIC BLOOD PRESSURE: 126 MMHG | BODY MASS INDEX: 31.41 KG/M2 | TEMPERATURE: 97.7 F | WEIGHT: 194.6 LBS | DIASTOLIC BLOOD PRESSURE: 66 MMHG | OXYGEN SATURATION: 95 % | HEART RATE: 77 BPM

## 2025-03-18 DIAGNOSIS — Z94.0 IMMUNOSUPPRESSIVE MANAGEMENT ENCOUNTER FOLLOWING KIDNEY TRANSPLANT: ICD-10-CM

## 2025-03-18 DIAGNOSIS — D70.2 OTHER DRUG-INDUCED NEUTROPENIA: ICD-10-CM

## 2025-03-18 DIAGNOSIS — G57.91 NEUROPATHY OF RIGHT LOWER EXTREMITY: ICD-10-CM

## 2025-03-18 DIAGNOSIS — Z79.899 IMMUNOSUPPRESSIVE MANAGEMENT ENCOUNTER FOLLOWING KIDNEY TRANSPLANT: ICD-10-CM

## 2025-03-18 DIAGNOSIS — M79.604 RIGHT LEG PAIN: ICD-10-CM

## 2025-03-18 DIAGNOSIS — Z94.0 KIDNEY REPLACED BY TRANSPLANT (HHS-HCC): ICD-10-CM

## 2025-03-18 DIAGNOSIS — R09.89 SUSPECTED DEEP VEIN THROMBOSIS (DVT): ICD-10-CM

## 2025-03-18 DIAGNOSIS — Z94.0 KIDNEY REPLACED BY TRANSPLANT (HHS-HCC): Primary | ICD-10-CM

## 2025-03-18 LAB
25(OH)D3 SERPL-MCNC: 41 NG/ML (ref 30–100)
ACANTHOCYTES BLD QL SMEAR: ABNORMAL
BASOPHILS # BLD MANUAL: 0.04 X10*3/UL (ref 0–0.1)
BASOPHILS NFR BLD MANUAL: 3.2 %
BURR CELLS BLD QL SMEAR: ABNORMAL
EOSINOPHIL # BLD MANUAL: 0.04 X10*3/UL (ref 0–0.4)
EOSINOPHIL NFR BLD MANUAL: 3.2 %
ERYTHROCYTE [DISTWIDTH] IN BLOOD BY AUTOMATED COUNT: 13.2 % (ref 11.5–14.5)
HCT VFR BLD AUTO: 35.8 % (ref 36–46)
HGB BLD-MCNC: 11.2 G/DL (ref 12–16)
IMM GRANULOCYTES # BLD AUTO: 0.14 X10*3/UL (ref 0–0.5)
IMM GRANULOCYTES NFR BLD AUTO: 10.9 % (ref 0–0.9)
LYMPHOCYTES # BLD MANUAL: 0.35 X10*3/UL (ref 0.8–3)
LYMPHOCYTES NFR BLD MANUAL: 26.6 %
MCH RBC QN AUTO: 28.9 PG (ref 26–34)
MCHC RBC AUTO-ENTMCNC: 31.3 G/DL (ref 32–36)
MCV RBC AUTO: 92 FL (ref 80–100)
MONOCYTES # BLD MANUAL: 0.13 X10*3/UL (ref 0.05–0.8)
MONOCYTES NFR BLD MANUAL: 9.7 %
NEUTS SEG # BLD MANUAL: 0.69 X10*3/UL (ref 1.6–5)
NEUTS SEG NFR BLD MANUAL: 53.2 %
NRBC BLD-RTO: 0 /100 WBCS (ref 0–0)
OVALOCYTES BLD QL SMEAR: ABNORMAL
PLATELET # BLD AUTO: 156 X10*3/UL (ref 150–450)
RBC # BLD AUTO: 3.88 X10*6/UL (ref 4–5.2)
RBC MORPH BLD: ABNORMAL
TOTAL CELLS COUNTED BLD: 124
VARIANT LYMPHS # BLD MANUAL: 0.05 X10*3/UL (ref 0–0.3)
VARIANT LYMPHS NFR BLD: 4.1 %
WBC # BLD AUTO: 1.3 X10*3/UL (ref 4.4–11.3)

## 2025-03-18 PROCEDURE — 1126F AMNT PAIN NOTED NONE PRSNT: CPT | Performed by: INTERNAL MEDICINE

## 2025-03-18 PROCEDURE — 1159F MED LIST DOCD IN RCRD: CPT | Performed by: INTERNAL MEDICINE

## 2025-03-18 PROCEDURE — 85007 BL SMEAR W/DIFF WBC COUNT: CPT | Performed by: INTERNAL MEDICINE

## 2025-03-18 PROCEDURE — 87799 DETECT AGENT NOS DNA QUANT: CPT | Performed by: INTERNAL MEDICINE

## 2025-03-18 PROCEDURE — 99214 OFFICE O/P EST MOD 30 MIN: CPT | Performed by: INTERNAL MEDICINE

## 2025-03-18 PROCEDURE — 82306 VITAMIN D 25 HYDROXY: CPT | Performed by: INTERNAL MEDICINE

## 2025-03-18 PROCEDURE — 3074F SYST BP LT 130 MM HG: CPT | Performed by: INTERNAL MEDICINE

## 2025-03-18 PROCEDURE — 85027 COMPLETE CBC AUTOMATED: CPT | Performed by: INTERNAL MEDICINE

## 2025-03-18 PROCEDURE — 3078F DIAST BP <80 MM HG: CPT | Performed by: INTERNAL MEDICINE

## 2025-03-18 PROCEDURE — 36415 COLL VENOUS BLD VENIPUNCTURE: CPT | Performed by: INTERNAL MEDICINE

## 2025-03-18 ASSESSMENT — PAIN SCALES - GENERAL: PAINLEVEL_OUTOF10: 0-NO PAIN

## 2025-03-18 NOTE — PROGRESS NOTES
TRANSPLANT NEPHROLOGY :   OUTPATIENT CLINIC NOTE      SERVICE DATE : 03/18/2025    REASON FOR VISIT/CHIEF COMPLAINT:  S/P  TRANSPLANT SURGERY  IMMUNOSUPPRESSIVE MEDICATION MANAGEMENT  BLOOD PRESSURE MANAGEMENT    HPI:    Ms. Halmin is a 79 y.o. 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 Patient was induced by 3 mg/kg of Thymoglobulin initiated on belatacept CellCept and prednisone taper. 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     Low WBC - Bactrim AND Valcyte are on HOLD. MMF was reduced to 500 mg bid. Lab today    Patient is doing well overall. She has right leg pain, numbness and weakness (mild) started last Sunday.    Denied chest pain, SOB, STORM, Palpitation. Normal urination and bowel movement. Normal gait and no weakness of arms/legs. No cough, runny nose, sore throat, cold symptoms, or rash. No hearing loss. Normal vision.No problems with his sleep, mood and function. No recent infection, hospitalization, surgery or ER visits.    ROS:  Review of  14 systems was performed system by system. See HPI. Otherwise, the symptoms were negative.    PAST MEDICAL HISTORY:  Past Medical History:   Diagnosis Date    CKD (chronic kidney disease)     Encounter for general adult medical examination without abnormal findings 09/14/2021    Encounter for Medicare annual wellness exam    Glaucoma     Gout     Hyperlipidemia     Hypertension     Mitral valve regurgitation     JOSE E (obstructive sleep apnea)     Unspecified cataract     Cataracts, both eyes        PAST SURGICAL HISTORY:  Past Surgical History:   Procedure Laterality Date    OTHER SURGICAL HISTORY  11/04/2019    Carpal tunnel surgery    OTHER SURGICAL HISTORY  11/04/2019      section    OTHER SURGICAL HISTORY  2019    Hernia repair        SOCIAL HISTORY:  Social History     Socioeconomic History    Marital status:      Spouse name: Not on file    Number of children: Not on file    Years of education: Not on file    Highest education level: Not on file   Occupational History    Not on file   Tobacco Use    Smoking status: Never     Passive exposure: Never    Smokeless tobacco: Never   Vaping Use    Vaping status: Never Used   Substance and Sexual Activity    Alcohol use: Never    Drug use: Never    Sexual activity: Yes     Partners: Male   Other Topics Concern    Not on file   Social History Narrative    Not on file     Social Drivers of Health     Financial Resource Strain: Low Risk  (10/21/2024)    Overall Financial Resource Strain (CARDIA)     Difficulty of Paying Living Expenses: Not hard at all   Food Insecurity: No Food Insecurity (10/20/2024)    Hunger Vital Sign     Worried About Running Out of Food in the Last Year: Never true     Ran Out of Food in the Last Year: Never true   Transportation Needs: No Transportation Needs (12/3/2024)    OASIS : Transportation     Lack of Transportation (Medical): No     Lack of Transportation (Non-Medical): No     Patient Unable or Declines to Respond: No   Physical Activity: Not on file   Stress: Not on file   Social Connections: Feeling Socially Integrated (12/3/2024)    OASIS : Social Isolation     Frequency of experiencing loneliness or isolation: Never   Intimate Partner Violence: Not At Risk (10/20/2024)    Humiliation, Afraid, Rape, and Kick questionnaire     Fear of Current or Ex-Partner: No     Emotionally Abused: No     Physically Abused: No     Sexually Abused: No   Housing Stability: Low Risk  (10/21/2024)    Housing Stability Vital Sign     Unable to Pay for Housing in the Last Year: No     Number of Times Moved in the Last Year: 0     Homeless in the Last Year: No       FAMILY HISTORY:  Family  History   Problem Relation Name Age of Onset    Diabetes Mother      Hypertension Mother      Diabetes Son      Colon cancer Mother's Sister      Colon cancer Father's Sister         MEDICATION LIST:  Current Outpatient Medications   Medication Instructions    aspirin 81 mg EC tablet 1 tablet, Daily    atorvastatin (LIPITOR) 40 mg, oral, Daily    carvedilol (COREG) 50 mg, oral, 2 times daily    empagliflozin (JARDIANCE) 10 mg, oral, Daily    ferrous sulfate, 325 mg ferrous sulfate, (FeroSuL) tablet 1 tablet, oral, Daily    furosemide (LASIX) 20 mg, oral, Daily    hydrALAZINE (APRESOLINE) 100 mg, oral, 2 times daily    magnesium oxide (MAG-OX) 400 mg, oral, Daily    mycophenolate (CELLCEPT) 500 mg, oral, Every 12 hours    pantoprazole (PROTONIX) 40 mg, oral, Daily before breakfast, Do not crush, chew, or split.    predniSONE (DELTASONE) 5 mg, oral, Daily    SITagliptin phosphate (JANUVIA) 100 mg, oral, Daily    sulfamethoxazole-trimethoprim (Bactrim) 400-80 mg tablet 1 tablet, oral, Daily    tacrolimus (PROGRAF) 2 mg, oral, Every 12 hours    valGANciclovir (VALCYTE) 900 mg, oral, Every 24 hours, Do not crush or chew.       ALLERGY  Allergies   Allergen Reactions    Amlodipine Swelling     Edema    Codeine Itching    Erythromycin Itching    Nsaids (Non-Steroidal Anti-Inflammatory Drug) Itching and Nausea Only     chronic renal insufficiency    Tramadol Itching and Nausea/vomiting    Lisinopril Cough     Cough       PHYSICAL EXAM:    Visit Vitals  /66 (BP Location: Right arm, Patient Position: Sitting, BP Cuff Size: Adult)   Pulse 77   Temp 36.5 °C (97.7 °F) (Temporal)   Wt 88.3 kg (194 lb 9.6 oz)   LMP  (LMP Unknown)   SpO2 95%   BMI 31.41 kg/m²   OB Status Postmenopausal   Smoking Status Never   BSA 2.03 m²          Vital signs - reviewed. Acceptable BP at this office visit.   General Appearance - NAD, Good speech, oriented and alert  HEENT - Supple. Not pale. No jaundice. No cervical lymphadenopathy. Pharynx  and tonsils are not injected.  CVS - RRR. Normal S1/S2. No murmur, click , rub or gallop  Lungs- clear to auscultation bilaterally  Abdomen - soft , not tender, no guarding, no rigidity. No hepatosplenomegaly. Normal bowel sounds. No masses and ascites. S/P Kidney transplant .  Transplanted kidney is not tender.   Musculoskeletal /Extremities - no edema. Full ROM. No joint tenderness.   Neuro/Psych - appropriate mood and affect. Motor power V/V all extremities. CN I -XII were grossly intact.  Skin - No visible rash      LABS:    Lab Results   Component Value Date    WBC 1.7 (L) 03/11/2025    WBC 1.7 (L) 03/11/2025    HGB 11.4 (L) 03/11/2025    HGB 11.4 (L) 03/11/2025    HCT 36.2 03/11/2025    HCT 36.2 03/11/2025     (L) 03/11/2025     (L) 03/11/2025    CHOL 121 04/11/2024    TRIG 120 04/11/2024    HDL 44.3 04/11/2024    ALT 16 10/20/2024    AST 19 10/20/2024     03/11/2025    K 3.8 03/11/2025     03/11/2025    CREATININE 1.01 03/11/2025    BUN 21 03/11/2025    CO2 27 03/11/2025    TSH 2.34 04/11/2024    INR 1.1 01/02/2025    HGBA1C 5.6 04/11/2024     par    ASSESSMENT AND PLAN:    Ms. Hamlin is a 79 y.o. female  who is here for follow up s/p kidney transplant.    TRANSPLANT DATE: 10/20/2024 (Kidney)      1. ESRD S/P kidney transplant   - Creatinine last check was :  Lab Results   Component Value Date    CREATININE 1.01 03/11/2025       - Renal allograft function is excellent  -Ensure adequate hydration  - Avoid nephrotoxic medications, NSAIDs, and IV contrast.    2. Immunosuppression  -s/p OHT and DDKT  Defer to heart txp team  -Continue current immunosuppression regimen.    3. Electrolytes  Lab Results   Component Value Date    GLUCOSE 89 03/11/2025    CALCIUM 9.7 03/11/2025     03/11/2025    K 3.8 03/11/2025    CO2 27 03/11/2025     03/11/2025    BUN 21 03/11/2025    CREATININE 1.01 03/11/2025     -Acceptable from last lab drawn    4. Hypertension  Blood Pressures          3/18/2025  0904             BP: 126/66          -Home  BP had been acceptable  -Encourage to monitor home BP  -Continue current anti hypertensive medication    5. Bone Mineral Disease/Osteoporosis  Lab Results   Component Value Date    PTH 96.5 (H) 01/28/2025    CALCIUM 9.7 03/11/2025    CAION 1.25 10/26/2024    PHOS 3.1 03/11/2025    VITD25 39 01/28/2025     -check VIT D, PTH q 3 months  - Consider DEXA every 2-3 years , defer to PCP  - May consider initiation of Sensipar when PTH >300 AND Ca >8.4    6.Anemia  Lab Results   Component Value Date    WBC 1.7 (L) 03/11/2025    WBC 1.7 (L) 03/11/2025    HGB 11.4 (L) 03/11/2025    HGB 11.4 (L) 03/11/2025    HCT 36.2 03/11/2025    HCT 36.2 03/11/2025    MCV 92 03/11/2025    MCV 92 03/11/2025     (L) 03/11/2025     (L) 03/11/2025     Lab Results   Component Value Date    IRON <10 (L) 10/21/2024    TIBC  10/21/2024      Comment:      One or more of the analytes used in this calculation is outside of the analytical measurement range.      FERRITIN 270 (H) 07/19/2022     -asymptomatic  - Continue to monitor  -check iron studies and ferritin. Will consider DURGA as needed.  - No indications for blood transfusion   - Keep ANC >500. Neupogen prn    7.Health maintenance and vaccination  - Flu shot during flu season annually  - Cancer screening is up to date per the patient    Summary  OK to take over the counter vitamin B complex or Centrum multi vitamin  Blood test today. Last dose of tacrolimus was at 10 pm. Also will get allosure and vitamin D level ( Vit D def may associate with neuropathy)  Tylenol prn, lidocaine patch prn for leg pain (Rt).ED precaution  Duplex right leg to rule out blood clot  MRI lumbar spine for acute neuropathy right leg  Routine transplant lab weekly  Continue to hold Bactrim and Valcyte for low WBC  Neupogen shot as needed to keep ANC >500  Viral PCR (CMV and EBV for leukopenia)  Next follow up in 3 weeks    Ivon Pizano  MD    Transplant Nephrology

## 2025-03-18 NOTE — TELEPHONE ENCOUNTER
Patient scheduled for Ultrasound as requested by coordinator.   Location, date, and instructions given to patient.   Advised patient that if any changes to appointment are needed, they must call the office so we are aware.   Name: Aliya Shell MRN: [id number]  Date: 3/19/2025 Status: Pinky  Time: 11:00 AM    Arrive By:        Length: 60    Visit Type: VASC US LOWER EXTREMITY VENOUS DUPLEX RIGHT [294] Copay: $45.00  Provider: [facility] 3 Department: [facility] VASCULAR      Department Address: [address] [place] [id number]  CSN: [id number]    Bill Area:     Referral Number: [id number]      Referring Provider: Ivon Pizano MD [[id number]] Referral Status: Pending Review  KERI: ALIYA SHELL [[id number]]      Notes: scheduled w Paty in transplant. orders in chart. Transplant should not need approved - [facility]  Made On:  Change Notes: 3/18/2025 10:12 AM  3/18/2025 10:14 AM By:  By: JORGE NOGUEIRA [[id number]] (ES)  JORGE NOGUEIRA [[id number]] (ES)

## 2025-03-18 NOTE — TELEPHONE ENCOUNTER
Patient scheduled for MRI as requested by coordinator.   Location, date, and instructions given to patient.   Advised patient that if any changes to appointment are needed, they must call the office so we are aware.   Name: Aliya Shell MRN: [id number]  Date: 3/19/2025 Status: Pinky  Time: 10:00 AM    Arrive By: 9:45 AM      Length: 45    Visit Type: MR LUMBAR SPINE W WO [[id number]] Copay: $0.00  Provider: [facility] MRI SPORTS [facility]ICINE 1 Department: [facility] MRI      Department Address: [address] [place] [id number]  CSN: [id number]    Bill Area:     Referral Number: [id number]      Referring Provider: Ivon Pizano MD [[id number]] Referral Status: Pending Review  KERI: ALIYA SHELL [[id number]]      Notes: scheduled w Paty in transplant. orders in chart. Transplant should not need approved - [facility]  Made On:  Change Notes: 3/18/2025 10:12 AM  3/18/2025 10:14 AM By:  By: JORGE NOGUEIRA [[id number]] (ES)  JORGE NOGUEIRA [[id number]] (ES)

## 2025-03-18 NOTE — PATIENT INSTRUCTIONS
OK to take over the counter vitamin B complex or Centrum multi vitamin  Blood test today. Last dose of tacrolimus was at 10 pm. Also will get allosure and vitamin D level ( Vit D def may associate with neuropathy)  Tylenol prn, lidocaine patch prn for leg pain (Rt)  Duplex right leg to rule out blood clot  MRI lumbar spine for acute neuropathy right leg  Routine transplant lab weekly  Continue to hold Bactrim and Valcyte for low WBC  Viral PCR (CMV and EBV for leukopenia)  Next follow up in 3 weeks

## 2025-03-19 ENCOUNTER — PATIENT MESSAGE (OUTPATIENT)
Facility: HOSPITAL | Age: 80
End: 2025-03-19

## 2025-03-19 ENCOUNTER — HOSPITAL ENCOUNTER (OUTPATIENT)
Dept: VASCULAR MEDICINE | Facility: HOSPITAL | Age: 80
Discharge: HOME | End: 2025-03-19
Payer: COMMERCIAL

## 2025-03-19 ENCOUNTER — HOSPITAL ENCOUNTER (OUTPATIENT)
Dept: RADIOLOGY | Facility: HOSPITAL | Age: 80
Discharge: HOME | End: 2025-03-19
Payer: COMMERCIAL

## 2025-03-19 ENCOUNTER — TELEPHONE (OUTPATIENT)
Facility: HOSPITAL | Age: 80
End: 2025-03-19

## 2025-03-19 ENCOUNTER — APPOINTMENT (OUTPATIENT)
Dept: UROLOGY | Facility: CLINIC | Age: 80
End: 2025-03-19
Payer: COMMERCIAL

## 2025-03-19 DIAGNOSIS — M79.604 RIGHT LEG PAIN: ICD-10-CM

## 2025-03-19 DIAGNOSIS — Z94.0 KIDNEY REPLACED BY TRANSPLANT (HHS-HCC): ICD-10-CM

## 2025-03-19 DIAGNOSIS — I82.4Y9 DEEP VEIN THROMBOSIS (DVT) OF PROXIMAL LOWER EXTREMITY, UNSPECIFIED CHRONICITY, UNSPECIFIED LATERALITY (MULTI): ICD-10-CM

## 2025-03-19 DIAGNOSIS — G57.91 NEUROPATHY OF RIGHT LOWER EXTREMITY: ICD-10-CM

## 2025-03-19 DIAGNOSIS — D70.2 OTHER DRUG-INDUCED NEUTROPENIA: ICD-10-CM

## 2025-03-19 DIAGNOSIS — Z94.0 IMMUNOSUPPRESSIVE MANAGEMENT ENCOUNTER FOLLOWING KIDNEY TRANSPLANT: ICD-10-CM

## 2025-03-19 DIAGNOSIS — Z79.899 IMMUNOSUPPRESSIVE MANAGEMENT ENCOUNTER FOLLOWING KIDNEY TRANSPLANT: ICD-10-CM

## 2025-03-19 DIAGNOSIS — R09.89 SUSPECTED DEEP VEIN THROMBOSIS (DVT): ICD-10-CM

## 2025-03-19 LAB
BKV DNA SERPL NAA+PROBE-LOG#: NORMAL {LOG_COPIES}/ML
CMV DNA SERPL NAA+PROBE-LOG IU: NORMAL {LOG_IU}/ML
EBV DNA SPEC NAA+PROBE-LOG#: NORMAL {LOG_COPIES}/ML
LABORATORY COMMENT REPORT: NOT DETECTED

## 2025-03-19 PROCEDURE — 93971 EXTREMITY STUDY: CPT

## 2025-03-19 PROCEDURE — 93971 EXTREMITY STUDY: CPT | Performed by: INTERNAL MEDICINE

## 2025-03-19 PROCEDURE — 72148 MRI LUMBAR SPINE W/O DYE: CPT

## 2025-03-19 RX ORDER — MYCOPHENOLATE MOFETIL 250 MG/1
250 CAPSULE ORAL EVERY 12 HOURS
Qty: 60 CAPSULE | Refills: 11 | Status: SHIPPED | OUTPATIENT
Start: 2025-03-19 | End: 2026-03-14

## 2025-03-19 NOTE — TELEPHONE ENCOUNTER
Received the following message from [facility] tech  Good afternoon, This is the vascular lab at [facility]. There is a patient here Liz Hamlin that had a right duplex and appears to have age indeterminate thrombus in the right popliteal vein and acute thrombus of the peroneal vein and soleal vein. What would you like her to do? Huma         Per Dr Reid and Dr Padilla - plan to start PO Eliquis, Dr Reid also recommends to stop ASA.  Confirmed dosing with Estefany Watson pharmD - She would need the eliquis starter pack that gives her 10 mg BID x 7 days then 5 mg BID.      Rx send to MD to sign.

## 2025-03-19 NOTE — TELEPHONE ENCOUNTER
Patient called requesting to speak with coordinator about needs puk to call [facility] radiology regarding finding and when she can go home.  Patient call back number is [phone].

## 2025-03-19 NOTE — TELEPHONE ENCOUNTER
Patient called requesting to speak with coordinator about  PT's pharmacy will not have Eliquis available until tomorrow .  Patient call back number is [phone] .

## 2025-03-20 ENCOUNTER — SPECIALTY PHARMACY (OUTPATIENT)
Dept: PHARMACY | Facility: CLINIC | Age: 80
End: 2025-03-20

## 2025-03-20 PROCEDURE — RXMED WILLOW AMBULATORY MEDICATION CHARGE

## 2025-03-20 NOTE — TELEPHONE ENCOUNTER
Responded to patient's Giftaht messages yesterday, pharmacy did not have pill pack in stock. Re-ordered script for regular tablets, see Giftaht messages.

## 2025-03-21 ENCOUNTER — PHARMACY VISIT (OUTPATIENT)
Dept: PHARMACY | Facility: CLINIC | Age: 80
End: 2025-03-21
Payer: COMMERCIAL

## 2025-03-21 ENCOUNTER — SPECIALTY PHARMACY (OUTPATIENT)
Dept: PHARMACY | Facility: CLINIC | Age: 80
End: 2025-03-21

## 2025-03-21 DIAGNOSIS — Z94.0 KIDNEY REPLACED BY TRANSPLANT (HHS-HCC): ICD-10-CM

## 2025-03-21 LAB
ALLOSURE SCORE - KIDNEY: 1.8 %
CAREDX_ORDER_ID: NORMAL
CENTER_ORDER_ID: NORMAL
CLIENT SPECIMEN ID - ALLOSURE: NORMAL
DONOR RELATION - ALLOSURE: NORMAL
NOTES - ALLOSURE: NORMAL
RELATIVE CHANGE VALUE - KIDNEY: NORMAL
TEST COMMENTS - ALLOSURE: NORMAL
TIME POST TX - ALLOSURE: NORMAL
TRANSPLANTED ORGAN - ALLOSURE: NORMAL
TX DATE - ALLOSURE/ALLOMAP: NORMAL
WP_ORDER_ID: NORMAL

## 2025-03-24 ENCOUNTER — TELEPHONE (OUTPATIENT)
Facility: HOSPITAL | Age: 80
End: 2025-03-24
Payer: COMMERCIAL

## 2025-03-24 DIAGNOSIS — Z94.0 KIDNEY REPLACED BY TRANSPLANT (HHS-HCC): ICD-10-CM

## 2025-03-24 DIAGNOSIS — T86.19 DELAYED GRAFT FUNCTION OF KIDNEY (HHS-HCC): ICD-10-CM

## 2025-03-24 NOTE — TELEPHONE ENCOUNTER
Allosure 1.8%, increased from 0.18%  Hx of positive Bcell and Tcell XM  Tac 2 mg BID, last level 10  Pred 5 mg daily;  mg BID d/t leukopenia  On 3/11 - Cr stable 1.0, virals negative and DSA negative    Reviewed with Dr Bailey - get biopsy  Call placed to patient, informed her of need for biopsy, she verbalized understanding. Pt has new DVT and on Eliquis; will discuss with MD's holding therapy once biopsy date is provided.

## 2025-03-25 DIAGNOSIS — N18.4 CKD STAGE 4 SECONDARY TO HYPERTENSION (MULTI): ICD-10-CM

## 2025-03-25 DIAGNOSIS — I12.9 CKD STAGE 4 SECONDARY TO HYPERTENSION (MULTI): ICD-10-CM

## 2025-03-26 ENCOUNTER — DOCUMENTATION (OUTPATIENT)
Facility: HOSPITAL | Age: 80
End: 2025-03-26
Payer: COMMERCIAL

## 2025-03-26 DIAGNOSIS — Z94.0 KIDNEY REPLACED BY TRANSPLANT (HHS-HCC): ICD-10-CM

## 2025-03-26 NOTE — PROGRESS NOTES
Reviewed patient in 365 with Dr Ty and Dr Leo hicks to hold Eliquis x5 days before biopsy. Plan for repeat allosure prior to bx, order placed.

## 2025-03-27 ENCOUNTER — NURSE ONLY (OUTPATIENT)
Facility: HOSPITAL | Age: 80
End: 2025-03-27
Payer: COMMERCIAL

## 2025-03-27 DIAGNOSIS — Z94.0 KIDNEY REPLACED BY TRANSPLANT (HHS-HCC): ICD-10-CM

## 2025-03-27 LAB
ALBUMIN SERPL BCP-MCNC: 4 G/DL (ref 3.4–5)
ANION GAP SERPL CALC-SCNC: 8 MMOL/L (ref 10–20)
APTT PPP: 41 SECONDS (ref 26–36)
BUN SERPL-MCNC: 25 MG/DL (ref 6–23)
CALCIUM SERPL-MCNC: 9.6 MG/DL (ref 8.6–10.6)
CHLORIDE SERPL-SCNC: 106 MMOL/L (ref 98–107)
CO2 SERPL-SCNC: 30 MMOL/L (ref 21–32)
CREAT SERPL-MCNC: 1.16 MG/DL (ref 0.5–1.05)
EGFRCR SERPLBLD CKD-EPI 2021: 49 ML/MIN/1.73M*2
ERYTHROCYTE [DISTWIDTH] IN BLOOD BY AUTOMATED COUNT: 13.5 % (ref 11.5–14.5)
GLUCOSE SERPL-MCNC: 104 MG/DL (ref 74–99)
HCT VFR BLD AUTO: 37.3 % (ref 36–46)
HGB BLD-MCNC: 11.4 G/DL (ref 12–16)
INR PPP: 1.6 (ref 0.9–1.1)
MAGNESIUM SERPL-MCNC: 2.09 MG/DL (ref 1.6–2.4)
MCH RBC QN AUTO: 28.6 PG (ref 26–34)
MCHC RBC AUTO-ENTMCNC: 30.6 G/DL (ref 32–36)
MCV RBC AUTO: 94 FL (ref 80–100)
NRBC BLD-RTO: 0 /100 WBCS (ref 0–0)
PHOSPHATE SERPL-MCNC: 3 MG/DL (ref 2.5–4.9)
PLATELET # BLD AUTO: 123 X10*3/UL (ref 150–450)
POTASSIUM SERPL-SCNC: 4.3 MMOL/L (ref 3.5–5.3)
PROTHROMBIN TIME: 17.4 SECONDS (ref 9.8–12.4)
RBC # BLD AUTO: 3.99 X10*6/UL (ref 4–5.2)
SODIUM SERPL-SCNC: 140 MMOL/L (ref 136–145)
TACROLIMUS BLD-MCNC: 7.6 NG/ML
WBC # BLD AUTO: 2.1 X10*3/UL (ref 4.4–11.3)

## 2025-03-27 PROCEDURE — 83735 ASSAY OF MAGNESIUM: CPT | Performed by: INTERNAL MEDICINE

## 2025-03-27 PROCEDURE — 87799 DETECT AGENT NOS DNA QUANT: CPT | Performed by: INTERNAL MEDICINE

## 2025-03-27 PROCEDURE — 80069 RENAL FUNCTION PANEL: CPT | Performed by: INTERNAL MEDICINE

## 2025-03-27 PROCEDURE — 80197 ASSAY OF TACROLIMUS: CPT | Performed by: INTERNAL MEDICINE

## 2025-03-27 PROCEDURE — 86832 HLA CLASS I HIGH DEFIN QUAL: CPT | Performed by: INTERNAL MEDICINE

## 2025-03-27 PROCEDURE — 85610 PROTHROMBIN TIME: CPT | Performed by: INTERNAL MEDICINE

## 2025-03-27 PROCEDURE — 85027 COMPLETE CBC AUTOMATED: CPT | Performed by: INTERNAL MEDICINE

## 2025-03-27 PROCEDURE — 36415 COLL VENOUS BLD VENIPUNCTURE: CPT

## 2025-03-27 RX ORDER — FERROUS SULFATE 325(65) MG
1 TABLET ORAL DAILY
Qty: 90 TABLET | Refills: 1 | Status: SHIPPED | OUTPATIENT
Start: 2025-03-27

## 2025-03-28 DIAGNOSIS — Z94.0 KIDNEY REPLACED BY TRANSPLANT (HHS-HCC): ICD-10-CM

## 2025-03-28 LAB
BKV DNA SERPL NAA+PROBE-LOG#: NORMAL {LOG_COPIES}/ML
CMV DNA SERPL NAA+PROBE-LOG IU: NORMAL {LOG_IU}/ML
EBV DNA SPEC NAA+PROBE-LOG#: NORMAL {LOG_COPIES}/ML
HLA RESULTS: NORMAL
HLA-A+B+C AB NFR SER: NORMAL %
HLA-DP+DQ+DR AB NFR SER: NORMAL %
LABORATORY COMMENT REPORT: NOT DETECTED

## 2025-03-30 PROCEDURE — RXMED WILLOW AMBULATORY MEDICATION CHARGE

## 2025-03-31 PROCEDURE — RXMED WILLOW AMBULATORY MEDICATION CHARGE

## 2025-04-03 ENCOUNTER — PHARMACY VISIT (OUTPATIENT)
Dept: PHARMACY | Facility: CLINIC | Age: 80
End: 2025-04-03
Payer: COMMERCIAL

## 2025-04-03 ENCOUNTER — NURSE ONLY (OUTPATIENT)
Facility: HOSPITAL | Age: 80
End: 2025-04-03
Payer: COMMERCIAL

## 2025-04-03 DIAGNOSIS — Z94.0 KIDNEY TRANSPLANTED (HHS-HCC): ICD-10-CM

## 2025-04-03 DIAGNOSIS — D84.9 IMMUNOSUPPRESSION: ICD-10-CM

## 2025-04-03 DIAGNOSIS — Z94.0 KIDNEY REPLACED BY TRANSPLANT (HHS-HCC): ICD-10-CM

## 2025-04-03 LAB
ALBUMIN SERPL BCP-MCNC: 4.3 G/DL (ref 3.4–5)
ANION GAP SERPL CALC-SCNC: 15 MMOL/L (ref 10–20)
APPEARANCE UR: CLEAR
BASOPHILS # BLD MANUAL: 0.06 X10*3/UL (ref 0–0.1)
BASOPHILS NFR BLD MANUAL: 2.5 %
BILIRUB UR STRIP.AUTO-MCNC: NEGATIVE MG/DL
BLASTS # BLD MANUAL: 0 X10*3/UL
BLASTS NFR BLD MANUAL: 0 %
BUN SERPL-MCNC: 27 MG/DL (ref 6–23)
BURR CELLS BLD QL SMEAR: ABNORMAL
CALCIUM SERPL-MCNC: 10 MG/DL (ref 8.6–10.6)
CHLORIDE SERPL-SCNC: 108 MMOL/L (ref 98–107)
CO2 SERPL-SCNC: 25 MMOL/L (ref 21–32)
COLOR UR: COLORLESS
CREAT SERPL-MCNC: 1.1 MG/DL (ref 0.5–1.05)
CREAT UR-MCNC: 28.3 MG/DL (ref 20–320)
EGFRCR SERPLBLD CKD-EPI 2021: 52 ML/MIN/1.73M*2
EOSINOPHIL # BLD MANUAL: 0.11 X10*3/UL (ref 0–0.4)
EOSINOPHIL NFR BLD MANUAL: 4.2 %
ERYTHROCYTE [DISTWIDTH] IN BLOOD BY AUTOMATED COUNT: 14.1 % (ref 11.5–14.5)
ERYTHROCYTE [DISTWIDTH] IN BLOOD BY AUTOMATED COUNT: 14.1 % (ref 11.5–14.5)
GLUCOSE SERPL-MCNC: 104 MG/DL (ref 74–99)
GLUCOSE UR STRIP.AUTO-MCNC: ABNORMAL MG/DL
HCT VFR BLD AUTO: 37.8 % (ref 36–46)
HCT VFR BLD AUTO: 37.8 % (ref 36–46)
HGB BLD-MCNC: 11.5 G/DL (ref 12–16)
HGB BLD-MCNC: 11.5 G/DL (ref 12–16)
IMM GRANULOCYTES # BLD AUTO: 0.07 X10*3/UL (ref 0–0.5)
IMM GRANULOCYTES NFR BLD AUTO: 2.8 % (ref 0–0.9)
KETONES UR STRIP.AUTO-MCNC: NEGATIVE MG/DL
LEUKOCYTE ESTERASE UR QL STRIP.AUTO: NEGATIVE
LYMPHOCYTES # BLD MANUAL: 0.54 X10*3/UL (ref 0.8–3)
LYMPHOCYTES NFR BLD MANUAL: 21.6 %
MAGNESIUM SERPL-MCNC: 2.05 MG/DL (ref 1.6–2.4)
MCH RBC QN AUTO: 27.9 PG (ref 26–34)
MCH RBC QN AUTO: 27.9 PG (ref 26–34)
MCHC RBC AUTO-ENTMCNC: 30.4 G/DL (ref 32–36)
MCHC RBC AUTO-ENTMCNC: 30.4 G/DL (ref 32–36)
MCV RBC AUTO: 92 FL (ref 80–100)
MCV RBC AUTO: 92 FL (ref 80–100)
METAMYELOCYTES # BLD MANUAL: 0 X10*3/UL
METAMYELOCYTES NFR BLD MANUAL: 0 %
MONOCYTES # BLD MANUAL: 0.73 X10*3/UL (ref 0.05–0.8)
MONOCYTES NFR BLD MANUAL: 29.2 %
MYELOCYTES # BLD MANUAL: 0 X10*3/UL
MYELOCYTES NFR BLD MANUAL: 0 %
NEUTROPHILS # BLD MANUAL: 1.02 X10*3/UL (ref 1.6–5.5)
NEUTS BAND # BLD MANUAL: 0 X10*3/UL (ref 0–0.5)
NEUTS BAND NFR BLD MANUAL: 0 %
NEUTS SEG # BLD MANUAL: 1.02 X10*3/UL (ref 1.6–5)
NEUTS SEG NFR BLD MANUAL: 40.8 %
NITRITE UR QL STRIP.AUTO: NEGATIVE
NRBC BLD MANUAL-RTO: 0 % (ref 0–0)
NRBC BLD-RTO: 0 /100 WBCS (ref 0–0)
NRBC BLD-RTO: 0 /100 WBCS (ref 0–0)
OVALOCYTES BLD QL SMEAR: ABNORMAL
PH UR STRIP.AUTO: 6 [PH]
PHOSPHATE SERPL-MCNC: 3.5 MG/DL (ref 2.5–4.9)
PLASMA CELLS # BLD MANUAL: 0 X10*3/UL
PLASMA CELLS NFR BLD MANUAL: 0 %
PLATELET # BLD AUTO: 130 X10*3/UL (ref 150–450)
PLATELET # BLD AUTO: 130 X10*3/UL (ref 150–450)
POTASSIUM SERPL-SCNC: 4.1 MMOL/L (ref 3.5–5.3)
PROMYELOCYTES # BLD MANUAL: 0 X10*3/UL
PROMYELOCYTES NFR BLD MANUAL: 0 %
PROT UR STRIP.AUTO-MCNC: NEGATIVE MG/DL
PROT UR-ACNC: 4 MG/DL (ref 5–24)
PROT/CREAT UR: 0.14 MG/MG CREAT (ref 0–0.17)
RBC # BLD AUTO: 4.12 X10*6/UL (ref 4–5.2)
RBC # BLD AUTO: 4.12 X10*6/UL (ref 4–5.2)
RBC # UR STRIP.AUTO: NEGATIVE MG/DL
RBC MORPH BLD: ABNORMAL
SODIUM SERPL-SCNC: 144 MMOL/L (ref 136–145)
SP GR UR STRIP.AUTO: 1.01
TACROLIMUS BLD-MCNC: 7.1 NG/ML
TOTAL CELLS COUNTED BLD: 120
UROBILINOGEN UR STRIP.AUTO-MCNC: NORMAL MG/DL
VARIANT LYMPHS # BLD MANUAL: 0.04 X10*3/UL (ref 0–0.3)
VARIANT LYMPHS NFR BLD: 1.7 %
WBC # BLD AUTO: 2.5 X10*3/UL (ref 4.4–11.3)
WBC # BLD AUTO: 2.5 X10*3/UL (ref 4.4–11.3)

## 2025-04-03 PROCEDURE — 85007 BL SMEAR W/DIFF WBC COUNT: CPT

## 2025-04-03 PROCEDURE — 80069 RENAL FUNCTION PANEL: CPT

## 2025-04-03 PROCEDURE — 85027 COMPLETE CBC AUTOMATED: CPT

## 2025-04-03 PROCEDURE — 87799 DETECT AGENT NOS DNA QUANT: CPT

## 2025-04-03 PROCEDURE — 83735 ASSAY OF MAGNESIUM: CPT

## 2025-04-03 PROCEDURE — 81003 URINALYSIS AUTO W/O SCOPE: CPT | Performed by: INTERNAL MEDICINE

## 2025-04-03 PROCEDURE — 36415 COLL VENOUS BLD VENIPUNCTURE: CPT

## 2025-04-03 PROCEDURE — 80197 ASSAY OF TACROLIMUS: CPT

## 2025-04-03 PROCEDURE — 82570 ASSAY OF URINE CREATININE: CPT

## 2025-04-03 PROCEDURE — 87385 HISTOPLASMA CAPSUL AG IA: CPT

## 2025-04-04 DIAGNOSIS — Z94.0 KIDNEY REPLACED BY TRANSPLANT (HHS-HCC): ICD-10-CM

## 2025-04-04 RX ORDER — FUROSEMIDE 20 MG/1
20 TABLET ORAL DAILY
Qty: 30 TABLET | Refills: 0 | Status: SHIPPED | OUTPATIENT
Start: 2025-04-04 | End: 2026-04-04

## 2025-04-08 ENCOUNTER — NURSE ONLY (OUTPATIENT)
Facility: HOSPITAL | Age: 80
End: 2025-04-08
Payer: COMMERCIAL

## 2025-04-08 ENCOUNTER — OFFICE VISIT (OUTPATIENT)
Facility: HOSPITAL | Age: 80
End: 2025-04-08
Payer: COMMERCIAL

## 2025-04-08 VITALS
SYSTOLIC BLOOD PRESSURE: 116 MMHG | BODY MASS INDEX: 31.43 KG/M2 | DIASTOLIC BLOOD PRESSURE: 74 MMHG | OXYGEN SATURATION: 96 % | HEART RATE: 74 BPM | TEMPERATURE: 97.8 F | WEIGHT: 194.7 LBS

## 2025-04-08 DIAGNOSIS — Z94.0 KIDNEY REPLACED BY TRANSPLANT (HHS-HCC): ICD-10-CM

## 2025-04-08 DIAGNOSIS — Z79.899 IMMUNOSUPPRESSIVE MANAGEMENT ENCOUNTER FOLLOWING KIDNEY TRANSPLANT: ICD-10-CM

## 2025-04-08 DIAGNOSIS — D84.9 IMMUNOSUPPRESSION: ICD-10-CM

## 2025-04-08 DIAGNOSIS — I82.4Y9 DEEP VEIN THROMBOSIS (DVT) OF PROXIMAL LOWER EXTREMITY, UNSPECIFIED CHRONICITY, UNSPECIFIED LATERALITY: ICD-10-CM

## 2025-04-08 DIAGNOSIS — Z94.0 KIDNEY REPLACED BY TRANSPLANT (HHS-HCC): Primary | ICD-10-CM

## 2025-04-08 DIAGNOSIS — Z94.0 KIDNEY TRANSPLANTED (HHS-HCC): ICD-10-CM

## 2025-04-08 DIAGNOSIS — G57.91 NEUROPATHY OF RIGHT LOWER EXTREMITY: ICD-10-CM

## 2025-04-08 DIAGNOSIS — D72.819 LEUKOPENIA, UNSPECIFIED TYPE: ICD-10-CM

## 2025-04-08 DIAGNOSIS — Z94.0 IMMUNOSUPPRESSIVE MANAGEMENT ENCOUNTER FOLLOWING KIDNEY TRANSPLANT: ICD-10-CM

## 2025-04-08 LAB
ALBUMIN SERPL BCP-MCNC: 4.1 G/DL (ref 3.4–5)
ANION GAP SERPL CALC-SCNC: 13 MMOL/L (ref 10–20)
BUN SERPL-MCNC: 25 MG/DL (ref 6–23)
CALCIUM SERPL-MCNC: 9.7 MG/DL (ref 8.6–10.6)
CHLORIDE SERPL-SCNC: 106 MMOL/L (ref 98–107)
CO2 SERPL-SCNC: 25 MMOL/L (ref 21–32)
CREAT SERPL-MCNC: 1.15 MG/DL (ref 0.5–1.05)
CREAT UR-MCNC: 89 MG/DL (ref 20–320)
EGFRCR SERPLBLD CKD-EPI 2021: 49 ML/MIN/1.73M*2
ERYTHROCYTE [DISTWIDTH] IN BLOOD BY AUTOMATED COUNT: 14 % (ref 11.5–14.5)
GLUCOSE SERPL-MCNC: 90 MG/DL (ref 74–99)
HCT VFR BLD AUTO: 37.4 % (ref 36–46)
HGB BLD-MCNC: 11.5 G/DL (ref 12–16)
MAGNESIUM SERPL-MCNC: 2.1 MG/DL (ref 1.6–2.4)
MCH RBC QN AUTO: 28.1 PG (ref 26–34)
MCHC RBC AUTO-ENTMCNC: 30.7 G/DL (ref 32–36)
MCV RBC AUTO: 91 FL (ref 80–100)
NRBC BLD-RTO: 0 /100 WBCS (ref 0–0)
PHOSPHATE SERPL-MCNC: 3.9 MG/DL (ref 2.5–4.9)
PLATELET # BLD AUTO: 148 X10*3/UL (ref 150–450)
POTASSIUM SERPL-SCNC: 4.5 MMOL/L (ref 3.5–5.3)
PROT UR-ACNC: 11 MG/DL (ref 5–24)
PROT/CREAT UR: 0.12 MG/MG CREAT (ref 0–0.17)
RBC # BLD AUTO: 4.09 X10*6/UL (ref 4–5.2)
SODIUM SERPL-SCNC: 139 MMOL/L (ref 136–145)
TACROLIMUS BLD-MCNC: 7.2 NG/ML
WBC # BLD AUTO: 3.8 X10*3/UL (ref 4.4–11.3)

## 2025-04-08 PROCEDURE — 82570 ASSAY OF URINE CREATININE: CPT

## 2025-04-08 PROCEDURE — 83735 ASSAY OF MAGNESIUM: CPT

## 2025-04-08 PROCEDURE — 3078F DIAST BP <80 MM HG: CPT | Performed by: STUDENT IN AN ORGANIZED HEALTH CARE EDUCATION/TRAINING PROGRAM

## 2025-04-08 PROCEDURE — 87799 DETECT AGENT NOS DNA QUANT: CPT

## 2025-04-08 PROCEDURE — G2211 COMPLEX E/M VISIT ADD ON: HCPCS

## 2025-04-08 PROCEDURE — 80197 ASSAY OF TACROLIMUS: CPT

## 2025-04-08 PROCEDURE — 86832 HLA CLASS I HIGH DEFIN QUAL: CPT

## 2025-04-08 PROCEDURE — 85027 COMPLETE CBC AUTOMATED: CPT

## 2025-04-08 PROCEDURE — 3074F SYST BP LT 130 MM HG: CPT | Performed by: STUDENT IN AN ORGANIZED HEALTH CARE EDUCATION/TRAINING PROGRAM

## 2025-04-08 PROCEDURE — 87385 HISTOPLASMA CAPSUL AG IA: CPT

## 2025-04-08 PROCEDURE — 99215 OFFICE O/P EST HI 40 MIN: CPT

## 2025-04-08 PROCEDURE — 1159F MED LIST DOCD IN RCRD: CPT | Performed by: STUDENT IN AN ORGANIZED HEALTH CARE EDUCATION/TRAINING PROGRAM

## 2025-04-08 PROCEDURE — 1125F AMNT PAIN NOTED PAIN PRSNT: CPT | Performed by: STUDENT IN AN ORGANIZED HEALTH CARE EDUCATION/TRAINING PROGRAM

## 2025-04-08 PROCEDURE — 36415 COLL VENOUS BLD VENIPUNCTURE: CPT

## 2025-04-08 PROCEDURE — 84100 ASSAY OF PHOSPHORUS: CPT

## 2025-04-08 RX ORDER — GABAPENTIN 300 MG/1
300 CAPSULE ORAL NIGHTLY
Qty: 30 CAPSULE | Refills: 2 | Status: SHIPPED | OUTPATIENT
Start: 2025-04-08 | End: 2025-07-07

## 2025-04-08 ASSESSMENT — PAIN SCALES - GENERAL: PAINLEVEL_OUTOF10: 5

## 2025-04-08 NOTE — PROGRESS NOTES
TRANSPLANT NEPHROLOGY :   OUTPATIENT CLINIC NOTE      SERVICE DATE : 04/08/2025    REASON FOR VISIT/CHIEF COMPLAINT:  S/P  TRANSPLANT SURGERY  IMMUNOSUPPRESSIVE MEDICATION MANAGEMENT  BLOOD PRESSURE MANAGEMENT    HPI:    Ms. Hamlin is a 79 y.o. 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 Patient was induced by 3 mg/kg of Thymoglobulin initiated on belatacept CellCept and prednisone taper. 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     170 days out  Biopsy scheduled this Monday.  Right leg DVT - diagnosed on 3/19/25 - she has been on Eliquis.   Doing well. Leg edema is improving.  C/o electric shock like pain at her upper thigh off and on, severe enough to bother her  No problems voiding. No N/V/D    Patient is doing well overall. No new complaints. Denied chest pain, SOB, STORM, Palpitation. Normal urination and bowel movement. Normal gait and no weakness of arms/legs. No cough, runny nose, sore throat, cold symptoms, or rash. No hearing loss. Normal vision.No problems with his sleep, mood and function. No recent infection, hospitalization, surgery or ER visits.      ROS:  Review of  14 systems was performed system by system. See HPI. Otherwise, the symptoms were negative.    PAST MEDICAL HISTORY:  Past Medical History:   Diagnosis Date    CKD (chronic kidney disease)     Encounter for general adult medical examination without abnormal findings 09/14/2021    Encounter for Medicare annual wellness exam    Glaucoma     Gout     Hyperlipidemia     Hypertension     Mitral valve regurgitation     JOSE E (obstructive sleep apnea)     Unspecified cataract     Cataracts, both eyes        PAST SURGICAL HISTORY:  Past Surgical History:    Procedure Laterality Date    OTHER SURGICAL HISTORY  2019    Carpal tunnel surgery    OTHER SURGICAL HISTORY  2019     section    OTHER SURGICAL HISTORY  2019    Hernia repair        SOCIAL HISTORY:  Social History     Socioeconomic History    Marital status:      Spouse name: Not on file    Number of children: Not on file    Years of education: Not on file    Highest education level: Not on file   Occupational History    Not on file   Tobacco Use    Smoking status: Never     Passive exposure: Never    Smokeless tobacco: Never   Vaping Use    Vaping status: Never Used   Substance and Sexual Activity    Alcohol use: Never    Drug use: Never    Sexual activity: Yes     Partners: Male   Other Topics Concern    Not on file   Social History Narrative    Not on file     Social Drivers of Health     Financial Resource Strain: Low Risk  (10/21/2024)    Overall Financial Resource Strain (CARDIA)     Difficulty of Paying Living Expenses: Not hard at all   Food Insecurity: No Food Insecurity (10/20/2024)    Hunger Vital Sign     Worried About Running Out of Food in the Last Year: Never true     Ran Out of Food in the Last Year: Never true   Transportation Needs: No Transportation Needs (12/3/2024)    OASIS : Transportation     Lack of Transportation (Medical): No     Lack of Transportation (Non-Medical): No     Patient Unable or Declines to Respond: No   Physical Activity: Not on file   Stress: Not on file   Social Connections: Feeling Socially Integrated (12/3/2024)    OASIS : Social Isolation     Frequency of experiencing loneliness or isolation: Never   Intimate Partner Violence: Not At Risk (10/20/2024)    Humiliation, Afraid, Rape, and Kick questionnaire     Fear of Current or Ex-Partner: No     Emotionally Abused: No     Physically Abused: No     Sexually Abused: No   Housing Stability: Low Risk  (10/21/2024)    Housing Stability Vital Sign     Unable to Pay for Housing in the  Last Year: No     Number of Times Moved in the Last Year: 0     Homeless in the Last Year: No       FAMILY HISTORY:  Family History   Problem Relation Name Age of Onset    Diabetes Mother      Hypertension Mother      Diabetes Son      Colon cancer Mother's Sister      Colon cancer Father's Sister         MEDICATION LIST:  Current Outpatient Medications   Medication Instructions    apixaban (Eliquis) 5 mg tablet Take 2 tablets (10 mg) by mouth 2 times a day for 7 days, THEN 1 tablet (5 mg) 2 times a day.    atorvastatin (LIPITOR) 40 mg, oral, Daily    carvedilol (COREG) 50 mg, oral, 2 times daily    empagliflozin (JARDIANCE) 10 mg, oral, Daily    FeroSuL 325 mg, oral, Daily    furosemide (LASIX) 20 mg, oral, Daily    hydrALAZINE (APRESOLINE) 100 mg, oral, 2 times daily    magnesium oxide (MAG-OX) 400 mg, oral, Daily    mycophenolate (CELLCEPT) 250 mg, oral, Every 12 hours    predniSONE (DELTASONE) 5 mg, oral, Daily    SITagliptin phosphate (JANUVIA) 100 mg, oral, Daily    tacrolimus (PROGRAF) 2 mg, oral, Every 12 hours       ALLERGY  Allergies   Allergen Reactions    Amlodipine Swelling     Edema    Codeine Itching    Erythromycin Itching    Nsaids (Non-Steroidal Anti-Inflammatory Drug) Itching and Nausea Only     chronic renal insufficiency    Tramadol Itching and Nausea/vomiting    Lisinopril Cough     Cough       PHYSICAL EXAM:    Visit Vitals  /74   Pulse 74   Temp 36.6 °C (97.8 °F) (Temporal)   Wt 88.3 kg (194 lb 11.2 oz)   LMP  (LMP Unknown)   SpO2 96%   BMI 31.43 kg/m²   OB Status Postmenopausal   Smoking Status Never   BSA 2.03 m²          Vital signs - reviewed. Acceptable BP at this office visit.   General Appearance - NAD, Good speech, oriented and alert  HEENT - Supple. Not pale. No jaundice. CVS - RRR. Normal S1/S2. No murmur, click , rub or gallop  Lungs- clear to auscultation bilaterally  Abdomen - soft , not tender, no guarding, no rigidity.  S/P Kidney transplant .  Transplanted kidney is not  tender.   Musculoskeletal /Extremities - no edema. Full ROM. No joint tenderness.   Neuro/Psych - appropriate mood and affect. Motor power V/V all extremities. CN I -XII were grossly intact.  Skin - No visible rash      LABS:    Lab Results   Component Value Date    WBC 2.5 (L) 04/03/2025    WBC 2.5 (L) 04/03/2025    HGB 11.5 (L) 04/03/2025    HGB 11.5 (L) 04/03/2025    HCT 37.8 04/03/2025    HCT 37.8 04/03/2025     (L) 04/03/2025     (L) 04/03/2025    CHOL 121 04/11/2024    TRIG 120 04/11/2024    HDL 44.3 04/11/2024    ALT 16 10/20/2024    AST 19 10/20/2024     04/03/2025    K 4.1 04/03/2025     (H) 04/03/2025    CREATININE 1.10 (H) 04/03/2025    BUN 27 (H) 04/03/2025    CO2 25 04/03/2025    TSH 2.34 04/11/2024    INR 1.6 (H) 03/27/2025    HGBA1C 5.6 04/11/2024         ASSESSMENT AND PLAN:    Ms. Hamlin is a 79 y.o. female  who is here for follow up s/p kidney transplant.    TRANSPLANT DATE: 10/20/2024 (Kidney)      1. ESRD S/P kidney transplant   - Creatinine last check was :  Lab Results   Component Value Date    CREATININE 1.10 (H) 04/03/2025       - Renal allograft function is excellent.  Oleg Cr 0.8-0.9. No proteinuria.  -Allosure last check was 1.8% 3/18/25 --> repeat Allosure  -Ensure adequate hydration  - Avoid nephrotoxic medications, NSAIDs, and IV contrast.    2. Immunosuppression  -Tacrolimus level last check was 7.1 - goal ~7-9 for now  -Continue current immunosuppression regimen.  TAC 2 mg BID   mg BID  Pred 5 mg/day    CMV/EBV/BK neg 4/3/25  DSA neg 3/27/25    3. Electrolytes  Lab Results   Component Value Date    GLUCOSE 104 (H) 04/03/2025    CALCIUM 10.0 04/03/2025     04/03/2025    K 4.1 04/03/2025    CO2 25 04/03/2025     (H) 04/03/2025    BUN 27 (H) 04/03/2025    CREATININE 1.10 (H) 04/03/2025     -Acceptable from last lab drawn    4. Hypertension  Blood Pressures         4/8/2025  1055             BP: 116/74          -Home  BP had been  acceptable  -Encourage to monitor home BP  -Continue current anti hypertensive medication  Hydralazine 100 mg BID  --> will taper off after biopsy  Coreg 50 mg BID  Lasix 20 mg daily    5. Bone Mineral Disease/Osteoporosis  Lab Results   Component Value Date    PTH 96.5 (H) 01/28/2025    CALCIUM 10.0 04/03/2025    CAION 1.25 10/26/2024    PHOS 3.5 04/03/2025    VITD25 41 03/18/2025   OK    6.Anemia  Lab Results   Component Value Date    WBC 2.5 (L) 04/03/2025    WBC 2.5 (L) 04/03/2025    HGB 11.5 (L) 04/03/2025    HGB 11.5 (L) 04/03/2025    HCT 37.8 04/03/2025    HCT 37.8 04/03/2025    MCV 92 04/03/2025    MCV 92 04/03/2025     (L) 04/03/2025     (L) 04/03/2025   Pancytopenia  Off Valcyte and TMP-SMX    7.Health maintenance and vaccination  - Flu shot during flu season annually  - Cancer screening is up to date per the patient  - CVD prevention: Lipitor 40 mg/day + ASA 81 mg./day  - DM2: sitagliptin 100 mg/day + Jardiance 10 mg/day  - neuropathic pain post-surgical: gabapentin 300 mg at bedtime   - DVT of right leg 3/19/25: Eliquis    Lab : Routine transplant lab ( CBC, RFP, and anti-rejection trough level ) every 2 weeks  VIT D, PTH q3mo  Viral screening PCR, Allosure and UPC per protocol.    Additional Plan :  Biopsy on Monday  Follow today's lab with Allosure  Gabapentin 300 mg at bedtime for neuropathic pain of right extremity    RTC 6 week(s)    Hilario Baltazar MD    Transplant Nephrology

## 2025-04-09 ENCOUNTER — OFFICE VISIT (OUTPATIENT)
Dept: CARDIOLOGY | Facility: HOSPITAL | Age: 80
End: 2025-04-09
Payer: COMMERCIAL

## 2025-04-09 VITALS
SYSTOLIC BLOOD PRESSURE: 119 MMHG | WEIGHT: 194 LBS | HEIGHT: 66 IN | OXYGEN SATURATION: 99 % | HEART RATE: 80 BPM | DIASTOLIC BLOOD PRESSURE: 68 MMHG | BODY MASS INDEX: 31.18 KG/M2

## 2025-04-09 DIAGNOSIS — E78.5 HYPERLIPIDEMIA, UNSPECIFIED HYPERLIPIDEMIA TYPE: ICD-10-CM

## 2025-04-09 DIAGNOSIS — I34.0 NONRHEUMATIC MITRAL VALVE REGURGITATION: ICD-10-CM

## 2025-04-09 DIAGNOSIS — I50.33 ACUTE ON CHRONIC HEART FAILURE WITH PRESERVED EJECTION FRACTION: Primary | Chronic | ICD-10-CM

## 2025-04-09 DIAGNOSIS — I10 ESSENTIAL HYPERTENSION: ICD-10-CM

## 2025-04-09 PROCEDURE — 3078F DIAST BP <80 MM HG: CPT | Performed by: INTERNAL MEDICINE

## 2025-04-09 PROCEDURE — 3074F SYST BP LT 130 MM HG: CPT | Performed by: INTERNAL MEDICINE

## 2025-04-09 PROCEDURE — 99214 OFFICE O/P EST MOD 30 MIN: CPT | Performed by: INTERNAL MEDICINE

## 2025-04-09 PROCEDURE — 1159F MED LIST DOCD IN RCRD: CPT | Performed by: INTERNAL MEDICINE

## 2025-04-09 PROCEDURE — 1036F TOBACCO NON-USER: CPT | Performed by: INTERNAL MEDICINE

## 2025-04-09 NOTE — PROGRESS NOTES
"Chief Complaint:   No chief complaint on file.     History Of Present Illness:    Liz Hamlin is a 79 y.o. female here for follow-up. She has a history of hypertension, hyperlipidemia, HFpEF, mitral regurgitation of variable severity (mild to mild-moderate), CKD/ESRD s/p renal transplant.     Last visit she had noted symptoms of acute on chronic HFpEF. She had her diuretics adjusted until her leg swelling had resolved. She notes resolution of her leg edema but still with some exertional dyspnea and orthopnea which had improved but has not resolved. She reports being diagnosed with a DVT since her last visit here.      Last Recorded Vitals:  Vitals:    04/09/25 0940   BP: 119/68   BP Location: Right arm   Patient Position: Sitting   BP Cuff Size: Adult   Pulse: 80   SpO2: 99%   Weight: 88 kg (194 lb)   Height: 1.676 m (5' 6\")              Allergies:  Amlodipine, Codeine, Erythromycin, Nsaids (non-steroidal anti-inflammatory drug), Tramadol, and Lisinopril    Outpatient Medications:  Current Outpatient Medications   Medication Instructions    apixaban (Eliquis) 5 mg tablet Take 2 tablets (10 mg) by mouth 2 times a day for 7 days, THEN 1 tablet (5 mg) 2 times a day.    atorvastatin (LIPITOR) 40 mg, oral, Daily    carvedilol (COREG) 50 mg, oral, 2 times daily    empagliflozin (JARDIANCE) 10 mg, oral, Daily    FeroSuL 325 mg, oral, Daily    furosemide (LASIX) 20 mg, oral, Daily    gabapentin (NEURONTIN) 300 mg, oral, Nightly    hydrALAZINE (APRESOLINE) 100 mg, oral, 2 times daily    magnesium oxide (MAG-OX) 400 mg, oral, Daily    mycophenolate (CELLCEPT) 250 mg, oral, Every 12 hours    predniSONE (DELTASONE) 5 mg, oral, Daily    SITagliptin phosphate (JANUVIA) 100 mg, oral, Daily    tacrolimus (PROGRAF) 2 mg, oral, Every 12 hours         Physical Exam:  Gen Well nourished septuagenarian female sitting up in NAD. Body mass index is 31.31 kg/m².  CV reg rate with ectopy. No m/r/g appreciated. + JVD (slight). No leg " edema.    Pulm Lungs clear with normal respiratory effort.  Neuro Alert and conversant. Grossly nonfocal.          I reviewed most recent imaging / labs / and office notes      Assessment/Plan   1.  Acute on chronic HFpEF  Volume status improved though still with some HF symptoms. Will have her increase her furosemide to BID until her symptoms have resolved or 1 week has past--whichever is sooner. She will call next week with an up date on her symptoms. Her SGLT2-i is to continue.    2. Mitral regurgitation  Variable being more mild by cardiac MRI 2024. For reassessment due 2026 but will recheck earlier if her symptoms fail to resolve with adjusted therapies as above.    3. Hypertension   BP well controlled. Her present regimen is to continue.     4. Hyperlipidemia   Lipids well controlled. Last CAC score 0 (2/2024). Her present regimen is to continue.         Follow-up 6 weeks        Dov Encinas MD

## 2025-04-10 ENCOUNTER — TELEPHONE (OUTPATIENT)
Facility: HOSPITAL | Age: 80
End: 2025-04-10
Payer: COMMERCIAL

## 2025-04-10 ENCOUNTER — PHARMACY VISIT (OUTPATIENT)
Dept: PHARMACY | Facility: CLINIC | Age: 80
End: 2025-04-10
Payer: COMMERCIAL

## 2025-04-10 LAB
HLA RESULTS: NORMAL
HLA-A+B+C AB NFR SER: NORMAL %
HLA-DP+DQ+DR AB NFR SER: NORMAL %

## 2025-04-10 NOTE — TELEPHONE ENCOUNTER
Patient called requesting to speak with coordinator about missed a call from ran.  Patient call back number is [phone].

## 2025-04-11 LAB
ALLOSURE SCORE - KIDNEY: 3.1 %
CAREDX_ORDER_ID: NORMAL
CENTER_ORDER_ID: NORMAL
CLIENT SPECIMEN ID - ALLOSURE: NORMAL
DONOR RELATION - ALLOSURE: NORMAL
H CAPSUL AG UR QL: NOT DETECTED
NOTES - ALLOSURE: NORMAL
RELATIVE CHANGE VALUE - KIDNEY: 72 %
SCAN RESULT: NORMAL
TEST COMMENTS - ALLOSURE: NORMAL
TIME POST TX - ALLOSURE: NORMAL
TRANSPLANTED ORGAN - ALLOSURE: NORMAL
TX DATE - ALLOSURE/ALLOMAP: NORMAL
WP_ORDER_ID: NORMAL

## 2025-04-14 ENCOUNTER — HOSPITAL ENCOUNTER (INPATIENT)
Facility: HOSPITAL | Age: 80
LOS: 5 days | Discharge: HOME | End: 2025-04-19
Attending: TRANSPLANT SURGERY | Admitting: TRANSPLANT SURGERY
Payer: MEDICARE

## 2025-04-14 ENCOUNTER — TELEPHONE (OUTPATIENT)
Facility: HOSPITAL | Age: 80
End: 2025-04-14
Payer: COMMERCIAL

## 2025-04-14 ENCOUNTER — HOSPITAL ENCOUNTER (OUTPATIENT)
Dept: RADIOLOGY | Facility: HOSPITAL | Age: 80
Discharge: HOME | End: 2025-04-14
Payer: COMMERCIAL

## 2025-04-14 VITALS
DIASTOLIC BLOOD PRESSURE: 85 MMHG | OXYGEN SATURATION: 99 % | SYSTOLIC BLOOD PRESSURE: 131 MMHG | HEART RATE: 70 BPM | TEMPERATURE: 97.5 F | RESPIRATION RATE: 16 BRPM

## 2025-04-14 DIAGNOSIS — T86.11 KIDNEY TRANSPLANT REJECTION (HHS-HCC): Primary | ICD-10-CM

## 2025-04-14 DIAGNOSIS — T86.19 DELAYED GRAFT FUNCTION OF KIDNEY (HHS-HCC): ICD-10-CM

## 2025-04-14 DIAGNOSIS — B25.9 CYTOMEGALOVIRUS INFECTION, UNSPECIFIED CYTOMEGALOVIRAL INFECTION TYPE (MULTI): ICD-10-CM

## 2025-04-14 DIAGNOSIS — Z94.0 KIDNEY TRANSPLANTED (HHS-HCC): ICD-10-CM

## 2025-04-14 DIAGNOSIS — Z94.0 KIDNEY REPLACED BY TRANSPLANT (HHS-HCC): ICD-10-CM

## 2025-04-14 LAB
ALBUMIN SERPL BCP-MCNC: 4.1 G/DL (ref 3.4–5)
ANION GAP SERPL CALC-SCNC: 14 MMOL/L (ref 10–20)
BUN SERPL-MCNC: 23 MG/DL (ref 6–23)
CALCIUM SERPL-MCNC: 9.5 MG/DL (ref 8.6–10.6)
CHLORIDE SERPL-SCNC: 108 MMOL/L (ref 98–107)
CO2 SERPL-SCNC: 25 MMOL/L (ref 21–32)
CREAT SERPL-MCNC: 1.18 MG/DL (ref 0.5–1.05)
EGFRCR SERPLBLD CKD-EPI 2021: 48 ML/MIN/1.73M*2
ERYTHROCYTE [DISTWIDTH] IN BLOOD BY AUTOMATED COUNT: 14.4 % (ref 11.5–14.5)
GLUCOSE BLD MANUAL STRIP-MCNC: 139 MG/DL (ref 74–99)
GLUCOSE BLD MANUAL STRIP-MCNC: 204 MG/DL (ref 74–99)
GLUCOSE SERPL-MCNC: 111 MG/DL (ref 74–99)
HCT VFR BLD AUTO: 37.8 % (ref 36–46)
HGB BLD-MCNC: 11.5 G/DL (ref 12–16)
MAGNESIUM SERPL-MCNC: 1.96 MG/DL (ref 1.6–2.4)
MCH RBC QN AUTO: 28 PG (ref 26–34)
MCHC RBC AUTO-ENTMCNC: 30.4 G/DL (ref 32–36)
MCV RBC AUTO: 92 FL (ref 80–100)
NRBC BLD-RTO: 0 /100 WBCS (ref 0–0)
PHOSPHATE SERPL-MCNC: 3.7 MG/DL (ref 2.5–4.9)
PLATELET # BLD AUTO: 121 X10*3/UL (ref 150–450)
POTASSIUM SERPL-SCNC: 3.7 MMOL/L (ref 3.5–5.3)
RBC # BLD AUTO: 4.1 X10*6/UL (ref 4–5.2)
SODIUM SERPL-SCNC: 143 MMOL/L (ref 136–145)
WBC # BLD AUTO: 6.6 X10*3/UL (ref 4.4–11.3)

## 2025-04-14 PROCEDURE — 88305 TISSUE EXAM BY PATHOLOGIST: CPT | Mod: TC,SUR | Performed by: STUDENT IN AN ORGANIZED HEALTH CARE EDUCATION/TRAINING PROGRAM

## 2025-04-14 PROCEDURE — 2720000007 HC OR 272 NO HCPCS

## 2025-04-14 PROCEDURE — 99222 1ST HOSP IP/OBS MODERATE 55: CPT

## 2025-04-14 PROCEDURE — 99152 MOD SED SAME PHYS/QHP 5/>YRS: CPT | Performed by: RADIOLOGY

## 2025-04-14 PROCEDURE — 2500000001 HC RX 250 WO HCPCS SELF ADMINISTERED DRUGS (ALT 637 FOR MEDICARE OP)

## 2025-04-14 PROCEDURE — 82947 ASSAY GLUCOSE BLOOD QUANT: CPT

## 2025-04-14 PROCEDURE — 36415 COLL VENOUS BLD VENIPUNCTURE: CPT

## 2025-04-14 PROCEDURE — 80069 RENAL FUNCTION PANEL: CPT

## 2025-04-14 PROCEDURE — 1100000001 HC PRIVATE ROOM DAILY

## 2025-04-14 PROCEDURE — 99152 MOD SED SAME PHYS/QHP 5/>YRS: CPT

## 2025-04-14 PROCEDURE — 7100000009 HC PHASE TWO TIME - INITIAL BASE CHARGE

## 2025-04-14 PROCEDURE — 7100000010 HC PHASE TWO TIME - EACH INCREMENTAL 1 MINUTE

## 2025-04-14 PROCEDURE — 83735 ASSAY OF MAGNESIUM: CPT

## 2025-04-14 PROCEDURE — 2500000004 HC RX 250 GENERAL PHARMACY W/ HCPCS (ALT 636 FOR OP/ED): Performed by: RADIOLOGY

## 2025-04-14 PROCEDURE — 85027 COMPLETE CBC AUTOMATED: CPT

## 2025-04-14 PROCEDURE — 50200 RENAL BIOPSY PERQ: CPT

## 2025-04-14 PROCEDURE — 2500000004 HC RX 250 GENERAL PHARMACY W/ HCPCS (ALT 636 FOR OP/ED)

## 2025-04-14 RX ORDER — GABAPENTIN 300 MG/1
300 CAPSULE ORAL NIGHTLY
Status: DISCONTINUED | OUTPATIENT
Start: 2025-04-14 | End: 2025-04-19 | Stop reason: HOSPADM

## 2025-04-14 RX ORDER — PREDNISONE 10 MG/1
5 TABLET ORAL DAILY
Status: DISCONTINUED | OUTPATIENT
Start: 2025-04-14 | End: 2025-04-16

## 2025-04-14 RX ORDER — DEXTROSE 50 % IN WATER (D50W) INTRAVENOUS SYRINGE
25
Status: DISCONTINUED | OUTPATIENT
Start: 2025-04-14 | End: 2025-04-19 | Stop reason: HOSPADM

## 2025-04-14 RX ORDER — FUROSEMIDE 20 MG/1
20 TABLET ORAL DAILY
Status: DISCONTINUED | OUTPATIENT
Start: 2025-04-14 | End: 2025-04-19 | Stop reason: HOSPADM

## 2025-04-14 RX ORDER — HYDRALAZINE HYDROCHLORIDE 25 MG/1
100 TABLET, FILM COATED ORAL 2 TIMES DAILY
Status: DISCONTINUED | OUTPATIENT
Start: 2025-04-14 | End: 2025-04-19 | Stop reason: HOSPADM

## 2025-04-14 RX ORDER — MIDAZOLAM HYDROCHLORIDE 1 MG/ML
INJECTION INTRAMUSCULAR; INTRAVENOUS
Status: COMPLETED | OUTPATIENT
Start: 2025-04-14 | End: 2025-04-14

## 2025-04-14 RX ORDER — ATORVASTATIN CALCIUM 40 MG/1
40 TABLET, FILM COATED ORAL DAILY
Status: DISCONTINUED | OUTPATIENT
Start: 2025-04-14 | End: 2025-04-19 | Stop reason: HOSPADM

## 2025-04-14 RX ORDER — FENTANYL CITRATE 50 UG/ML
INJECTION, SOLUTION INTRAMUSCULAR; INTRAVENOUS
Status: COMPLETED | OUTPATIENT
Start: 2025-04-14 | End: 2025-04-14

## 2025-04-14 RX ORDER — MYCOPHENOLATE MOFETIL 250 MG/1
250 CAPSULE ORAL EVERY 12 HOURS
Status: DISCONTINUED | OUTPATIENT
Start: 2025-04-14 | End: 2025-04-15

## 2025-04-14 RX ORDER — FERROUS SULFATE 325(65) MG
1 TABLET ORAL DAILY
Status: DISCONTINUED | OUTPATIENT
Start: 2025-04-14 | End: 2025-04-19 | Stop reason: HOSPADM

## 2025-04-14 RX ORDER — TACROLIMUS 1 MG/1
2 CAPSULE ORAL EVERY 12 HOURS
Status: DISCONTINUED | OUTPATIENT
Start: 2025-04-14 | End: 2025-04-15

## 2025-04-14 RX ORDER — DEXTROSE 50 % IN WATER (D50W) INTRAVENOUS SYRINGE
12.5
Status: DISCONTINUED | OUTPATIENT
Start: 2025-04-14 | End: 2025-04-19 | Stop reason: HOSPADM

## 2025-04-14 RX ORDER — LANOLIN ALCOHOL/MO/W.PET/CERES
400 CREAM (GRAM) TOPICAL DAILY
Status: DISCONTINUED | OUTPATIENT
Start: 2025-04-14 | End: 2025-04-19 | Stop reason: HOSPADM

## 2025-04-14 RX ORDER — CARVEDILOL 25 MG/1
50 TABLET ORAL 2 TIMES DAILY
Status: DISCONTINUED | OUTPATIENT
Start: 2025-04-14 | End: 2025-04-19 | Stop reason: HOSPADM

## 2025-04-14 RX ORDER — INSULIN LISPRO 100 [IU]/ML
0-10 INJECTION, SOLUTION INTRAVENOUS; SUBCUTANEOUS
Status: DISCONTINUED | OUTPATIENT
Start: 2025-04-14 | End: 2025-04-19 | Stop reason: HOSPADM

## 2025-04-14 RX ORDER — HEPARIN SODIUM 5000 [USP'U]/ML
5000 INJECTION, SOLUTION INTRAVENOUS; SUBCUTANEOUS EVERY 8 HOURS
Status: DISCONTINUED | OUTPATIENT
Start: 2025-04-14 | End: 2025-04-15

## 2025-04-14 RX ADMIN — ATORVASTATIN CALCIUM 40 MG: 40 TABLET, FILM COATED ORAL at 20:52

## 2025-04-14 RX ADMIN — MIDAZOLAM HYDROCHLORIDE 1 MG: 2 INJECTION, SOLUTION INTRAMUSCULAR; INTRAVENOUS at 09:36

## 2025-04-14 RX ADMIN — HYDRALAZINE HYDROCHLORIDE 100 MG: 25 TABLET ORAL at 20:52

## 2025-04-14 RX ADMIN — TACROLIMUS 2 MG: 1 CAPSULE ORAL at 16:05

## 2025-04-14 RX ADMIN — FENTANYL CITRATE 50 MCG: 50 INJECTION, SOLUTION INTRAMUSCULAR; INTRAVENOUS at 09:42

## 2025-04-14 RX ADMIN — HEPARIN SODIUM 5000 UNITS: 5000 INJECTION, SOLUTION INTRAVENOUS; SUBCUTANEOUS at 16:07

## 2025-04-14 RX ADMIN — MIDAZOLAM HYDROCHLORIDE 1 MG: 2 INJECTION, SOLUTION INTRAMUSCULAR; INTRAVENOUS at 09:42

## 2025-04-14 RX ADMIN — FENTANYL CITRATE 50 MCG: 50 INJECTION, SOLUTION INTRAMUSCULAR; INTRAVENOUS at 09:36

## 2025-04-14 RX ADMIN — MYCOPHENOLATE MOFETIL 250 MG: 250 CAPSULE ORAL at 16:05

## 2025-04-14 RX ADMIN — CARVEDILOL 50 MG: 25 TABLET, FILM COATED ORAL at 20:52

## 2025-04-14 RX ADMIN — DEXTROSE MONOHYDRATE 500 MG: 50 INJECTION, SOLUTION INTRAVENOUS at 16:56

## 2025-04-14 RX ADMIN — HEPARIN SODIUM 5000 UNITS: 5000 INJECTION, SOLUTION INTRAVENOUS; SUBCUTANEOUS at 23:55

## 2025-04-14 SDOH — SOCIAL STABILITY: SOCIAL INSECURITY: WITHIN THE LAST YEAR, HAVE YOU BEEN AFRAID OF YOUR PARTNER OR EX-PARTNER?: NO

## 2025-04-14 SDOH — ECONOMIC STABILITY: FOOD INSECURITY: WITHIN THE PAST 12 MONTHS, YOU WORRIED THAT YOUR FOOD WOULD RUN OUT BEFORE YOU GOT THE MONEY TO BUY MORE.: NEVER TRUE

## 2025-04-14 SDOH — SOCIAL STABILITY: SOCIAL INSECURITY: HAS ANYONE EVER THREATENED TO HURT YOUR FAMILY OR YOUR PETS?: NO

## 2025-04-14 SDOH — SOCIAL STABILITY: SOCIAL INSECURITY: WITHIN THE LAST YEAR, HAVE YOU BEEN HUMILIATED OR EMOTIONALLY ABUSED IN OTHER WAYS BY YOUR PARTNER OR EX-PARTNER?: NO

## 2025-04-14 SDOH — ECONOMIC STABILITY: HOUSING INSECURITY: IN THE LAST 12 MONTHS, WAS THERE A TIME WHEN YOU WERE NOT ABLE TO PAY THE MORTGAGE OR RENT ON TIME?: NO

## 2025-04-14 SDOH — ECONOMIC STABILITY: HOUSING INSECURITY: IN THE PAST 12 MONTHS, HOW MANY TIMES HAVE YOU MOVED WHERE YOU WERE LIVING?: 0

## 2025-04-14 SDOH — ECONOMIC STABILITY: INCOME INSECURITY: IN THE PAST 12 MONTHS HAS THE ELECTRIC, GAS, OIL, OR WATER COMPANY THREATENED TO SHUT OFF SERVICES IN YOUR HOME?: NO

## 2025-04-14 SDOH — ECONOMIC STABILITY: TRANSPORTATION INSECURITY: IN THE PAST 12 MONTHS, HAS LACK OF TRANSPORTATION KEPT YOU FROM MEDICAL APPOINTMENTS OR FROM GETTING MEDICATIONS?: NO

## 2025-04-14 SDOH — SOCIAL STABILITY: SOCIAL INSECURITY: ARE YOU OR HAVE YOU BEEN THREATENED OR ABUSED PHYSICALLY, EMOTIONALLY, OR SEXUALLY BY ANYONE?: NO

## 2025-04-14 SDOH — ECONOMIC STABILITY: FOOD INSECURITY: HOW HARD IS IT FOR YOU TO PAY FOR THE VERY BASICS LIKE FOOD, HOUSING, MEDICAL CARE, AND HEATING?: NOT HARD AT ALL

## 2025-04-14 SDOH — SOCIAL STABILITY: SOCIAL INSECURITY: WERE YOU ABLE TO COMPLETE ALL THE BEHAVIORAL HEALTH SCREENINGS?: YES

## 2025-04-14 SDOH — SOCIAL STABILITY: SOCIAL INSECURITY: HAVE YOU HAD THOUGHTS OF HARMING ANYONE ELSE?: NO

## 2025-04-14 SDOH — ECONOMIC STABILITY: FOOD INSECURITY: WITHIN THE PAST 12 MONTHS, THE FOOD YOU BOUGHT JUST DIDN'T LAST AND YOU DIDN'T HAVE MONEY TO GET MORE.: NEVER TRUE

## 2025-04-14 SDOH — SOCIAL STABILITY: SOCIAL INSECURITY: HAVE YOU HAD ANY THOUGHTS OF HARMING ANYONE ELSE?: NO

## 2025-04-14 SDOH — SOCIAL STABILITY: SOCIAL INSECURITY: DOES ANYONE TRY TO KEEP YOU FROM HAVING/CONTACTING OTHER FRIENDS OR DOING THINGS OUTSIDE YOUR HOME?: NO

## 2025-04-14 SDOH — ECONOMIC STABILITY: HOUSING INSECURITY: AT ANY TIME IN THE PAST 12 MONTHS, WERE YOU HOMELESS OR LIVING IN A SHELTER (INCLUDING NOW)?: NO

## 2025-04-14 SDOH — SOCIAL STABILITY: SOCIAL INSECURITY: DO YOU FEEL ANYONE HAS EXPLOITED OR TAKEN ADVANTAGE OF YOU FINANCIALLY OR OF YOUR PERSONAL PROPERTY?: NO

## 2025-04-14 SDOH — SOCIAL STABILITY: SOCIAL INSECURITY: ARE THERE ANY APPARENT SIGNS OF INJURIES/BEHAVIORS THAT COULD BE RELATED TO ABUSE/NEGLECT?: NO

## 2025-04-14 SDOH — SOCIAL STABILITY: SOCIAL INSECURITY: DO YOU FEEL UNSAFE GOING BACK TO THE PLACE WHERE YOU ARE LIVING?: NO

## 2025-04-14 SDOH — SOCIAL STABILITY: SOCIAL INSECURITY: ABUSE: ADULT

## 2025-04-14 ASSESSMENT — PAIN SCALES - GENERAL
PAINLEVEL_OUTOF10: 0 - NO PAIN
PAINLEVEL_OUTOF10: 4
PAINLEVEL_OUTOF10: 0 - NO PAIN

## 2025-04-14 ASSESSMENT — LIFESTYLE VARIABLES
PRESCIPTION_ABUSE_PAST_12_MONTHS: NO
AUDIT-C TOTAL SCORE: 0
HOW MANY STANDARD DRINKS CONTAINING ALCOHOL DO YOU HAVE ON A TYPICAL DAY: PATIENT DOES NOT DRINK
HOW OFTEN DO YOU HAVE A DRINK CONTAINING ALCOHOL: NEVER
AUDIT-C TOTAL SCORE: 0
SKIP TO QUESTIONS 9-10: 1
HOW OFTEN DO YOU HAVE 6 OR MORE DRINKS ON ONE OCCASION: NEVER
SUBSTANCE_ABUSE_PAST_12_MONTHS: NO

## 2025-04-14 ASSESSMENT — COGNITIVE AND FUNCTIONAL STATUS - GENERAL
STANDING UP FROM CHAIR USING ARMS: A LITTLE
WALKING IN HOSPITAL ROOM: A LITTLE
MOVING FROM LYING ON BACK TO SITTING ON SIDE OF FLAT BED WITH BEDRAILS: A LITTLE
EATING MEALS: A LITTLE
PERSONAL GROOMING: A LITTLE
MOVING FROM LYING ON BACK TO SITTING ON SIDE OF FLAT BED WITH BEDRAILS: A LITTLE
DAILY ACTIVITIY SCORE: 18
TURNING FROM BACK TO SIDE WHILE IN FLAT BAD: A LITTLE
MOVING TO AND FROM BED TO CHAIR: A LITTLE
DRESSING REGULAR UPPER BODY CLOTHING: A LITTLE
STANDING UP FROM CHAIR USING ARMS: A LITTLE
PATIENT BASELINE BEDBOUND: NO
DAILY ACTIVITIY SCORE: 18
TOILETING: A LITTLE
MOBILITY SCORE: 18
TOILETING: A LITTLE
HELP NEEDED FOR BATHING: A LITTLE
MOVING TO AND FROM BED TO CHAIR: A LITTLE
WALKING IN HOSPITAL ROOM: A LITTLE
DRESSING REGULAR LOWER BODY CLOTHING: A LITTLE
DRESSING REGULAR LOWER BODY CLOTHING: A LITTLE
MOBILITY SCORE: 18
CLIMB 3 TO 5 STEPS WITH RAILING: A LITTLE
DRESSING REGULAR UPPER BODY CLOTHING: A LITTLE
HELP NEEDED FOR BATHING: A LITTLE
CLIMB 3 TO 5 STEPS WITH RAILING: A LITTLE
PERSONAL GROOMING: A LITTLE
EATING MEALS: A LITTLE
TURNING FROM BACK TO SIDE WHILE IN FLAT BAD: A LITTLE

## 2025-04-14 ASSESSMENT — PAIN - FUNCTIONAL ASSESSMENT
PAIN_FUNCTIONAL_ASSESSMENT: 0-10

## 2025-04-14 ASSESSMENT — PATIENT HEALTH QUESTIONNAIRE - PHQ9
SUM OF ALL RESPONSES TO PHQ9 QUESTIONS 1 & 2: 0
1. LITTLE INTEREST OR PLEASURE IN DOING THINGS: NOT AT ALL
2. FEELING DOWN, DEPRESSED OR HOPELESS: NOT AT ALL

## 2025-04-14 ASSESSMENT — ACTIVITIES OF DAILY LIVING (ADL)
FEEDING YOURSELF: INDEPENDENT
JUDGMENT_ADEQUATE_SAFELY_COMPLETE_DAILY_ACTIVITIES: YES
HEARING - RIGHT EAR: FUNCTIONAL
TOILETING: INDEPENDENT
DRESSING YOURSELF: INDEPENDENT
LACK_OF_TRANSPORTATION: NO
BATHING: INDEPENDENT
LACK_OF_TRANSPORTATION: NO
PATIENT'S MEMORY ADEQUATE TO SAFELY COMPLETE DAILY ACTIVITIES?: YES
GROOMING: INDEPENDENT
ADEQUATE_TO_COMPLETE_ADL: YES
HEARING - LEFT EAR: FUNCTIONAL
WALKS IN HOME: INDEPENDENT

## 2025-04-14 ASSESSMENT — ENCOUNTER SYMPTOMS
PALPITATIONS: 0
COUGH: 0
CONFUSION: 0
ABDOMINAL PAIN: 0
HEADACHES: 0
FEVER: 0
CHILLS: 0

## 2025-04-14 NOTE — TELEPHONE ENCOUNTER
Allosure 3.1% from 1.8, 0.18  Pt at IR getting biopsy now.  Discussed with Dr Reid - plan for direct admission after biopsy to start IV steroids.     Call placed to patient, informed her of the above. She verbalized understanding. Admit order placed. Inpatient team notified.

## 2025-04-14 NOTE — CARE PLAN
Problem: Safety - Adult  Goal: Free from fall injury  Outcome: Met     Problem: Discharge Planning  Goal: Discharge to home or other facility with appropriate resources  Outcome: Progressing     Problem: Chronic Conditions and Co-morbidities  Goal: Patient's chronic conditions and co-morbidity symptoms are monitored and maintained or improved  Outcome: Progressing     Problem: Nutrition  Goal: Nutrient intake appropriate for maintaining nutritional needs  Outcome: Progressing       The clinical goals for the shift include patient oswaldo remain HDS this shift    .

## 2025-04-14 NOTE — POST-PROCEDURE NOTE
Interventional Radiology Brief Postprocedure Note    Attending: Dr. Al    Assistant: Dr. Cardoza    Diagnosis: renal transplant    Description of procedure:     A total of 2 passes were made into the lower pole of the right iliac fossa transplant kidney under ultrasound guidance using an 18 Gauge BARD needle passed through a 17 gauge coaxial system. Scanning after each pass demonstrated no bleeding. Specimens were sent to pathology for further analysis. See PACS for full procedural report.        Anesthesia:  Local and MAC Moderate    Complications: None    Estimated Blood Loss: minimal    Medications (Filter: Administrations occurring from 0925 to 0951 on 04/14/25) As of 04/14/25 0951      fentaNYL PF (Sublimaze) injection (mcg) Total dose:  100 mcg      Date/Time Rate/Dose/Volume Action       04/14/25  0936 50 mcg Given      0942 50 mcg Given               midazolam (Versed) injection (mg) Total dose:  2 mg      Date/Time Rate/Dose/Volume Action       04/14/25  0936 1 mg Given      0942 1 mg Given                     See detailed result report with images in PACS.    The patient tolerated the procedure well without incident or complication and is in stable condition.

## 2025-04-14 NOTE — H&P
History Of Present Illness  Ms. Hamlin is a 79 y.o. female with past medical history significant for ESKD secondary to hypertension status post pre-emptive DDKT on 10/28/2024 with KDPI 86%, PRA 77%. Patient was induced with 3 mg/kg of Thymoglobulin. She was initiated on belatacept, cellcept, and prednisone taper. 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 Recently, Allosure increased to 3.1% from 1.8, 0.18.     This morning she went for IR kidney biopsy. She was directly admitted after biopsy for IV steroids & will await biopsy results. She denies fever, chills, chest pain, palpitations, SOB, abdominal pain or difficulty voiding.      Past Medical History  Past Medical History:   Diagnosis Date    CKD (chronic kidney disease)     Encounter for general adult medical examination without abnormal findings 2021    Encounter for Medicare annual wellness exam    Glaucoma     Gout     Hyperlipidemia     Hypertension     Mitral valve regurgitation     JOSE E (obstructive sleep apnea)     Unspecified cataract     Cataracts, both eyes       Surgical History  Past Surgical History:   Procedure Laterality Date    OTHER SURGICAL HISTORY  2019    Carpal tunnel surgery    OTHER SURGICAL HISTORY  2019     section    OTHER SURGICAL HISTORY  2019    Hernia repair        Social History  She reports that she has never smoked. She has never been exposed to tobacco smoke. She has never used smokeless tobacco. She reports that she does not drink alcohol and does not use drugs.    Family History  Family History   Problem Relation Name Age of Onset    Diabetes Mother      Hypertension Mother      Diabetes Son      Colon cancer Mother's Sister      Colon cancer Father's Sister          Allergies  Amlodipine, Codeine, Erythromycin, Nsaids (non-steroidal anti-inflammatory drug), Tramadol,  and Lisinopril    Review of Systems   Constitutional:  Negative for chills and fever.   HENT:  Negative for congestion.    Respiratory:  Negative for cough.    Cardiovascular:  Negative for chest pain and palpitations.   Gastrointestinal:  Negative for abdominal pain.   Genitourinary:  Negative for decreased urine volume.   Neurological:  Negative for headaches.        Last headache Saturday after gabapentin    Psychiatric/Behavioral:  Negative for confusion.         Physical Exam  Vitals reviewed.   Constitutional:       Appearance: She is obese. She is not ill-appearing.   HENT:      Head: Normocephalic.   Cardiovascular:      Rate and Rhythm: Normal rate.   Pulmonary:      Effort: Pulmonary effort is normal.   Abdominal:      Palpations: Abdomen is soft.   Musculoskeletal:         General: Normal range of motion.      Cervical back: Normal range of motion.   Skin:     General: Skin is warm and dry.      Comments: RLQ biopsy site C/D/I. No ecchymosis. Abdomen soft.    Neurological:      Mental Status: She is alert and oriented to person, place, and time.   Psychiatric:         Mood and Affect: Mood normal.          Last Recorded Vitals  not currently breastfeeding.    Relevant Results  Scheduled medications  [Held by provider] apixaban, 5 mg, oral, BID  atorvastatin, 40 mg, oral, Daily  carvedilol, 50 mg, oral, BID  ferrous sulfate, 1 tablet, oral, Daily  furosemide, 20 mg, oral, Daily  gabapentin, 300 mg, oral, Nightly  heparin (porcine), 5,000 Units, subcutaneous, q8h  hydrALAZINE, 100 mg, oral, BID  insulin lispro, 0-10 Units, subcutaneous, TID AC  magnesium oxide, 400 mg, oral, Daily  methylPREDNISolone sodium succinate (PF), 500 mg, intravenous, Once  mycophenolate, 250 mg, oral, q12h  predniSONE, 5 mg, oral, Daily  tacrolimus, 2 mg, oral, q12h        Assessment/Plan   Assessment & Plan  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 Hamlin is a 79 y.o. female with past medical history significant for ESKD  secondary to hypertension status post pre-emptive DDKT on 10/28/2024. Admitted s/p kidney biopsy for rising Allosure.     Plan:   Admit to Transplant Surgery  500mg Solumedrol x1 dose this evening  Awaiting biopsy results to determine remainder of treatment  Hold Eliquis this evening  Daily labs     Plan discussed with Transplant Surgery Attending.     I spent 55 mins in the professional care of this patient.     Agustina Chong, APRN-CNP

## 2025-04-14 NOTE — PRE-PROCEDURE NOTE
Interventional Radiology Preprocedure Note    Indication for procedure: The encounter diagnosis was Delayed graft function of kidney ([facility]).    Relevant review of systems: NA    Relevant Labs:   Lab Results   Component Value Date    CREATININE 1.15 (H) 04/08/2025    EGFR 49 (L) 04/08/2025    INR 1.6 (H) 03/27/2025    PROTIME 17.4 (H) 03/27/2025       Planned Sedation/Anesthesia: Moderate    Airway assessment: normal    Directed physical examination:    Ao, calm    Mallampati: III (soft and hard palate and base of uvula visible)    ASA Score: ASA 2 - Patient with mild systemic disease with no functional limitations    Benefits, risks and alternatives of procedure and planned sedation have been discussed with the patient and/or their representative. All questions answered and they agree to proceed.

## 2025-04-15 LAB
ALBUMIN SERPL BCP-MCNC: 3.9 G/DL (ref 3.4–5)
ANION GAP SERPL CALC-SCNC: 14 MMOL/L (ref 10–20)
BUN SERPL-MCNC: 23 MG/DL (ref 6–23)
CALCIUM SERPL-MCNC: 9.5 MG/DL (ref 8.6–10.6)
CHLORIDE SERPL-SCNC: 108 MMOL/L (ref 98–107)
CO2 SERPL-SCNC: 23 MMOL/L (ref 21–32)
CREAT SERPL-MCNC: 0.88 MG/DL (ref 0.5–1.05)
EGFRCR SERPLBLD CKD-EPI 2021: 68 ML/MIN/1.73M*2
ERYTHROCYTE [DISTWIDTH] IN BLOOD BY AUTOMATED COUNT: 14.1 % (ref 11.5–14.5)
GLUCOSE BLD MANUAL STRIP-MCNC: 153 MG/DL (ref 74–99)
GLUCOSE BLD MANUAL STRIP-MCNC: 160 MG/DL (ref 74–99)
GLUCOSE BLD MANUAL STRIP-MCNC: 162 MG/DL (ref 74–99)
GLUCOSE BLD MANUAL STRIP-MCNC: 252 MG/DL (ref 74–99)
GLUCOSE SERPL-MCNC: 172 MG/DL (ref 74–99)
HCT VFR BLD AUTO: 35.5 % (ref 36–46)
HGB BLD-MCNC: 11.1 G/DL (ref 12–16)
MAGNESIUM SERPL-MCNC: 1.83 MG/DL (ref 1.6–2.4)
MCH RBC QN AUTO: 27.9 PG (ref 26–34)
MCHC RBC AUTO-ENTMCNC: 31.3 G/DL (ref 32–36)
MCV RBC AUTO: 89 FL (ref 80–100)
NRBC BLD-RTO: 0 /100 WBCS (ref 0–0)
PHOSPHATE SERPL-MCNC: 3.3 MG/DL (ref 2.5–4.9)
PLATELET # BLD AUTO: 119 X10*3/UL (ref 150–450)
POTASSIUM SERPL-SCNC: 3.7 MMOL/L (ref 3.5–5.3)
RBC # BLD AUTO: 3.98 X10*6/UL (ref 4–5.2)
SODIUM SERPL-SCNC: 141 MMOL/L (ref 136–145)
TACROLIMUS BLD-MCNC: 8.5 NG/ML
WBC # BLD AUTO: 6 X10*3/UL (ref 4.4–11.3)

## 2025-04-15 PROCEDURE — 2500000001 HC RX 250 WO HCPCS SELF ADMINISTERED DRUGS (ALT 637 FOR MEDICARE OP)

## 2025-04-15 PROCEDURE — 83735 ASSAY OF MAGNESIUM: CPT

## 2025-04-15 PROCEDURE — 2500000004 HC RX 250 GENERAL PHARMACY W/ HCPCS (ALT 636 FOR OP/ED)

## 2025-04-15 PROCEDURE — 99233 SBSQ HOSP IP/OBS HIGH 50: CPT | Performed by: HOSPITALIST

## 2025-04-15 PROCEDURE — 1100000001 HC PRIVATE ROOM DAILY

## 2025-04-15 PROCEDURE — 82947 ASSAY GLUCOSE BLOOD QUANT: CPT

## 2025-04-15 PROCEDURE — 99232 SBSQ HOSP IP/OBS MODERATE 35: CPT | Performed by: TRANSPLANT SURGERY

## 2025-04-15 PROCEDURE — 36415 COLL VENOUS BLD VENIPUNCTURE: CPT

## 2025-04-15 PROCEDURE — 2500000002 HC RX 250 W HCPCS SELF ADMINISTERED DRUGS (ALT 637 FOR MEDICARE OP, ALT 636 FOR OP/ED)

## 2025-04-15 PROCEDURE — 80197 ASSAY OF TACROLIMUS: CPT

## 2025-04-15 PROCEDURE — 80069 RENAL FUNCTION PANEL: CPT

## 2025-04-15 PROCEDURE — 85027 COMPLETE CBC AUTOMATED: CPT

## 2025-04-15 RX ORDER — ONDANSETRON HYDROCHLORIDE 2 MG/ML
4 INJECTION, SOLUTION INTRAVENOUS EVERY 6 HOURS PRN
Status: DISCONTINUED | OUTPATIENT
Start: 2025-04-15 | End: 2025-04-19 | Stop reason: HOSPADM

## 2025-04-15 RX ORDER — MYCOPHENOLATE MOFETIL 250 MG/1
1000 CAPSULE ORAL
Status: DISCONTINUED | OUTPATIENT
Start: 2025-04-15 | End: 2025-04-19 | Stop reason: HOSPADM

## 2025-04-15 RX ORDER — ACETAMINOPHEN 325 MG/1
650 TABLET ORAL EVERY 6 HOURS PRN
Status: DISCONTINUED | OUTPATIENT
Start: 2025-04-15 | End: 2025-04-17

## 2025-04-15 RX ORDER — TACROLIMUS 1 MG/1
2 CAPSULE ORAL
Status: DISCONTINUED | OUTPATIENT
Start: 2025-04-15 | End: 2025-04-19

## 2025-04-15 RX ORDER — ACETAMINOPHEN 325 MG/1
975 TABLET ORAL ONCE
Status: COMPLETED | OUTPATIENT
Start: 2025-04-15 | End: 2025-04-15

## 2025-04-15 RX ORDER — POLYETHYLENE GLYCOL 3350 17 G/17G
17 POWDER, FOR SOLUTION ORAL DAILY
Status: DISCONTINUED | OUTPATIENT
Start: 2025-04-15 | End: 2025-04-19 | Stop reason: HOSPADM

## 2025-04-15 RX ORDER — VALGANCICLOVIR 450 MG/1
2 TABLET, FILM COATED ORAL DAILY
Status: ON HOLD | COMMUNITY
End: 2025-04-19

## 2025-04-15 RX ORDER — MAGNESIUM SULFATE HEPTAHYDRATE 40 MG/ML
2 INJECTION, SOLUTION INTRAVENOUS ONCE
Status: COMPLETED | OUTPATIENT
Start: 2025-04-15 | End: 2025-04-15

## 2025-04-15 RX ORDER — DIPHENHYDRAMINE HCL 25 MG
25 CAPSULE ORAL ONCE
Status: COMPLETED | OUTPATIENT
Start: 2025-04-15 | End: 2025-04-15

## 2025-04-15 RX ORDER — KETOROLAC TROMETHAMINE 15 MG/ML
15 INJECTION, SOLUTION INTRAMUSCULAR; INTRAVENOUS ONCE
Status: COMPLETED | OUTPATIENT
Start: 2025-04-15 | End: 2025-04-15

## 2025-04-15 RX ORDER — DOCUSATE SODIUM 100 MG/1
100 CAPSULE, LIQUID FILLED ORAL DAILY
Status: DISCONTINUED | OUTPATIENT
Start: 2025-04-15 | End: 2025-04-19 | Stop reason: HOSPADM

## 2025-04-15 RX ADMIN — ACETAMINOPHEN 650 MG: 325 TABLET, FILM COATED ORAL at 09:02

## 2025-04-15 RX ADMIN — INSULIN LISPRO 2 UNITS: 100 INJECTION, SOLUTION INTRAVENOUS; SUBCUTANEOUS at 18:28

## 2025-04-15 RX ADMIN — FUROSEMIDE 20 MG: 20 TABLET ORAL at 09:00

## 2025-04-15 RX ADMIN — KETOROLAC TROMETHAMINE 15 MG: 15 INJECTION, SOLUTION INTRAMUSCULAR; INTRAVENOUS at 16:06

## 2025-04-15 RX ADMIN — INSULIN LISPRO 2 UNITS: 100 INJECTION, SOLUTION INTRAVENOUS; SUBCUTANEOUS at 09:00

## 2025-04-15 RX ADMIN — MAGNESIUM OXIDE TAB 400 MG (241.3 MG ELEMENTAL MG) 400 MG: 400 (241.3 MG) TAB at 09:00

## 2025-04-15 RX ADMIN — DIPHENHYDRAMINE HCL 25 MG: 25 CAPSULE ORAL at 16:30

## 2025-04-15 RX ADMIN — CARVEDILOL 50 MG: 25 TABLET, FILM COATED ORAL at 21:36

## 2025-04-15 RX ADMIN — APIXABAN 5 MG: 5 TABLET, FILM COATED ORAL at 21:34

## 2025-04-15 RX ADMIN — ATORVASTATIN CALCIUM 40 MG: 40 TABLET, FILM COATED ORAL at 21:34

## 2025-04-15 RX ADMIN — HYDRALAZINE HYDROCHLORIDE 100 MG: 25 TABLET ORAL at 21:35

## 2025-04-15 RX ADMIN — ACETAMINOPHEN 975 MG: 325 TABLET, FILM COATED ORAL at 13:08

## 2025-04-15 RX ADMIN — MYCOPHENOLATE MOFETIL 250 MG: 250 CAPSULE ORAL at 06:15

## 2025-04-15 RX ADMIN — INSULIN LISPRO 2 UNITS: 100 INJECTION, SOLUTION INTRAVENOUS; SUBCUTANEOUS at 11:36

## 2025-04-15 RX ADMIN — HEPARIN SODIUM 5000 UNITS: 5000 INJECTION, SOLUTION INTRAVENOUS; SUBCUTANEOUS at 08:59

## 2025-04-15 RX ADMIN — MAGNESIUM SULFATE HEPTAHYDRATE 2 G: 40 INJECTION, SOLUTION INTRAVENOUS at 09:28

## 2025-04-15 RX ADMIN — TACROLIMUS 2 MG: 1 CAPSULE ORAL at 06:15

## 2025-04-15 RX ADMIN — CARVEDILOL 50 MG: 25 TABLET, FILM COATED ORAL at 09:00

## 2025-04-15 RX ADMIN — HYDRALAZINE HYDROCHLORIDE 100 MG: 25 TABLET ORAL at 09:00

## 2025-04-15 RX ADMIN — FERROUS SULFATE TAB 325 MG (65 MG ELEMENTAL FE) 325 MG: 325 (65 FE) TAB at 09:00

## 2025-04-15 RX ADMIN — DEXTROSE MONOHYDRATE 250 MG: 50 INJECTION, SOLUTION INTRAVENOUS at 11:36

## 2025-04-15 RX ADMIN — DOCUSATE SODIUM 100 MG: 100 CAPSULE, LIQUID FILLED ORAL at 09:00

## 2025-04-15 RX ADMIN — ONDANSETRON 4 MG: 2 INJECTION INTRAMUSCULAR; INTRAVENOUS at 16:12

## 2025-04-15 RX ADMIN — POLYETHYLENE GLYCOL 3350 17 G: 17 POWDER, FOR SOLUTION ORAL at 09:00

## 2025-04-15 RX ADMIN — MYCOPHENOLATE MOFETIL 1000 MG: 250 CAPSULE ORAL at 18:28

## 2025-04-15 RX ADMIN — TACROLIMUS 2 MG: 1 CAPSULE ORAL at 18:28

## 2025-04-15 ASSESSMENT — COGNITIVE AND FUNCTIONAL STATUS - GENERAL
DAILY ACTIVITIY SCORE: 18
HELP NEEDED FOR BATHING: A LITTLE
MOVING TO AND FROM BED TO CHAIR: A LITTLE
DRESSING REGULAR LOWER BODY CLOTHING: A LITTLE
DRESSING REGULAR UPPER BODY CLOTHING: A LITTLE
TOILETING: A LITTLE
EATING MEALS: A LITTLE
PERSONAL GROOMING: A LITTLE
CLIMB 3 TO 5 STEPS WITH RAILING: A LITTLE
MOBILITY SCORE: 21
STANDING UP FROM CHAIR USING ARMS: A LITTLE

## 2025-04-15 ASSESSMENT — PAIN DESCRIPTION - LOCATION: LOCATION: HEAD

## 2025-04-15 ASSESSMENT — PAIN SCALES - GENERAL
PAINLEVEL_OUTOF10: 9
PAINLEVEL_OUTOF10: 0 - NO PAIN
PAINLEVEL_OUTOF10: 8
PAINLEVEL_OUTOF10: 7
PAINLEVEL_OUTOF10: 10 - WORST POSSIBLE PAIN
PAINLEVEL_OUTOF10: 2
PAINLEVEL_OUTOF10: 5 - MODERATE PAIN
PAINLEVEL_OUTOF10: 2

## 2025-04-15 ASSESSMENT — PAIN DESCRIPTION - DESCRIPTORS
DESCRIPTORS: HEADACHE

## 2025-04-15 NOTE — CARE PLAN
The patient's goals for the shift include      The clinical goals for the shift include patient will remain hds      Problem: Discharge Planning  Goal: Discharge to home or other facility with appropriate resources  Outcome: Progressing     Problem: Chronic Conditions and Co-morbidities  Goal: Patient's chronic conditions and co-morbidity symptoms are monitored and maintained or improved  Outcome: Progressing     Problem: Nutrition  Goal: Nutrient intake appropriate for maintaining nutritional needs  Outcome: Progressing

## 2025-04-15 NOTE — NURSING NOTE
4 Eyes Skin Assessment    Reason for assessment? Weekly skin assessment  Does the patient have a wound? no  Wound RN consult placed? N/A  Preventative foam dressing applied? No      Four eyes skin check performed with Radha Arriaga RN.

## 2025-04-15 NOTE — PROGRESS NOTES
Pharmacy Medication History Review    Liz Hamlin is a 79 y.o. female admitted for Kidney transplant rejection ([facility]). Pharmacy reviewed the patient's uulsn-bu-caiwwxxoh medications and allergies for accuracy.    Medications ADDED:  valGANciclovir 450mg  Medications CHANGED:  None  Medications REMOVED:   None    The list below reflects the updated PTA list.   Prior to Admission Medications   Prescriptions Last Dose Informant   SITagliptin phosphate (Januvia) 100 mg tablet  Self   Sig: Take 1 tablet (100 mg) by mouth once daily.   apixaban (Eliquis) 5 mg tablet  Self   Sig: Take 2 tablets (10 mg) by mouth 2 times a day for 7 days, THEN 1 tablet (5 mg) 2 times a day.   atorvastatin (Lipitor) 40 mg tablet  Self   Sig: Take 1 tablet (40 mg) by mouth once daily.   carvedilol (Coreg) 25 mg tablet  Self   Sig: Take 2 tablets (50 mg) by mouth 2 times a day.   empagliflozin (Jardiance) 10 mg  Self   Sig: Take 1 tablet (10 mg) by mouth once daily.   ferrous sulfate (FeroSuL) tablet  Self   Sig: TAKE 1 TABLET DAILY   furosemide (Lasix) 20 mg tablet  Self   Sig: Take 1 tablet (20 mg) by mouth once daily.   gabapentin (Neurontin) 300 mg capsule  Self   Sig: Take 1 capsule (300 mg) by mouth once daily at bedtime.   hydrALAZINE (Apresoline) 100 mg tablet  Self   Sig: Take 1 tablet (100 mg) by mouth 2 times a day.   magnesium oxide (Mag-Ox) 400 mg (241.3 mg magnesium) tablet  Self   Sig: Take 1 tablet (400 mg) by mouth once daily.   mycophenolate (Cellcept) 250 mg capsule  Self   Sig: Take 1 capsule (250 mg) by mouth every 12 hours.   predniSONE (Deltasone) 5 mg tablet  Self   Sig: Take 1 tablet (5 mg) by mouth once daily.   tacrolimus (Prograf) 1 mg capsule  Self   Sig: Take 2 capsules (2 mg) by mouth every 12 hours.   valGANciclovir (Valcyte) 450 mg tablet  Self   Sig: Take 2 tablets (900 mg) by mouth once daily. Do not crush or chew.      Facility-Administered Medications: None        The list below reflects the updated  "allergy list. Please review each documented allergy for additional clarification and justification.  Allergies  Reviewed by Daphne Gan on 4/15/2025        Severity Reactions Comments    Amlodipine Medium Swelling Edema    Codeine Medium Itching     Erythromycin Medium Itching     Nsaids (non-steroidal Anti-inflammatory Drug) Medium Itching, Nausea Only chronic renal insufficiency    Tramadol Medium Itching, Nausea/vomiting     Lisinopril Low Cough Cough            Patient declines M2B at discharge.     Sources:   -Patient interview, Good historian   -Outpatient pharmacy dispense history  -OARRS  -Care everywhere  -Chart review      Additional Comments:  None      DAPHNE GAN  Pharmacy Technician  04/15/25     Secure Chat preferred   If no response call [phone] or [employer] \"Med Rec\"    "

## 2025-04-15 NOTE — PROGRESS NOTES
"Liz Hamlin is a 79 y.o. female on day 1 of admission presenting with Kidney transplant rejection ([facility]).    Subjective   Admitted for rejection       Objective   Vitals:    04/15/25 0800   BP: 156/88   Pulse: 82   Resp: 18   Temp: 36.5 °C (97.7 °F)   SpO2: 97%       Physical Exam  Constitutional:       Appearance: Normal appearance.   Eyes:      Pupils: Pupils are equal, round, and reactive to light.   Cardiovascular:      Rate and Rhythm: Normal rate.   Pulmonary:      Effort: Pulmonary effort is normal. No respiratory distress.   Abdominal:      General: There is no distension.      Palpations: Abdomen is soft.      Comments: Well healed   Musculoskeletal:         General: Normal range of motion.      Right lower leg: No edema.      Left lower leg: No edema.   Skin:     General: Skin is warm and dry.   Neurological:      General: No focal deficit present.      Mental Status: She is alert and oriented to person, place, and time.   Psychiatric:         Mood and Affect: Mood normal.         Behavior: Behavior normal.         Last Recorded Vitals  Blood pressure 156/88, pulse 82, temperature 36.5 °C (97.7 °F), temperature source Temporal, resp. rate 18, height 1.676 m (5' 5.98\"), weight 90.3 kg (199 lb), SpO2 97%, not currently breastfeeding.  Intake/Output last 3 Shifts:  I/O last 3 completed shifts:  In: 820 (9.1 mL/kg) [P.O.:720; IV Piggyback:100]  Out: 1275 (14.1 mL/kg) [Urine:1275 (0.4 mL/kg/hr)]  Weight: 90.3 kg     Relevant Results  Lab Results   Component Value Date    WBC 6.0 04/15/2025    HGB 11.1 (L) 04/15/2025    HCT 35.5 (L) 04/15/2025    MCV 89 04/15/2025     (L) 04/15/2025     Lab Results   Component Value Date    GLUCOSE 172 (H) 04/15/2025    CALCIUM 9.5 04/15/2025     04/15/2025    K 3.7 04/15/2025    CO2 23 04/15/2025     (H) 04/15/2025    BUN 23 04/15/2025    CREATININE 0.88 04/15/2025     [Held by provider] apixaban, 5 mg, oral, BID  atorvastatin, 40 mg, oral, " Daily  carvedilol, 50 mg, oral, BID  docusate sodium, 100 mg, oral, Daily  ferrous sulfate, 1 tablet, oral, Daily  furosemide, 20 mg, oral, Daily  [Held by provider] gabapentin, 300 mg, oral, Nightly  heparin (porcine), 5,000 Units, subcutaneous, q8h  hydrALAZINE, 100 mg, oral, BID  insulin lispro, 0-10 Units, subcutaneous, TID AC  magnesium oxide, 400 mg, oral, Daily  magnesium sulfate, 2 g, intravenous, Once  mycophenolate, 250 mg, oral, q12h  polyethylene glycol, 17 g, oral, Daily  [Held by provider] predniSONE, 5 mg, oral, Daily  tacrolimus, 2 mg, oral, q12h          Assessment/Plan   Assessment & Plan  Kidney transplant rejection ([facility])    DDKT 10/20/2024  KDPI 86  PRA 71    Kidney allograft function   Stable   Allosure significant elevated, biopsy concerning for rejection, final pending    Getting steroid pulse    Immunosuppression   Tac 2/2   /250, increase to 1000/1000   Solumedrol 500/250        I spent 35 minutes in the professional and overall care of this patient.      Ruben Jimenez MD

## 2025-04-15 NOTE — HOSPITAL COURSE
Ms. Hamlin is a 79 y.o. female with past medical history significant for ESKD secondary to hypertension status post pre-emptive DDKT on 10/28/2024 with KDPI 86%, PRA 77%. Patient was induced with 3 mg/kg of Thymoglobulin. She was initiated on belatacept, cellcept, and prednisone taper. 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 Recently, Allosure increased to 3.1% from 1.8, 0.18.      She was directly admitted after biopsy for IV steroids. Biopsy results showed 2 arteries suspicious for acute and chronic vascular rejection. No AMR or TCMR.  She was treated with a total of Thymo 4.5 mg/kg and re-started on the anti-infective prophylaxis as indicated.     Pt was tolerating a regular diet, maintaining adequate hydration with oral intake, ambulating independently. Immunosuppression was managed by the transplant team. Patient is in stable condition for discharge home with no home going needs. RX delivered to bedside prior to discharge. The patient will f/u in the transplant institute and have labs drawn in the walk-in clinic.

## 2025-04-15 NOTE — PROGRESS NOTES
Liz Hamlin is a 79 y.o. female on day 1 of admission presenting with Kidney transplant rejection ([facility]).      Transitional Care Coordination Progress Note:  Patient discussed during interdisciplinary rounds.     Plan per Medical/Surgical team:    Home Care choice for home going needs, [facility]  Home. No home going needs anticipated for this pt at this time.        Discharge disposition: Home. No home going needs anticipated for this pt at this time.      Potential Barriers: None    ADOD:  4/15    This TCC will continue to follow for home going needs and safe DC plan.        DEBI DAILEY

## 2025-04-15 NOTE — SIGNIFICANT EVENT
04/15/25 1411   Patient Interaction   Organ Kidney   Type of Interaction Morning rounds   Interdisciplinary Rounds   Attendance Surgeon;Physician;GUTIERREZ;Coordinator;Pharmacist   Topics Discussed Diet;Home care needs;Medications;Blood test results;Discharge preparation     Transplant Surgery Multidisciplinary Team Note    Liz Hamlin is a 79 y.o. female   s/p DDKT from 10/28/24. Her post operative complications: None and Other complication:  rejection     24 Hour Events  1. YARELI     Last Recorded Vitals  Visit Vitals  /71 (BP Location: Left arm, Patient Position: Sitting)   Pulse 85   Temp 36.4 °C (97.5 °F) (Temporal)   Resp 18      Intake/Output last 3 Shifts:    Intake/Output Summary (Last 24 hours) at 4/15/2025 1411  Last data filed at 4/15/2025 1304  Gross per 24 hour   Intake 1770 ml   Output 1625 ml   Net 145 ml      Vitals:    04/14/25 1531   Weight: 90.3 kg (199 lb)        Assessment/Plan   Principal Problem:  DDKT 10/28/24  Cr 0.88, BUN 23  UOP 1.3L    Rejection  Methylprednisolone 500/ 250    Active Problems:  Patient Active Problem List   Diagnosis    Astigmatism    Benign hypertension    Bilateral renal cysts    Chronic kidney disease (CKD) stage G4/A1, severely decreased glomerular filtration rate (GFR) between 15-29 mL/min/1.73 square meter and albuminuria creatinine ratio less than 30 mg/g (CMS/HC* (Multi)    CKD (chronic kidney disease)    Chronic right shoulder pain    Combined form of age-related cataract, both eyes    Essential hypertension    Glaucoma suspect of both eyes    Gout    Complex renal cyst    Fibroids    Hyperlipidemia    Hyperopia    Myopia with presbyopia    Mitral valve regurgitation    Hepatic steatosis    Intermittent urinary incontinence    Obstructive sleep apnea, adult    Onycholysis of toenail    Onychomycosis of toenail    Pre-ulcerative corn or callous    Pruritus    PVD (posterior vitreous detachment), both eyes    Sleep apnea    Toe deformity, right    Nocturia     CKD stage 4 secondary to hypertension (Multi)    History of anemia due to CKD    Cellulitis of right leg    Health care maintenance    Venous stasis dermatitis of both lower extremities    Diabetes mellitus screening    Screening for cardiovascular condition    Need for vaccination    Asymptomatic menopausal state    Encounter for screening mammogram for breast cancer    Pre-transplant evaluation for kidney transplant    Chronic fatigue    Long toenail    Hyperopia, bilateral    Presbyopia    Chronic diastolic heart failure    Preoperative exam for gynecologic surgery    Screening cholesterol level    Asymptomatic menopause    Visit for screening mammogram    Colon cancer screening    Kidney replaced by transplant ([facility])    ESRD (end stage renal disease) (Multi)    Muscular deconditioning    Arthritis of knee    Acute on chronic heart failure with preserved ejection fraction    Kidney transplant rejection ([facility])        Immunosuppression reviewed and adjusted       Tacrolimus goal 8-10 ng/mL. Current dose 2 mg BID         MMF 1000 mg po BID       Solumedrol taper  DVT prophylaxis Eliquis & SCDs  PT/OT  Diet: general  Anticipated discharge 1-2 days     Agustina Chong, APRN-CNP

## 2025-04-15 NOTE — PROGRESS NOTES
Transplant Nephrology progress note     Date of admission: 4/14/2025     Liz Hamlin is a 79 y.o.  with [facility]   Past Medical History:   Diagnosis Date    CKD (chronic kidney disease)     Encounter for general adult medical examination without abnormal findings 09/14/2021    Encounter for Medicare annual wellness exam    Glaucoma     Gout     Hyperlipidemia     Hypertension     Mitral valve regurgitation     JOSE E (obstructive sleep apnea)     Unspecified cataract     Cataracts, both eyes        SUBJECTIVE:    Denied any complaints today.  Urine output in last 24 hours is 1.2 L.      PROBLEM LIST:  Assessment & Plan  Kidney transplant rejection ([facility])           ALLERGIES:  Allergies   Allergen Reactions    Amlodipine Swelling     Edema    Codeine Itching    Erythromycin Itching    Nsaids (Non-Steroidal Anti-Inflammatory Drug) Itching and Nausea Only     chronic renal insufficiency    Tramadol Itching and Nausea/vomiting    Lisinopril Cough     Cough            CURRENT MEDICATIONS:  Scheduled medications  [Held by provider] apixaban, 5 mg, oral, BID  atorvastatin, 40 mg, oral, Daily  carvedilol, 50 mg, oral, BID  docusate sodium, 100 mg, oral, Daily  ferrous sulfate, 1 tablet, oral, Daily  furosemide, 20 mg, oral, Daily  [Held by provider] gabapentin, 300 mg, oral, Nightly  heparin (porcine), 5,000 Units, subcutaneous, q8h  hydrALAZINE, 100 mg, oral, BID  insulin lispro, 0-10 Units, subcutaneous, TID AC  magnesium oxide, 400 mg, oral, Daily  magnesium sulfate, 2 g, intravenous, Once  mycophenolate, 250 mg, oral, q12h  polyethylene glycol, 17 g, oral, Daily  [Held by provider] predniSONE, 5 mg, oral, Daily  tacrolimus, 2 mg, oral, q12h      Continuous medications     PRN medications  PRN medications: acetaminophen, dextrose, dextrose, glucagon, glucagon       OBJECTIVE:    VITALS: Visit Vitals  /88 (BP Location: Left arm, Patient Position: Sitting)   Pulse 82   Temp 36.5 °C (97.7 °F) (Temporal)   Resp 18   Ht  "1.676 m (5' 5.98\")   Wt 90.3 kg (199 lb)   LMP  (LMP Unknown)   SpO2 97%   BMI 32.14 kg/m²   OB Status Postmenopausal   Smoking Status Never   BSA 2.05 m²        General: No distress   Mucosa moist   AI, AC, AF     HEENT: PEERLA  CVS: S1 S2 no murmurs  RESP:  Lungs clear to auscultation   ABDO: Soft, non-tender   Neuro: A + O x 3  Skin: No rash   Extremities: No edema       LABS:  Results from last 72 hours   Lab Units 04/15/25  0546   WBC AUTO x10*3/uL 6.0   HEMOGLOBIN g/dL 11.1*   MCV fL 89   PLATELETS AUTO x10*3/uL 119*   BUN mg/dL 23   CREATININE mg/dL 0.88   CALCIUM mg/dL 9.5            Intake/Output Summary (Last 24 hours) at 4/15/2025 0940  Last data filed at 4/15/2025 0245  Gross per 24 hour   Intake 820 ml   Output 1275 ml   Net -455 ml          ASSESSMENT AND PLAN:    Liz Hamlin is a 79 y.o.  79 y.o. with preemptive kidney transplant secondary to hypertension status post DDKT on 10/28/2024 with KDPI 86%, PRA 77%, EBV status D+/R+, CMV status D+/R- hepatitis C status D-/R-.  Patient was induced by 3 mg/kg of Thymoglobulin initiated on belatacept CellCept and prednisone taper.  Transition from belatacept to tacrolimus in the setting of T-cell and B-cell crossmatch positive was done prior..  Currently got admitted because of potential vascular rejection.  Kidney BX : 4/14/25  2 arteries suspicious for acute and chronic vascular rejection (present on tangential section; getting deepers and immunostains to confirm)  Moderate peritubular capillaritis without glomerulitis; negative C4d; negative DSA; not diagnostic of acute antibody-mediated rejection  Focal (<10%) interstitial inflammatory infiltrate with mild tubulitis; not diagnostic of acute T cell-mediated rejection (also does not meet criteria for borderline)  Allograft function:  -Seems to be at her baseline 0.8-1.1, stable electrolytes.  Last UPC 0.12, recent infectious workup like BK, CMV, EBV negative as of 4/8/2025.  AlloSure jump from 1.8--->3.1 " status post biopsy with questionable vascular rejection.  - Started on methylprednisolone 500mg on 4/14/2025 planning 250 mg today awaiting full report today  - Monitor kidney function closely    Immunosuppression:  - FK levels 8.5 aim levels are 8-10 continue current dose, full dose of mycophenolate    Hemodynamics:  - Blood pressures optimal continue with carvedilol, Lasix 20 daily, hydralazine 100 twice daily    No anemia or leukopenia.    Bone mineral disease: Calcium and phosphorus levels are optimal continue with the current management      Thank you for consulting .  Franklin Blake MD       Notes created by Rudy -Please excuse the Typos .

## 2025-04-15 NOTE — CARE PLAN
The clinical goals for the shift include patient will remain hds  Problem: Discharge Planning  Goal: Discharge to home or other facility with appropriate resources  Outcome: Progressing     Problem: Chronic Conditions and Co-morbidities  Goal: Patient's chronic conditions and co-morbidity symptoms are monitored and maintained or improved  Outcome: Progressing     Problem: Nutrition  Goal: Nutrient intake appropriate for maintaining nutritional needs  Outcome: Met     Problem: Recent hospitalization or complication while living with DM  Goal: Patient will effectively self-manage their DM disease process  Outcome: Progressing

## 2025-04-16 LAB
ALBUMIN SERPL BCP-MCNC: 3.7 G/DL (ref 3.4–5)
ANION GAP SERPL CALC-SCNC: 15 MMOL/L (ref 10–20)
BUN SERPL-MCNC: 33 MG/DL (ref 6–23)
CALCIUM SERPL-MCNC: 9.2 MG/DL (ref 8.6–10.6)
CHLORIDE SERPL-SCNC: 106 MMOL/L (ref 98–107)
CO2 SERPL-SCNC: 22 MMOL/L (ref 21–32)
CREAT SERPL-MCNC: 1.19 MG/DL (ref 0.5–1.05)
EGFRCR SERPLBLD CKD-EPI 2021: 47 ML/MIN/1.73M*2
ERYTHROCYTE [DISTWIDTH] IN BLOOD BY AUTOMATED COUNT: 14.6 % (ref 11.5–14.5)
GLUCOSE BLD MANUAL STRIP-MCNC: 136 MG/DL (ref 74–99)
GLUCOSE BLD MANUAL STRIP-MCNC: 160 MG/DL (ref 74–99)
GLUCOSE BLD MANUAL STRIP-MCNC: 177 MG/DL (ref 74–99)
GLUCOSE BLD MANUAL STRIP-MCNC: 200 MG/DL (ref 74–99)
GLUCOSE BLD MANUAL STRIP-MCNC: 222 MG/DL (ref 74–99)
GLUCOSE SERPL-MCNC: 190 MG/DL (ref 74–99)
HCT VFR BLD AUTO: 35.8 % (ref 36–46)
HGB BLD-MCNC: 11 G/DL (ref 12–16)
LAB AP ASR DISCLAIMER: NORMAL
LABORATORY COMMENT REPORT: NORMAL
MAGNESIUM SERPL-MCNC: 2.26 MG/DL (ref 1.6–2.4)
MCH RBC QN AUTO: 27.5 PG (ref 26–34)
MCHC RBC AUTO-ENTMCNC: 30.7 G/DL (ref 32–36)
MCV RBC AUTO: 90 FL (ref 80–100)
NRBC BLD-RTO: 0 /100 WBCS (ref 0–0)
PATH REPORT.COMMENTS IMP SPEC: NORMAL
PATH REPORT.FINAL DX SPEC: NORMAL
PATH REPORT.GROSS SPEC: NORMAL
PATH REPORT.MICROSCOPIC SPEC OTHER STN: NORMAL
PATH REPORT.RELEVANT HX SPEC: NORMAL
PATH REPORT.TOTAL CANCER: NORMAL
PHOSPHATE SERPL-MCNC: 3.4 MG/DL (ref 2.5–4.9)
PLATELET # BLD AUTO: 125 X10*3/UL (ref 150–450)
POTASSIUM SERPL-SCNC: 3.9 MMOL/L (ref 3.5–5.3)
RBC # BLD AUTO: 4 X10*6/UL (ref 4–5.2)
SODIUM SERPL-SCNC: 139 MMOL/L (ref 136–145)
TACROLIMUS BLD-MCNC: 9 NG/ML
WBC # BLD AUTO: 11.9 X10*3/UL (ref 4.4–11.3)

## 2025-04-16 PROCEDURE — 80069 RENAL FUNCTION PANEL: CPT

## 2025-04-16 PROCEDURE — 2500000004 HC RX 250 GENERAL PHARMACY W/ HCPCS (ALT 636 FOR OP/ED): Mod: JZ,TB

## 2025-04-16 PROCEDURE — RXMED WILLOW AMBULATORY MEDICATION CHARGE

## 2025-04-16 PROCEDURE — 2500000002 HC RX 250 W HCPCS SELF ADMINISTERED DRUGS (ALT 637 FOR MEDICARE OP, ALT 636 FOR OP/ED)

## 2025-04-16 PROCEDURE — 2500000004 HC RX 250 GENERAL PHARMACY W/ HCPCS (ALT 636 FOR OP/ED)

## 2025-04-16 PROCEDURE — 83735 ASSAY OF MAGNESIUM: CPT

## 2025-04-16 PROCEDURE — 80197 ASSAY OF TACROLIMUS: CPT

## 2025-04-16 PROCEDURE — 2500000001 HC RX 250 WO HCPCS SELF ADMINISTERED DRUGS (ALT 637 FOR MEDICARE OP)

## 2025-04-16 PROCEDURE — 82947 ASSAY GLUCOSE BLOOD QUANT: CPT

## 2025-04-16 PROCEDURE — 1100000001 HC PRIVATE ROOM DAILY

## 2025-04-16 PROCEDURE — 85027 COMPLETE CBC AUTOMATED: CPT

## 2025-04-16 PROCEDURE — 36415 COLL VENOUS BLD VENIPUNCTURE: CPT

## 2025-04-16 PROCEDURE — 99239 HOSP IP/OBS DSCHRG MGMT >30: CPT | Performed by: PHYSICIAN ASSISTANT

## 2025-04-16 RX ORDER — PREDNISONE 10 MG/1
20 TABLET ORAL DAILY
Status: DISCONTINUED | OUTPATIENT
Start: 2025-04-17 | End: 2025-04-19 | Stop reason: HOSPADM

## 2025-04-16 RX ORDER — PREDNISONE 5 MG/1
20 TABLET ORAL DAILY
Start: 2025-04-16 | End: 2025-04-23 | Stop reason: SDUPTHER

## 2025-04-16 RX ORDER — MYCOPHENOLATE MOFETIL 250 MG/1
1000 CAPSULE ORAL EVERY 12 HOURS
Start: 2025-04-16 | End: 2025-04-28 | Stop reason: SDUPTHER

## 2025-04-16 RX ADMIN — INSULIN LISPRO 2 UNITS: 100 INJECTION, SOLUTION INTRAVENOUS; SUBCUTANEOUS at 12:26

## 2025-04-16 RX ADMIN — HYDRALAZINE HYDROCHLORIDE 100 MG: 25 TABLET ORAL at 09:44

## 2025-04-16 RX ADMIN — MAGNESIUM OXIDE TAB 400 MG (241.3 MG ELEMENTAL MG) 400 MG: 400 (241.3 MG) TAB at 09:44

## 2025-04-16 RX ADMIN — MYCOPHENOLATE MOFETIL 1000 MG: 250 CAPSULE ORAL at 06:12

## 2025-04-16 RX ADMIN — ACETAMINOPHEN 650 MG: 325 TABLET, FILM COATED ORAL at 20:00

## 2025-04-16 RX ADMIN — APIXABAN 5 MG: 5 TABLET, FILM COATED ORAL at 09:44

## 2025-04-16 RX ADMIN — MYCOPHENOLATE MOFETIL 1000 MG: 250 CAPSULE ORAL at 18:18

## 2025-04-16 RX ADMIN — CARVEDILOL 50 MG: 25 TABLET, FILM COATED ORAL at 09:44

## 2025-04-16 RX ADMIN — POLYETHYLENE GLYCOL 3350 17 G: 17 POWDER, FOR SOLUTION ORAL at 09:45

## 2025-04-16 RX ADMIN — CARVEDILOL 50 MG: 25 TABLET, FILM COATED ORAL at 20:00

## 2025-04-16 RX ADMIN — TACROLIMUS 2 MG: 1 CAPSULE ORAL at 18:18

## 2025-04-16 RX ADMIN — HYDRALAZINE HYDROCHLORIDE 100 MG: 25 TABLET ORAL at 19:59

## 2025-04-16 RX ADMIN — TACROLIMUS 2 MG: 1 CAPSULE ORAL at 06:12

## 2025-04-16 RX ADMIN — DEXTROSE MONOHYDRATE 250 MG: 50 INJECTION, SOLUTION INTRAVENOUS at 12:26

## 2025-04-16 RX ADMIN — APIXABAN 5 MG: 5 TABLET, FILM COATED ORAL at 19:59

## 2025-04-16 RX ADMIN — FUROSEMIDE 20 MG: 20 TABLET ORAL at 09:45

## 2025-04-16 RX ADMIN — ATORVASTATIN CALCIUM 40 MG: 40 TABLET, FILM COATED ORAL at 09:45

## 2025-04-16 RX ADMIN — INSULIN LISPRO 2 UNITS: 100 INJECTION, SOLUTION INTRAVENOUS; SUBCUTANEOUS at 16:11

## 2025-04-16 RX ADMIN — FERROUS SULFATE TAB 325 MG (65 MG ELEMENTAL FE) 325 MG: 325 (65 FE) TAB at 09:44

## 2025-04-16 RX ADMIN — DOCUSATE SODIUM 100 MG: 100 CAPSULE, LIQUID FILLED ORAL at 09:46

## 2025-04-16 ASSESSMENT — PAIN SCALES - GENERAL
PAINLEVEL_OUTOF10: 2
PAINLEVEL_OUTOF10: 5 - MODERATE PAIN
PAINLEVEL_OUTOF10: 0 - NO PAIN

## 2025-04-16 ASSESSMENT — PAIN - FUNCTIONAL ASSESSMENT
PAIN_FUNCTIONAL_ASSESSMENT: 0-10
PAIN_FUNCTIONAL_ASSESSMENT: 0-10

## 2025-04-16 NOTE — CARE PLAN
The patient's goals for the shift include      The clinical goals for the shift include patient will remain HDS throughout this shift    Problem: Discharge Planning  Goal: Discharge to home or other facility with appropriate resources  Outcome: Progressing     Problem: Chronic Conditions and Co-morbidities  Goal: Patient's chronic conditions and co-morbidity symptoms are monitored and maintained or improved  Outcome: Progressing     Problem: Recent hospitalization or complication while living with DM  Goal: Patient will effectively self-manage their DM disease process  Outcome: Progressing     Problem: Fall/Injury  Goal: Not fall by end of shift  Outcome: Progressing  Goal: Be free from injury by end of the shift  Outcome: Progressing  Goal: Verbalize understanding of personal risk factors for fall in the hospital  Outcome: Progressing  Goal: Verbalize understanding of risk factor reduction measures to prevent injury from fall in the home  Outcome: Progressing  Goal: Use assistive devices by end of the shift  Outcome: Progressing  Goal: Pace activities to prevent fatigue by end of the shift  Outcome: Progressing

## 2025-04-16 NOTE — CARE PLAN
Problem: Discharge Planning  Goal: Discharge to home or other facility with appropriate resources  Outcome: Progressing     Problem: Chronic Conditions and Co-morbidities  Goal: Patient's chronic conditions and co-morbidity symptoms are monitored and maintained or improved  Outcome: Progressing     Problem: Recent hospitalization or complication while living with DM  Goal: Patient will effectively self-manage their DM disease process  Outcome: Progressing     Problem: Fall/Injury  Goal: Not fall by end of shift  Outcome: Progressing  Goal: Be free from injury by end of the shift  Outcome: Progressing  Goal: Verbalize understanding of personal risk factors for fall in the hospital  Outcome: Progressing  Goal: Verbalize understanding of risk factor reduction measures to prevent injury from fall in the home  Outcome: Progressing  Goal: Use assistive devices by end of the shift  Outcome: Progressing  Goal: Pace activities to prevent fatigue by end of the shift  Outcome: Progressing   The patient's goals for the shift include      The clinical goals for the shift include Patient will be HDS on this shift    .

## 2025-04-16 NOTE — DISCHARGE SUMMARY
Discharge Diagnosis  Kidney transplant rejection ([facility])    Issues Requiring Follow-Up  Received 4.5 mg/kg Thymo-- per discussion with Dr. Horton, we gave the final dose of Thymo (150 mg) with her platelets being 63-- please monitor platelet + WBC trend outpatient     Re-started on Valcyte 900 mg daily (CMV mismatch), Bactrim, and Clotrimazole     Labs Monday       Test Results Pending At Discharge  Pending Labs       No current pending labs.            Hospital Course  Ms. Hamlin is a 79 y.o. female with past medical history significant for ESKD secondary to hypertension status post pre-emptive DDKT on 10/28/2024 with KDPI 86%, PRA 77%. Patient was induced with 3 mg/kg of Thymoglobulin. She was initiated on belatacept, cellcept, and prednisone taper. 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 Recently, Allosure increased to 3.1% from 1.8, 0.18.      She was directly admitted after biopsy for IV steroids. Biopsy results showed 2 arteries suspicious for acute and chronic vascular rejection. No AMR or TCMR.  She was treated with a total of Thymo 4.5 mg/kg and re-started on the anti-infective prophylaxis as indicated.     Pt was tolerating a regular diet, maintaining adequate hydration with oral intake, ambulating independently. Immunosuppression was managed by the transplant team. Patient is in stable condition for discharge home with no home going needs. RX delivered to bedside prior to discharge. The patient will f/u in the transplant institute and have labs drawn in the walk-in clinic.    Pertinent Physical Exam At Time of Discharge  Physical Exam  Physical Exam  Constitutional:       Appearance: Normal appearance.   Cardiovascular:      Rate and Rhythm: Normal rate.   Pulmonary:      Effort: Pulmonary effort is normal. No respiratory distress.   Abdominal:      General: There is no  distension.      Palpations: Abdomen is soft.      Comments: Well healed   Musculoskeletal:         General: Normal range of motion.      Right lower leg: No edema.      Left lower leg: No edema.   Skin:     General: Skin is warm and dry.   Neurological:      General: No focal deficit present.      Mental Status: She is alert and oriented to person, place, and time.   Psychiatric:         Mood and Affect: Mood normal.         Behavior: Behavior normal.      Home Medications     Medication List      START taking these medications     clotrimazole 10 mg dixon; Commonly known as: Mycelex; Use 1 tablet (10   mg) in the mouth or throat 3 times daily (morning, midday, late   afternoon).   Prevymis 480 mg tablet; Generic drug: letermovir; Take 1 tablet (480 mg)   by mouth once daily for 28 days.   sulfamethoxazole-trimethoprim 400-80 mg tablet; Commonly known as:   Bactrim; Take 1 tablet by mouth once daily.     CHANGE how you take these medications     mycophenolate 250 mg capsule; Commonly known as: Cellcept; Take 4   capsules (1,000 mg) by mouth every 12 hours.; What changed: how much to   take   predniSONE 5 mg tablet; Commonly known as: Deltasone; Take 4 tablets (20   mg) by mouth once daily.; What changed: how much to take   tacrolimus 1 mg capsule; Commonly known as: Prograf; Take 1 capsule (1   mg) by mouth every 12 hours.; What changed: how much to take     CONTINUE taking these medications     apixaban 5 mg tablet; Commonly known as: Eliquis; Take 2 tablets (10 mg)   by mouth 2 times a day for 7 days, THEN 1 tablet (5 mg) 2 times a day.;   Start taking on: March 19, 2025   atorvastatin 40 mg tablet; Commonly known as: Lipitor; Take 1 tablet (40   mg) by mouth once daily.   carvedilol 25 mg tablet; Commonly known as: Coreg; Take 2 tablets (50   mg) by mouth 2 times a day.; Notes to patient: Do not take if SBP is less   than 110 mmHg or HR less than 55 bpm.    empagliflozin 10 mg tablet; Commonly known as:  Jardiance; Take 1 tablet   (10 mg) by mouth once daily.   FeroSuL 325 mg (65 mg iron) tablet; Generic drug: ferrous sulfate; TAKE   1 TABLET DAILY   furosemide 20 mg tablet; Commonly known as: Lasix; Take 1 tablet (20 mg)   by mouth once daily.   gabapentin 300 mg capsule; Commonly known as: Neurontin; Take 1 capsule   (300 mg) by mouth once daily at bedtime.   hydrALAZINE 100 mg tablet; Commonly known as: Apresoline; Take 1 tablet   (100 mg) by mouth 2 times a day.   magnesium oxide 400 mg (241.3 mg elemental) tablet; Commonly known as:   Mag-Ox; Take 1 tablet (400 mg) by mouth once daily.; Notes to patient:   Must be  from transplant medications by at least 2 hours   SITagliptin phosphate 100 mg tablet; Commonly known as: Januvia; Take 1   tablet (100 mg) by mouth once daily.   valGANciclovir 450 mg tablet; Commonly known as: Valcyte; Take 2 tablets   (900 mg) by mouth once daily. Do not crush or chew.       Outpatient Follow-Up  Future Appointments   Date Time Provider Department [place]   5/1/2025 10:20 AM [facility]P [facility] PHYSICIAN [facility]   5/12/2025 11:00 AM Eloise Edwards MD [id number] [facility]   5/20/2025  8:20 AM TXP KIDNEY PHYSICIAN [facility]   5/21/2025  9:45 AM Dov Encinas MD [id number] [facility]   6/9/2025 10:15 AM Theresa Cummings DPM [id number] [facility]       Brooke Middleton PA-C

## 2025-04-17 LAB
ALBUMIN SERPL BCP-MCNC: 3.6 G/DL (ref 3.4–5)
ANION GAP SERPL CALC-SCNC: 12 MMOL/L (ref 10–20)
BUN SERPL-MCNC: 32 MG/DL (ref 6–23)
CALCIUM SERPL-MCNC: 9.1 MG/DL (ref 8.6–10.6)
CHLORIDE SERPL-SCNC: 104 MMOL/L (ref 98–107)
CO2 SERPL-SCNC: 25 MMOL/L (ref 21–32)
CREAT SERPL-MCNC: 0.9 MG/DL (ref 0.5–1.05)
EGFRCR SERPLBLD CKD-EPI 2021: 66 ML/MIN/1.73M*2
ERYTHROCYTE [DISTWIDTH] IN BLOOD BY AUTOMATED COUNT: 14.4 % (ref 11.5–14.5)
GLUCOSE BLD MANUAL STRIP-MCNC: 145 MG/DL (ref 74–99)
GLUCOSE BLD MANUAL STRIP-MCNC: 158 MG/DL (ref 74–99)
GLUCOSE BLD MANUAL STRIP-MCNC: 175 MG/DL (ref 74–99)
GLUCOSE BLD MANUAL STRIP-MCNC: 234 MG/DL (ref 74–99)
GLUCOSE SERPL-MCNC: 171 MG/DL (ref 74–99)
HCT VFR BLD AUTO: 36.2 % (ref 36–46)
HGB BLD-MCNC: 11.4 G/DL (ref 12–16)
MAGNESIUM SERPL-MCNC: 2.03 MG/DL (ref 1.6–2.4)
MCH RBC QN AUTO: 28 PG (ref 26–34)
MCHC RBC AUTO-ENTMCNC: 31.5 G/DL (ref 32–36)
MCV RBC AUTO: 89 FL (ref 80–100)
NRBC BLD-RTO: 0 /100 WBCS (ref 0–0)
PHOSPHATE SERPL-MCNC: 2.4 MG/DL (ref 2.5–4.9)
PLATELET # BLD AUTO: 116 X10*3/UL (ref 150–450)
POTASSIUM SERPL-SCNC: 4.1 MMOL/L (ref 3.5–5.3)
RBC # BLD AUTO: 4.07 X10*6/UL (ref 4–5.2)
SODIUM SERPL-SCNC: 137 MMOL/L (ref 136–145)
WBC # BLD AUTO: 11.2 X10*3/UL (ref 4.4–11.3)

## 2025-04-17 PROCEDURE — 2500000001 HC RX 250 WO HCPCS SELF ADMINISTERED DRUGS (ALT 637 FOR MEDICARE OP)

## 2025-04-17 PROCEDURE — 80069 RENAL FUNCTION PANEL: CPT

## 2025-04-17 PROCEDURE — 2500000004 HC RX 250 GENERAL PHARMACY W/ HCPCS (ALT 636 FOR OP/ED)

## 2025-04-17 PROCEDURE — 2500000004 HC RX 250 GENERAL PHARMACY W/ HCPCS (ALT 636 FOR OP/ED): Mod: JZ | Performed by: PHYSICIAN ASSISTANT

## 2025-04-17 PROCEDURE — 1100000001 HC PRIVATE ROOM DAILY

## 2025-04-17 PROCEDURE — 2500000001 HC RX 250 WO HCPCS SELF ADMINISTERED DRUGS (ALT 637 FOR MEDICARE OP): Performed by: PHYSICIAN ASSISTANT

## 2025-04-17 PROCEDURE — 83735 ASSAY OF MAGNESIUM: CPT

## 2025-04-17 PROCEDURE — 2500000002 HC RX 250 W HCPCS SELF ADMINISTERED DRUGS (ALT 637 FOR MEDICARE OP, ALT 636 FOR OP/ED)

## 2025-04-17 PROCEDURE — 82947 ASSAY GLUCOSE BLOOD QUANT: CPT

## 2025-04-17 PROCEDURE — 85027 COMPLETE CBC AUTOMATED: CPT

## 2025-04-17 PROCEDURE — 99233 SBSQ HOSP IP/OBS HIGH 50: CPT | Performed by: HOSPITALIST

## 2025-04-17 PROCEDURE — 99232 SBSQ HOSP IP/OBS MODERATE 35: CPT | Performed by: STUDENT IN AN ORGANIZED HEALTH CARE EDUCATION/TRAINING PROGRAM

## 2025-04-17 PROCEDURE — 36415 COLL VENOUS BLD VENIPUNCTURE: CPT

## 2025-04-17 PROCEDURE — 2500000002 HC RX 250 W HCPCS SELF ADMINISTERED DRUGS (ALT 637 FOR MEDICARE OP, ALT 636 FOR OP/ED): Performed by: PHYSICIAN ASSISTANT

## 2025-04-17 RX ORDER — ACETAMINOPHEN 325 MG/1
975 TABLET ORAL ONCE
Status: DISCONTINUED | OUTPATIENT
Start: 2025-04-17 | End: 2025-04-17

## 2025-04-17 RX ORDER — PANTOPRAZOLE SODIUM 40 MG/1
40 TABLET, DELAYED RELEASE ORAL
Status: DISCONTINUED | OUTPATIENT
Start: 2025-04-18 | End: 2025-04-19 | Stop reason: HOSPADM

## 2025-04-17 RX ORDER — SULFAMETHOXAZOLE AND TRIMETHOPRIM 400; 80 MG/1; MG/1
1 TABLET ORAL DAILY
Status: DISCONTINUED | OUTPATIENT
Start: 2025-04-17 | End: 2025-04-19 | Stop reason: HOSPADM

## 2025-04-17 RX ORDER — ACETAMINOPHEN 325 MG/1
650 TABLET ORAL ONCE
Status: COMPLETED | OUTPATIENT
Start: 2025-04-17 | End: 2025-04-17

## 2025-04-17 RX ORDER — ACETAMINOPHEN 325 MG/1
975 TABLET ORAL EVERY 6 HOURS PRN
Status: DISCONTINUED | OUTPATIENT
Start: 2025-04-17 | End: 2025-04-19 | Stop reason: HOSPADM

## 2025-04-17 RX ORDER — CLOTRIMAZOLE 10 MG/1
10 LOZENGE ORAL
Status: DISCONTINUED | OUTPATIENT
Start: 2025-04-17 | End: 2025-04-19 | Stop reason: HOSPADM

## 2025-04-17 RX ORDER — DIPHENHYDRAMINE HCL 25 MG
25 CAPSULE ORAL ONCE
Status: COMPLETED | OUTPATIENT
Start: 2025-04-17 | End: 2025-04-17

## 2025-04-17 RX ADMIN — CLOTRIMAZOLE 10 MG: 10 LOZENGE ORAL at 16:53

## 2025-04-17 RX ADMIN — HYDRALAZINE HYDROCHLORIDE 100 MG: 25 TABLET ORAL at 08:35

## 2025-04-17 RX ADMIN — INSULIN LISPRO 2 UNITS: 100 INJECTION, SOLUTION INTRAVENOUS; SUBCUTANEOUS at 12:08

## 2025-04-17 RX ADMIN — TACROLIMUS 2 MG: 1 CAPSULE ORAL at 18:19

## 2025-04-17 RX ADMIN — CARVEDILOL 50 MG: 25 TABLET, FILM COATED ORAL at 20:53

## 2025-04-17 RX ADMIN — DIPHENHYDRAMINE HYDROCHLORIDE 25 MG: 25 CAPSULE ORAL at 12:41

## 2025-04-17 RX ADMIN — DOCUSATE SODIUM 100 MG: 100 CAPSULE, LIQUID FILLED ORAL at 08:36

## 2025-04-17 RX ADMIN — APIXABAN 5 MG: 5 TABLET, FILM COATED ORAL at 20:53

## 2025-04-17 RX ADMIN — MYCOPHENOLATE MOFETIL 1000 MG: 250 CAPSULE ORAL at 06:50

## 2025-04-17 RX ADMIN — ATORVASTATIN CALCIUM 40 MG: 40 TABLET, FILM COATED ORAL at 08:36

## 2025-04-17 RX ADMIN — FUROSEMIDE 20 MG: 20 TABLET ORAL at 08:35

## 2025-04-17 RX ADMIN — METHYLPREDNISOLONE SODIUM SUCCINATE 40 MG: 40 INJECTION, POWDER, FOR SOLUTION INTRAMUSCULAR; INTRAVENOUS at 12:41

## 2025-04-17 RX ADMIN — DIBASIC SODIUM PHOSPHATE, MONOBASIC POTASSIUM PHOSPHATE AND MONOBASIC SODIUM PHOSPHATE 250 MG: 852; 155; 130 TABLET ORAL at 16:53

## 2025-04-17 RX ADMIN — HYDRALAZINE HYDROCHLORIDE 100 MG: 25 TABLET ORAL at 20:53

## 2025-04-17 RX ADMIN — CARVEDILOL 50 MG: 25 TABLET, FILM COATED ORAL at 08:35

## 2025-04-17 RX ADMIN — APIXABAN 5 MG: 5 TABLET, FILM COATED ORAL at 08:36

## 2025-04-17 RX ADMIN — HEPARIN SODIUM 125 MG: 1000 INJECTION INTRAVENOUS; SUBCUTANEOUS at 13:14

## 2025-04-17 RX ADMIN — ACETAMINOPHEN 650 MG: 325 TABLET, FILM COATED ORAL at 02:37

## 2025-04-17 RX ADMIN — MYCOPHENOLATE MOFETIL 1000 MG: 250 CAPSULE ORAL at 18:19

## 2025-04-17 RX ADMIN — ACETAMINOPHEN 650 MG: 325 TABLET, FILM COATED ORAL at 12:41

## 2025-04-17 RX ADMIN — CLOTRIMAZOLE 10 MG: 10 LOZENGE ORAL at 12:08

## 2025-04-17 RX ADMIN — INSULIN LISPRO 2 UNITS: 100 INJECTION, SOLUTION INTRAVENOUS; SUBCUTANEOUS at 16:53

## 2025-04-17 RX ADMIN — MAGNESIUM OXIDE TAB 400 MG (241.3 MG ELEMENTAL MG) 400 MG: 400 (241.3 MG) TAB at 08:36

## 2025-04-17 RX ADMIN — TACROLIMUS 2 MG: 1 CAPSULE ORAL at 06:50

## 2025-04-17 RX ADMIN — ACETAMINOPHEN 650 MG: 325 TABLET, FILM COATED ORAL at 08:03

## 2025-04-17 RX ADMIN — FERROUS SULFATE TAB 325 MG (65 MG ELEMENTAL FE) 325 MG: 325 (65 FE) TAB at 08:35

## 2025-04-17 RX ADMIN — DIBASIC SODIUM PHOSPHATE, MONOBASIC POTASSIUM PHOSPHATE AND MONOBASIC SODIUM PHOSPHATE 250 MG: 852; 155; 130 TABLET ORAL at 15:17

## 2025-04-17 RX ADMIN — SULFAMETHOXAZOLE AND TRIMETHOPRIM 1 TABLET: 400; 80 TABLET ORAL at 12:08

## 2025-04-17 RX ADMIN — PREDNISONE 20 MG: 10 TABLET ORAL at 08:36

## 2025-04-17 RX ADMIN — POLYETHYLENE GLYCOL 3350 17 G: 17 POWDER, FOR SOLUTION ORAL at 08:36

## 2025-04-17 ASSESSMENT — PAIN - FUNCTIONAL ASSESSMENT
PAIN_FUNCTIONAL_ASSESSMENT: 0-10

## 2025-04-17 ASSESSMENT — PAIN SCALES - GENERAL
PAINLEVEL_OUTOF10: 2
PAINLEVEL_OUTOF10: 3
PAINLEVEL_OUTOF10: 7
PAINLEVEL_OUTOF10: 5 - MODERATE PAIN
PAINLEVEL_OUTOF10: 5 - MODERATE PAIN
PAINLEVEL_OUTOF10: 4
PAINLEVEL_OUTOF10: 4

## 2025-04-17 ASSESSMENT — COGNITIVE AND FUNCTIONAL STATUS - GENERAL
MOBILITY SCORE: 20
CLIMB 3 TO 5 STEPS WITH RAILING: A LITTLE
CLIMB 3 TO 5 STEPS WITH RAILING: A LITTLE
STANDING UP FROM CHAIR USING ARMS: A LITTLE
MOVING TO AND FROM BED TO CHAIR: A LITTLE
HELP NEEDED FOR BATHING: A LITTLE
WALKING IN HOSPITAL ROOM: A LITTLE
DAILY ACTIVITIY SCORE: 22
MOBILITY SCORE: 20
TOILETING: A LITTLE
WALKING IN HOSPITAL ROOM: A LITTLE
HELP NEEDED FOR BATHING: A LITTLE
STANDING UP FROM CHAIR USING ARMS: A LITTLE
DAILY ACTIVITIY SCORE: 22
TOILETING: A LITTLE
MOVING TO AND FROM BED TO CHAIR: A LITTLE

## 2025-04-17 ASSESSMENT — PAIN DESCRIPTION - DESCRIPTORS
DESCRIPTORS: ACHING

## 2025-04-17 NOTE — CARE PLAN
The patient's goals for the shift include      The clinical goals for the shift include patient will remain HDS throughout this shift    Problem: Discharge Planning  Goal: Discharge to home or other facility with appropriate resources  Outcome: Progressing

## 2025-04-17 NOTE — PROGRESS NOTES
"Liz Hamlin is a 79 y.o. female on day 3 of admission presenting with Kidney transplant rejection ([facility]).    Subjective   Admitted for rejection       Objective   Vitals:    04/17/25 0800   BP: 179/78   Pulse: 80   Resp: 18   Temp: 36.8 °C (98.2 °F)   SpO2: 96%       Physical Exam  Constitutional:       Appearance: Normal appearance.   Eyes:      Pupils: Pupils are equal, round, and reactive to light.   Cardiovascular:      Rate and Rhythm: Normal rate.   Pulmonary:      Effort: Pulmonary effort is normal. No respiratory distress.   Abdominal:      General: There is no distension.      Palpations: Abdomen is soft.      Comments: Well healed   Musculoskeletal:         General: Normal range of motion.      Right lower leg: No edema.      Left lower leg: No edema.   Skin:     General: Skin is warm and dry.   Neurological:      General: No focal deficit present.      Mental Status: She is alert and oriented to person, place, and time.   Psychiatric:         Mood and Affect: Mood normal.         Behavior: Behavior normal.         Last Recorded Vitals  Blood pressure 179/78, pulse 80, temperature 36.8 °C (98.2 °F), temperature source Temporal, resp. rate 18, height 1.676 m (5' 5.98\"), weight 88.8 kg (195 lb 12.3 oz), SpO2 96%, not currently breastfeeding.  Intake/Output last 3 Shifts:  I/O last 3 completed shifts:  In: 1184 (13.3 mL/kg) [P.O.:1080; IV Piggyback:104]  Out: 3100 (34.9 mL/kg) [Urine:3100 (1 mL/kg/hr)]  Weight: 88.8 kg     Relevant Results  Lab Results   Component Value Date    WBC 11.9 (H) 04/16/2025    HGB 11.0 (L) 04/16/2025    HCT 35.8 (L) 04/16/2025    MCV 90 04/16/2025     (L) 04/16/2025     Lab Results   Component Value Date    GLUCOSE 190 (H) 04/16/2025    CALCIUM 9.2 04/16/2025     04/16/2025    K 3.9 04/16/2025    CO2 22 04/16/2025     04/16/2025    BUN 33 (H) 04/16/2025    CREATININE 1.19 (H) 04/16/2025     acetaminophen, 650 mg, oral, Once  antithymocyte globulin (rabbit), " 1.5 mg/kg, intravenous, Once  apixaban, 5 mg, oral, BID  atorvastatin, 40 mg, oral, Daily  carvedilol, 50 mg, oral, BID  clotrimazole, 10 mg, Mouth/Throat, TID  diphenhydrAMINE, 25 mg, oral, Once  docusate sodium, 100 mg, oral, Daily  ferrous sulfate, 1 tablet, oral, Daily  furosemide, 20 mg, oral, Daily  [Held by provider] gabapentin, 300 mg, oral, Nightly  hydrALAZINE, 100 mg, oral, BID  insulin lispro, 0-10 Units, subcutaneous, TID AC  magnesium oxide, 400 mg, oral, Daily  methylPREDNISolone sodium succinate (PF), 40 mg, intravenous, Once  mycophenolate, 1,000 mg, oral, q12h NATALIA  polyethylene glycol, 17 g, oral, Daily  predniSONE, 20 mg, oral, Daily  sulfamethoxazole-trimethoprim, 1 tablet, oral, Daily  tacrolimus, 2 mg, oral, q12h NATALIA          Assessment/Plan   Assessment & Plan  Kidney transplant rejection ([facility])    DDKT 10/20/2024  KDPI 86  PRA 71    Kidney allograft function   Stable   Allosure significant elevated, biopsy - [id number]    Getting steroid pulse    Immunosuppression   Tac 2/2   MMF 1000 bid   Solumedrol 500/250/250       Thymo 4.5 starting today for Banff 2A      I spent 35 minutes in the professional and overall care of this patient.      Elder Mac MD

## 2025-04-17 NOTE — DISCHARGE INSTR - OTHER ORDERS
Please follow your new medication chart. Some doses and medications have changed.    We've set up an appointment with transplant nephrology for you on Thursday 5/1 at 10:20am.     Please do labs weekly on Mondays or Tuesdays.    Let the coordinators know if you have any questions or concerns by calling [phone] or via [employer].

## 2025-04-17 NOTE — SIGNIFICANT EVENT
04/17/25 1206   Patient Interaction   Organ Kidney   Type of Interaction Morning rounds   Interdisciplinary Rounds   Attendance Surgeon;Physician;GUTIERREZ;Coordinator;Pharmacist   Topics Discussed Diet;Home care needs;Medications;Blood test results;Activity;Imaging/test results     Transplant Surgery Multidisciplinary Team Note    Liz Hamlin is a 79 y.o. female   s/p DDKT from 10/28/24. Her post operative complications: None and Other complication:  rejection     24 Hour Events  1. Final path report showing Banff 2A ACR    Last Recorded Vitals  Visit Vitals  /74 (BP Location: Left arm, Patient Position: Lying)   Pulse 77   Temp 36.9 °C (98.4 °F) (Temporal)   Resp 18      Intake/Output last 3 Shifts:    Intake/Output Summary (Last 24 hours) at 4/17/2025 1206  Last data filed at 4/17/2025 1150  Gross per 24 hour   Intake 944 ml   Output 2900 ml   Net -1956 ml      Vitals:    04/16/25 0400   Weight: 88.8 kg (195 lb 12.3 oz)        Assessment/Plan     Kidney allograft function  -Banff 2A ACR  -Plan Thymo today, plan 4.5 mg/kg  -methylpred 40 mg IV   -Labs pending     Cardiac  -Trend BP, add Nifedipine 30 mg if needed    ID  -Re-start clotrimazole, bactrim  -Re-start Valcyte once she has received a few doses of Thymo and we see where her WBC lands      Principal Problem:    Management after organ transplant  Active Problems:  Problem List[1]     Immunosuppression reviewed and adjusted              Tacrolimus goal 8-10 ng/mL. Current dose 2 mg BID                MMF 1000 mg po BID              Solumedrol taper  DVT prophylaxis Eliquis & SCDs   PT/OT  Diet: general  Anticipated discharge 3-4 days     Brooke Middleton PA-C             [1]   Patient Active Problem List  Diagnosis    Astigmatism    Benign hypertension    Bilateral renal cysts    Chronic kidney disease (CKD) stage G4/A1, severely decreased glomerular filtration rate (GFR) between 15-29 mL/min/1.73 square meter and albuminuria creatinine ratio less  than 30 mg/g (CMS/HC* (Multi)    CKD (chronic kidney disease)    Chronic right shoulder pain    Combined form of age-related cataract, both eyes    Essential hypertension    Glaucoma suspect of both eyes    Gout    Complex renal cyst    Fibroids    Hyperlipidemia    Hyperopia    Myopia with presbyopia    Mitral valve regurgitation    Hepatic steatosis    Intermittent urinary incontinence    Obstructive sleep apnea, adult    Onycholysis of toenail    Onychomycosis of toenail    Pre-ulcerative corn or callous    Pruritus    PVD (posterior vitreous detachment), both eyes    Sleep apnea    Toe deformity, right    Nocturia    CKD stage 4 secondary to hypertension (Multi)    History of anemia due to CKD    Cellulitis of right leg    Health care maintenance    Venous stasis dermatitis of both lower extremities    Diabetes mellitus screening    Screening for cardiovascular condition    Need for vaccination    Asymptomatic menopausal state    Encounter for screening mammogram for breast cancer    Pre-transplant evaluation for kidney transplant    Chronic fatigue    Long toenail    Hyperopia, bilateral    Presbyopia    Chronic diastolic heart failure    Preoperative exam for gynecologic surgery    Screening cholesterol level    Asymptomatic menopause    Visit for screening mammogram    Colon cancer screening    Kidney replaced by transplant ([facility])    ESRD (end stage renal disease) (Multi)    Muscular deconditioning    Arthritis of knee    Acute on chronic heart failure with preserved ejection fraction    Kidney transplant rejection ([facility])

## 2025-04-17 NOTE — PROGRESS NOTES
Transplant Nephrology progress note     Date of admission: 4/14/2025     Liz Hamlin is a 79 y.o.  with [facility]   Past Medical History:   Diagnosis Date    CKD (chronic kidney disease)     Encounter for general adult medical examination without abnormal findings 09/14/2021    Encounter for Medicare annual wellness exam    Glaucoma     Gout     Hyperlipidemia     Hypertension     Mitral valve regurgitation     JOSE E (obstructive sleep apnea)     Unspecified cataract     Cataracts, both eyes        SUBJECTIVE:    C/o headache,explained the change in plan as biopsy showing ACR -2a      PROBLEM LIST:  Assessment & Plan  Kidney transplant rejection ([facility])           ALLERGIES:  Allergies   Allergen Reactions    Amlodipine Swelling     Edema    Codeine Itching    Erythromycin Itching    Nsaids (Non-Steroidal Anti-Inflammatory Drug) Itching and Nausea Only     chronic renal insufficiency    Tramadol Itching and Nausea/vomiting    Lisinopril Cough     Cough            CURRENT MEDICATIONS:  Scheduled medications  antithymocyte globulin (rabbit), 1.5 mg/kg, intravenous, Once  apixaban, 5 mg, oral, BID  atorvastatin, 40 mg, oral, Daily  carvedilol, 50 mg, oral, BID  clotrimazole, 10 mg, Mouth/Throat, TID  docusate sodium, 100 mg, oral, Daily  ferrous sulfate, 1 tablet, oral, Daily  furosemide, 20 mg, oral, Daily  [Held by provider] gabapentin, 300 mg, oral, Nightly  hydrALAZINE, 100 mg, oral, BID  insulin lispro, 0-10 Units, subcutaneous, TID AC  magnesium oxide, 400 mg, oral, Daily  mycophenolate, 1,000 mg, oral, q12h NATALIA  [START ON 4/18/2025] pantoprazole, 40 mg, oral, Daily before breakfast  polyethylene glycol, 17 g, oral, Daily  predniSONE, 20 mg, oral, Daily  sod phos di, mono-K phos mono, 250 mg, oral, 4x daily  sulfamethoxazole-trimethoprim, 1 tablet, oral, Daily  tacrolimus, 2 mg, oral, q12h NATALIA      Continuous medications     PRN medications  PRN medications: acetaminophen, dextrose, dextrose, glucagon, glucagon,  "ondansetron, phenyleph-min oil-petrolatum       OBJECTIVE:    VITALS: Visit Vitals  /70 (BP Location: Left arm, Patient Position: Lying)   Pulse 80   Temp 36.9 °C (98.4 °F) (Temporal)   Resp 18   Ht 1.676 m (5' 5.98\")   Wt 88.8 kg (195 lb 12.3 oz)   LMP  (LMP Unknown)   SpO2 96%   BMI 31.61 kg/m²   OB Status Postmenopausal   Smoking Status Never   BSA 2.03 m²        General: No distress   Mucosa moist   AI, AC, AF     HEENT: PEERLA  CVS: S1 S2 no murmurs  RESP:  Lungs clear to auscultation   ABDO: Soft, non-tender   Neuro: A + O x 3  Skin: No rash   Extremities: No edema       LABS:  Results from last 72 hours   Lab Units 04/17/25  1139 04/17/25  1137 04/16/25  0520   WBC AUTO x10*3/uL 11.2  --  11.9*   HEMOGLOBIN g/dL 11.4*  --  11.0*   MCV fL 89  --  90   PLATELETS AUTO x10*3/uL 116*  --  125*   BUN mg/dL  --  32* 33*   CREATININE mg/dL  --  0.90 1.19*   CALCIUM mg/dL  --  9.1 9.2   TACROLIMUS ng/mL  --   --  9.0            Intake/Output Summary (Last 24 hours) at 4/17/2025 1655  Last data filed at 4/17/2025 1514  Gross per 24 hour   Intake 1440 ml   Output 3100 ml   Net -1660 ml          ASSESSMENT AND PLAN:    Liz Hamlin is a 79 y.o.  79 y.o. with preemptive kidney transplant secondary to hypertension status post DDKT on 10/28/2024 with KDPI 86%, PRA 77%, EBV status D+/R+, CMV status D+/R- hepatitis C status D-/R-.  Patient was induced by 3 mg/kg of Thymoglobulin initiated on belatacept CellCept and prednisone taper.  Transition from belatacept to tacrolimus in the setting of T-cell and B-cell crossmatch positive was done prior..  Currently got admitted because of potential vascular rejection.  Kidney BX : 4/14/25  2 arteries suspicious for acute and chronic vascular rejection (present on tangential section; getting deepers and immunostains to confirm)  Moderate peritubular capillaritis without glomerulitis; negative C4d; negative DSA; not diagnostic of acute antibody-mediated rejection  Focal (<10%) " interstitial inflammatory infiltrate with mild tubulitis; not diagnostic of acute T cell-mediated rejection (also does not meet criteria for borderline)  Allograft function:  -Seems to be at her baseline 0.8-1.1, stable electrolytes.  Last UPC 0.12, recent infectious workup like BK, CMV, EBV negative as of 4/8/2025.  AlloSure jump from 1.8--->3.1 status post biopsy with Banff-2a rejection .  - Started on methylprednisolone 500mg on 4/14/2025 completed 250 mg on 4/16 and 4/16 respectively .Will be doing 40 mg IV steroids with thymo.  -Planning Thymoglobulin total dose of 4.5 mg /kg -planning 1.5 mg/day day 1 today .  -started on PPX bactrim ,clotrimazole and valcyte at least 3 months .  - Monitor kidney function closely    Immunosuppression:  - FK levels 9 aim levels are 8-10 continue current dose, full dose of mycophenolate    Hemodynamics:  - Blood pressures optimal continue with carvedilol, Lasix 20 daily, hydralazine 100 twice daily    No anemia or leukopenia.    Bone mineral disease: Calcium and phosphorus levels are optimal continue with the current management      Thank you for consulting .  Franklin Blake MD       Notes created by uRdy -Please excuse the Typos .

## 2025-04-17 NOTE — CARE PLAN
The patient's goals for the shift include  get discharged    The clinical goals for the shift include pt will remain safe and free from falls throughout shift        Problem: Discharge Planning  Goal: Discharge to home or other facility with appropriate resources  Outcome: Progressing     Problem: Chronic Conditions and Co-morbidities  Goal: Patient's chronic conditions and co-morbidity symptoms are monitored and maintained or improved  Outcome: Progressing     Problem: Recent hospitalization or complication while living with DM  Goal: Patient will effectively self-manage their DM disease process  Outcome: Progressing     Problem: Fall/Injury  Goal: Not fall by end of shift  Outcome: Progressing  Goal: Be free from injury by end of the shift  Outcome: Progressing  Goal: Verbalize understanding of personal risk factors for fall in the hospital  Outcome: Progressing  Goal: Verbalize understanding of risk factor reduction measures to prevent injury from fall in the home  Outcome: Progressing  Goal: Use assistive devices by end of the shift  Outcome: Progressing  Goal: Pace activities to prevent fatigue by end of the shift  Outcome: Progressing

## 2025-04-17 NOTE — DOCUMENTATION CLARIFICATION NOTE
"    PATIENT:               ALIYA SHELL  ACCT #:                  [id number]  MRN:                       [id number]  :                       1945  ADMIT DATE:       2025 3:22 PM  DISCH DATE:  RESPONDING PROVIDER #:        [id number]          PROVIDER RESPONSE TEXT:    Immunosuppressed due to other kidney transplant    CDI QUERY TEXT:    Clarification        Instruction:    Based on your assessment of the patient and the clinical information, please provide the requested documentation by clicking on the appropriate radio button and enter any additional information if prompted.    Question: Please further clarify if there is a diagnosis related the clinical information    When answering this query, please exercise your independent professional judgment. The fact that a question is being asked, does not imply that any particular answer is desired or expected.    The patient's clinical indicators include:  Clinical Information: 79 yr old female hx HTN, HLD and s/p kidney transplant on 24 who presents after kidney biopsy for rejection.    Clinical Indicators: Pt s/p transplant and on immunosuppressants.  Documentation from significant event note on 4/15 shows \"Immunosuppression reviewed and adjusted  Tacrolimus goal 8-10 ng/mL. Current dose 2 mg BID  MMF 1000 mg po BID\"    Treatment: Maintain immunosuppressants and adjust accordingly.    Risk Factors: advanced age, HTN, rejection of transplanted organ  Options provided:  -- Immunosuppressed due to drugs  -- Immunosuppressed due to other, Please add etiology of immunosuppression here  -- Other - I will add my own diagnosis  -- Refer to Clinical Documentation Reviewer    Query created by: Bertha Suarez on 2025 7:26 AM      Electronically signed by:  HUI JEREZ-CNP 2025 7:15 AM          "

## 2025-04-18 ENCOUNTER — SPECIALTY PHARMACY (OUTPATIENT)
Dept: PHARMACY | Facility: CLINIC | Age: 80
End: 2025-04-18

## 2025-04-18 ENCOUNTER — PHARMACY VISIT (OUTPATIENT)
Dept: PHARMACY | Facility: CLINIC | Age: 80
End: 2025-04-18
Payer: COMMERCIAL

## 2025-04-18 LAB
ALBUMIN SERPL BCP-MCNC: 3.2 G/DL (ref 3.4–5)
ANION GAP SERPL CALC-SCNC: 11 MMOL/L (ref 10–20)
BASOPHILS # BLD AUTO: 0 X10*3/UL (ref 0–0.1)
BASOPHILS NFR BLD AUTO: 0 %
BUN SERPL-MCNC: 32 MG/DL (ref 6–23)
CALCIUM SERPL-MCNC: 9 MG/DL (ref 8.6–10.6)
CHLORIDE SERPL-SCNC: 106 MMOL/L (ref 98–107)
CO2 SERPL-SCNC: 27 MMOL/L (ref 21–32)
CREAT SERPL-MCNC: 0.88 MG/DL (ref 0.5–1.05)
EGFRCR SERPLBLD CKD-EPI 2021: 68 ML/MIN/1.73M*2
EOSINOPHIL # BLD AUTO: 0 X10*3/UL (ref 0–0.4)
EOSINOPHIL NFR BLD AUTO: 0 %
ERYTHROCYTE [DISTWIDTH] IN BLOOD BY AUTOMATED COUNT: 14.6 % (ref 11.5–14.5)
GLUCOSE BLD MANUAL STRIP-MCNC: 108 MG/DL (ref 74–99)
GLUCOSE BLD MANUAL STRIP-MCNC: 196 MG/DL (ref 74–99)
GLUCOSE BLD MANUAL STRIP-MCNC: 197 MG/DL (ref 74–99)
GLUCOSE BLD MANUAL STRIP-MCNC: 217 MG/DL (ref 74–99)
GLUCOSE BLD MANUAL STRIP-MCNC: 224 MG/DL (ref 74–99)
GLUCOSE SERPL-MCNC: 179 MG/DL (ref 74–99)
HCT VFR BLD AUTO: 37.1 % (ref 36–46)
HGB BLD-MCNC: 11.5 G/DL (ref 12–16)
IMM GRANULOCYTES # BLD AUTO: 0.05 X10*3/UL (ref 0–0.5)
IMM GRANULOCYTES NFR BLD AUTO: 0.5 % (ref 0–0.9)
LYMPHOCYTES # BLD AUTO: 0.16 X10*3/UL (ref 0.8–3)
LYMPHOCYTES NFR BLD AUTO: 1.6 %
MAGNESIUM SERPL-MCNC: 1.96 MG/DL (ref 1.6–2.4)
MCH RBC QN AUTO: 28.3 PG (ref 26–34)
MCHC RBC AUTO-ENTMCNC: 31 G/DL (ref 32–36)
MCV RBC AUTO: 91 FL (ref 80–100)
MONOCYTES # BLD AUTO: 0.3 X10*3/UL (ref 0.05–0.8)
MONOCYTES NFR BLD AUTO: 3 %
NEUTROPHILS # BLD AUTO: 9.5 X10*3/UL (ref 1.6–5.5)
NEUTROPHILS NFR BLD AUTO: 94.9 %
NRBC BLD-RTO: 0 /100 WBCS (ref 0–0)
PHOSPHATE SERPL-MCNC: 2.5 MG/DL (ref 2.5–4.9)
PLATELET # BLD AUTO: 93 X10*3/UL (ref 150–450)
POTASSIUM SERPL-SCNC: 3.8 MMOL/L (ref 3.5–5.3)
RBC # BLD AUTO: 4.07 X10*6/UL (ref 4–5.2)
SODIUM SERPL-SCNC: 140 MMOL/L (ref 136–145)
TACROLIMUS BLD-MCNC: 12 NG/ML
WBC # BLD AUTO: 10 X10*3/UL (ref 4.4–11.3)

## 2025-04-18 PROCEDURE — 36415 COLL VENOUS BLD VENIPUNCTURE: CPT | Performed by: PHYSICIAN ASSISTANT

## 2025-04-18 PROCEDURE — 2500000004 HC RX 250 GENERAL PHARMACY W/ HCPCS (ALT 636 FOR OP/ED): Mod: JZ | Performed by: STUDENT IN AN ORGANIZED HEALTH CARE EDUCATION/TRAINING PROGRAM

## 2025-04-18 PROCEDURE — 1100000001 HC PRIVATE ROOM DAILY

## 2025-04-18 PROCEDURE — 2500000004 HC RX 250 GENERAL PHARMACY W/ HCPCS (ALT 636 FOR OP/ED)

## 2025-04-18 PROCEDURE — 2500000002 HC RX 250 W HCPCS SELF ADMINISTERED DRUGS (ALT 637 FOR MEDICARE OP, ALT 636 FOR OP/ED): Performed by: PHYSICIAN ASSISTANT

## 2025-04-18 PROCEDURE — 2500000001 HC RX 250 WO HCPCS SELF ADMINISTERED DRUGS (ALT 637 FOR MEDICARE OP): Performed by: PHYSICIAN ASSISTANT

## 2025-04-18 PROCEDURE — 82947 ASSAY GLUCOSE BLOOD QUANT: CPT

## 2025-04-18 PROCEDURE — 2500000001 HC RX 250 WO HCPCS SELF ADMINISTERED DRUGS (ALT 637 FOR MEDICARE OP): Performed by: STUDENT IN AN ORGANIZED HEALTH CARE EDUCATION/TRAINING PROGRAM

## 2025-04-18 PROCEDURE — 2500000001 HC RX 250 WO HCPCS SELF ADMINISTERED DRUGS (ALT 637 FOR MEDICARE OP)

## 2025-04-18 PROCEDURE — 2500000002 HC RX 250 W HCPCS SELF ADMINISTERED DRUGS (ALT 637 FOR MEDICARE OP, ALT 636 FOR OP/ED)

## 2025-04-18 PROCEDURE — 80197 ASSAY OF TACROLIMUS: CPT | Performed by: PHYSICIAN ASSISTANT

## 2025-04-18 PROCEDURE — RXMED WILLOW AMBULATORY MEDICATION CHARGE

## 2025-04-18 PROCEDURE — 99232 SBSQ HOSP IP/OBS MODERATE 35: CPT | Performed by: STUDENT IN AN ORGANIZED HEALTH CARE EDUCATION/TRAINING PROGRAM

## 2025-04-18 PROCEDURE — 84100 ASSAY OF PHOSPHORUS: CPT | Performed by: PHYSICIAN ASSISTANT

## 2025-04-18 PROCEDURE — 85025 COMPLETE CBC W/AUTO DIFF WBC: CPT | Performed by: PHYSICIAN ASSISTANT

## 2025-04-18 PROCEDURE — 83735 ASSAY OF MAGNESIUM: CPT | Performed by: PHYSICIAN ASSISTANT

## 2025-04-18 RX ORDER — ACETAMINOPHEN 325 MG/1
650 TABLET ORAL ONCE
Status: COMPLETED | OUTPATIENT
Start: 2025-04-18 | End: 2025-04-18

## 2025-04-18 RX ORDER — SULFAMETHOXAZOLE AND TRIMETHOPRIM 400; 80 MG/1; MG/1
1 TABLET ORAL DAILY
Qty: 30 TABLET | Refills: 0 | Status: SHIPPED | OUTPATIENT
Start: 2025-04-19 | End: 2025-05-19

## 2025-04-18 RX ORDER — CLOTRIMAZOLE 10 MG/1
10 LOZENGE ORAL
Qty: 90 TROCHE | Refills: 0 | Status: SHIPPED | OUTPATIENT
Start: 2025-04-18 | End: 2025-05-18

## 2025-04-18 RX ORDER — DIPHENHYDRAMINE HCL 25 MG
25 CAPSULE ORAL ONCE
Status: COMPLETED | OUTPATIENT
Start: 2025-04-18 | End: 2025-04-18

## 2025-04-18 RX ADMIN — INSULIN LISPRO 2 UNITS: 100 INJECTION, SOLUTION INTRAVENOUS; SUBCUTANEOUS at 12:02

## 2025-04-18 RX ADMIN — ACETAMINOPHEN 650 MG: 325 TABLET, FILM COATED ORAL at 12:18

## 2025-04-18 RX ADMIN — CLOTRIMAZOLE 10 MG: 10 LOZENGE ORAL at 08:33

## 2025-04-18 RX ADMIN — HYDRALAZINE HYDROCHLORIDE 100 MG: 25 TABLET ORAL at 08:32

## 2025-04-18 RX ADMIN — SULFAMETHOXAZOLE AND TRIMETHOPRIM 1 TABLET: 400; 80 TABLET ORAL at 08:33

## 2025-04-18 RX ADMIN — METHYLPREDNISOLONE SODIUM SUCCINATE 40 MG: 40 INJECTION, POWDER, FOR SOLUTION INTRAMUSCULAR; INTRAVENOUS at 12:19

## 2025-04-18 RX ADMIN — FERROUS SULFATE TAB 325 MG (65 MG ELEMENTAL FE) 325 MG: 325 (65 FE) TAB at 08:33

## 2025-04-18 RX ADMIN — FUROSEMIDE 20 MG: 20 TABLET ORAL at 08:33

## 2025-04-18 RX ADMIN — APIXABAN 5 MG: 5 TABLET, FILM COATED ORAL at 08:33

## 2025-04-18 RX ADMIN — PREDNISONE 20 MG: 10 TABLET ORAL at 08:33

## 2025-04-18 RX ADMIN — MYCOPHENOLATE MOFETIL 1000 MG: 250 CAPSULE ORAL at 06:21

## 2025-04-18 RX ADMIN — PANTOPRAZOLE SODIUM 40 MG: 40 TABLET, DELAYED RELEASE ORAL at 06:21

## 2025-04-18 RX ADMIN — INSULIN LISPRO 4 UNITS: 100 INJECTION, SOLUTION INTRAVENOUS; SUBCUTANEOUS at 16:32

## 2025-04-18 RX ADMIN — CARVEDILOL 50 MG: 25 TABLET, FILM COATED ORAL at 20:29

## 2025-04-18 RX ADMIN — ONDANSETRON 4 MG: 2 INJECTION INTRAMUSCULAR; INTRAVENOUS at 09:50

## 2025-04-18 RX ADMIN — MAGNESIUM OXIDE TAB 400 MG (241.3 MG ELEMENTAL MG) 400 MG: 400 (241.3 MG) TAB at 08:33

## 2025-04-18 RX ADMIN — CLOTRIMAZOLE 10 MG: 10 LOZENGE ORAL at 16:32

## 2025-04-18 RX ADMIN — POLYETHYLENE GLYCOL 3350 17 G: 17 POWDER, FOR SOLUTION ORAL at 08:33

## 2025-04-18 RX ADMIN — HEPARIN SODIUM 125 MG: 1000 INJECTION INTRAVENOUS; SUBCUTANEOUS at 12:55

## 2025-04-18 RX ADMIN — MYCOPHENOLATE MOFETIL 1000 MG: 250 CAPSULE ORAL at 18:03

## 2025-04-18 RX ADMIN — TACROLIMUS 2 MG: 1 CAPSULE ORAL at 18:03

## 2025-04-18 RX ADMIN — TACROLIMUS 2 MG: 1 CAPSULE ORAL at 06:21

## 2025-04-18 RX ADMIN — HYDRALAZINE HYDROCHLORIDE 100 MG: 25 TABLET ORAL at 20:28

## 2025-04-18 RX ADMIN — DOCUSATE SODIUM 100 MG: 100 CAPSULE, LIQUID FILLED ORAL at 08:33

## 2025-04-18 RX ADMIN — ATORVASTATIN CALCIUM 40 MG: 40 TABLET, FILM COATED ORAL at 08:33

## 2025-04-18 RX ADMIN — APIXABAN 5 MG: 5 TABLET, FILM COATED ORAL at 20:29

## 2025-04-18 RX ADMIN — DIPHENHYDRAMINE HYDROCHLORIDE 25 MG: 25 CAPSULE ORAL at 12:18

## 2025-04-18 RX ADMIN — CARVEDILOL 50 MG: 25 TABLET, FILM COATED ORAL at 08:33

## 2025-04-18 RX ADMIN — CLOTRIMAZOLE 10 MG: 10 LOZENGE ORAL at 12:02

## 2025-04-18 ASSESSMENT — PAIN SCALES - GENERAL
PAINLEVEL_OUTOF10: 0 - NO PAIN
PAINLEVEL_OUTOF10: 0 - NO PAIN

## 2025-04-18 ASSESSMENT — COGNITIVE AND FUNCTIONAL STATUS - GENERAL
MOBILITY SCORE: 24
DAILY ACTIVITIY SCORE: 24
MOBILITY SCORE: 24
MOBILITY SCORE: 24

## 2025-04-18 ASSESSMENT — PAIN - FUNCTIONAL ASSESSMENT
PAIN_FUNCTIONAL_ASSESSMENT: 0-10
PAIN_FUNCTIONAL_ASSESSMENT: 0-10

## 2025-04-18 NOTE — PROGRESS NOTES
"Liz Hamlin is a 79 y.o. female on day 4 of admission presenting with Kidney transplant rejection ([facility]).    Subjective   Admitted for rejection       Objective   Vitals:    04/18/25 0832   BP: 154/81   Pulse: 64   Resp:    Temp:    SpO2:        Physical Exam  Constitutional:       Appearance: Normal appearance.   Eyes:      Pupils: Pupils are equal, round, and reactive to light.   Cardiovascular:      Rate and Rhythm: Normal rate.   Pulmonary:      Effort: Pulmonary effort is normal. No respiratory distress.   Abdominal:      General: There is no distension.      Palpations: Abdomen is soft.      Comments: Well healed   Musculoskeletal:         General: Normal range of motion.      Right lower leg: No edema.      Left lower leg: No edema.   Skin:     General: Skin is warm and dry.   Neurological:      General: No focal deficit present.      Mental Status: She is alert and oriented to person, place, and time.   Psychiatric:         Mood and Affect: Mood normal.         Behavior: Behavior normal.         Last Recorded Vitals  Blood pressure 154/81, pulse 64, temperature 36.1 °C (97 °F), temperature source Temporal, resp. rate 19, height 1.676 m (5' 5.98\"), weight 88.8 kg (195 lb 12.3 oz), SpO2 98%, not currently breastfeeding.  Intake/Output last 3 Shifts:  I/O last 3 completed shifts:  In: 2503.9 (28.2 mL/kg) [P.O.:1920; I.V.:57.5 (0.6 mL/kg); IV Piggyback:526.4]  Out: 3900 (43.9 mL/kg) [Urine:3900 (1.2 mL/kg/hr)]  Weight: 88.8 kg     Relevant Results  Lab Results   Component Value Date    WBC 10.0 04/18/2025    HGB 11.5 (L) 04/18/2025    HCT 37.1 04/18/2025    MCV 91 04/18/2025    PLT 93 (L) 04/18/2025     Lab Results   Component Value Date    GLUCOSE 179 (H) 04/18/2025    CALCIUM 9.0 04/18/2025     04/18/2025    K 3.8 04/18/2025    CO2 27 04/18/2025     04/18/2025    BUN 32 (H) 04/18/2025    CREATININE 0.88 04/18/2025     acetaminophen, 650 mg, oral, Once  antithymocyte globulin (rabbit), 1.5 " mg/kg, intravenous, Once  apixaban, 5 mg, oral, BID  atorvastatin, 40 mg, oral, Daily  carvedilol, 50 mg, oral, BID  clotrimazole, 10 mg, Mouth/Throat, TID  diphenhydrAMINE, 25 mg, oral, Once  docusate sodium, 100 mg, oral, Daily  ferrous sulfate, 1 tablet, oral, Daily  furosemide, 20 mg, oral, Daily  [Held by provider] gabapentin, 300 mg, oral, Nightly  hydrALAZINE, 100 mg, oral, BID  insulin lispro, 0-10 Units, subcutaneous, TID AC  magnesium oxide, 400 mg, oral, Daily  methylPREDNISolone sodium succinate (PF), 40 mg, intravenous, Once  mycophenolate, 1,000 mg, oral, q12h NATALIA  pantoprazole, 40 mg, oral, Daily before breakfast  polyethylene glycol, 17 g, oral, Daily  predniSONE, 20 mg, oral, Daily  sulfamethoxazole-trimethoprim, 1 tablet, oral, Daily  tacrolimus, 2 mg, oral, q12h NATALIA          Assessment/Plan   Assessment & Plan  Kidney transplant rejection ([facility])    DDKT 10/20/2024  KDPI 86  PRA 71    Kidney allograft function   Stable   Allosure significant elevated, biopsy - [id number]    Getting steroid pulse    Immunosuppression   Tac 2/2   MMF 1000 bid   Solumedrol 500/250/250       Thymo 4.5 4/17-4/19 for Banff 2A    Headache well controlled on tylenol    DC home tomorrow    I spent 35 minutes in the professional and overall care of this patient.      Elder Mac MD

## 2025-04-18 NOTE — CARE PLAN
The patient's goals for the shift include  get well    The clinical goals for the shift include pt will remain safe and free from falls throughout shift      Problem: Discharge Planning  Goal: Discharge to home or other facility with appropriate resources  Outcome: Progressing     Problem: Chronic Conditions and Co-morbidities  Goal: Patient's chronic conditions and co-morbidity symptoms are monitored and maintained or improved  Outcome: Progressing     Problem: Recent hospitalization or complication while living with DM  Goal: Patient will effectively self-manage their DM disease process  Outcome: Progressing     Problem: Fall/Injury  Goal: Not fall by end of shift  Outcome: Progressing  Goal: Be free from injury by end of the shift  Outcome: Progressing  Goal: Verbalize understanding of personal risk factors for fall in the hospital  Outcome: Progressing  Goal: Verbalize understanding of risk factor reduction measures to prevent injury from fall in the home  Outcome: Progressing  Goal: Use assistive devices by end of the shift  Outcome: Progressing  Goal: Pace activities to prevent fatigue by end of the shift  Outcome: Progressing

## 2025-04-18 NOTE — PROGRESS NOTES
Liz Hamlin is a 79 y.o. female on day 4 of admission presenting with Kidney transplant rejection ([facility]).      Transitional Care Coordination Progress Note:  Patient discussed during interdisciplinary rounds.      Plan per Medical/Surgical team:    Home Care choice for home going needs, [facility]  Home. No home going needs anticipated for this pt at this time.          Discharge disposition: Home. No home going needs anticipated for this pt at this time.       Potential Barriers: None     ADOD:  4/19     This TCC will continue to follow for home going needs and safe DC plan.      DEBI DAILEY

## 2025-04-18 NOTE — CARE PLAN
The patient's goals for the shift include      The clinical goals for the shift include pt will remain safe and free from falls throughout shift    Problem: Discharge Planning  Goal: Discharge to home or other facility with appropriate resources  Outcome: Progressing

## 2025-04-19 ENCOUNTER — PHARMACY VISIT (OUTPATIENT)
Dept: PHARMACY | Facility: CLINIC | Age: 80
End: 2025-04-19
Payer: COMMERCIAL

## 2025-04-19 VITALS
SYSTOLIC BLOOD PRESSURE: 138 MMHG | HEART RATE: 82 BPM | TEMPERATURE: 97.2 F | BODY MASS INDEX: 31.46 KG/M2 | OXYGEN SATURATION: 96 % | RESPIRATION RATE: 18 BRPM | DIASTOLIC BLOOD PRESSURE: 87 MMHG | HEIGHT: 66 IN | WEIGHT: 195.77 LBS

## 2025-04-19 LAB
ALBUMIN SERPL BCP-MCNC: 3.2 G/DL (ref 3.4–5)
ANION GAP SERPL CALC-SCNC: 9 MMOL/L (ref 10–20)
BASOPHILS # BLD AUTO: 0 X10*3/UL (ref 0–0.1)
BASOPHILS NFR BLD AUTO: 0 %
BUN SERPL-MCNC: 31 MG/DL (ref 6–23)
CALCIUM SERPL-MCNC: 8.8 MG/DL (ref 8.6–10.6)
CHLORIDE SERPL-SCNC: 106 MMOL/L (ref 98–107)
CO2 SERPL-SCNC: 27 MMOL/L (ref 21–32)
CREAT SERPL-MCNC: 0.93 MG/DL (ref 0.5–1.05)
EGFRCR SERPLBLD CKD-EPI 2021: 63 ML/MIN/1.73M*2
EOSINOPHIL # BLD AUTO: 0 X10*3/UL (ref 0–0.4)
EOSINOPHIL NFR BLD AUTO: 0 %
ERYTHROCYTE [DISTWIDTH] IN BLOOD BY AUTOMATED COUNT: 14.6 % (ref 11.5–14.5)
GLUCOSE BLD MANUAL STRIP-MCNC: 166 MG/DL (ref 74–99)
GLUCOSE BLD MANUAL STRIP-MCNC: 174 MG/DL (ref 74–99)
GLUCOSE BLD MANUAL STRIP-MCNC: 185 MG/DL (ref 74–99)
GLUCOSE SERPL-MCNC: 193 MG/DL (ref 74–99)
HCT VFR BLD AUTO: 35 % (ref 36–46)
HGB BLD-MCNC: 10.9 G/DL (ref 12–16)
IMM GRANULOCYTES # BLD AUTO: 0.02 X10*3/UL (ref 0–0.5)
IMM GRANULOCYTES NFR BLD AUTO: 0.5 % (ref 0–0.9)
LYMPHOCYTES # BLD AUTO: 0.07 X10*3/UL (ref 0.8–3)
LYMPHOCYTES NFR BLD AUTO: 1.8 %
MAGNESIUM SERPL-MCNC: 2.05 MG/DL (ref 1.6–2.4)
MCH RBC QN AUTO: 27.9 PG (ref 26–34)
MCHC RBC AUTO-ENTMCNC: 31.1 G/DL (ref 32–36)
MCV RBC AUTO: 90 FL (ref 80–100)
MONOCYTES # BLD AUTO: 0.23 X10*3/UL (ref 0.05–0.8)
MONOCYTES NFR BLD AUTO: 5.9 %
NEUTROPHILS # BLD AUTO: 3.57 X10*3/UL (ref 1.6–5.5)
NEUTROPHILS NFR BLD AUTO: 91.8 %
NRBC BLD-RTO: 0 /100 WBCS (ref 0–0)
PHOSPHATE SERPL-MCNC: 2.2 MG/DL (ref 2.5–4.9)
PLATELET # BLD AUTO: 63 X10*3/UL (ref 150–450)
POTASSIUM SERPL-SCNC: 4 MMOL/L (ref 3.5–5.3)
RBC # BLD AUTO: 3.9 X10*6/UL (ref 4–5.2)
SODIUM SERPL-SCNC: 138 MMOL/L (ref 136–145)
TACROLIMUS BLD-MCNC: 17 NG/ML
WBC # BLD AUTO: 3.9 X10*3/UL (ref 4.4–11.3)

## 2025-04-19 PROCEDURE — 2500000001 HC RX 250 WO HCPCS SELF ADMINISTERED DRUGS (ALT 637 FOR MEDICARE OP): Performed by: PHYSICIAN ASSISTANT

## 2025-04-19 PROCEDURE — 2500000004 HC RX 250 GENERAL PHARMACY W/ HCPCS (ALT 636 FOR OP/ED)

## 2025-04-19 PROCEDURE — 80197 ASSAY OF TACROLIMUS: CPT | Performed by: PHYSICIAN ASSISTANT

## 2025-04-19 PROCEDURE — 99233 SBSQ HOSP IP/OBS HIGH 50: CPT | Performed by: HOSPITALIST

## 2025-04-19 PROCEDURE — 83735 ASSAY OF MAGNESIUM: CPT | Performed by: PHYSICIAN ASSISTANT

## 2025-04-19 PROCEDURE — 2500000004 HC RX 250 GENERAL PHARMACY W/ HCPCS (ALT 636 FOR OP/ED): Performed by: PHYSICIAN ASSISTANT

## 2025-04-19 PROCEDURE — 2500000001 HC RX 250 WO HCPCS SELF ADMINISTERED DRUGS (ALT 637 FOR MEDICARE OP)

## 2025-04-19 PROCEDURE — 82947 ASSAY GLUCOSE BLOOD QUANT: CPT

## 2025-04-19 PROCEDURE — 85025 COMPLETE CBC W/AUTO DIFF WBC: CPT | Performed by: PHYSICIAN ASSISTANT

## 2025-04-19 PROCEDURE — 2500000002 HC RX 250 W HCPCS SELF ADMINISTERED DRUGS (ALT 637 FOR MEDICARE OP, ALT 636 FOR OP/ED): Performed by: PHYSICIAN ASSISTANT

## 2025-04-19 PROCEDURE — RXMED WILLOW AMBULATORY MEDICATION CHARGE

## 2025-04-19 PROCEDURE — 80069 RENAL FUNCTION PANEL: CPT | Performed by: PHYSICIAN ASSISTANT

## 2025-04-19 PROCEDURE — 36415 COLL VENOUS BLD VENIPUNCTURE: CPT | Performed by: PHYSICIAN ASSISTANT

## 2025-04-19 PROCEDURE — 2500000002 HC RX 250 W HCPCS SELF ADMINISTERED DRUGS (ALT 637 FOR MEDICARE OP, ALT 636 FOR OP/ED)

## 2025-04-19 RX ORDER — DIPHENHYDRAMINE HCL 25 MG
25 CAPSULE ORAL ONCE
Status: COMPLETED | OUTPATIENT
Start: 2025-04-19 | End: 2025-04-19

## 2025-04-19 RX ORDER — ACETAMINOPHEN 325 MG/1
650 TABLET ORAL ONCE
Status: COMPLETED | OUTPATIENT
Start: 2025-04-19 | End: 2025-04-19

## 2025-04-19 RX ORDER — TACROLIMUS 1 MG/1
1 CAPSULE ORAL
Status: DISCONTINUED | OUTPATIENT
Start: 2025-04-20 | End: 2025-04-19 | Stop reason: HOSPADM

## 2025-04-19 RX ORDER — TACROLIMUS 1 MG/1
1 CAPSULE ORAL EVERY 12 HOURS
Start: 2025-04-19 | End: 2025-04-23 | Stop reason: SDUPTHER

## 2025-04-19 RX ORDER — VALGANCICLOVIR 450 MG/1
900 TABLET, FILM COATED ORAL DAILY
Qty: 60 TABLET | Refills: 0 | Status: SHIPPED | OUTPATIENT
Start: 2025-04-19 | End: 2025-05-19

## 2025-04-19 RX ADMIN — HYDRALAZINE HYDROCHLORIDE 100 MG: 25 TABLET ORAL at 08:48

## 2025-04-19 RX ADMIN — ACETAMINOPHEN 650 MG: 325 TABLET, FILM COATED ORAL at 11:19

## 2025-04-19 RX ADMIN — SULFAMETHOXAZOLE AND TRIMETHOPRIM 1 TABLET: 400; 80 TABLET ORAL at 08:48

## 2025-04-19 RX ADMIN — INSULIN LISPRO 2 UNITS: 100 INJECTION, SOLUTION INTRAVENOUS; SUBCUTANEOUS at 12:31

## 2025-04-19 RX ADMIN — MYCOPHENOLATE MOFETIL 1000 MG: 250 CAPSULE ORAL at 06:57

## 2025-04-19 RX ADMIN — PANTOPRAZOLE SODIUM 40 MG: 40 TABLET, DELAYED RELEASE ORAL at 06:57

## 2025-04-19 RX ADMIN — DOCUSATE SODIUM 100 MG: 100 CAPSULE, LIQUID FILLED ORAL at 08:47

## 2025-04-19 RX ADMIN — CLOTRIMAZOLE 10 MG: 10 LOZENGE ORAL at 18:00

## 2025-04-19 RX ADMIN — MYCOPHENOLATE MOFETIL 1000 MG: 250 CAPSULE ORAL at 18:00

## 2025-04-19 RX ADMIN — HEPARIN SODIUM 150 MG: 1000 INJECTION INTRAVENOUS; SUBCUTANEOUS at 11:52

## 2025-04-19 RX ADMIN — POLYETHYLENE GLYCOL 3350 17 G: 17 POWDER, FOR SOLUTION ORAL at 08:48

## 2025-04-19 RX ADMIN — DIPHENHYDRAMINE HYDROCHLORIDE 25 MG: 25 CAPSULE ORAL at 11:20

## 2025-04-19 RX ADMIN — INSULIN LISPRO 2 UNITS: 100 INJECTION, SOLUTION INTRAVENOUS; SUBCUTANEOUS at 08:49

## 2025-04-19 RX ADMIN — TACROLIMUS 2 MG: 1 CAPSULE ORAL at 06:57

## 2025-04-19 RX ADMIN — DIBASIC SODIUM PHOSPHATE, MONOBASIC POTASSIUM PHOSPHATE AND MONOBASIC SODIUM PHOSPHATE 250 MG: 852; 155; 130 TABLET ORAL at 18:01

## 2025-04-19 RX ADMIN — FERROUS SULFATE TAB 325 MG (65 MG ELEMENTAL FE) 325 MG: 325 (65 FE) TAB at 08:47

## 2025-04-19 RX ADMIN — CLOTRIMAZOLE 10 MG: 10 LOZENGE ORAL at 08:47

## 2025-04-19 RX ADMIN — CARVEDILOL 50 MG: 25 TABLET, FILM COATED ORAL at 08:47

## 2025-04-19 RX ADMIN — PREDNISONE 20 MG: 10 TABLET ORAL at 08:48

## 2025-04-19 RX ADMIN — METHYLPREDNISOLONE SODIUM SUCCINATE 40 MG: 40 INJECTION, POWDER, FOR SOLUTION INTRAMUSCULAR; INTRAVENOUS at 11:20

## 2025-04-19 RX ADMIN — MAGNESIUM OXIDE TAB 400 MG (241.3 MG ELEMENTAL MG) 400 MG: 400 (241.3 MG) TAB at 08:48

## 2025-04-19 RX ADMIN — CLOTRIMAZOLE 10 MG: 10 LOZENGE ORAL at 11:51

## 2025-04-19 RX ADMIN — APIXABAN 5 MG: 5 TABLET, FILM COATED ORAL at 08:46

## 2025-04-19 RX ADMIN — INSULIN LISPRO 2 UNITS: 100 INJECTION, SOLUTION INTRAVENOUS; SUBCUTANEOUS at 15:52

## 2025-04-19 RX ADMIN — FUROSEMIDE 20 MG: 20 TABLET ORAL at 08:48

## 2025-04-19 RX ADMIN — ATORVASTATIN CALCIUM 40 MG: 40 TABLET, FILM COATED ORAL at 08:47

## 2025-04-19 ASSESSMENT — COGNITIVE AND FUNCTIONAL STATUS - GENERAL
MOBILITY SCORE: 24
DAILY ACTIVITIY SCORE: 24

## 2025-04-19 ASSESSMENT — PAIN - FUNCTIONAL ASSESSMENT: PAIN_FUNCTIONAL_ASSESSMENT: 0-10

## 2025-04-19 ASSESSMENT — PAIN SCALES - GENERAL
PAINLEVEL_OUTOF10: 0 - NO PAIN
PAINLEVEL_OUTOF10: 0 - NO PAIN

## 2025-04-19 NOTE — PROGRESS NOTES
Transplant Nephrology progress note     Date of admission: 4/14/2025     Liz Hamlin is a 79 y.o.  with [facility]   Past Medical History:   Diagnosis Date    CKD (chronic kidney disease)     Encounter for general adult medical examination without abnormal findings 09/14/2021    Encounter for Medicare annual wellness exam    Glaucoma     Gout     Hyperlipidemia     Hypertension     Mitral valve regurgitation     JOSE E (obstructive sleep apnea)     Unspecified cataract     Cataracts, both eyes        SUBJECTIVE:    C/o headache,explained the change in plan as biopsy showing ACR -2a      PROBLEM LIST:  Assessment & Plan  Kidney transplant rejection ([facility])           ALLERGIES:  Allergies   Allergen Reactions    Amlodipine Swelling     Edema    Codeine Itching    Erythromycin Itching    Nsaids (Non-Steroidal Anti-Inflammatory Drug) Itching and Nausea Only     chronic renal insufficiency    Tramadol Itching and Nausea/vomiting    Lisinopril Cough     Cough            CURRENT MEDICATIONS:  Scheduled medications  antithymocyte globulin (rabbit), 150 mg, intravenous, Once  apixaban, 5 mg, oral, BID  atorvastatin, 40 mg, oral, Daily  carvedilol, 50 mg, oral, BID  clotrimazole, 10 mg, Mouth/Throat, TID  docusate sodium, 100 mg, oral, Daily  ferrous sulfate, 1 tablet, oral, Daily  furosemide, 20 mg, oral, Daily  [Held by provider] gabapentin, 300 mg, oral, Nightly  hydrALAZINE, 100 mg, oral, BID  insulin lispro, 0-10 Units, subcutaneous, TID AC  magnesium oxide, 400 mg, oral, Daily  mycophenolate, 1,000 mg, oral, q12h NATALIA  pantoprazole, 40 mg, oral, Daily before breakfast  polyethylene glycol, 17 g, oral, Daily  predniSONE, 20 mg, oral, Daily  sulfamethoxazole-trimethoprim, 1 tablet, oral, Daily  [START ON 4/20/2025] tacrolimus, 1 mg, oral, q12h NATALIA      Continuous medications     PRN medications  PRN medications: acetaminophen, dextrose, dextrose, glucagon, glucagon, ondansetron, phenyleph-min oil-petrolatum  "      OBJECTIVE:    VITALS: Visit Vitals  /63 (BP Location: Left arm, Patient Position: Lying)   Pulse 68   Temp 36.2 °C (97.2 °F) (Temporal)   Resp 18   Ht 1.676 m (5' 5.98\")   Wt 88.8 kg (195 lb 12.3 oz)   LMP  (LMP Unknown)   SpO2 94%   BMI 31.61 kg/m²   OB Status Postmenopausal   Smoking Status Never   BSA 2.03 m²        General: No distress   Mucosa moist   AI, AC, AF     HEENT: PEERLA  CVS: S1 S2 no murmurs  RESP:  Lungs clear to auscultation   ABDO: Soft, non-tender   Neuro: A + O x 3  Skin: No rash   Extremities: No edema       LABS:  Results from last 72 hours   Lab Units 04/19/25  0456   WBC AUTO x10*3/uL 3.9*   HEMOGLOBIN g/dL 10.9*   MCV fL 90   PLATELETS AUTO x10*3/uL 63*   BUN mg/dL 31*   CREATININE mg/dL 0.93   CALCIUM mg/dL 8.8   TACROLIMUS ng/mL 17.0*            Intake/Output Summary (Last 24 hours) at 4/19/2025 1402  Last data filed at 4/19/2025 1152  Gross per 24 hour   Intake 1897.4 ml   Output 1260 ml   Net 637.4 ml          ASSESSMENT AND PLAN:    Liz Hamlin is a 79 y.o.  79 y.o. with preemptive kidney transplant secondary to hypertension status post DDKT on 10/28/2024 with KDPI 86%, PRA 77%, EBV status D+/R+, CMV status D+/R- hepatitis C status D-/R-.  Patient was induced by 3 mg/kg of Thymoglobulin initiated on belatacept CellCept and prednisone taper.  Transition from belatacept to tacrolimus in the setting of T-cell and B-cell crossmatch positive was done prior..  Currently got admitted because of potential vascular rejection.  Kidney BX : 4/14/25  2 arteries suspicious for acute and chronic vascular rejection (present on tangential section; getting deepers and immunostains to confirm)  Moderate peritubular capillaritis without glomerulitis; negative C4d; negative DSA; not diagnostic of acute antibody-mediated rejection  Focal (<10%) interstitial inflammatory infiltrate with mild tubulitis; not diagnostic of acute T cell-mediated rejection (also does not meet criteria for " borderline)  Allograft function:  -Seems to be at her baseline 0.8-1.1, stable electrolytes.  Last UPC 0.12, recent infectious workup like BK, CMV, EBV negative as of 4/8/2025.  AlloSure jump from 1.8--->3.1 status post biopsy with Banff-2a rejection .  - Started on methylprednisolone 500mg on 4/14/2025 completed 250 mg on 4/15 and 4/16 respectively .Will be doing 40 mg IV steroids with thymo.  -Planning Thymoglobulin total dose of 4.5 mg /kg --completing today .  -started on PPX bactrim ,clotrimazole and valcyte at least 3 months .  - Monitor kidney function closely    Immunosuppression:  - FK levels 17 aim levels are 8-10 continue current dose, full dose of mycophenolate.Will hold Fk tonight and restart 1 mg bid .    Hemodynamics:  - Blood pressures optimal continue with carvedilol, Lasix 20 daily, hydralazine 100 twice daily    No anemia or leukopenia.    Bone mineral disease: Calcium and phosphorus levels are optimal continue with the current management      Thank you for consulting .  Franklin Blake MD       Notes created by Rudy -Please excuse the Typos .

## 2025-04-19 NOTE — CARE PLAN
Problem: Discharge Planning  Goal: Discharge to home or other facility with appropriate resources  Outcome: Progressing     Problem: Chronic Conditions and Co-morbidities  Goal: Patient's chronic conditions and co-morbidity symptoms are monitored and maintained or improved  Outcome: Progressing     Problem: Recent hospitalization or complication while living with DM  Goal: Patient will effectively self-manage their DM disease process  Outcome: Progressing     Problem: Fall/Injury  Goal: Not fall by end of shift  Outcome: Progressing  Goal: Be free from injury by end of the shift  Outcome: Progressing  Goal: Verbalize understanding of personal risk factors for fall in the hospital  Outcome: Progressing  Goal: Verbalize understanding of risk factor reduction measures to prevent injury from fall in the home  Outcome: Progressing  Goal: Use assistive devices by end of the shift  Outcome: Progressing  Goal: Pace activities to prevent fatigue by end of the shift  Outcome: Progressing   The patient's goals for the shift include  discharge today    The clinical goals for the shift include Pt will remain hemodynamically stable throughout shift    Over the shift, the patient did make progress toward the following goals.

## 2025-04-21 ENCOUNTER — TELEPHONE (OUTPATIENT)
Dept: PRIMARY CARE | Facility: CLINIC | Age: 80
End: 2025-04-21
Payer: COMMERCIAL

## 2025-04-21 ENCOUNTER — PATIENT MESSAGE (OUTPATIENT)
Facility: HOSPITAL | Age: 80
End: 2025-04-21
Payer: COMMERCIAL

## 2025-04-21 ENCOUNTER — SPECIALTY PHARMACY (OUTPATIENT)
Dept: PHARMACY | Facility: CLINIC | Age: 80
End: 2025-04-21

## 2025-04-21 DIAGNOSIS — Z94.0 KIDNEY TRANSPLANTED (HHS-HCC): ICD-10-CM

## 2025-04-21 DIAGNOSIS — Z94.0 KIDNEY REPLACED BY TRANSPLANT (HHS-HCC): ICD-10-CM

## 2025-04-22 ENCOUNTER — PHARMACY VISIT (OUTPATIENT)
Dept: PHARMACY | Facility: CLINIC | Age: 80
End: 2025-04-22
Payer: COMMERCIAL

## 2025-04-23 ENCOUNTER — NURSE ONLY (OUTPATIENT)
Facility: HOSPITAL | Age: 80
End: 2025-04-23
Payer: COMMERCIAL

## 2025-04-23 ENCOUNTER — TELEMEDICINE (OUTPATIENT)
Dept: PRIMARY CARE | Facility: CLINIC | Age: 80
End: 2025-04-23
Payer: COMMERCIAL

## 2025-04-23 DIAGNOSIS — R53.81 PHYSICAL DEBILITY: ICD-10-CM

## 2025-04-23 DIAGNOSIS — Z94.0 KIDNEY REPLACED BY TRANSPLANT (HHS-HCC): ICD-10-CM

## 2025-04-23 DIAGNOSIS — T86.11 KIDNEY TRANSPLANT REJECTION (HHS-HCC): ICD-10-CM

## 2025-04-23 DIAGNOSIS — D84.9 IMMUNOSUPPRESSION: ICD-10-CM

## 2025-04-23 DIAGNOSIS — Z94.0 KIDNEY TRANSPLANTED (HHS-HCC): ICD-10-CM

## 2025-04-23 DIAGNOSIS — Z09 HOSPITAL DISCHARGE FOLLOW-UP: Primary | ICD-10-CM

## 2025-04-23 LAB
ALBUMIN SERPL BCP-MCNC: 3.5 G/DL (ref 3.4–5)
ANION GAP SERPL CALC-SCNC: 13 MMOL/L (ref 10–20)
BUN SERPL-MCNC: 33 MG/DL (ref 6–23)
CALCIUM SERPL-MCNC: 9.4 MG/DL (ref 8.6–10.6)
CHLORIDE SERPL-SCNC: 104 MMOL/L (ref 98–107)
CO2 SERPL-SCNC: 26 MMOL/L (ref 21–32)
CREAT SERPL-MCNC: 1.23 MG/DL (ref 0.5–1.05)
CREAT UR-MCNC: 79.8 MG/DL (ref 20–320)
EGFRCR SERPLBLD CKD-EPI 2021: 45 ML/MIN/1.73M*2
ERYTHROCYTE [DISTWIDTH] IN BLOOD BY AUTOMATED COUNT: 14.9 % (ref 11.5–14.5)
GLUCOSE SERPL-MCNC: 103 MG/DL (ref 74–99)
H CAPSUL AG UR QL: NOT DETECTED
HCT VFR BLD AUTO: 34.8 % (ref 36–46)
HGB BLD-MCNC: 11.1 G/DL (ref 12–16)
MAGNESIUM SERPL-MCNC: 1.79 MG/DL (ref 1.6–2.4)
MCH RBC QN AUTO: 28.4 PG (ref 26–34)
MCHC RBC AUTO-ENTMCNC: 31.9 G/DL (ref 32–36)
MCV RBC AUTO: 89 FL (ref 80–100)
NRBC BLD-RTO: 0 /100 WBCS (ref 0–0)
PHOSPHATE SERPL-MCNC: 2.5 MG/DL (ref 2.5–4.9)
PLATELET # BLD AUTO: 96 X10*3/UL (ref 150–450)
POTASSIUM SERPL-SCNC: 4.3 MMOL/L (ref 3.5–5.3)
PROT UR-ACNC: 9 MG/DL (ref 5–24)
PROT/CREAT UR: 0.11 MG/MG CREAT (ref 0–0.17)
RBC # BLD AUTO: 3.91 X10*6/UL (ref 4–5.2)
SCAN RESULT: NORMAL
SODIUM SERPL-SCNC: 139 MMOL/L (ref 136–145)
TACROLIMUS BLD-MCNC: 13.4 NG/ML
WBC # BLD AUTO: 5.8 X10*3/UL (ref 4.4–11.3)

## 2025-04-23 PROCEDURE — 85027 COMPLETE CBC AUTOMATED: CPT

## 2025-04-23 PROCEDURE — 87385 HISTOPLASMA CAPSUL AG IA: CPT

## 2025-04-23 PROCEDURE — 80069 RENAL FUNCTION PANEL: CPT

## 2025-04-23 PROCEDURE — 86832 HLA CLASS I HIGH DEFIN QUAL: CPT

## 2025-04-23 PROCEDURE — 87799 DETECT AGENT NOS DNA QUANT: CPT

## 2025-04-23 PROCEDURE — 1111F DSCHRG MED/CURRENT MED MERGE: CPT | Performed by: FAMILY MEDICINE

## 2025-04-23 PROCEDURE — 83735 ASSAY OF MAGNESIUM: CPT

## 2025-04-23 PROCEDURE — 80197 ASSAY OF TACROLIMUS: CPT

## 2025-04-23 PROCEDURE — 36415 COLL VENOUS BLD VENIPUNCTURE: CPT

## 2025-04-23 PROCEDURE — 84156 ASSAY OF PROTEIN URINE: CPT

## 2025-04-23 PROCEDURE — 99496 TRANSJ CARE MGMT HIGH F2F 7D: CPT | Performed by: FAMILY MEDICINE

## 2025-04-23 NOTE — PROGRESS NOTES
Subjective   Patient ID: Liz Hamlin is a 79 y.o. female who presents for a post hospitalization follow-up visit from 4/14/2025-  4/19/2025.  HPI    The patient is a 78 yo AA female with a history of HTN, MVR, gout, ESKD secondary to hypertension status post pre-emptive DDKT on 10/28/2024 and kidney rejection, HLD, JOSE E ( on CPAP), here for:    1- a post hospitalization follow-up visit from 4/14/2025-  4/19/2025 for a kidney transplant potential vascular rejection. She responded well to her medical treatment but is complaining abut some debility due to her deconditioning.      A review of system was completed.  All systems were reviewed and were normal, except for the ones that are listed in the HPI.    Objective   Physical Exam    Assessment/Plan   Problem List Items Addressed This Visit       Kidney transplant rejection ([facility])    -follow-up with transplant team.          Relevant Orders    Referral to Physical Therapy    Hospital discharge follow-up - Primary    -a post hospitalization follow-up visit from 4/14/2025-  4/19/2025 for a kidney transplant potential vascular rejection. She responded well to her medical treatment but is complaining abut some debility due to her deconditioning.    -PT referral done.          Relevant Orders    Referral to Physical Therapy    Physical debility    Relevant Orders    Referral to Physical Therapy    Patient to return to office in 4- 6 months.

## 2025-04-23 NOTE — ASSESSMENT & PLAN NOTE
-a post hospitalization follow-up visit from 4/14/2025-  4/19/2025 for a kidney transplant potential vascular rejection. She responded well to her medical treatment but is complaining abut some debility due to her deconditioning.    -PT referral done.

## 2025-04-24 PROCEDURE — RXMED WILLOW AMBULATORY MEDICATION CHARGE

## 2025-04-24 RX ORDER — PREDNISONE 5 MG/1
20 TABLET ORAL DAILY
Qty: 84 TABLET | Refills: 17 | Status: SHIPPED | OUTPATIENT
Start: 2025-04-24 | End: 2026-04-19

## 2025-04-24 RX ORDER — FUROSEMIDE 20 MG/1
20 TABLET ORAL DAILY
Qty: 30 TABLET | Refills: 0 | Status: SHIPPED | OUTPATIENT
Start: 2025-04-24 | End: 2026-04-24

## 2025-04-24 RX ORDER — TACROLIMUS 0.5 MG/1
CAPSULE ORAL
Qty: 90 CAPSULE | Refills: 11 | Status: SHIPPED | OUTPATIENT
Start: 2025-04-24 | End: 2026-04-24

## 2025-04-25 DIAGNOSIS — Z94.0 KIDNEY REPLACED BY TRANSPLANT (HHS-HCC): ICD-10-CM

## 2025-04-25 LAB
H CAPSUL AG UR QL: NOT DETECTED
HLA RESULTS: NORMAL
HLA-A+B+C AB NFR SER: NORMAL %
HLA-DP+DQ+DR AB NFR SER: NORMAL %
SCAN RESULT: NORMAL

## 2025-04-26 ENCOUNTER — PHARMACY VISIT (OUTPATIENT)
Dept: PHARMACY | Facility: CLINIC | Age: 80
End: 2025-04-26
Payer: COMMERCIAL

## 2025-04-26 LAB
ALLOSURE SCORE - KIDNEY: 0.09 %
CAREDX_ORDER_ID: NORMAL
CENTER_ORDER_ID: NORMAL
CLIENT SPECIMEN ID - ALLOSURE: NORMAL
DONOR RELATION - ALLOSURE: NORMAL
NOTES - ALLOSURE: NORMAL
RELATIVE CHANGE VALUE - KIDNEY: NORMAL
TEST COMMENTS - ALLOSURE: NORMAL
TIME POST TX - ALLOSURE: NORMAL
TRANSPLANTED ORGAN - ALLOSURE: NORMAL
TX DATE - ALLOSURE/ALLOMAP: NORMAL
WP_ORDER_ID: NORMAL

## 2025-04-27 NOTE — DOCUMENTATION CLARIFICATION NOTE
"    PATIENT:               ALIYA SHELL  ACCT #:                  [id number]  MRN:                       [id number]  :                       1945  ADMIT DATE:       2025 3:22 PM  DISCH DATE:        2025 7:22 PM  RESPONDING PROVIDER #:        [id number]          PROVIDER RESPONSE TEXT:    Chronic Diastolic Congestive Heart Failure    CDI QUERY TEXT:    Clarification      Instruction:    Based on your assessment of the patient and the clinical information, please provide the requested documentation by clicking on the appropriate radio button and enter any additional information if prompted.    Question: Please further clarify the type and acuity of congestive heart failure    When answering this query, please exercise your independent professional judgment. The fact that a question is being asked, does not imply that any particular answer is desired or expected.    The patient's clinical indicators include:  Clinical Information: 79 yr old female hx HTN HLD,RSRD and s/p kidney transplant on 10/28/24.    Clinical Indicators: Preserved ejection fraction  Documentation shows from significant event note on 4/15 \"Chronic diastolic heart failure\" and \"Acute on chronic heart failure with preserved ejection fraction\"    Treatment: On 20mg Lasix daily    Risk Factors: Advanced age, transplant rejection  Options provided:  -- Acute on Chronic Diastolic Congestive Heart Failure  -- Chronic Diastolic Congestive Heart Failure  -- Other - I will add my own diagnosis  -- Refer to Clinical Documentation Reviewer    Query created by: Bertha Suarez on 2025 12:48 PM      Electronically signed by:  JACK SMITH MD 2025 5:29 PM          "

## 2025-04-28 ENCOUNTER — TELEPHONE (OUTPATIENT)
Facility: HOSPITAL | Age: 80
End: 2025-04-28
Payer: COMMERCIAL

## 2025-04-28 DIAGNOSIS — Z94.0 KIDNEY REPLACED BY TRANSPLANT (HHS-HCC): ICD-10-CM

## 2025-04-28 PROCEDURE — RXMED WILLOW AMBULATORY MEDICATION CHARGE

## 2025-04-28 RX ORDER — MYCOPHENOLATE MOFETIL 250 MG/1
1000 CAPSULE ORAL EVERY 12 HOURS
Qty: 240 CAPSULE | Refills: 0 | Status: SHIPPED | OUTPATIENT
Start: 2025-04-28 | End: 2025-05-31

## 2025-04-28 NOTE — TELEPHONE ENCOUNTER
Patient called to request refills of the below medication(s) and dose(s).  Please send prescription for the checked items below to     [facility]  [address], [address]  [place] [id number]  Phone: [phone] Fax: [phone]           mycophenolate (Cellcept) 250 mg capsule

## 2025-04-29 DIAGNOSIS — E78.00 PURE HYPERCHOLESTEROLEMIA: ICD-10-CM

## 2025-04-29 DIAGNOSIS — N18.4 CKD STAGE 4 SECONDARY TO HYPERTENSION (MULTI): ICD-10-CM

## 2025-04-29 DIAGNOSIS — I12.9 CKD STAGE 4 SECONDARY TO HYPERTENSION (MULTI): ICD-10-CM

## 2025-04-29 DIAGNOSIS — I10 PRIMARY HYPERTENSION: ICD-10-CM

## 2025-04-29 DIAGNOSIS — I10 BENIGN HYPERTENSION: ICD-10-CM

## 2025-04-30 ENCOUNTER — SPECIALTY PHARMACY (OUTPATIENT)
Dept: PHARMACY | Facility: CLINIC | Age: 80
End: 2025-04-30

## 2025-05-01 ENCOUNTER — TELEPHONE (OUTPATIENT)
Facility: HOSPITAL | Age: 80
End: 2025-05-01

## 2025-05-01 ENCOUNTER — OFFICE VISIT (OUTPATIENT)
Facility: HOSPITAL | Age: 80
End: 2025-05-01
Payer: COMMERCIAL

## 2025-05-01 ENCOUNTER — NURSE ONLY (OUTPATIENT)
Facility: HOSPITAL | Age: 80
End: 2025-05-01
Payer: COMMERCIAL

## 2025-05-01 ENCOUNTER — PHARMACY VISIT (OUTPATIENT)
Dept: PHARMACY | Facility: CLINIC | Age: 80
End: 2025-05-01
Payer: COMMERCIAL

## 2025-05-01 VITALS
HEART RATE: 66 BPM | BODY MASS INDEX: 31.59 KG/M2 | DIASTOLIC BLOOD PRESSURE: 69 MMHG | OXYGEN SATURATION: 99 % | WEIGHT: 195.6 LBS | TEMPERATURE: 97.1 F | SYSTOLIC BLOOD PRESSURE: 133 MMHG

## 2025-05-01 DIAGNOSIS — Z94.0 KIDNEY TRANSPLANTED (HHS-HCC): ICD-10-CM

## 2025-05-01 DIAGNOSIS — D84.9 IMMUNOSUPPRESSION: ICD-10-CM

## 2025-05-01 DIAGNOSIS — Z94.0 KIDNEY REPLACED BY TRANSPLANT (HHS-HCC): ICD-10-CM

## 2025-05-01 DIAGNOSIS — T86.11 ACUTE REJECTION OF KIDNEY TRANSPLANT (HHS-HCC): ICD-10-CM

## 2025-05-01 DIAGNOSIS — Z09 HOSPITAL DISCHARGE FOLLOW-UP: Primary | ICD-10-CM

## 2025-05-01 DIAGNOSIS — I10 ESSENTIAL HYPERTENSION: ICD-10-CM

## 2025-05-01 LAB
ALBUMIN SERPL BCP-MCNC: 4 G/DL (ref 3.4–5)
ANION GAP SERPL CALC-SCNC: 13 MMOL/L
BUN SERPL-MCNC: 15 MG/DL (ref 6–23)
CALCIUM SERPL-MCNC: 9.3 MG/DL (ref 8.6–10.6)
CHLORIDE SERPL-SCNC: 106 MMOL/L (ref 98–107)
CO2 SERPL-SCNC: 25 MMOL/L (ref 21–32)
CREAT SERPL-MCNC: 0.97 MG/DL (ref 0.5–1.05)
EGFRCR SERPLBLD CKD-EPI 2021: 60 ML/MIN/1.73M*2
ERYTHROCYTE [DISTWIDTH] IN BLOOD BY AUTOMATED COUNT: 16.4 % (ref 11.5–14.5)
GLUCOSE SERPL-MCNC: 109 MG/DL (ref 74–99)
HCT VFR BLD AUTO: 34.7 % (ref 36–46)
HGB BLD-MCNC: 11 G/DL (ref 12–16)
MAGNESIUM SERPL-MCNC: 2.05 MG/DL (ref 1.6–2.4)
MCH RBC QN AUTO: 29 PG (ref 26–34)
MCHC RBC AUTO-ENTMCNC: 31.7 G/DL (ref 32–36)
MCV RBC AUTO: 92 FL (ref 80–100)
NRBC BLD-RTO: 0 /100 WBCS (ref 0–0)
PHOSPHATE SERPL-MCNC: 2.5 MG/DL (ref 2.5–4.9)
PLATELET # BLD AUTO: 130 X10*3/UL (ref 150–450)
POTASSIUM SERPL-SCNC: 4 MMOL/L (ref 3.5–5.3)
RBC # BLD AUTO: 3.79 X10*6/UL (ref 4–5.2)
SODIUM SERPL-SCNC: 140 MMOL/L (ref 136–145)
TACROLIMUS BLD-MCNC: 5 NG/ML
WBC # BLD AUTO: 6.2 X10*3/UL (ref 4.4–11.3)

## 2025-05-01 PROCEDURE — 3075F SYST BP GE 130 - 139MM HG: CPT | Performed by: INTERNAL MEDICINE

## 2025-05-01 PROCEDURE — 1159F MED LIST DOCD IN RCRD: CPT | Performed by: INTERNAL MEDICINE

## 2025-05-01 PROCEDURE — 36415 COLL VENOUS BLD VENIPUNCTURE: CPT

## 2025-05-01 PROCEDURE — 99215 OFFICE O/P EST HI 40 MIN: CPT | Performed by: INTERNAL MEDICINE

## 2025-05-01 PROCEDURE — 87799 DETECT AGENT NOS DNA QUANT: CPT

## 2025-05-01 PROCEDURE — RXMED WILLOW AMBULATORY MEDICATION CHARGE

## 2025-05-01 PROCEDURE — 85027 COMPLETE CBC AUTOMATED: CPT

## 2025-05-01 PROCEDURE — 80197 ASSAY OF TACROLIMUS: CPT

## 2025-05-01 PROCEDURE — 1126F AMNT PAIN NOTED NONE PRSNT: CPT | Performed by: INTERNAL MEDICINE

## 2025-05-01 PROCEDURE — 3078F DIAST BP <80 MM HG: CPT | Performed by: INTERNAL MEDICINE

## 2025-05-01 PROCEDURE — 84100 ASSAY OF PHOSPHORUS: CPT

## 2025-05-01 PROCEDURE — 84132 ASSAY OF SERUM POTASSIUM: CPT

## 2025-05-01 PROCEDURE — 83735 ASSAY OF MAGNESIUM: CPT

## 2025-05-01 PROCEDURE — 1111F DSCHRG MED/CURRENT MED MERGE: CPT | Performed by: INTERNAL MEDICINE

## 2025-05-01 RX ORDER — PREDNISONE 5 MG/1
15 TABLET ORAL DAILY
Qty: 63 TABLET | Refills: 17 | Status: SHIPPED | OUTPATIENT
Start: 2025-05-01 | End: 2026-04-26

## 2025-05-01 RX ORDER — TACROLIMUS 0.5 MG/1
0.5 CAPSULE ORAL 2 TIMES DAILY
Qty: 60 CAPSULE | Refills: 11 | Status: SHIPPED | OUTPATIENT
Start: 2025-05-01 | End: 2026-05-01

## 2025-05-01 RX ORDER — TACROLIMUS 0.5 MG/1
1 CAPSULE ORAL 2 TIMES DAILY
Qty: 120 CAPSULE | Refills: 11 | Status: SHIPPED | OUTPATIENT
Start: 2025-05-01 | End: 2025-05-01 | Stop reason: DRUGHIGH

## 2025-05-01 RX ORDER — HYDRALAZINE HYDROCHLORIDE 100 MG/1
100 TABLET, FILM COATED ORAL 2 TIMES DAILY
Qty: 180 TABLET | Refills: 1 | Status: SHIPPED | OUTPATIENT
Start: 2025-05-01

## 2025-05-01 RX ORDER — ATORVASTATIN CALCIUM 40 MG/1
40 TABLET, FILM COATED ORAL DAILY
Qty: 90 TABLET | Refills: 1 | Status: SHIPPED | OUTPATIENT
Start: 2025-05-01

## 2025-05-01 ASSESSMENT — PAIN SCALES - GENERAL: PAINLEVEL_OUTOF10: 0-NO PAIN

## 2025-05-01 NOTE — TELEPHONE ENCOUNTER
Reviewed tac level of 5.0 with Dr. Pizano:   PLAN:   Change tac dose to 0.5 mg BID   (Patient seen in clinic today 5/1/25 and states she had only been taking the 1 mg in the morning and no night dose )    Email sent to  specialty will be getting 0.5 mg script delivered tomorrow 5/2    Repeat labs Monday     Call placed to patient who verbalized understanding of plan.

## 2025-05-01 NOTE — PROGRESS NOTES
TRANSPLANT NEPHROLOGY :   OUTPATIENT CLINIC NOTE      SERVICE DATE : 05/01/2025    REASON FOR VISIT/CHIEF COMPLAINT:  Hospital discharge follow up  S/P  TRANSPLANT SURGERY  IMMUNOSUPPRESSIVE MEDICATION MANAGEMENT  BLOOD PRESSURE MANAGEMENT    HPI:    Ms. Hamlin is a 79 y.o. 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 Patient was induced by 3 mg/kg of Thymoglobulin initiated on belatacept CellCept and prednisone taper. 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     Patient is here for follow up s/p kidney transplant and hospital discharge follow up.    Admitted to [facility] 4/16/25 for [place] IIA rejection s/p thymo 4.5 mg/kg, solumedrol pulse 500-250-250 mg.     Patient is doing well overall. No new complaints.   She didn't take PM Dose of tacrolimus since 4/24/25 because she misunderstood the [employer] message about reducing pm dose tacrolimus. Getting lab today. Last tac dose was at 11 am yesterday. It will be 24 hour trough level today.    Denied chest pain, SOB, STORM, Palpitation. Normal urination and bowel movement. Normal gait and no weakness of arms/legs. No cough, runny nose, sore throat, cold symptoms, or rash. No hearing loss. Normal vision.No problems with his sleep, mood and function. No recent infection, hospitalization, surgery or ER visits.      ROS:  Review of  14 systems was performed system by system. See HPI. Otherwise, the symptoms were negative.    PAST MEDICAL HISTORY:  Medical History[1]     PAST SURGICAL HISTORY:  Surgical History[2]     SOCIAL HISTORY:  Social History     Socioeconomic History    Marital status:      Spouse name: Not on file    Number of children: Not on file    Years of education:  Not on file    Highest education level: Not on file   Occupational History    Not on file   Tobacco Use    Smoking status: Never     Passive exposure: Never    Smokeless tobacco: Never   Vaping Use    Vaping status: Never Used   Substance and Sexual Activity    Alcohol use: Never    Drug use: Never    Sexual activity: Yes     Partners: Male   Other Topics Concern    Not on file   Social History Narrative    Not on file     Social Drivers of Health     Financial Resource Strain: Low Risk  (4/14/2025)    Overall Financial Resource Strain (CARDIA)     Difficulty of Paying Living Expenses: Not hard at all   Food Insecurity: No Food Insecurity (4/14/2025)    Hunger Vital Sign     Worried About Running Out of Food in the Last Year: Never true     Ran Out of Food in the Last Year: Never true   Transportation Needs: No Transportation Needs (4/14/2025)    PRAPARE - Transportation     Lack of Transportation (Medical): No     Lack of Transportation (Non-Medical): No   Physical Activity: Not on file   Stress: Not on file   Social Connections: Feeling Socially Integrated (12/3/2024)    OASIS : Social Isolation     Frequency of experiencing loneliness or isolation: Never   Intimate Partner Violence: Not At Risk (4/14/2025)    Humiliation, Afraid, Rape, and Kick questionnaire     Fear of Current or Ex-Partner: No     Emotionally Abused: No     Physically Abused: No     Sexually Abused: No   Housing Stability: Low Risk  (4/14/2025)    Housing Stability Vital Sign     Unable to Pay for Housing in the Last Year: No     Number of Times Moved in the Last Year: 0     Homeless in the Last Year: No       FAMILY HISTORY:  Family History[3]    MEDICATION LIST:  Current Outpatient Medications   Medication Instructions    apixaban (Eliquis) 5 mg tablet Take 2 tablets (10 mg) by mouth 2 times a day for 7 days, THEN 1 tablet (5 mg) 2 times a day.    atorvastatin (LIPITOR) 40 mg, oral, Daily    carvedilol (COREG) 50 mg, oral, 2 times daily     clotrimazole (MYCELEX) 10 mg, Mouth/Throat, 3 times daily (morning, midday, late afternoon)    empagliflozin (JARDIANCE) 10 mg, oral, Daily    FeroSuL 325 mg, oral, Daily    furosemide (LASIX) 20 mg, oral, Daily    gabapentin (NEURONTIN) 300 mg, oral, Nightly    hydrALAZINE (APRESOLINE) 100 mg, oral, 2 times daily    magnesium oxide (MAG-OX) 400 mg, oral, Daily    mycophenolate (CELLCEPT) 1,000 mg, oral, Every 12 hours    predniSONE (DELTASONE) 20 mg, oral, Daily    Prevymis 480 mg, oral, Daily    SITagliptin phosphate (JANUVIA) 100 mg, oral, Daily    sulfamethoxazole-trimethoprim (Bactrim) 400-80 mg tablet 1 tablet, oral, Daily    tacrolimus (Prograf) 0.5 mg capsule Take 2 capsules (1 mg) by mouth once daily in the morning AND 1 capsule (0.5 mg) once daily at bedtime.    valGANciclovir (VALCYTE) 900 mg, oral, Daily, Do not crush or chew.       ALLERGY  Allergies[4]    PHYSICAL EXAM:    Visit Vitals  /69 (BP Location: Left arm, Patient Position: Sitting, BP Cuff Size: Adult)   Pulse 66   Temp 36.2 °C (97.1 °F) (Temporal)   Wt 88.7 kg (195 lb 9.6 oz)   LMP  (LMP Unknown)   SpO2 99%   BMI 31.59 kg/m²   OB Status Postmenopausal   Smoking Status Never   BSA 2.03 m²          Vital signs - reviewed. Acceptable BP at this office visit.   General Appearance - NAD, Good speech, oriented and alert  HEENT - Supple. Not pale. No jaundice. No cervical lymphadenopathy. Pharynx and tonsils are not injected.  CVS - RRR. Normal S1/S2. No murmur, click , rub or gallop  Lungs- clear to auscultation bilaterally  Abdomen - soft , not tender, no guarding, no rigidity. No hepatosplenomegaly. Normal bowel sounds. No masses and ascites. S/P Kidney transplant .  Transplanted kidney is not tender.   Musculoskeletal /Extremities - no edema. Full ROM. No joint tenderness.   Neuro/Psych - appropriate mood and affect. Motor power V/V all extremities. CN I -XII were grossly intact.  Skin - No visible rash      LABS:    Lab Results    Component Value Date    WBC 5.8 04/23/2025    HGB 11.1 (L) 04/23/2025    HCT 34.8 (L) 04/23/2025    PLT 96 (L) 04/23/2025    CHOL 121 04/11/2024    TRIG 120 04/11/2024    HDL 44.3 04/11/2024    ALT 16 10/20/2024    AST 19 10/20/2024     04/23/2025    K 4.3 04/23/2025     04/23/2025    CREATININE 1.23 (H) 04/23/2025    BUN 33 (H) 04/23/2025    CO2 26 04/23/2025    TSH 2.34 04/11/2024    INR 1.6 (H) 03/27/2025    HGBA1C 5.6 04/11/2024     par    ASSESSMENT AND PLAN:    Ms. Hamlin is a 79 y.o. female  who is here for follow up s/p kidney transplant.    TRANSPLANT DATE: 10/20/2024 (Kidney)      1. ESRD S/P kidney transplant   - Creatinine last check was :  Lab Results   Component Value Date    CREATININE 1.23 (H) 04/23/2025     -Ensure adequate hydration  - Avoid nephrotoxic medications, NSAIDs, and IV contrast.    2. Immunosuppression    -Continue current immunosuppression regimen.    3. Electrolytes  Lab Results   Component Value Date    GLUCOSE 103 (H) 04/23/2025    CALCIUM 9.4 04/23/2025     04/23/2025    K 4.3 04/23/2025    CO2 26 04/23/2025     04/23/2025    BUN 33 (H) 04/23/2025    CREATININE 1.23 (H) 04/23/2025     -Acceptable from last lab drawn    4. Hypertension  Blood Pressures         5/1/2025  1027             BP: 133/69          -Home  BP had been acceptable  -Encourage to monitor home BP  -Continue current anti hypertensive medication    5. Bone Mineral Disease/Osteoporosis  Lab Results   Component Value Date    PTH 96.5 (H) 01/28/2025    CALCIUM 9.4 04/23/2025    CAION 1.25 10/26/2024    PHOS 2.5 04/23/2025    VITD25 41 03/18/2025     -check VIT D, PTH q 3 months  - Consider DEXA every 2-3 years , defer to PCP  - May consider initiation of Sensipar when PTH >300 AND Ca >8.4    6.Anemia  Lab Results   Component Value Date    WBC 5.8 04/23/2025    HGB 11.1 (L) 04/23/2025    HCT 34.8 (L) 04/23/2025    MCV 89 04/23/2025    PLT 96 (L) 04/23/2025     Lab Results   Component Value  Date    IRON <10 (L) 10/21/2024    TIBC  10/21/2024      Comment:      One or more of the analytes used in this calculation is outside of the analytical measurement range.      FERRITIN 270 (H) 2022     -asymptomatic  - Continue to monitor  -check iron studies and ferritin. Will consider DURGA as needed.  - No indications for blood transfusion     7.Health maintenance and vaccination  - Flu shot during flu season annually  - Cancer screening is up to date per the patient    Summary    She didn't take PM Dose of tacrolimus since 25 because she misunderstood the [employer] message about reducing pm dose tacrolimus. Getting lab today. Last tac dose was at 11 am yesterday. It will be 24 hour trough level today.    Lab today and routine txp lab weekly  Allosure/viral PCRs per acute rejection protocol  Prednisone 15 mg daily  RTC 2-3 weeks      Ivon Pizano MD    Transplant Nephrology          [1]   Past Medical History:  Diagnosis Date    CKD (chronic kidney disease)     Encounter for general adult medical examination without abnormal findings 2021    Encounter for Medicare annual wellness exam    Glaucoma     Gout     Hyperlipidemia     Hypertension     Mitral valve regurgitation     JOSE E (obstructive sleep apnea)     Unspecified cataract     Cataracts, both eyes   [2]   Past Surgical History:  Procedure Laterality Date    OTHER SURGICAL HISTORY  2019    Carpal tunnel surgery    OTHER SURGICAL HISTORY  2019     section    OTHER SURGICAL HISTORY  2019    Hernia repair   [3]   Family History  Problem Relation Name Age of Onset    Diabetes Mother      Hypertension Mother      Diabetes Son      Colon cancer Mother's Sister      Colon cancer Father's Sister     [4]   Allergies  Allergen Reactions    Amlodipine Swelling     Edema    Codeine Itching    Erythromycin Itching    Nsaids (Non-Steroidal Anti-Inflammatory Drug) Itching and Nausea Only     chronic renal insufficiency     Tramadol Itching and Nausea/vomiting    Lisinopril Cough     Cough

## 2025-05-02 ENCOUNTER — PHARMACY VISIT (OUTPATIENT)
Dept: PHARMACY | Facility: CLINIC | Age: 80
End: 2025-05-02
Payer: COMMERCIAL

## 2025-05-03 DIAGNOSIS — Z94.0 KIDNEY REPLACED BY TRANSPLANT (HHS-HCC): ICD-10-CM

## 2025-05-04 RX ORDER — LANOLIN ALCOHOL/MO/W.PET/CERES
400 CREAM (GRAM) TOPICAL DAILY
Qty: 30 TABLET | Refills: 2 | Status: SHIPPED | OUTPATIENT
Start: 2025-05-04

## 2025-05-04 ASSESSMENT — CUP TO DISC RATIO
OD_RATIO: 0.55
OS_RATIO: 0.5

## 2025-05-04 ASSESSMENT — SLIT LAMP EXAM - LIDS
COMMENTS: GOOD POSITION
COMMENTS: GOOD POSITION

## 2025-05-04 ASSESSMENT — EXTERNAL EXAM - RIGHT EYE: OD_EXAM: NORMAL

## 2025-05-04 ASSESSMENT — EXTERNAL EXAM - LEFT EYE: OS_EXAM: NORMAL

## 2025-05-05 ENCOUNTER — DOCUMENTATION (OUTPATIENT)
Facility: HOSPITAL | Age: 80
End: 2025-05-05
Payer: COMMERCIAL

## 2025-05-05 ENCOUNTER — NURSE ONLY (OUTPATIENT)
Facility: HOSPITAL | Age: 80
End: 2025-05-05
Payer: COMMERCIAL

## 2025-05-05 DIAGNOSIS — Z94.0 KIDNEY REPLACED BY TRANSPLANT (HHS-HCC): ICD-10-CM

## 2025-05-05 LAB
ALBUMIN SERPL BCP-MCNC: 3.8 G/DL (ref 3.4–5)
ANION GAP SERPL CALC-SCNC: 11 MMOL/L (ref 10–20)
BUN SERPL-MCNC: 24 MG/DL (ref 6–23)
CALCIUM SERPL-MCNC: 9.1 MG/DL (ref 8.6–10.6)
CHLORIDE SERPL-SCNC: 109 MMOL/L (ref 98–107)
CO2 SERPL-SCNC: 24 MMOL/L (ref 21–32)
CREAT SERPL-MCNC: 1.11 MG/DL (ref 0.5–1.05)
EGFRCR SERPLBLD CKD-EPI 2021: 51 ML/MIN/1.73M*2
ERYTHROCYTE [DISTWIDTH] IN BLOOD BY AUTOMATED COUNT: 17.2 % (ref 11.5–14.5)
GLUCOSE SERPL-MCNC: 93 MG/DL (ref 74–99)
HCT VFR BLD AUTO: 34.1 % (ref 36–46)
HGB BLD-MCNC: 10.4 G/DL (ref 12–16)
MAGNESIUM SERPL-MCNC: 1.94 MG/DL (ref 1.6–2.4)
MCH RBC QN AUTO: 28.2 PG (ref 26–34)
MCHC RBC AUTO-ENTMCNC: 30.5 G/DL (ref 32–36)
MCV RBC AUTO: 92 FL (ref 80–100)
NRBC BLD-RTO: 0 /100 WBCS (ref 0–0)
PHOSPHATE SERPL-MCNC: 2.3 MG/DL (ref 2.5–4.9)
PLATELET # BLD AUTO: 113 X10*3/UL (ref 150–450)
POTASSIUM SERPL-SCNC: 4 MMOL/L (ref 3.5–5.3)
RBC # BLD AUTO: 3.69 X10*6/UL (ref 4–5.2)
SODIUM SERPL-SCNC: 140 MMOL/L (ref 136–145)
TACROLIMUS BLD-MCNC: 5.5 NG/ML
WBC # BLD AUTO: 4.8 X10*3/UL (ref 4.4–11.3)

## 2025-05-05 PROCEDURE — 87799 DETECT AGENT NOS DNA QUANT: CPT | Performed by: INTERNAL MEDICINE

## 2025-05-05 PROCEDURE — 83735 ASSAY OF MAGNESIUM: CPT

## 2025-05-05 PROCEDURE — RXMED WILLOW AMBULATORY MEDICATION CHARGE

## 2025-05-05 PROCEDURE — 86833 HLA CLASS II HIGH DEFIN QUAL: CPT | Performed by: INTERNAL MEDICINE

## 2025-05-05 PROCEDURE — 80069 RENAL FUNCTION PANEL: CPT

## 2025-05-05 PROCEDURE — 80197 ASSAY OF TACROLIMUS: CPT

## 2025-05-05 PROCEDURE — 85027 COMPLETE CBC AUTOMATED: CPT

## 2025-05-05 NOTE — PROGRESS NOTES
Glaucoma suspect of both eyesH40.003  -(+)FH glaucoma - 2 first cousins  - Cup to disc asymmetry OD>OS  -Pachymetry (3/2/20) - 536/524  -HVF 24-2 (5/12/25) - OD: 7/18 FL 5%FP 31%FN. Peripheral depression. OS: 5/15FL 5%FP 3%FN. Nasal depression. High false negatives OD>OS, unreliable. Similar OS to 2/6/23.   -OCT RNFL (5/12/25) - SS: 4/10 OS and 8/10 OS. WNL OU. 85/106. Stable from 5/10/21, poor signal strength OD.   -Plan to repeat HVF 24-2 only as needed, decreased reliability.   -Plan: IOP stable, HVF 24-2 stable/improved. Low suspicion for glaucoma at this time. No treatment necessary. Will need to watch IOP, if IOP starts to increase, may need to initiate treatment. F/u 1 year - comprehensive exam with OCT RNFL.    Combined form of age-related cataract, right eyeH25.811  Combined form of age-related cataract, left eyeH25.812  -Not visually significant at this time. Monitor.     PVD (posterior vitreous detachment), both eyesH43.813  -No retinal tear or detachment seen on exam. Retinal detachment symptoms discussed. F/u 1 year - comprehensive exam with OCT RNFL.    ChdlzfvseA84.00  PylbtdjjiajT28.209  VxjqjvcouiJ83.4  -Current Rx from 2023  -New Rx for glasses given per patient request. Patient's signature obtained to acknowledge and confirm that a paper copy of glasses Rx was given to patient in compliance with [employer] Eyeglass Rule. Electronic copy of Rx will also be available via HOSTING/EPIC.         No history of intraocular surgery/refractive surgery.   No FH of AMD

## 2025-05-06 ENCOUNTER — TELEPHONE (OUTPATIENT)
Facility: HOSPITAL | Age: 80
End: 2025-05-06
Payer: COMMERCIAL

## 2025-05-06 DIAGNOSIS — Z94.0 KIDNEY TRANSPLANTED (HHS-HCC): ICD-10-CM

## 2025-05-06 DIAGNOSIS — K21.9 GASTROESOPHAGEAL REFLUX DISEASE, UNSPECIFIED WHETHER ESOPHAGITIS PRESENT: ICD-10-CM

## 2025-05-06 LAB
BKV DNA SERPL NAA+PROBE-LOG#: NORMAL {LOG_COPIES}/ML
CMV DNA SERPL NAA+PROBE-LOG IU: NORMAL {LOG_IU}/ML
EBV DNA SPEC NAA+PROBE-LOG#: ABNORMAL {LOG_COPIES}/ML
LABORATORY COMMENT REPORT: ABNORMAL
LABORATORY COMMENT REPORT: NOT DETECTED
LABORATORY COMMENT REPORT: NOT DETECTED

## 2025-05-06 RX ORDER — PREDNISONE 5 MG/1
10 TABLET ORAL DAILY
Qty: 42 TABLET | Refills: 17 | Status: SHIPPED | OUTPATIENT
Start: 2025-05-06 | End: 2026-05-01

## 2025-05-06 NOTE — TELEPHONE ENCOUNTER
Reviewed tac results with Dr. Baltazar  Tac 5.5  we restarted her back on   recent rejection   increase to 1 mg and 0.5 mg pm and repeat labs monday     Called patient with update who verbalized understanding     Patient also reports new onset heart burn, asking for pantoprazole  Discussed with Dr. Pizano, plan to decrease pred to 10 mg for 7 days then 5 mg and pantoprazole 40 mg daily     Patient verbalized understanding of plan

## 2025-05-07 ENCOUNTER — PHARMACY VISIT (OUTPATIENT)
Dept: PHARMACY | Facility: CLINIC | Age: 80
End: 2025-05-07
Payer: COMMERCIAL

## 2025-05-07 ENCOUNTER — DOCUMENTATION (OUTPATIENT)
Facility: HOSPITAL | Age: 80
End: 2025-05-07
Payer: COMMERCIAL

## 2025-05-07 DIAGNOSIS — D84.9 IMMUNOSUPPRESSION: ICD-10-CM

## 2025-05-07 DIAGNOSIS — Z94.0 KIDNEY REPLACED BY TRANSPLANT (HHS-HCC): ICD-10-CM

## 2025-05-07 LAB
HLA RESULTS: NORMAL
HLA-A+B+C AB NFR SER: NORMAL %
HLA-DP+DQ+DR AB NFR SER: NORMAL %

## 2025-05-07 RX ORDER — PANTOPRAZOLE SODIUM 40 MG/1
40 TABLET, DELAYED RELEASE ORAL
Qty: 30 TABLET | Refills: 11 | Status: SHIPPED | OUTPATIENT
Start: 2025-05-07 | End: 2026-05-07

## 2025-05-07 NOTE — PROGRESS NOTES
Labs reviewed at 365 meeting    new ebv <35  mmf 1,000, pred 10. tac 0.5 bid (5.5)  cr 1.11    PLAN: CTM at this time. Lab ordered weekly with rest of standing lab orders.

## 2025-05-09 DIAGNOSIS — D84.9 IMMUNOSUPPRESSION: ICD-10-CM

## 2025-05-09 DIAGNOSIS — Z94.0 KIDNEY REPLACED BY TRANSPLANT (HHS-HCC): ICD-10-CM

## 2025-05-12 ENCOUNTER — APPOINTMENT (OUTPATIENT)
Dept: OPHTHALMOLOGY | Facility: CLINIC | Age: 80
End: 2025-05-12
Payer: COMMERCIAL

## 2025-05-12 DIAGNOSIS — H25.811 COMBINED FORM OF AGE-RELATED CATARACT, RIGHT EYE: ICD-10-CM

## 2025-05-12 DIAGNOSIS — H43.813 PVD (POSTERIOR VITREOUS DETACHMENT), BOTH EYES: ICD-10-CM

## 2025-05-12 DIAGNOSIS — H52.4 PRESBYOPIA: ICD-10-CM

## 2025-05-12 DIAGNOSIS — H25.812 COMBINED FORM OF AGE-RELATED CATARACT, LEFT EYE: ICD-10-CM

## 2025-05-12 DIAGNOSIS — H52.03 HYPEROPIA, BILATERAL: ICD-10-CM

## 2025-05-12 DIAGNOSIS — H40.003 GLAUCOMA SUSPECT OF BOTH EYES: Primary | ICD-10-CM

## 2025-05-12 DIAGNOSIS — H52.203 ASTIGMATISM OF BOTH EYES, UNSPECIFIED TYPE: ICD-10-CM

## 2025-05-12 PROCEDURE — 92083 EXTENDED VISUAL FIELD XM: CPT | Performed by: OPHTHALMOLOGY

## 2025-05-12 PROCEDURE — 92133 CPTRZD OPH DX IMG PST SGM ON: CPT | Performed by: OPHTHALMOLOGY

## 2025-05-12 PROCEDURE — 92015 DETERMINE REFRACTIVE STATE: CPT | Performed by: OPHTHALMOLOGY

## 2025-05-12 PROCEDURE — 99214 OFFICE O/P EST MOD 30 MIN: CPT | Performed by: OPHTHALMOLOGY

## 2025-05-12 ASSESSMENT — ENCOUNTER SYMPTOMS
RESPIRATORY NEGATIVE: 0
NEUROLOGICAL NEGATIVE: 0
GASTROINTESTINAL NEGATIVE: 0
ALLERGIC/IMMUNOLOGIC NEGATIVE: 0
HEMATOLOGIC/LYMPHATIC NEGATIVE: 0
PSYCHIATRIC NEGATIVE: 0
CONSTITUTIONAL NEGATIVE: 0
ENDOCRINE NEGATIVE: 0
MUSCULOSKELETAL NEGATIVE: 0
EYES NEGATIVE: 1
CARDIOVASCULAR NEGATIVE: 0

## 2025-05-12 ASSESSMENT — PACHYMETRY
OD_CT(UM): 536
OS_CT(UM): 524

## 2025-05-12 ASSESSMENT — TONOMETRY
IOP_METHOD: GOLDMANN APPLANATION
OS_IOP_MMHG: 14
OD_IOP_MMHG: 15

## 2025-05-12 ASSESSMENT — CONF VISUAL FIELD
OS_SUPERIOR_TEMPORAL_RESTRICTION: 0
OD_NORMAL: 1
OD_INFERIOR_NASAL_RESTRICTION: 0
OS_INFERIOR_NASAL_RESTRICTION: 0
OS_NORMAL: 1
OS_INFERIOR_TEMPORAL_RESTRICTION: 0
OD_INFERIOR_TEMPORAL_RESTRICTION: 0
OD_SUPERIOR_NASAL_RESTRICTION: 0
OD_SUPERIOR_TEMPORAL_RESTRICTION: 0
OS_SUPERIOR_NASAL_RESTRICTION: 0

## 2025-05-12 ASSESSMENT — REFRACTION_WEARINGRX
OS_ADD: +2.50
OD_ADD: +2.50
OD_AXIS: 045
OS_SPHERE: +3.25
OD_SPHERE: +3.25
OD_CYLINDER: -0.75
OS_AXIS: 125
OS_CYLINDER: -1.00

## 2025-05-12 ASSESSMENT — REFRACTION_MANIFEST
OS_AXIS: 125
OD_SPHERE: +3.75
OD_AXIS: 045
OS_ADD: +2.50
OS_SPHERE: +3.50
OS_CYLINDER: -0.50
OD_CYLINDER: -0.75
OD_ADD: +2.50

## 2025-05-12 ASSESSMENT — VISUAL ACUITY
OD_CC: 20/25
OS_CC: 20/20-2
METHOD: SNELLEN - LINEAR
CORRECTION_TYPE: GLASSES

## 2025-05-13 ENCOUNTER — PATIENT MESSAGE (OUTPATIENT)
Facility: HOSPITAL | Age: 80
End: 2025-05-13
Payer: COMMERCIAL

## 2025-05-15 ENCOUNTER — NURSE ONLY (OUTPATIENT)
Facility: HOSPITAL | Age: 80
End: 2025-05-15
Payer: COMMERCIAL

## 2025-05-15 DIAGNOSIS — Z94.0 KIDNEY REPLACED BY TRANSPLANT (HHS-HCC): ICD-10-CM

## 2025-05-15 DIAGNOSIS — D84.9 IMMUNOSUPPRESSION: ICD-10-CM

## 2025-05-15 DIAGNOSIS — Z94.0 KIDNEY TRANSPLANTED (HHS-HCC): ICD-10-CM

## 2025-05-15 LAB
ALBUMIN SERPL BCP-MCNC: 3.9 G/DL (ref 3.4–5)
ANION GAP SERPL CALC-SCNC: 11 MMOL/L (ref 10–20)
BASOPHILS # BLD AUTO: 0.01 X10*3/UL (ref 0–0.1)
BASOPHILS NFR BLD AUTO: 0.4 %
BUN SERPL-MCNC: 15 MG/DL (ref 6–23)
CALCIUM SERPL-MCNC: 9.3 MG/DL (ref 8.6–10.6)
CHLORIDE SERPL-SCNC: 112 MMOL/L (ref 98–107)
CO2 SERPL-SCNC: 24 MMOL/L (ref 21–32)
CREAT SERPL-MCNC: 0.99 MG/DL (ref 0.5–1.05)
EGFRCR SERPLBLD CKD-EPI 2021: 58 ML/MIN/1.73M*2
EOSINOPHIL # BLD AUTO: 0.03 X10*3/UL (ref 0–0.4)
EOSINOPHIL NFR BLD AUTO: 1.2 %
ERYTHROCYTE [DISTWIDTH] IN BLOOD BY AUTOMATED COUNT: 18.1 % (ref 11.5–14.5)
GLUCOSE SERPL-MCNC: 90 MG/DL (ref 74–99)
HCT VFR BLD AUTO: 36.3 % (ref 36–46)
HGB BLD-MCNC: 11 G/DL (ref 12–16)
IMM GRANULOCYTES # BLD AUTO: 0.01 X10*3/UL (ref 0–0.5)
IMM GRANULOCYTES NFR BLD AUTO: 0.4 % (ref 0–0.9)
LYMPHOCYTES # BLD AUTO: 0.09 X10*3/UL (ref 0.8–3)
LYMPHOCYTES NFR BLD AUTO: 3.6 %
MAGNESIUM SERPL-MCNC: 1.81 MG/DL (ref 1.6–2.4)
MCH RBC QN AUTO: 28.6 PG (ref 26–34)
MCHC RBC AUTO-ENTMCNC: 30.3 G/DL (ref 32–36)
MCV RBC AUTO: 95 FL (ref 80–100)
MONOCYTES # BLD AUTO: 0.17 X10*3/UL (ref 0.05–0.8)
MONOCYTES NFR BLD AUTO: 6.8 %
NEUTROPHILS # BLD AUTO: 2.19 X10*3/UL (ref 1.6–5.5)
NEUTROPHILS NFR BLD AUTO: 87.6 %
NRBC BLD-RTO: 0 /100 WBCS (ref 0–0)
PHOSPHATE SERPL-MCNC: 1.8 MG/DL (ref 2.5–4.9)
PLATELET # BLD AUTO: 128 X10*3/UL (ref 150–450)
POTASSIUM SERPL-SCNC: 3.8 MMOL/L (ref 3.5–5.3)
RBC # BLD AUTO: 3.84 X10*6/UL (ref 4–5.2)
SODIUM SERPL-SCNC: 143 MMOL/L (ref 136–145)
TACROLIMUS BLD-MCNC: 7.6 NG/ML
WBC # BLD AUTO: 2.5 X10*3/UL (ref 4.4–11.3)

## 2025-05-15 PROCEDURE — 85027 COMPLETE CBC AUTOMATED: CPT

## 2025-05-15 PROCEDURE — 80197 ASSAY OF TACROLIMUS: CPT

## 2025-05-15 PROCEDURE — 87799 DETECT AGENT NOS DNA QUANT: CPT

## 2025-05-15 PROCEDURE — 83735 ASSAY OF MAGNESIUM: CPT

## 2025-05-15 PROCEDURE — 36415 COLL VENOUS BLD VENIPUNCTURE: CPT

## 2025-05-15 PROCEDURE — 80069 RENAL FUNCTION PANEL: CPT

## 2025-05-16 ENCOUNTER — SPECIALTY PHARMACY (OUTPATIENT)
Dept: PHARMACY | Facility: CLINIC | Age: 80
End: 2025-05-16

## 2025-05-16 ENCOUNTER — TELEPHONE (OUTPATIENT)
Facility: HOSPITAL | Age: 80
End: 2025-05-16
Payer: COMMERCIAL

## 2025-05-16 DIAGNOSIS — D84.9 IMMUNOSUPPRESSION: ICD-10-CM

## 2025-05-16 DIAGNOSIS — T86.11 KIDNEY TRANSPLANT REJECTION (HHS-HCC): ICD-10-CM

## 2025-05-16 DIAGNOSIS — E83.39 LOW PHOSPHATE LEVELS: ICD-10-CM

## 2025-05-16 DIAGNOSIS — R60.9 EDEMA, UNSPECIFIED TYPE: ICD-10-CM

## 2025-05-16 DIAGNOSIS — Z94.0 KIDNEY REPLACED BY TRANSPLANT (HHS-HCC): ICD-10-CM

## 2025-05-16 PROCEDURE — RXMED WILLOW AMBULATORY MEDICATION CHARGE

## 2025-05-16 NOTE — TELEPHONE ENCOUNTER
Reviewed labs with Dr. Reid:   Phos 1.8    WBC 2.5  ANC: 2.19   Having a little diarrhea earlier this week but resolved today   added on auto diff  virals pending   tac 7.6  HLA   all pending  PLAN:   HOLD valcyte for WBC  Start neutra phos 1 BID     Called patient with plan   Scripts sent for approval

## 2025-05-17 RX ORDER — MYCOPHENOLATE MOFETIL 250 MG/1
1000 CAPSULE ORAL EVERY 12 HOURS
Qty: 240 CAPSULE | Refills: 11 | Status: SHIPPED | OUTPATIENT
Start: 2025-05-17 | End: 2026-05-17

## 2025-05-17 RX ORDER — SULFAMETHOXAZOLE AND TRIMETHOPRIM 400; 80 MG/1; MG/1
1 TABLET ORAL DAILY
Qty: 30 TABLET | Refills: 1 | Status: SHIPPED | OUTPATIENT
Start: 2025-05-17 | End: 2025-07-16

## 2025-05-17 RX ORDER — FUROSEMIDE 20 MG/1
20 TABLET ORAL DAILY
Qty: 30 TABLET | Refills: 11 | Status: SHIPPED | OUTPATIENT
Start: 2025-05-17 | End: 2026-05-17

## 2025-05-19 ENCOUNTER — PHARMACY VISIT (OUTPATIENT)
Dept: PHARMACY | Facility: CLINIC | Age: 80
End: 2025-05-19
Payer: COMMERCIAL

## 2025-05-19 LAB
HLA RESULTS: NORMAL
HLA-A+B+C AB NFR SER: NORMAL %
HLA-DP+DQ+DR AB NFR SER: NORMAL %

## 2025-05-19 PROCEDURE — RXMED WILLOW AMBULATORY MEDICATION CHARGE

## 2025-05-20 ENCOUNTER — SPECIALTY PHARMACY (OUTPATIENT)
Dept: PHARMACY | Facility: CLINIC | Age: 80
End: 2025-05-20

## 2025-05-20 ENCOUNTER — OFFICE VISIT (OUTPATIENT)
Facility: HOSPITAL | Age: 80
End: 2025-05-20
Payer: COMMERCIAL

## 2025-05-20 ENCOUNTER — NURSE ONLY (OUTPATIENT)
Facility: HOSPITAL | Age: 80
End: 2025-05-20
Payer: COMMERCIAL

## 2025-05-20 VITALS
OXYGEN SATURATION: 97 % | SYSTOLIC BLOOD PRESSURE: 114 MMHG | DIASTOLIC BLOOD PRESSURE: 69 MMHG | HEART RATE: 81 BPM | WEIGHT: 188.1 LBS | TEMPERATURE: 97.7 F | BODY MASS INDEX: 30.37 KG/M2

## 2025-05-20 DIAGNOSIS — Z94.0 KIDNEY REPLACED BY TRANSPLANT (HHS-HCC): ICD-10-CM

## 2025-05-20 DIAGNOSIS — Z94.0 IMMUNOSUPPRESSIVE MANAGEMENT ENCOUNTER FOLLOWING KIDNEY TRANSPLANT: ICD-10-CM

## 2025-05-20 DIAGNOSIS — Z79.899 IMMUNOSUPPRESSIVE MANAGEMENT ENCOUNTER FOLLOWING KIDNEY TRANSPLANT: ICD-10-CM

## 2025-05-20 DIAGNOSIS — D84.9 IMMUNOSUPPRESSION: ICD-10-CM

## 2025-05-20 DIAGNOSIS — N18.9 ANEMIA IN CHRONIC KIDNEY DISEASE, UNSPECIFIED CKD STAGE: ICD-10-CM

## 2025-05-20 DIAGNOSIS — I10 ESSENTIAL HYPERTENSION: ICD-10-CM

## 2025-05-20 DIAGNOSIS — Z94.0 KIDNEY TRANSPLANTED (HHS-HCC): ICD-10-CM

## 2025-05-20 DIAGNOSIS — E83.39 HYPOPHOSPHATEMIA: ICD-10-CM

## 2025-05-20 DIAGNOSIS — Z94.0 KIDNEY REPLACED BY TRANSPLANT (HHS-HCC): Primary | ICD-10-CM

## 2025-05-20 DIAGNOSIS — R19.7 DIARRHEA, UNSPECIFIED TYPE: ICD-10-CM

## 2025-05-20 DIAGNOSIS — T86.11 KIDNEY TRANSPLANT REJECTION (HHS-HCC): ICD-10-CM

## 2025-05-20 DIAGNOSIS — D63.1 ANEMIA IN CHRONIC KIDNEY DISEASE, UNSPECIFIED CKD STAGE: ICD-10-CM

## 2025-05-20 DIAGNOSIS — D72.819 LEUKOPENIA, UNSPECIFIED TYPE: ICD-10-CM

## 2025-05-20 LAB
ALBUMIN SERPL BCP-MCNC: 4.2 G/DL (ref 3.4–5)
ANION GAP SERPL CALC-SCNC: 11 MMOL/L (ref 10–20)
BUN SERPL-MCNC: 16 MG/DL (ref 6–23)
CALCIUM SERPL-MCNC: 9.8 MG/DL (ref 8.6–10.6)
CHLORIDE SERPL-SCNC: 109 MMOL/L (ref 98–107)
CO2 SERPL-SCNC: 26 MMOL/L (ref 21–32)
CREAT SERPL-MCNC: 1.09 MG/DL (ref 0.5–1.05)
CREAT UR-MCNC: 61.9 MG/DL (ref 20–320)
EGFRCR SERPLBLD CKD-EPI 2021: 52 ML/MIN/1.73M*2
ERYTHROCYTE [DISTWIDTH] IN BLOOD BY AUTOMATED COUNT: 18.4 % (ref 11.5–14.5)
GLUCOSE SERPL-MCNC: 92 MG/DL (ref 74–99)
HCT VFR BLD AUTO: 37.1 % (ref 36–46)
HGB BLD-MCNC: 11.5 G/DL (ref 12–16)
MAGNESIUM SERPL-MCNC: 1.79 MG/DL (ref 1.6–2.4)
MCH RBC QN AUTO: 28.7 PG (ref 26–34)
MCHC RBC AUTO-ENTMCNC: 31 G/DL (ref 32–36)
MCV RBC AUTO: 93 FL (ref 80–100)
NRBC BLD-RTO: 0 /100 WBCS (ref 0–0)
PHOSPHATE SERPL-MCNC: 2 MG/DL (ref 2.5–4.9)
PLATELET # BLD AUTO: 98 X10*3/UL (ref 150–450)
POTASSIUM SERPL-SCNC: 3.8 MMOL/L (ref 3.5–5.3)
PROT UR-ACNC: 13 MG/DL (ref 5–24)
PROT/CREAT UR: 0.21 MG/MG CREAT (ref 0–0.17)
RBC # BLD AUTO: 4.01 X10*6/UL (ref 4–5.2)
SODIUM SERPL-SCNC: 142 MMOL/L (ref 136–145)
TACROLIMUS BLD-MCNC: 8.2 NG/ML
WBC # BLD AUTO: 2.5 X10*3/UL (ref 4.4–11.3)

## 2025-05-20 PROCEDURE — 3078F DIAST BP <80 MM HG: CPT | Performed by: STUDENT IN AN ORGANIZED HEALTH CARE EDUCATION/TRAINING PROGRAM

## 2025-05-20 PROCEDURE — 82570 ASSAY OF URINE CREATININE: CPT

## 2025-05-20 PROCEDURE — 83735 ASSAY OF MAGNESIUM: CPT

## 2025-05-20 PROCEDURE — 87799 DETECT AGENT NOS DNA QUANT: CPT

## 2025-05-20 PROCEDURE — 80069 RENAL FUNCTION PANEL: CPT

## 2025-05-20 PROCEDURE — 3074F SYST BP LT 130 MM HG: CPT | Performed by: STUDENT IN AN ORGANIZED HEALTH CARE EDUCATION/TRAINING PROGRAM

## 2025-05-20 PROCEDURE — 36415 COLL VENOUS BLD VENIPUNCTURE: CPT

## 2025-05-20 PROCEDURE — 1126F AMNT PAIN NOTED NONE PRSNT: CPT | Performed by: STUDENT IN AN ORGANIZED HEALTH CARE EDUCATION/TRAINING PROGRAM

## 2025-05-20 PROCEDURE — 99215 OFFICE O/P EST HI 40 MIN: CPT

## 2025-05-20 PROCEDURE — 87385 HISTOPLASMA CAPSUL AG IA: CPT

## 2025-05-20 PROCEDURE — 85027 COMPLETE CBC AUTOMATED: CPT

## 2025-05-20 PROCEDURE — 1159F MED LIST DOCD IN RCRD: CPT | Performed by: STUDENT IN AN ORGANIZED HEALTH CARE EDUCATION/TRAINING PROGRAM

## 2025-05-20 PROCEDURE — 80197 ASSAY OF TACROLIMUS: CPT

## 2025-05-20 RX ORDER — MYCOPHENOLATE MOFETIL 250 MG/1
750 CAPSULE ORAL EVERY 12 HOURS
Qty: 180 CAPSULE | Refills: 11 | Status: SHIPPED | OUTPATIENT
Start: 2025-05-20 | End: 2026-05-20

## 2025-05-20 ASSESSMENT — PAIN SCALES - GENERAL: PAINLEVEL_OUTOF10: 0-NO PAIN

## 2025-05-20 NOTE — PATIENT INSTRUCTIONS
Hold TMP-SMX due to leukopenia  MMF reduced to 750 mg BID  Stool work-up: C diff PCR, stool culture and stool GI panel    Lab q2wk  RTC in 1 mo

## 2025-05-20 NOTE — PROGRESS NOTES
TRANSPLANT NEPHROLOGY :   OUTPATIENT CLINIC NOTE      SERVICE DATE : 05/20/2025    REASON FOR VISIT/CHIEF COMPLAINT:  S/P  TRANSPLANT SURGERY  IMMUNOSUPPRESSIVE MEDICATION MANAGEMENT  BLOOD PRESSURE MANAGEMENT    HPI:    Ms. Hamlin is a 80 y.o. 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 Patient was induced by 3 mg/kg of Thymoglobulin initiated on belatacept CellCept and prednisone taper. 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      Admitted to [facility] 4/16/25 for [place] IIA rejection s/p thymo 4.5 mg/kg, solumedrol pulse 500-250-250 mg.     212 days out  C/o diarrhea since 5/1 w/o abdominal pain, fever, chills, or bloody stools - triggered by meals.  Also note poor appetite and early satiety.  No problems voiding    Patient is doing well overall. No new complaints. Denied chest pain, SOB, STORM, Palpitation. Normal urination and bowel movement. Normal gait and no weakness of arms/legs. No cough, runny nose, sore throat, cold symptoms, or rash. No hearing loss. Normal vision.No problems with his sleep, mood and function. No recent infection, hospitalization, surgery or ER visits.      ROS:  Review of  14 systems was performed system by system. See HPI. Otherwise, the symptoms were negative.    PAST MEDICAL HISTORY:  Medical History[1]     PAST SURGICAL HISTORY:  Surgical History[2]     SOCIAL HISTORY:  Social History     Socioeconomic History    Marital status:      Spouse name: Not on file    Number of children: Not on file    Years of education: Not on file    Highest education level: Not on file   Occupational History    Not on file   Tobacco Use    Smoking status: Never     Passive exposure: Never    Smokeless  tobacco: Never   Vaping Use    Vaping status: Never Used   Substance and Sexual Activity    Alcohol use: Never    Drug use: Never    Sexual activity: Yes     Partners: Male   Other Topics Concern    Not on file   Social History Narrative    Not on file     Social Drivers of Health     Financial Resource Strain: Low Risk  (4/14/2025)    Overall Financial Resource Strain (CARDIA)     Difficulty of Paying Living Expenses: Not hard at all   Food Insecurity: No Food Insecurity (4/14/2025)    Hunger Vital Sign     Worried About Running Out of Food in the Last Year: Never true     Ran Out of Food in the Last Year: Never true   Transportation Needs: No Transportation Needs (4/14/2025)    PRAPARE - Transportation     Lack of Transportation (Medical): No     Lack of Transportation (Non-Medical): No   Physical Activity: Not on file   Stress: Not on file   Social Connections: Feeling Socially Integrated (12/3/2024)    OASIS : Social Isolation     Frequency of experiencing loneliness or isolation: Never   Intimate Partner Violence: Not At Risk (4/14/2025)    Humiliation, Afraid, Rape, and Kick questionnaire     Fear of Current or Ex-Partner: No     Emotionally Abused: No     Physically Abused: No     Sexually Abused: No   Housing Stability: Low Risk  (4/14/2025)    Housing Stability Vital Sign     Unable to Pay for Housing in the Last Year: No     Number of Times Moved in the Last Year: 0     Homeless in the Last Year: No       FAMILY HISTORY:  Family History[3]    MEDICATION LIST:  Current Outpatient Medications   Medication Instructions    apixaban (Eliquis) 5 mg tablet Take 2 tablets (10 mg) by mouth 2 times a day for 7 days, THEN 1 tablet (5 mg) 2 times a day.    atorvastatin (LIPITOR) 40 mg, oral, Daily    carvedilol (COREG) 50 mg, oral, 2 times daily    empagliflozin (JARDIANCE) 10 mg, oral, Daily    FeroSuL 325 mg, oral, Daily    furosemide (LASIX) 20 mg, oral, Daily    gabapentin (NEURONTIN) 300 mg, oral, Nightly     hydrALAZINE (APRESOLINE) 100 mg, oral, 2 times daily    magnesium oxide (MAG-OX) 400 mg, oral, Daily    mycophenolate (CELLCEPT) 1,000 mg, oral, Every 12 hours    pantoprazole (PROTONIX) 40 mg, oral, Daily before breakfast, Do not crush, chew, or split.    predniSONE (DELTASONE) 10 mg, oral, Daily    SITagliptin phosphate (JANUVIA) 100 mg, oral, Daily    sod phos di, mono-K phos mono (K Phos Neutral) tablet 250 mg, oral, 2 times daily    sulfamethoxazole-trimethoprim (Bactrim) 400-80 mg tablet 1 tablet, oral, Daily    tacrolimus (PROGRAF) 0.5 mg, oral, 2 times daily       ALLERGY  Allergies[4]    PHYSICAL EXAM:    Vitals:    05/20/25 0840   BP: 114/69   Pulse: 81   Temp: 36.5 °C (97.7 °F)   SpO2: 97%       Vital signs - reviewed. Acceptable BP at this office visit.   General Appearance - NAD, Good speech, oriented and alert  HEENT - Supple. Not pale. No jaundice.   CVS - RRR. Normal S1/S2. No murmur, click , rub or gallop  Lungs- clear to auscultation bilaterally  Abdomen - soft , not tender, no guarding, no rigidity. No masses and ascites. S/P Kidney transplant .  Transplanted kidney is not tender.   Musculoskeletal /Extremities - no edema. Full ROM. No joint tenderness.   Neuro/Psych - appropriate mood and affect. Motor power V/V all extremities. CN I -XII were grossly intact.  Skin - No visible rash      LABS:    Lab Results   Component Value Date    WBC 2.5 (L) 05/15/2025    HGB 11.0 (L) 05/15/2025    HCT 36.3 05/15/2025     (L) 05/15/2025    CHOL 121 04/11/2024    TRIG 120 04/11/2024    HDL 44.3 04/11/2024    ALT 16 10/20/2024    AST 19 10/20/2024     05/15/2025    K 3.8 05/15/2025     (H) 05/15/2025    CREATININE 0.99 05/15/2025    BUN 15 05/15/2025    CO2 24 05/15/2025    TSH 2.34 04/11/2024    INR 1.6 (H) 03/27/2025    HGBA1C 5.6 04/11/2024     ASSESSMENT AND PLAN:    Ms. Hamlin is a 80 y.o. female  who is here for follow up s/p kidney transplant.    TRANSPLANT DATE: 10/20/2024  (Kidney)      1. ESRD S/P kidney transplant   - Creatinine last check was :  Lab Results   Component Value Date    CREATININE 0.99 05/15/2025       - Renal allograft function is good. No proteinuria  -Allosure last check was 0.09% 4/23/25  -Ensure adequate hydration  - Avoid nephrotoxic medications, NSAIDs, and IV contrast.    2. Immunosuppression  -Tacrolimus level last check was 7.6 - acceptable - goal 7-8  -Continue current immunosuppression regimen.  TAC 0.5 mg BID  MMF 1000 mg BID  Pred 5 mg/day    BK/CMV/EBV neg 5/15/25    Valcyte on hold due to leukopenia  TMP-SMX SS daily    3. Electrolytes  Lab Results   Component Value Date    GLUCOSE 90 05/15/2025    CALCIUM 9.3 05/15/2025     05/15/2025    K 3.8 05/15/2025    CO2 24 05/15/2025     (H) 05/15/2025    BUN 15 05/15/2025    CREATININE 0.99 05/15/2025   Mg supplement  -Acceptable from last lab drawn    4. Hypertension  -Home  BP had been acceptable  -Encourage to monitor home BP  -Continue current anti hypertensive medication  Coreg 50 mg BID  Hydralazine 100 mg BID  Jardiance 10 mg/day    5. Bone Mineral Disease/Osteoporosis  Lab Results   Component Value Date    PTH 96.5 (H) 01/28/2025    CALCIUM 9.3 05/15/2025    CAION 1.25 10/26/2024    PHOS 1.8 (L) 05/15/2025    VITD25 41 03/18/2025     -Phos supplement + high protein, high phos diet  - Consider DEXA every 2-3 years     6.Anemia  Lab Results   Component Value Date    WBC 2.5 (L) 05/15/2025    HGB 11.0 (L) 05/15/2025    HCT 36.3 05/15/2025    MCV 95 05/15/2025     (L) 05/15/2025   PO iron  -asymptomatic leukopenia      7.Health maintenance and vaccination  - Flu shot during flu season annually  - Cancer screening is up to date per the patient    Lab : Routine transplant lab ( CBC, RFP, and anti-rejection trough level ) every 2 weeks  Additional labs:  VIT D, PTH with next lab  Viral screening PCR, Allosure and UPC per protocol.    Additional Plan :  Hold TMP-SMX due to leukopenia  MMF  reduced to 750 mg BID  Stool work-up: C diff PCR, culture and stool GI panel    RTC 1 month(s)    Hilario Baltazar MD    Transplant Nephrology        [1]   Past Medical History:  Diagnosis Date    CKD (chronic kidney disease)     Diabetes mellitus (Multi)     Encounter for general adult medical examination without abnormal findings 2021    Encounter for Medicare annual wellness exam    Glaucoma     Gout     Hyperlipidemia     Hypertension     Mitral valve regurgitation     JOSE E (obstructive sleep apnea)     Unspecified cataract     Cataracts, both eyes   [2]   Past Surgical History:  Procedure Laterality Date    OTHER SURGICAL HISTORY  2019    Carpal tunnel surgery    OTHER SURGICAL HISTORY  2019     section    OTHER SURGICAL HISTORY  2019    Hernia repair    TRANSPLANT, KIDNEY, OPEN Right    [3]   Family History  Problem Relation Name Age of Onset    Diabetes Mother      Hypertension Mother      Diabetes Son      Colon cancer Mother's Sister      Colon cancer Father's Sister     [4]   Allergies  Allergen Reactions    Amlodipine Swelling     Edema    Codeine Itching    Erythromycin Itching    Nsaids (Non-Steroidal Anti-Inflammatory Drug) Itching and Nausea Only     chronic renal insufficiency    Tramadol Itching and Nausea/vomiting    Lisinopril Cough     Cough

## 2025-05-21 ENCOUNTER — OFFICE VISIT (OUTPATIENT)
Dept: CARDIOLOGY | Facility: HOSPITAL | Age: 80
End: 2025-05-21
Payer: COMMERCIAL

## 2025-05-21 ENCOUNTER — PHARMACY VISIT (OUTPATIENT)
Dept: PHARMACY | Facility: CLINIC | Age: 80
End: 2025-05-21
Payer: COMMERCIAL

## 2025-05-21 VITALS
SYSTOLIC BLOOD PRESSURE: 113 MMHG | HEIGHT: 66 IN | WEIGHT: 188 LBS | BODY MASS INDEX: 30.22 KG/M2 | DIASTOLIC BLOOD PRESSURE: 69 MMHG | OXYGEN SATURATION: 98 % | HEART RATE: 93 BPM

## 2025-05-21 DIAGNOSIS — I34.0 NONRHEUMATIC MITRAL VALVE REGURGITATION: ICD-10-CM

## 2025-05-21 DIAGNOSIS — I10 ESSENTIAL HYPERTENSION: ICD-10-CM

## 2025-05-21 DIAGNOSIS — I50.32 CHRONIC DIASTOLIC HEART FAILURE: Primary | ICD-10-CM

## 2025-05-21 DIAGNOSIS — E78.5 HYPERLIPIDEMIA, UNSPECIFIED HYPERLIPIDEMIA TYPE: ICD-10-CM

## 2025-05-21 LAB
C COLI+JEJ+UPSA DNA STL QL NAA+PROBE: NOT DETECTED
C DIF TOX TCDA+TCDB STL QL NAA+PROBE: NOT DETECTED
CMV DNA SERPL NAA+PROBE-LOG IU: NORMAL {LOG_IU}/ML
EBV DNA SPEC NAA+PROBE-LOG#: NORMAL {LOG_COPIES}/ML
EC STX1 GENE STL QL NAA+PROBE: NOT DETECTED
EC STX2 GENE STL QL NAA+PROBE: NOT DETECTED
LABORATORY COMMENT REPORT: NOT DETECTED
LABORATORY COMMENT REPORT: NOT DETECTED
NOROVIRUS GI + GII RNA STL NAA+PROBE: DETECTED
RV RNA STL NAA+PROBE: NOT DETECTED
SALMONELLA DNA STL QL NAA+PROBE: NOT DETECTED
SHIGELLA DNA SPEC QL NAA+PROBE: NOT DETECTED
V CHOLERAE DNA STL QL NAA+PROBE: NOT DETECTED
Y ENTEROCOL DNA STL QL NAA+PROBE: NOT DETECTED

## 2025-05-21 PROCEDURE — 3074F SYST BP LT 130 MM HG: CPT | Performed by: INTERNAL MEDICINE

## 2025-05-21 PROCEDURE — 87506 IADNA-DNA/RNA PROBE TQ 6-11: CPT | Performed by: STUDENT IN AN ORGANIZED HEALTH CARE EDUCATION/TRAINING PROGRAM

## 2025-05-21 PROCEDURE — 99212 OFFICE O/P EST SF 10 MIN: CPT | Performed by: INTERNAL MEDICINE

## 2025-05-21 PROCEDURE — 1159F MED LIST DOCD IN RCRD: CPT | Performed by: INTERNAL MEDICINE

## 2025-05-21 PROCEDURE — 3078F DIAST BP <80 MM HG: CPT | Performed by: INTERNAL MEDICINE

## 2025-05-21 PROCEDURE — 99214 OFFICE O/P EST MOD 30 MIN: CPT | Performed by: INTERNAL MEDICINE

## 2025-05-21 PROCEDURE — 87329 GIARDIA AG IA: CPT | Performed by: STUDENT IN AN ORGANIZED HEALTH CARE EDUCATION/TRAINING PROGRAM

## 2025-05-21 PROCEDURE — 87493 C DIFF AMPLIFIED PROBE: CPT | Performed by: STUDENT IN AN ORGANIZED HEALTH CARE EDUCATION/TRAINING PROGRAM

## 2025-05-21 PROCEDURE — 87328 CRYPTOSPORIDIUM AG IA: CPT | Performed by: STUDENT IN AN ORGANIZED HEALTH CARE EDUCATION/TRAINING PROGRAM

## 2025-05-21 NOTE — PROGRESS NOTES
"Chief Complaint:   No chief complaint on file.     History Of Present Illness:    Liz Hamlin is a 80 y.o. female here for follow-up. She has a history of hypertension, hyperlipidemia, HFpEF, DVT, mitral regurgitation of variable severity (mild to mild-moderate), CKD/ESRD s/p renal transplant.     Last visit she had noted symptoms of ongoing acute on chronic HFpEF. She had her diuretics adjusted and noted significant improvement in her breathing symptoms though they are not altogether normal. She otherwise denies cardiovascular symptoms. 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        Last Recorded Vitals:  Vitals:    05/21/25 1003   BP: 113/69   BP Location: Left arm   Patient Position: Sitting   Pulse: 93   SpO2: 98%   Weight: 85.3 kg (188 lb)   Height: 1.676 m (5' 6\")              Allergies:  Amlodipine, Codeine, Erythromycin, Nsaids (non-steroidal anti-inflammatory drug), Tramadol, and Lisinopril    Outpatient Medications:  Current Outpatient Medications   Medication Instructions    apixaban (Eliquis) 5 mg tablet Take 2 tablets (10 mg) by mouth 2 times a day for 7 days, THEN 1 tablet (5 mg) 2 times a day.    atorvastatin (LIPITOR) 40 mg, oral, Daily    carvedilol (COREG) 50 mg, oral, 2 times daily    empagliflozin (JARDIANCE) 10 mg, oral, Daily    FeroSuL 325 mg, oral, Daily    furosemide (LASIX) 20 mg, oral, Daily    gabapentin (NEURONTIN) 300 mg, oral, Nightly    hydrALAZINE (APRESOLINE) 100 mg, oral, 2 times daily    magnesium oxide (MAG-OX) 400 mg, oral, Daily    mycophenolate (CELLCEPT) 750 mg, oral, Every 12 hours    pantoprazole (PROTONIX) 40 mg, oral, Daily before breakfast, Do not crush, chew, or split.    predniSONE (DELTASONE) 10 mg, oral, Daily    SITagliptin phosphate (JANUVIA) 100 mg, oral, Daily    sod phos di, mono-K phos mono (K Phos Neutral) tablet 250 mg, oral, 2 times daily    sulfamethoxazole-trimethoprim (Bactrim) 400-80 " mg tablet 1 tablet, oral, Daily    tacrolimus (PROGRAF) 0.5 mg, oral, 2 times daily         Physical Exam:  Gen Well nourished septuagenarian female sitting up in NAD. Body mass index is 30.34 kg/m².  CV rrr. No m/r/g appreciated.  No JVD. No leg edema.    Pulm Lungs clear with normal respiratory effort.  Neuro Alert and conversant. Grossly nonfocal.        I reviewed most recent imaging / labs / and office notes        Assessment/Plan   1.  Chronic HFpEF  Volume status acceptable. Has minimal symptoms. Will not push her diuretics further to avoid risk of BRYSON in a kidney already experiencing some rejection issues. Her present regimen is to continue.     2. Mitral regurgitation  Variable being more mild by cardiac MRI 2024. For reassessment next year.    3. Hypertension   BP well controlled. Her present regimen is to continue.     4. Hyperlipidemia   Lipids well controlled. Last CAC score 0 (2/2024). Her present regimen is to continue.         Follow-up 4 months         Dov Encinas MD

## 2025-05-22 LAB
ALLOSURE SCORE - KIDNEY: 0.31 %
CAREDX_ORDER_ID: NORMAL
CENTER_ORDER_ID: NORMAL
CLIENT SPECIMEN ID - ALLOSURE: NORMAL
DONOR RELATION - ALLOSURE: NORMAL
H CAPSUL AG UR QL: NOT DETECTED
NOTES - ALLOSURE: NORMAL
RELATIVE CHANGE VALUE - KIDNEY: NORMAL
SCAN RESULT: NORMAL
TEST COMMENTS - ALLOSURE: NORMAL
TIME POST TX - ALLOSURE: NORMAL
TRANSPLANTED ORGAN - ALLOSURE: NORMAL
TX DATE - ALLOSURE/ALLOMAP: NORMAL
WP_ORDER_ID: NORMAL

## 2025-05-23 ENCOUNTER — TELEPHONE (OUTPATIENT)
Facility: HOSPITAL | Age: 80
End: 2025-05-23
Payer: COMMERCIAL

## 2025-05-23 ENCOUNTER — PATIENT MESSAGE (OUTPATIENT)
Facility: HOSPITAL | Age: 80
End: 2025-05-23
Payer: COMMERCIAL

## 2025-05-23 DIAGNOSIS — Z94.0 KIDNEY REPLACED BY TRANSPLANT (HHS-HCC): ICD-10-CM

## 2025-05-23 LAB
CRYPTOSP AG STL QL IA: NEGATIVE
G LAMBLIA AG STL QL IA: NEGATIVE

## 2025-05-23 PROCEDURE — RXMED WILLOW AMBULATORY MEDICATION CHARGE

## 2025-05-23 NOTE — TELEPHONE ENCOUNTER
Reviewed with Dr. Alexander  cr 1.09 from 0.99   norovirus +   recent rejection    BID   PLAN:   decrease 500 mg BID for 1 week   immodium ok     Called patient with update, asked to call if symptoms worsen or cannot stay hydrated.     Patient verbalized understanding of plan.

## 2025-05-23 NOTE — PATIENT COMMUNICATION
Reviewed allosure with Dr. Alexander  Allosure 0.31 from 0.09  Repeat in 2 weeks     Direct message sent to patient with plan    Email sent to [facility] asking for another kit shipped.

## 2025-05-25 LAB — O+P STL MICRO: NEGATIVE

## 2025-05-27 ENCOUNTER — PHARMACY VISIT (OUTPATIENT)
Dept: PHARMACY | Facility: CLINIC | Age: 80
End: 2025-05-27
Payer: COMMERCIAL

## 2025-05-29 ENCOUNTER — PATIENT MESSAGE (OUTPATIENT)
Facility: HOSPITAL | Age: 80
End: 2025-05-29
Payer: COMMERCIAL

## 2025-05-29 DIAGNOSIS — I82.4Y9 DEEP VEIN THROMBOSIS (DVT) OF PROXIMAL LOWER EXTREMITY, UNSPECIFIED CHRONICITY, UNSPECIFIED LATERALITY: ICD-10-CM

## 2025-05-29 DIAGNOSIS — Z94.0 KIDNEY REPLACED BY TRANSPLANT (HHS-HCC): ICD-10-CM

## 2025-06-02 PROCEDURE — RXMED WILLOW AMBULATORY MEDICATION CHARGE

## 2025-06-04 ENCOUNTER — NURSE ONLY (OUTPATIENT)
Facility: HOSPITAL | Age: 80
End: 2025-06-04
Payer: COMMERCIAL

## 2025-06-04 ENCOUNTER — PHARMACY VISIT (OUTPATIENT)
Dept: PHARMACY | Facility: CLINIC | Age: 80
End: 2025-06-04
Payer: COMMERCIAL

## 2025-06-04 DIAGNOSIS — Z94.0 KIDNEY REPLACED BY TRANSPLANT (HHS-HCC): ICD-10-CM

## 2025-06-04 LAB
ALBUMIN SERPL BCP-MCNC: 4.1 G/DL (ref 3.4–5)
ANION GAP SERPL CALC-SCNC: 11 MMOL/L (ref 10–20)
BUN SERPL-MCNC: 15 MG/DL (ref 6–23)
CALCIUM SERPL-MCNC: 9.4 MG/DL (ref 8.6–10.6)
CHLORIDE SERPL-SCNC: 108 MMOL/L (ref 98–107)
CO2 SERPL-SCNC: 27 MMOL/L (ref 21–32)
CREAT SERPL-MCNC: 0.96 MG/DL (ref 0.5–1.05)
EGFRCR SERPLBLD CKD-EPI 2021: 61 ML/MIN/1.73M*2
ERYTHROCYTE [DISTWIDTH] IN BLOOD BY AUTOMATED COUNT: 18.1 % (ref 11.5–14.5)
GLUCOSE SERPL-MCNC: 98 MG/DL (ref 74–99)
HCT VFR BLD AUTO: 37.2 % (ref 36–46)
HGB BLD-MCNC: 11.2 G/DL (ref 12–16)
MAGNESIUM SERPL-MCNC: 2.08 MG/DL (ref 1.6–2.4)
MCH RBC QN AUTO: 29.5 PG (ref 26–34)
MCHC RBC AUTO-ENTMCNC: 30.1 G/DL (ref 32–36)
MCV RBC AUTO: 98 FL (ref 80–100)
NRBC BLD-RTO: 0 /100 WBCS (ref 0–0)
PHOSPHATE SERPL-MCNC: 3 MG/DL (ref 2.5–4.9)
PLATELET # BLD AUTO: 133 X10*3/UL (ref 150–450)
POTASSIUM SERPL-SCNC: 3.9 MMOL/L (ref 3.5–5.3)
RBC # BLD AUTO: 3.8 X10*6/UL (ref 4–5.2)
SODIUM SERPL-SCNC: 142 MMOL/L (ref 136–145)
TACROLIMUS BLD-MCNC: 2.4 NG/ML
WBC # BLD AUTO: 3.6 X10*3/UL (ref 4.4–11.3)

## 2025-06-04 PROCEDURE — 85027 COMPLETE CBC AUTOMATED: CPT | Performed by: HOSPITALIST

## 2025-06-04 PROCEDURE — 87385 HISTOPLASMA CAPSUL AG IA: CPT | Performed by: HOSPITALIST

## 2025-06-04 PROCEDURE — 86833 HLA CLASS II HIGH DEFIN QUAL: CPT | Performed by: INTERNAL MEDICINE

## 2025-06-04 PROCEDURE — 83735 ASSAY OF MAGNESIUM: CPT | Performed by: HOSPITALIST

## 2025-06-04 PROCEDURE — 87799 DETECT AGENT NOS DNA QUANT: CPT | Performed by: HOSPITALIST

## 2025-06-04 PROCEDURE — 80197 ASSAY OF TACROLIMUS: CPT | Performed by: HOSPITALIST

## 2025-06-04 PROCEDURE — 80069 RENAL FUNCTION PANEL: CPT | Performed by: HOSPITALIST

## 2025-06-05 LAB
CMV DNA SERPL NAA+PROBE-LOG IU: NORMAL {LOG_IU}/ML
EBV DNA SPEC NAA+PROBE-LOG#: NORMAL {LOG_COPIES}/ML
LABORATORY COMMENT REPORT: NOT DETECTED
LABORATORY COMMENT REPORT: NOT DETECTED

## 2025-06-06 ENCOUNTER — TELEPHONE (OUTPATIENT)
Facility: HOSPITAL | Age: 80
End: 2025-06-06
Payer: COMMERCIAL

## 2025-06-06 DIAGNOSIS — Z94.0 KIDNEY REPLACED BY TRANSPLANT (HHS-HCC): ICD-10-CM

## 2025-06-06 LAB
ALLOSURE SCORE - KIDNEY: 0.36 %
CAREDX_ORDER_ID: NORMAL
CENTER_ORDER_ID: NORMAL
CLIENT SPECIMEN ID - ALLOSURE: NORMAL
DONOR RELATION - ALLOSURE: NORMAL
HLA RESULTS: NORMAL
HLA-A+B+C AB NFR SER: NORMAL %
HLA-DP+DQ+DR AB NFR SER: NORMAL %
NOTES - ALLOSURE: NORMAL
RELATIVE CHANGE VALUE - KIDNEY: 16 %
TEST COMMENTS - ALLOSURE: NORMAL
TIME POST TX - ALLOSURE: NORMAL
TRANSPLANTED ORGAN - ALLOSURE: NORMAL
TX DATE - ALLOSURE/ALLOMAP: NORMAL
WP_ORDER_ID: NORMAL

## 2025-06-06 RX ORDER — TACROLIMUS 0.5 MG/1
CAPSULE ORAL
Qty: 90 CAPSULE | Refills: 11 | Status: SHIPPED | OUTPATIENT
Start: 2025-06-06 | End: 2026-06-06

## 2025-06-06 NOTE — TELEPHONE ENCOUNTER
Call placed to patient, states diarrhea has resolved and reminder her to increase her mycophenolate back to 750 mg BID.   Tac level low at 2.4 from 8.2 patient states has not missed any doses and taking 0.5 mg BID.     Will review with nephrologist and return call to patient with plan.     Per Dr. Reid increase tac to 1 mg am and stay 0.5 mg pm     Called patient with plan and sent Voiceit message recap .

## 2025-06-07 LAB
H CAPSUL AG UR QL: NOT DETECTED
SCAN RESULT: NORMAL

## 2025-06-09 ENCOUNTER — APPOINTMENT (OUTPATIENT)
Dept: PODIATRY | Facility: CLINIC | Age: 80
End: 2025-06-09
Payer: COMMERCIAL

## 2025-06-09 DIAGNOSIS — M79.672 PAIN IN BOTH FEET: ICD-10-CM

## 2025-06-09 DIAGNOSIS — M79.671 PAIN IN BOTH FEET: ICD-10-CM

## 2025-06-09 DIAGNOSIS — M79.89 LEG SWELLING: ICD-10-CM

## 2025-06-09 DIAGNOSIS — L60.2 LONG TOENAIL: Primary | ICD-10-CM

## 2025-06-09 PROCEDURE — 99213 OFFICE O/P EST LOW 20 MIN: CPT | Performed by: PODIATRIST

## 2025-06-09 NOTE — PROGRESS NOTES
Patient is a 81 y/o female who presents to the office complaining of left big toenail lifting. Patient relates has been noticing thick and discolored big toenails for many years but not painful. Has tried multiple topical antifungal solution with no improvement.   Has thick and discolored nails  Presents with son for appt     Is on blood thinner, recently had kidney transplant    Past Medical History  Past Medical History:   Diagnosis Date    Encounter for general adult medical examination without abnormal findings 09/14/2021    Encounter for Medicare annual wellness exam    Unspecified cataract     Cataracts, both eyes       Medications and Allergies have been reviewed.    Review Of Systems:  GENERAL: No weight loss, malaise or fevers.  HEENT: Negative for frequent or significant headaches,   RESPIRATORY: Negative for cough, wheezing or shortness of breath.  CARDIOVASCULAR: Negative for chest pain, leg swelling or palpitations.    Physical Exam:  Vascular  CFT is 3 seconds to hallux bilaterally. Hair growth is noted to the digits bilaterally.      Neurology  Gross sensation intact to b/l foot.      Dermatology  Nails 1-5 bilaterally are within normal limits of length. Left hallux toenail with significant lysis and discolotration. Webspaces 1-4 bilaterally are clean, dry, and intact without any debris or maceration noted. Skin appears well hydrated.      Musculoskeletal  Pain on palpation to right 3rd toe distal aspect. No pain on palpation to L hallux.    1. Leg swelling        2. Long toenail  Referral to Podiatry      3. Pain in both feet          Patient was evaluated and examined.  Nails debrided.   Keep nails trimmed and filed down  Discussed discoloration in legs and venous staining. No SOI noted. Compression will help with continued swelling  Left big toenail debrided to attached surface. No open lesions noted.  Patient to return to the office on a as needed basis.   OK to do topical antifungal as  needed  Deferred oral antifungal at this time. Ok to keep trim and filed down    Theresa Cummings, MEY  [phone]  Option 2  Fax: [phone]

## 2025-06-11 ENCOUNTER — NURSE ONLY (OUTPATIENT)
Facility: HOSPITAL | Age: 80
End: 2025-06-11
Payer: COMMERCIAL

## 2025-06-11 DIAGNOSIS — Z94.0 KIDNEY REPLACED BY TRANSPLANT (HHS-HCC): ICD-10-CM

## 2025-06-11 LAB
ALBUMIN SERPL BCP-MCNC: 4.1 G/DL (ref 3.4–5)
ANION GAP SERPL CALC-SCNC: 11 MMOL/L (ref 10–20)
BUN SERPL-MCNC: 23 MG/DL (ref 6–23)
CALCIUM SERPL-MCNC: 9.9 MG/DL (ref 8.6–10.6)
CHLORIDE SERPL-SCNC: 109 MMOL/L (ref 98–107)
CO2 SERPL-SCNC: 27 MMOL/L (ref 21–32)
CREAT SERPL-MCNC: 1.04 MG/DL (ref 0.5–1.05)
EGFRCR SERPLBLD CKD-EPI 2021: 55 ML/MIN/1.73M*2
ERYTHROCYTE [DISTWIDTH] IN BLOOD BY AUTOMATED COUNT: 18 % (ref 11.5–14.5)
GLUCOSE SERPL-MCNC: 98 MG/DL (ref 74–99)
HCT VFR BLD AUTO: 37.7 % (ref 36–46)
HGB BLD-MCNC: 11.5 G/DL (ref 12–16)
MAGNESIUM SERPL-MCNC: 2.1 MG/DL (ref 1.6–2.4)
MCH RBC QN AUTO: 29.2 PG (ref 26–34)
MCHC RBC AUTO-ENTMCNC: 30.5 G/DL (ref 32–36)
MCV RBC AUTO: 96 FL (ref 80–100)
NRBC BLD-RTO: 0 /100 WBCS (ref 0–0)
PHOSPHATE SERPL-MCNC: 3.2 MG/DL (ref 2.5–4.9)
PLATELET # BLD AUTO: 143 X10*3/UL (ref 150–450)
POTASSIUM SERPL-SCNC: 4.2 MMOL/L (ref 3.5–5.3)
RBC # BLD AUTO: 3.94 X10*6/UL (ref 4–5.2)
SODIUM SERPL-SCNC: 143 MMOL/L (ref 136–145)
TACROLIMUS BLD-MCNC: 3.6 NG/ML
WBC # BLD AUTO: 3.9 X10*3/UL (ref 4.4–11.3)

## 2025-06-11 PROCEDURE — 80197 ASSAY OF TACROLIMUS: CPT

## 2025-06-11 PROCEDURE — 83735 ASSAY OF MAGNESIUM: CPT

## 2025-06-11 PROCEDURE — 36415 COLL VENOUS BLD VENIPUNCTURE: CPT

## 2025-06-11 PROCEDURE — 84520 ASSAY OF UREA NITROGEN: CPT

## 2025-06-11 PROCEDURE — 87799 DETECT AGENT NOS DNA QUANT: CPT | Performed by: HOSPITALIST

## 2025-06-11 PROCEDURE — 85027 COMPLETE CBC AUTOMATED: CPT

## 2025-06-13 ENCOUNTER — PATIENT MESSAGE (OUTPATIENT)
Facility: HOSPITAL | Age: 80
End: 2025-06-13
Payer: COMMERCIAL

## 2025-06-13 ENCOUNTER — SPECIALTY PHARMACY (OUTPATIENT)
Dept: PHARMACY | Facility: CLINIC | Age: 80
End: 2025-06-13

## 2025-06-13 DIAGNOSIS — Z94.0 KIDNEY REPLACED BY TRANSPLANT (HHS-HCC): ICD-10-CM

## 2025-06-13 DIAGNOSIS — D84.9 IMMUNOSUPPRESSION: ICD-10-CM

## 2025-06-13 PROCEDURE — RXMED WILLOW AMBULATORY MEDICATION CHARGE

## 2025-06-14 ENCOUNTER — PHARMACY VISIT (OUTPATIENT)
Dept: PHARMACY | Facility: CLINIC | Age: 80
End: 2025-06-14
Payer: COMMERCIAL

## 2025-06-16 ENCOUNTER — DOCUMENTATION (OUTPATIENT)
Facility: HOSPITAL | Age: 80
End: 2025-06-16
Payer: COMMERCIAL

## 2025-06-16 RX ORDER — TACROLIMUS 0.5 MG/1
1 CAPSULE ORAL 2 TIMES DAILY
Qty: 120 CAPSULE | Refills: 11 | Status: SHIPPED | OUTPATIENT
Start: 2025-06-16 | End: 2026-06-16

## 2025-06-16 NOTE — PROGRESS NOTES
Reviewed labs with Dr. Pizano :   Tac 3.6 from 2.4, 8.2   we increased to 1 mg and stayed 0.5 mg evening on 6/5  go up to 1 and 1 change to envarsus   1.5 mg daily envarsus  
written material/verbal instruction

## 2025-06-17 PROCEDURE — RXMED WILLOW AMBULATORY MEDICATION CHARGE

## 2025-06-19 ENCOUNTER — SPECIALTY PHARMACY (OUTPATIENT)
Dept: PHARMACY | Facility: CLINIC | Age: 80
End: 2025-06-19

## 2025-06-19 PROCEDURE — RXMED WILLOW AMBULATORY MEDICATION CHARGE

## 2025-06-20 ENCOUNTER — NURSE ONLY (OUTPATIENT)
Facility: HOSPITAL | Age: 80
End: 2025-06-20
Payer: COMMERCIAL

## 2025-06-20 ENCOUNTER — PHARMACY VISIT (OUTPATIENT)
Dept: PHARMACY | Facility: CLINIC | Age: 80
End: 2025-06-20
Payer: COMMERCIAL

## 2025-06-20 DIAGNOSIS — D84.9 IMMUNOSUPPRESSION: ICD-10-CM

## 2025-06-20 DIAGNOSIS — Z94.0 KIDNEY REPLACED BY TRANSPLANT (HHS-HCC): ICD-10-CM

## 2025-06-20 LAB
ALBUMIN SERPL BCP-MCNC: 4.1 G/DL (ref 3.4–5)
ANION GAP SERPL CALC-SCNC: 11 MMOL/L (ref 10–20)
BUN SERPL-MCNC: 17 MG/DL (ref 6–23)
CALCIUM SERPL-MCNC: 9.3 MG/DL (ref 8.6–10.6)
CHLORIDE SERPL-SCNC: 110 MMOL/L (ref 98–107)
CO2 SERPL-SCNC: 25 MMOL/L (ref 21–32)
CREAT SERPL-MCNC: 0.97 MG/DL (ref 0.5–1.05)
CREAT UR-MCNC: 70 MG/DL (ref 20–320)
EGFRCR SERPLBLD CKD-EPI 2021: 60 ML/MIN/1.73M*2
ERYTHROCYTE [DISTWIDTH] IN BLOOD BY AUTOMATED COUNT: 17.3 % (ref 11.5–14.5)
GLUCOSE SERPL-MCNC: 91 MG/DL (ref 74–99)
HCT VFR BLD AUTO: 38.7 % (ref 36–46)
HGB BLD-MCNC: 11.6 G/DL (ref 12–16)
MAGNESIUM SERPL-MCNC: 1.91 MG/DL (ref 1.6–2.4)
MCH RBC QN AUTO: 29.3 PG (ref 26–34)
MCHC RBC AUTO-ENTMCNC: 30 G/DL (ref 32–36)
MCV RBC AUTO: 98 FL (ref 80–100)
NRBC BLD-RTO: 0 /100 WBCS (ref 0–0)
PHOSPHATE SERPL-MCNC: 2.7 MG/DL (ref 2.5–4.9)
PLATELET # BLD AUTO: 130 X10*3/UL (ref 150–450)
POTASSIUM SERPL-SCNC: 4.1 MMOL/L (ref 3.5–5.3)
PROT UR-ACNC: 14 MG/DL (ref 5–24)
PROT/CREAT UR: 0.2 MG/MG CREAT (ref 0–0.17)
RBC # BLD AUTO: 3.96 X10*6/UL (ref 4–5.2)
SODIUM SERPL-SCNC: 142 MMOL/L (ref 136–145)
TACROLIMUS BLD-MCNC: 5.8 NG/ML
WBC # BLD AUTO: 4.9 X10*3/UL (ref 4.4–11.3)

## 2025-06-20 PROCEDURE — 36415 COLL VENOUS BLD VENIPUNCTURE: CPT

## 2025-06-20 PROCEDURE — 83735 ASSAY OF MAGNESIUM: CPT

## 2025-06-20 PROCEDURE — 87799 DETECT AGENT NOS DNA QUANT: CPT | Performed by: HOSPITALIST

## 2025-06-20 PROCEDURE — 80069 RENAL FUNCTION PANEL: CPT

## 2025-06-20 PROCEDURE — 80197 ASSAY OF TACROLIMUS: CPT

## 2025-06-20 PROCEDURE — 85027 COMPLETE CBC AUTOMATED: CPT

## 2025-06-20 PROCEDURE — 82570 ASSAY OF URINE CREATININE: CPT

## 2025-06-20 PROCEDURE — 87385 HISTOPLASMA CAPSUL AG IA: CPT | Performed by: HOSPITALIST

## 2025-06-23 ENCOUNTER — SPECIALTY PHARMACY (OUTPATIENT)
Dept: PHARMACY | Facility: CLINIC | Age: 80
End: 2025-06-23

## 2025-06-23 LAB
H CAPSUL AG UR QL: NOT DETECTED
SCAN RESULT: NORMAL

## 2025-06-24 ENCOUNTER — OFFICE VISIT (OUTPATIENT)
Facility: HOSPITAL | Age: 80
End: 2025-06-24
Payer: COMMERCIAL

## 2025-06-24 VITALS
TEMPERATURE: 97.4 F | DIASTOLIC BLOOD PRESSURE: 78 MMHG | WEIGHT: 189.5 LBS | OXYGEN SATURATION: 98 % | BODY MASS INDEX: 30.59 KG/M2 | HEART RATE: 90 BPM | SYSTOLIC BLOOD PRESSURE: 120 MMHG

## 2025-06-24 DIAGNOSIS — Z94.0 KIDNEY TRANSPLANTED (HHS-HCC): ICD-10-CM

## 2025-06-24 DIAGNOSIS — Z94.0 KIDNEY REPLACED BY TRANSPLANT (HHS-HCC): Primary | ICD-10-CM

## 2025-06-24 DIAGNOSIS — D84.9 IMMUNOSUPPRESSION: ICD-10-CM

## 2025-06-24 PROCEDURE — 3078F DIAST BP <80 MM HG: CPT | Performed by: HOSPITALIST

## 2025-06-24 PROCEDURE — 99215 OFFICE O/P EST HI 40 MIN: CPT | Performed by: HOSPITALIST

## 2025-06-24 PROCEDURE — 1159F MED LIST DOCD IN RCRD: CPT | Performed by: HOSPITALIST

## 2025-06-24 PROCEDURE — 1126F AMNT PAIN NOTED NONE PRSNT: CPT | Performed by: HOSPITALIST

## 2025-06-24 PROCEDURE — 3074F SYST BP LT 130 MM HG: CPT | Performed by: HOSPITALIST

## 2025-06-24 RX ORDER — PREDNISONE 5 MG/1
5 TABLET ORAL DAILY
Qty: 30 TABLET | Refills: 11 | Status: SHIPPED | OUTPATIENT
Start: 2025-06-24 | End: 2026-06-19

## 2025-06-24 ASSESSMENT — ENCOUNTER SYMPTOMS
PSYCHIATRIC NEGATIVE: 1
DYSURIA: 0
NEUROLOGICAL NEGATIVE: 1
POLYPHAGIA: 0
HEMATURIA: 0
CHEST TIGHTNESS: 0
HEMATOLOGIC/LYMPHATIC NEGATIVE: 1
POLYDIPSIA: 0
FLANK PAIN: 0
SHORTNESS OF BREATH: 0
ABDOMINAL DISTENTION: 0
RESPIRATORY NEGATIVE: 1
COUGH: 0
CONSTIPATION: 0
CARDIOVASCULAR NEGATIVE: 1
BLOOD IN STOOL: 0

## 2025-06-24 ASSESSMENT — PAIN SCALES - GENERAL: PAINLEVEL_OUTOF10: 0-NO PAIN

## 2025-06-24 NOTE — PROGRESS NOTES
Liz Hamlin  80 y.o. with past medical history significant for ESKD secondary to hypertension status post pre-emptive DDKT on 10/28/2024 with KDPI 86%, PRA 77%, EBV status D+/R+, CMV status D+/R- hepatitis C status D-/R-. Patient was induced by 3 mg/kg of Thymoglobulin initiated on belatacept CellCept and prednisone taper. 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   - Norovirus diarrhea as of 5/21/2025.    Interim history: Denied any complaints on today's visit.  Blood pressure has optimal.  Denied issues with voiding.      Review of Systems   HENT: Negative.     Respiratory: Negative.  Negative for cough, chest tightness and shortness of breath.    Cardiovascular: Negative.  Negative for leg swelling.   Gastrointestinal:  Negative for abdominal distention, blood in stool and constipation.   Endocrine: Negative for polydipsia, polyphagia and polyuria.   Genitourinary:  Negative for decreased urine volume, dysuria, flank pain, hematuria and urgency.   Neurological: Negative.    Hematological: Negative.    Psychiatric/Behavioral: Negative.          Objective:  Visit Vitals  /78 (BP Location: Right arm, Patient Position: Sitting, BP Cuff Size: Adult)   Pulse 90   Temp 36.3 °C (97.4 °F) (Temporal)   Wt 86 kg (189 lb 8 oz)   LMP  (LMP Unknown)   SpO2 98%   BMI 30.59 kg/m²   OB Status Postmenopausal   Smoking Status Never   BSA 2 m²      Physical Exam  HENT:      Head: Normocephalic.   Eyes:      Pupils: Pupils are equal, round, and reactive to light.   Cardiovascular:      Rate and Rhythm: Normal rate and regular rhythm.   Pulmonary:      Effort: Pulmonary effort is normal. No respiratory distress.      Breath sounds: No wheezing or rales.   Abdominal:      General: There is no  distension.      Tenderness: There is no abdominal tenderness. There is no rebound.   Musculoskeletal:         General: Normal range of motion.   Skin:     General: Skin is warm.      Findings: No bruising, lesion or rash.   Neurological:      General: No focal deficit present.   Psychiatric:         Mood and Affect: Mood normal.          Current Medications[1]     [unfilled]     No images are attached to the encounter.     Assessment and Plan :Liz Hamlin 80 y.o. with past medical history significant for ESKD secondary to hypertension status post pre-emptive DDKT on 10/28/2024 with KDPI 86%, PRA 77%, EBV status D+/R+, CMV status D+/R- hepatitis C status D-/R-. Patient was induced by 3 mg/kg of Thymoglobulin initiated on belatacept CellCept and prednisone taper. 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      Admitted to [facility] 4/16/25 for Banff IIA rejection s/p thymo 4.5 mg/kg, solumedrol pulse 500-250-250 mg.   - Norovirus diarrhea as of 5/21/2025.    Interim history: Denied any complaints on today's visit.  Blood pressure has optimal.  Denied issues with voiding.    Allograft function: Stable creatinine in the range of 0.9-1.0, stable electrolytes.  Last UPC 0.20.  No DSA as of 6/4/2025, AlloSure is 0.36 as of 6/4/2025.  - Last infectious workup like CMV, EBV, BK negative as of 5/15/2025.  - Continue with the Bactrim for 6 months    Immunosuppression: Crease prednisone to 5 mg daily, continue with mycophenolate 750 twice daily and Envarsus 1.5 mg daily recent tacrolimus levels are 5.8 aim levels are 5-8    Hemodynamics: Blood pressure is optimally controlled continue with the carvedilol 50 twice daily, Lasix 20 daily, hydralazine 100 twice daily.    No anemia or leukopenia.  Continue iron tablets    Bone mineral disease: Calcium and phosphorus levels are optimal magnesium  levels are optimal continue magnesium supplementation.  Last vitamin D levels are 41 continue with the current management       General health care: Recommended age-appropriate screening, COVID shots annual dermatology visits,DEXA scan 2-3 yrs while on steroids .    Labs every other week and follow-up in clinic in a month    Franklin Blake MD    Notes created by Rudy -Please excuse the Typos .         [1]   Current Outpatient Medications:     apixaban (Eliquis) 5 mg tablet, Take 1 tablet (5 mg) by mouth 2 times a day., Disp: 60 tablet, Rfl: 3    atorvastatin (Lipitor) 40 mg tablet, TAKE 1 TABLET DAILY, Disp: 90 tablet, Rfl: 1    carvedilol (Coreg) 25 mg tablet, Take 2 tablets (50 mg) by mouth 2 times a day., Disp: 120 tablet, Rfl: 11    empagliflozin (Jardiance) 10 mg, Take 1 tablet (10 mg) by mouth once daily., Disp: 90 tablet, Rfl: 3    ferrous sulfate (FeroSuL) tablet, TAKE 1 TABLET DAILY, Disp: 90 tablet, Rfl: 1    furosemide (Lasix) 20 mg tablet, Take 1 tablet (20 mg) by mouth once daily., Disp: 30 tablet, Rfl: 11    hydrALAZINE (Apresoline) 100 mg tablet, TAKE 1 TABLET TWICE A DAY, Disp: 180 tablet, Rfl: 1    magnesium oxide (Mag-Ox) 400 mg (241.3 mg elemental) tablet, Take 1 tablet (400 mg) by mouth once daily., Disp: 30 tablet, Rfl: 2    mycophenolate (Cellcept) 250 mg capsule, Take 3 capsules (750 mg) by mouth every 12 hours., Disp: 180 capsule, Rfl: 11    pantoprazole (ProtoNix) 40 mg EC tablet, Take 1 tablet (40 mg) by mouth once daily in the morning. Take before meals. Do not crush, chew, or split., Disp: 30 tablet, Rfl: 11    predniSONE (Deltasone) 5 mg tablet, Take 2 tablets (10 mg) by mouth once daily., Disp: 42 tablet, Rfl: 17    SITagliptin phosphate (Januvia) 100 mg tablet, Take 1 tablet (100 mg) by mouth once daily., Disp: 90 tablet, Rfl: 3    sod phos di, mono-K phos mono (K Phos Neutral) tablet, Take 1 tablet (250 mg) by mouth 2 times a day., Disp: 60 tablet, Rfl: 11     sulfamethoxazole-trimethoprim (Bactrim) 400-80 mg tablet, Take 1 tablet by mouth once daily., Disp: 30 tablet, Rfl: 1    tacrolimus (Prograf) 0.5 mg capsule, Take 2 capsules (1 mg) by mouth 2 times a day., Disp: 120 capsule, Rfl: 11    tacrolimus ER (Envarsus XR) 0.75 mg tablet ER, Take 2 tablets (1.5 mg) by mouth once daily., Disp: 60 tablet, Rfl: 11    gabapentin (Neurontin) 300 mg capsule, Take 1 capsule (300 mg) by mouth once daily at bedtime. (Patient not taking: Reported on 5/21/2025), Disp: 30 capsule, Rfl: 2

## 2025-06-26 PROCEDURE — RXMED WILLOW AMBULATORY MEDICATION CHARGE

## 2025-06-27 ENCOUNTER — PHARMACY VISIT (OUTPATIENT)
Dept: PHARMACY | Facility: CLINIC | Age: 80
End: 2025-06-27
Payer: COMMERCIAL

## 2025-06-27 DIAGNOSIS — Z94.0 KIDNEY REPLACED BY TRANSPLANT (HHS-HCC): ICD-10-CM

## 2025-06-27 DIAGNOSIS — D84.9 IMMUNOSUPPRESSION: ICD-10-CM

## 2025-06-30 PROCEDURE — RXMED WILLOW AMBULATORY MEDICATION CHARGE

## 2025-07-02 ENCOUNTER — PHARMACY VISIT (OUTPATIENT)
Dept: PHARMACY | Facility: CLINIC | Age: 80
End: 2025-07-02
Payer: COMMERCIAL

## 2025-07-03 ENCOUNTER — NURSE ONLY (OUTPATIENT)
Facility: HOSPITAL | Age: 80
End: 2025-07-03
Payer: COMMERCIAL

## 2025-07-03 DIAGNOSIS — D84.9 IMMUNOSUPPRESSION: ICD-10-CM

## 2025-07-03 DIAGNOSIS — Z94.0 KIDNEY REPLACED BY TRANSPLANT (HHS-HCC): ICD-10-CM

## 2025-07-03 LAB
ALBUMIN SERPL BCP-MCNC: 4.2 G/DL (ref 3.4–5)
ANION GAP SERPL CALC-SCNC: 13 MMOL/L (ref 10–20)
BUN SERPL-MCNC: 19 MG/DL (ref 6–23)
CALCIUM SERPL-MCNC: 9.6 MG/DL (ref 8.6–10.6)
CHLORIDE SERPL-SCNC: 108 MMOL/L (ref 98–107)
CO2 SERPL-SCNC: 26 MMOL/L (ref 21–32)
CREAT SERPL-MCNC: 1.06 MG/DL (ref 0.5–1.05)
CREAT UR-MCNC: 93.9 MG/DL (ref 20–320)
EGFRCR SERPLBLD CKD-EPI 2021: 54 ML/MIN/1.73M*2
ERYTHROCYTE [DISTWIDTH] IN BLOOD BY AUTOMATED COUNT: 16.7 % (ref 11.5–14.5)
GLUCOSE SERPL-MCNC: 87 MG/DL (ref 74–99)
HCT VFR BLD AUTO: 39.2 % (ref 36–46)
HGB BLD-MCNC: 11.8 G/DL (ref 12–16)
MAGNESIUM SERPL-MCNC: 2.04 MG/DL (ref 1.6–2.4)
MCH RBC QN AUTO: 29.2 PG (ref 26–34)
MCHC RBC AUTO-ENTMCNC: 30.1 G/DL (ref 32–36)
MCV RBC AUTO: 97 FL (ref 80–100)
NRBC BLD-RTO: 0 /100 WBCS (ref 0–0)
PHOSPHATE SERPL-MCNC: 3 MG/DL (ref 2.5–4.9)
PLATELET # BLD AUTO: 126 X10*3/UL (ref 150–450)
POTASSIUM SERPL-SCNC: 3.8 MMOL/L (ref 3.5–5.3)
PROT UR-ACNC: 21 MG/DL (ref 5–24)
PROT/CREAT UR: 0.22 MG/MG CREAT (ref 0–0.17)
RBC # BLD AUTO: 4.04 X10*6/UL (ref 4–5.2)
SODIUM SERPL-SCNC: 143 MMOL/L (ref 136–145)
TACROLIMUS BLD-MCNC: 3.3 NG/ML
WBC # BLD AUTO: 3.5 X10*3/UL (ref 4.4–11.3)

## 2025-07-03 PROCEDURE — 87799 DETECT AGENT NOS DNA QUANT: CPT | Performed by: HOSPITALIST

## 2025-07-03 PROCEDURE — 83735 ASSAY OF MAGNESIUM: CPT

## 2025-07-03 PROCEDURE — 80069 RENAL FUNCTION PANEL: CPT

## 2025-07-03 PROCEDURE — 36415 COLL VENOUS BLD VENIPUNCTURE: CPT

## 2025-07-03 PROCEDURE — 85027 COMPLETE CBC AUTOMATED: CPT

## 2025-07-03 PROCEDURE — 82570 ASSAY OF URINE CREATININE: CPT

## 2025-07-03 PROCEDURE — 80197 ASSAY OF TACROLIMUS: CPT

## 2025-07-03 PROCEDURE — 87385 HISTOPLASMA CAPSUL AG IA: CPT

## 2025-07-04 DIAGNOSIS — D84.9 IMMUNOSUPPRESSION: ICD-10-CM

## 2025-07-04 DIAGNOSIS — Z94.0 KIDNEY REPLACED BY TRANSPLANT (HHS-HCC): ICD-10-CM

## 2025-07-08 DIAGNOSIS — I82.4Y9 DEEP VEIN THROMBOSIS (DVT) OF PROXIMAL LOWER EXTREMITY, UNSPECIFIED CHRONICITY, UNSPECIFIED LATERALITY: ICD-10-CM

## 2025-07-08 DIAGNOSIS — Z94.0 KIDNEY REPLACED BY TRANSPLANT (HHS-HCC): ICD-10-CM

## 2025-07-10 ENCOUNTER — NURSE ONLY (OUTPATIENT)
Facility: HOSPITAL | Age: 80
End: 2025-07-10
Payer: COMMERCIAL

## 2025-07-10 DIAGNOSIS — Z94.0 KIDNEY REPLACED BY TRANSPLANT (HHS-HCC): ICD-10-CM

## 2025-07-10 LAB
25(OH)D3 SERPL-MCNC: 37 NG/ML (ref 30–100)
ALBUMIN SERPL BCP-MCNC: 4.1 G/DL (ref 3.4–5)
ANION GAP SERPL CALC-SCNC: 11 MMOL/L (ref 10–20)
BUN SERPL-MCNC: 20 MG/DL (ref 6–23)
CALCIUM SERPL-MCNC: 9.7 MG/DL (ref 8.6–10.6)
CHLORIDE SERPL-SCNC: 109 MMOL/L (ref 98–107)
CO2 SERPL-SCNC: 27 MMOL/L (ref 21–32)
CREAT SERPL-MCNC: 1.09 MG/DL (ref 0.5–1.05)
EGFRCR SERPLBLD CKD-EPI 2021: 52 ML/MIN/1.73M*2
ERYTHROCYTE [DISTWIDTH] IN BLOOD BY AUTOMATED COUNT: 16 % (ref 11.5–14.5)
GLUCOSE SERPL-MCNC: 103 MG/DL (ref 74–99)
HCT VFR BLD AUTO: 38.2 % (ref 36–46)
HGB BLD-MCNC: 11.7 G/DL (ref 12–16)
MAGNESIUM SERPL-MCNC: 2.04 MG/DL (ref 1.6–2.4)
MCH RBC QN AUTO: 29.7 PG (ref 26–34)
MCHC RBC AUTO-ENTMCNC: 30.6 G/DL (ref 32–36)
MCV RBC AUTO: 97 FL (ref 80–100)
NRBC BLD-RTO: 0 /100 WBCS (ref 0–0)
PHOSPHATE SERPL-MCNC: 3.1 MG/DL (ref 2.5–4.9)
PLATELET # BLD AUTO: 146 X10*3/UL (ref 150–450)
POTASSIUM SERPL-SCNC: 4 MMOL/L (ref 3.5–5.3)
PTH-INTACT SERPL-MCNC: 117.9 PG/ML (ref 18.5–88)
RBC # BLD AUTO: 3.94 X10*6/UL (ref 4–5.2)
SODIUM SERPL-SCNC: 143 MMOL/L (ref 136–145)
TACROLIMUS BLD-MCNC: 3.2 NG/ML
WBC # BLD AUTO: 3.6 X10*3/UL (ref 4.4–11.3)

## 2025-07-10 PROCEDURE — 80197 ASSAY OF TACROLIMUS: CPT | Performed by: HOSPITALIST

## 2025-07-10 PROCEDURE — 83735 ASSAY OF MAGNESIUM: CPT | Performed by: HOSPITALIST

## 2025-07-10 PROCEDURE — 83970 ASSAY OF PARATHORMONE: CPT | Performed by: HOSPITALIST

## 2025-07-10 PROCEDURE — 85027 COMPLETE CBC AUTOMATED: CPT | Performed by: HOSPITALIST

## 2025-07-10 PROCEDURE — 82306 VITAMIN D 25 HYDROXY: CPT | Performed by: HOSPITALIST

## 2025-07-10 PROCEDURE — 80069 RENAL FUNCTION PANEL: CPT | Performed by: HOSPITALIST

## 2025-07-11 ENCOUNTER — PATIENT MESSAGE (OUTPATIENT)
Facility: HOSPITAL | Age: 80
End: 2025-07-11
Payer: COMMERCIAL

## 2025-07-11 DIAGNOSIS — Z94.0 KIDNEY REPLACED BY TRANSPLANT (HHS-HCC): ICD-10-CM

## 2025-07-11 DIAGNOSIS — D84.9 IMMUNOSUPPRESSION: ICD-10-CM

## 2025-07-11 LAB
H CAPSUL AG UR QL: NOT DETECTED
SCAN RESULT: NORMAL

## 2025-07-11 PROCEDURE — RXMED WILLOW AMBULATORY MEDICATION CHARGE

## 2025-07-11 NOTE — PATIENT COMMUNICATION
Reviewed tacrolimus level with Dr. Baltazar  Tac 3.2 from 3.3.  3.3 was after 5 days of new start on Envarsus, patient repeated, level 3.2 on 1.5 mg.   PLAN:   Increase to 2.25 mg daily, repeat in 2 weeks.     Direct message sent to patient with plan.

## 2025-07-14 ENCOUNTER — OFFICE VISIT (OUTPATIENT)
Dept: ORTHOPEDIC SURGERY | Facility: HOSPITAL | Age: 80
End: 2025-07-14
Payer: COMMERCIAL

## 2025-07-14 DIAGNOSIS — M12.811 ROTATOR CUFF TEAR ARTHROPATHY OF RIGHT SHOULDER: Primary | ICD-10-CM

## 2025-07-14 DIAGNOSIS — M75.101 ROTATOR CUFF TEAR ARTHROPATHY OF RIGHT SHOULDER: Primary | ICD-10-CM

## 2025-07-14 PROCEDURE — 20610 DRAIN/INJ JOINT/BURSA W/O US: CPT | Mod: RT | Performed by: ORTHOPAEDIC SURGERY

## 2025-07-14 PROCEDURE — 1125F AMNT PAIN NOTED PAIN PRSNT: CPT | Performed by: ORTHOPAEDIC SURGERY

## 2025-07-14 PROCEDURE — 2500000004 HC RX 250 GENERAL PHARMACY W/ HCPCS (ALT 636 FOR OP/ED): Performed by: ORTHOPAEDIC SURGERY

## 2025-07-14 PROCEDURE — 99212 OFFICE O/P EST SF 10 MIN: CPT | Mod: 25 | Performed by: ORTHOPAEDIC SURGERY

## 2025-07-14 PROCEDURE — 99213 OFFICE O/P EST LOW 20 MIN: CPT | Performed by: ORTHOPAEDIC SURGERY

## 2025-07-14 RX ORDER — TRIAMCINOLONE ACETONIDE 40 MG/ML
40 INJECTION, SUSPENSION INTRA-ARTICULAR; INTRAMUSCULAR
Status: COMPLETED | OUTPATIENT
Start: 2025-07-14 | End: 2025-07-14

## 2025-07-14 RX ORDER — LIDOCAINE HYDROCHLORIDE 10 MG/ML
4 INJECTION, SOLUTION INFILTRATION; PERINEURAL
Status: COMPLETED | OUTPATIENT
Start: 2025-07-14 | End: 2025-07-14

## 2025-07-14 RX ADMIN — LIDOCAINE HYDROCHLORIDE 4 ML: 10 INJECTION, SOLUTION INFILTRATION; PERINEURAL at 08:41

## 2025-07-14 RX ADMIN — TRIAMCINOLONE ACETONIDE 40 MG: 400 INJECTION, SUSPENSION INTRA-ARTICULAR; INTRAMUSCULAR at 08:41

## 2025-07-14 ASSESSMENT — PAIN - FUNCTIONAL ASSESSMENT: PAIN_FUNCTIONAL_ASSESSMENT: 0-10

## 2025-07-14 ASSESSMENT — PAIN SCALES - GENERAL: PAINLEVEL_OUTOF10: 8

## 2025-07-14 NOTE — PROGRESS NOTES
Patient had recurrent pain in her right shoulder she has rotator cuff arthropathy left side previous injections have been helpful she would like to have it repeated on exam today the patient can elevate to about 110 degrees.  She does have painful arc of motion and motor power is compromised abduction and external rotation 4/5.    Right shoulder rotator cuff arthropathy the patient request injection was performed and tolerated well.  The patient has been asked to call and let us know if this fails to give her relief.  We also discussed possible surgical intervention and she is contemplating this in the future but she is just recently status post renal transplant and would like to wait several more months before having any surgery.    This was dictated using voice recognition software and not corrected for grammatical or spelling errors.  L Inj/Asp: R subacromial bursa on 7/14/2025 8:41 AM  Details: 22 G needle, posterior approach  Medications: 4 mL lidocaine 10 mg/mL (1 %); 40 mg triamcinolone acetonide 40 mg/mL  Outcome: tolerated well, no immediate complications  Procedure, treatment alternatives, risks and benefits explained, specific risks discussed. Consent was given by the patient. Immediately prior to procedure a time out was called to verify the correct patient, procedure, equipment, support staff and site/side marked as required.

## 2025-07-16 PROCEDURE — RXMED WILLOW AMBULATORY MEDICATION CHARGE

## 2025-07-17 ENCOUNTER — PHARMACY VISIT (OUTPATIENT)
Dept: PHARMACY | Facility: CLINIC | Age: 80
End: 2025-07-17
Payer: COMMERCIAL

## 2025-07-17 ENCOUNTER — SPECIALTY PHARMACY (OUTPATIENT)
Dept: PHARMACY | Facility: CLINIC | Age: 80
End: 2025-07-17

## 2025-07-18 DIAGNOSIS — Z94.0 KIDNEY REPLACED BY TRANSPLANT (HHS-HCC): ICD-10-CM

## 2025-07-21 PROCEDURE — RXMED WILLOW AMBULATORY MEDICATION CHARGE

## 2025-07-23 ENCOUNTER — NURSE ONLY (OUTPATIENT)
Facility: HOSPITAL | Age: 80
End: 2025-07-23
Payer: COMMERCIAL

## 2025-07-23 ENCOUNTER — SPECIALTY PHARMACY (OUTPATIENT)
Dept: PHARMACY | Facility: CLINIC | Age: 80
End: 2025-07-23

## 2025-07-23 DIAGNOSIS — Z94.0 KIDNEY REPLACED BY TRANSPLANT (HHS-HCC): ICD-10-CM

## 2025-07-23 LAB
ALBUMIN SERPL BCP-MCNC: 3.9 G/DL (ref 3.4–5)
ANION GAP SERPL CALC-SCNC: 13 MMOL/L (ref 10–20)
BUN SERPL-MCNC: 23 MG/DL (ref 6–23)
CALCIUM SERPL-MCNC: 9.4 MG/DL (ref 8.6–10.6)
CHLORIDE SERPL-SCNC: 108 MMOL/L (ref 98–107)
CO2 SERPL-SCNC: 26 MMOL/L (ref 21–32)
CREAT SERPL-MCNC: 1.08 MG/DL (ref 0.5–1.05)
CREAT UR-MCNC: 91.9 MG/DL (ref 20–320)
EGFRCR SERPLBLD CKD-EPI 2021: 53 ML/MIN/1.73M*2
ERYTHROCYTE [DISTWIDTH] IN BLOOD BY AUTOMATED COUNT: 15 % (ref 11.5–14.5)
GLUCOSE SERPL-MCNC: 94 MG/DL (ref 74–99)
HCT VFR BLD AUTO: 38.4 % (ref 36–46)
HGB BLD-MCNC: 11.7 G/DL (ref 12–16)
MAGNESIUM SERPL-MCNC: 2.02 MG/DL (ref 1.6–2.4)
MCH RBC QN AUTO: 29.3 PG (ref 26–34)
MCHC RBC AUTO-ENTMCNC: 30.5 G/DL (ref 32–36)
MCV RBC AUTO: 96 FL (ref 80–100)
NRBC BLD-RTO: 0 /100 WBCS (ref 0–0)
PHOSPHATE SERPL-MCNC: 3.3 MG/DL (ref 2.5–4.9)
PLATELET # BLD AUTO: 144 X10*3/UL (ref 150–450)
POTASSIUM SERPL-SCNC: 4.3 MMOL/L (ref 3.5–5.3)
PROT UR-ACNC: 14 MG/DL (ref 5–24)
PROT/CREAT UR: 0.15 MG/MG CREAT (ref 0–0.17)
RBC # BLD AUTO: 3.99 X10*6/UL (ref 4–5.2)
SODIUM SERPL-SCNC: 143 MMOL/L (ref 136–145)
TACROLIMUS BLD-MCNC: 4.8 NG/ML
WBC # BLD AUTO: 4.1 X10*3/UL (ref 4.4–11.3)

## 2025-07-23 PROCEDURE — 87799 DETECT AGENT NOS DNA QUANT: CPT | Performed by: HOSPITALIST

## 2025-07-23 PROCEDURE — 85027 COMPLETE CBC AUTOMATED: CPT | Performed by: HOSPITALIST

## 2025-07-23 PROCEDURE — 82570 ASSAY OF URINE CREATININE: CPT | Performed by: HOSPITALIST

## 2025-07-23 PROCEDURE — 87385 HISTOPLASMA CAPSUL AG IA: CPT | Performed by: HOSPITALIST

## 2025-07-23 PROCEDURE — 80069 RENAL FUNCTION PANEL: CPT | Performed by: HOSPITALIST

## 2025-07-23 PROCEDURE — 36415 COLL VENOUS BLD VENIPUNCTURE: CPT

## 2025-07-23 PROCEDURE — 83735 ASSAY OF MAGNESIUM: CPT | Performed by: HOSPITALIST

## 2025-07-23 PROCEDURE — 80197 ASSAY OF TACROLIMUS: CPT | Performed by: HOSPITALIST

## 2025-07-24 ENCOUNTER — PHARMACY VISIT (OUTPATIENT)
Dept: PHARMACY | Facility: CLINIC | Age: 80
End: 2025-07-24
Payer: COMMERCIAL

## 2025-07-25 DIAGNOSIS — Z94.0 KIDNEY REPLACED BY TRANSPLANT (HHS-HCC): ICD-10-CM

## 2025-07-25 LAB
ALLOSURE SCORE - KIDNEY: 1 %
CAREDX_ORDER_ID: NORMAL
CENTER_ORDER_ID: NORMAL
CLIENT SPECIMEN ID - ALLOSURE: NORMAL
DONOR RELATION - ALLOSURE: NORMAL
NOTES - ALLOSURE: NORMAL
RELATIVE CHANGE VALUE - KIDNEY: 178 %
SCAN RESULT: NORMAL
TEST COMMENTS - ALLOSURE: NORMAL
TIME POST TX - ALLOSURE: NORMAL
TRANSPLANTED ORGAN - ALLOSURE: NORMAL
TX DATE - ALLOSURE/ALLOMAP: NORMAL
WP_ORDER_ID: NORMAL

## 2025-07-30 ENCOUNTER — TELEPHONE (OUTPATIENT)
Facility: HOSPITAL | Age: 80
End: 2025-07-30
Payer: COMMERCIAL

## 2025-07-30 DIAGNOSIS — T86.19 DELAYED GRAFT FUNCTION OF KIDNEY (HHS-HCC): ICD-10-CM

## 2025-07-30 DIAGNOSIS — Z94.0 KIDNEY REPLACED BY TRANSPLANT (HHS-HCC): ICD-10-CM

## 2025-07-30 NOTE — TELEPHONE ENCOUNTER
Patient called requesting to speak with coordinator about question with biopsy.  Patient call back number is [phone] .

## 2025-07-30 NOTE — TELEPHONE ENCOUNTER
Reviewed allosure of 1.0 from 0.36 with Dr. 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 Patient open any day except Aug 6th. Patient will get labs next week prior to biopsy   ESPERANZA Gillespie scheduling biopsy with IR team.

## 2025-07-31 DIAGNOSIS — Z94.0 KIDNEY REPLACED BY TRANSPLANT (HHS-HCC): ICD-10-CM

## 2025-08-01 DIAGNOSIS — D84.9 IMMUNOSUPPRESSION: ICD-10-CM

## 2025-08-01 DIAGNOSIS — Z94.0 KIDNEY REPLACED BY TRANSPLANT (HHS-HCC): ICD-10-CM

## 2025-08-04 DIAGNOSIS — Z94.0 KIDNEY REPLACED BY TRANSPLANT (HHS-HCC): ICD-10-CM

## 2025-08-05 ENCOUNTER — APPOINTMENT (OUTPATIENT)
Facility: HOSPITAL | Age: 80
End: 2025-08-05
Payer: COMMERCIAL

## 2025-08-05 LAB
ALBUMIN SERPL BCP-MCNC: 4 G/DL (ref 3.4–5)
ANION GAP SERPL CALC-SCNC: 9 MMOL/L (ref 10–20)
APPEARANCE UR: CLEAR
APTT PPP: 40 SECONDS (ref 26–36)
BILIRUB UR STRIP.AUTO-MCNC: NEGATIVE MG/DL
BUN SERPL-MCNC: 22 MG/DL (ref 6–23)
CALCIUM SERPL-MCNC: 9.5 MG/DL (ref 8.6–10.6)
CHLORIDE SERPL-SCNC: 108 MMOL/L (ref 98–107)
CO2 SERPL-SCNC: 27 MMOL/L (ref 21–32)
COLOR UR: YELLOW
CREAT SERPL-MCNC: 1.05 MG/DL (ref 0.5–1.05)
CREAT UR-MCNC: 89.9 MG/DL (ref 20–320)
EGFRCR SERPLBLD CKD-EPI 2021: 54 ML/MIN/1.73M*2
ERYTHROCYTE [DISTWIDTH] IN BLOOD BY AUTOMATED COUNT: 14.6 % (ref 11.5–14.5)
GLUCOSE SERPL-MCNC: 93 MG/DL (ref 74–99)
GLUCOSE UR STRIP.AUTO-MCNC: ABNORMAL MG/DL
HCT VFR BLD AUTO: 38.3 % (ref 36–46)
HGB BLD-MCNC: 11.2 G/DL (ref 12–16)
INR PPP: 1.1 (ref 0.9–1.1)
KETONES UR STRIP.AUTO-MCNC: NEGATIVE MG/DL
LEUKOCYTE ESTERASE UR QL STRIP.AUTO: NEGATIVE
MAGNESIUM SERPL-MCNC: 2.02 MG/DL (ref 1.6–2.4)
MCH RBC QN AUTO: 29 PG (ref 26–34)
MCHC RBC AUTO-ENTMCNC: 29.2 G/DL (ref 32–36)
MCV RBC AUTO: 99 FL (ref 80–100)
NITRITE UR QL STRIP.AUTO: NEGATIVE
NRBC BLD-RTO: 0 /100 WBCS (ref 0–0)
PH UR STRIP.AUTO: 5.5 [PH]
PHOSPHATE SERPL-MCNC: 2.5 MG/DL (ref 2.5–4.9)
PLATELET # BLD AUTO: 127 X10*3/UL (ref 150–450)
POTASSIUM SERPL-SCNC: 4.1 MMOL/L (ref 3.5–5.3)
PROT UR STRIP.AUTO-MCNC: NEGATIVE MG/DL
PROT UR-ACNC: 13 MG/DL (ref 5–24)
PROT/CREAT UR: 0.14 MG/MG CREAT (ref 0–0.17)
PROTHROMBIN TIME: 12.7 SECONDS (ref 9.8–12.4)
RBC # BLD AUTO: 3.86 X10*6/UL (ref 4–5.2)
RBC # UR STRIP.AUTO: NEGATIVE MG/DL
SODIUM SERPL-SCNC: 140 MMOL/L (ref 136–145)
SP GR UR STRIP.AUTO: 1.03
TACROLIMUS BLD-MCNC: 4.1 NG/ML
UROBILINOGEN UR STRIP.AUTO-MCNC: NORMAL MG/DL
WBC # BLD AUTO: 3.1 X10*3/UL (ref 4.4–11.3)

## 2025-08-05 PROCEDURE — RXMED WILLOW AMBULATORY MEDICATION CHARGE

## 2025-08-05 PROCEDURE — 87799 DETECT AGENT NOS DNA QUANT: CPT | Performed by: HOSPITALIST

## 2025-08-05 PROCEDURE — 82570 ASSAY OF URINE CREATININE: CPT | Performed by: HOSPITALIST

## 2025-08-05 PROCEDURE — 87385 HISTOPLASMA CAPSUL AG IA: CPT | Performed by: HOSPITALIST

## 2025-08-05 PROCEDURE — 83735 ASSAY OF MAGNESIUM: CPT | Performed by: HOSPITALIST

## 2025-08-05 PROCEDURE — 81003 URINALYSIS AUTO W/O SCOPE: CPT | Performed by: INTERNAL MEDICINE

## 2025-08-05 PROCEDURE — 36415 COLL VENOUS BLD VENIPUNCTURE: CPT

## 2025-08-05 PROCEDURE — 85027 COMPLETE CBC AUTOMATED: CPT | Performed by: HOSPITALIST

## 2025-08-05 PROCEDURE — 80069 RENAL FUNCTION PANEL: CPT | Performed by: HOSPITALIST

## 2025-08-05 PROCEDURE — 80197 ASSAY OF TACROLIMUS: CPT | Performed by: HOSPITALIST

## 2025-08-05 PROCEDURE — 85610 PROTHROMBIN TIME: CPT | Performed by: INTERNAL MEDICINE

## 2025-08-05 PROCEDURE — 86832 HLA CLASS I HIGH DEFIN QUAL: CPT | Performed by: INTERNAL MEDICINE

## 2025-08-05 RX ORDER — LANOLIN ALCOHOL/MO/W.PET/CERES
1 CREAM (GRAM) TOPICAL DAILY
Qty: 30 TABLET | Refills: 2 | Status: SHIPPED | OUTPATIENT
Start: 2025-08-05

## 2025-08-06 ENCOUNTER — TELEPHONE (OUTPATIENT)
Facility: HOSPITAL | Age: 80
End: 2025-08-06
Payer: COMMERCIAL

## 2025-08-06 DIAGNOSIS — Z94.0 KIDNEY REPLACED BY TRANSPLANT (HHS-HCC): ICD-10-CM

## 2025-08-06 LAB
BKV DNA SERPL NAA+PROBE-LOG#: NORMAL {LOG_COPIES}/ML
CMV DNA SERPL NAA+PROBE-LOG IU: NORMAL {LOG_IU}/ML
EBV DNA SPEC NAA+PROBE-LOG#: NORMAL {LOG_COPIES}/ML
HLA RESULTS: NORMAL
HLA-A+B+C AB NFR SER: NORMAL %
HLA-DP+DQ+DR AB NFR SER: NORMAL %
HOLD SPECIMEN: NORMAL
LABORATORY COMMENT REPORT: NOT DETECTED

## 2025-08-06 NOTE — TELEPHONE ENCOUNTER
Reviewed labs with Dr. Pizano:   Tac 4.1 from 4.8  On envarsus 2.25 mg was increased on 7/11  BX tomorrow 8/7 for allosure increase     PLAN:   Increase envarsus dose to 3 mg daily     Direct message sent to patient with plan.

## 2025-08-07 ENCOUNTER — HOSPITAL ENCOUNTER (OUTPATIENT)
Dept: RADIOLOGY | Facility: HOSPITAL | Age: 80
Discharge: HOME | End: 2025-08-07
Payer: COMMERCIAL

## 2025-08-07 ENCOUNTER — SPECIALTY PHARMACY (OUTPATIENT)
Dept: PHARMACY | Facility: CLINIC | Age: 80
End: 2025-08-07

## 2025-08-07 VITALS
DIASTOLIC BLOOD PRESSURE: 90 MMHG | OXYGEN SATURATION: 97 % | RESPIRATION RATE: 16 BRPM | HEART RATE: 78 BPM | TEMPERATURE: 97.5 F | SYSTOLIC BLOOD PRESSURE: 134 MMHG

## 2025-08-07 DIAGNOSIS — Z94.0 KIDNEY REPLACED BY TRANSPLANT (HHS-HCC): ICD-10-CM

## 2025-08-07 LAB
H CAPSUL AG UR QL: NOT DETECTED
SCAN RESULT: NORMAL

## 2025-08-07 PROCEDURE — RXMED WILLOW AMBULATORY MEDICATION CHARGE

## 2025-08-07 PROCEDURE — 50200 RENAL BIOPSY PERQ: CPT

## 2025-08-07 PROCEDURE — 7100000010 HC PHASE TWO TIME - EACH INCREMENTAL 1 MINUTE

## 2025-08-07 PROCEDURE — 2720000007 HC OR 272 NO HCPCS

## 2025-08-07 PROCEDURE — 2500000004 HC RX 250 GENERAL PHARMACY W/ HCPCS (ALT 636 FOR OP/ED): Mod: JW | Performed by: RADIOLOGY

## 2025-08-07 PROCEDURE — 7100000009 HC PHASE TWO TIME - INITIAL BASE CHARGE

## 2025-08-07 RX ORDER — FENTANYL CITRATE 50 UG/ML
INJECTION, SOLUTION INTRAMUSCULAR; INTRAVENOUS
Status: COMPLETED | OUTPATIENT
Start: 2025-08-07 | End: 2025-08-07

## 2025-08-07 RX ORDER — MIDAZOLAM HYDROCHLORIDE 2 MG/2ML
INJECTION, SOLUTION INTRAMUSCULAR; INTRAVENOUS
Status: COMPLETED | OUTPATIENT
Start: 2025-08-07 | End: 2025-08-07

## 2025-08-07 RX ADMIN — MIDAZOLAM HYDROCHLORIDE 1 MG: 2 INJECTION, SOLUTION INTRAMUSCULAR; INTRAVENOUS at 09:53

## 2025-08-07 RX ADMIN — FENTANYL CITRATE 50 MCG: 50 INJECTION, SOLUTION INTRAMUSCULAR; INTRAVENOUS at 09:52

## 2025-08-07 ASSESSMENT — PAIN - FUNCTIONAL ASSESSMENT
PAIN_FUNCTIONAL_ASSESSMENT: 0-10

## 2025-08-07 ASSESSMENT — PAIN SCALES - GENERAL
PAINLEVEL_OUTOF10: 0 - NO PAIN

## 2025-08-07 NOTE — PRE-PROCEDURE NOTE
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    Relevant review of systems: NA    Relevant Labs:   Lab Results   Component Value Date    CREATININE 1.05 08/05/2025    EGFR 54 (L) 08/05/2025    INR 1.1 08/05/2025    PROTIME 12.7 (H) 08/05/2025       Planned Sedation/Anesthesia: Minimal    Airway assessment: normal      Mallampati: IV (only hard palate visible)    ASA Score: ASA 2 - Patient with mild systemic disease with no functional limitations    Benefits, risks and alternatives of procedure and planned sedation have been discussed with the patient and/or their representative. All questions answered and they agree to proceed.

## 2025-08-07 NOTE — POST-PROCEDURE NOTE
Interventional Radiology Brief Postprocedure Note    Attending: Dr. Al    Assistant: Dr. Kim    Diagnosis: Increased allosure    Description of procedure: US guided transplant kidney biopsy     Anesthesia:  Local    Complications: None    Estimated Blood Loss: minimal    Medications (Filter: Administrations occurring from 0951 to 0956 on 08/07/25) As of 08/07/25 0956      fentaNYL PF (Sublimaze) injection (mcg) Total dose:  50 mcg      Date/Time Rate/Dose/Volume Action       08/07/25 0952 50 mcg Given               midazolam (Versed) injection (mg) Total dose:  1 mg      Date/Time Rate/Dose/Volume Action       08/07/25 0953 1 mg Given                   No specimens collected      See detailed result report with images in PACS.    The patient tolerated the procedure well without incident or complication and is in stable condition.

## 2025-08-08 ENCOUNTER — PHARMACY VISIT (OUTPATIENT)
Dept: PHARMACY | Facility: CLINIC | Age: 80
End: 2025-08-08
Payer: COMMERCIAL

## 2025-08-08 DIAGNOSIS — Z94.0 KIDNEY REPLACED BY TRANSPLANT (HHS-HCC): ICD-10-CM

## 2025-08-11 ENCOUNTER — PHARMACY VISIT (OUTPATIENT)
Dept: PHARMACY | Facility: CLINIC | Age: 80
End: 2025-08-11
Payer: COMMERCIAL

## 2025-08-11 PROCEDURE — RXMED WILLOW AMBULATORY MEDICATION CHARGE

## 2025-08-13 ENCOUNTER — TELEPHONE (OUTPATIENT)
Facility: HOSPITAL | Age: 80
End: 2025-08-13
Payer: COMMERCIAL

## 2025-08-13 DIAGNOSIS — Z94.0 KIDNEY REPLACED BY TRANSPLANT (HHS-HCC): ICD-10-CM

## 2025-08-14 RX ORDER — MYCOPHENOLATE MOFETIL 250 MG/1
1000 CAPSULE ORAL EVERY 12 HOURS
Qty: 240 CAPSULE | Refills: 11 | Status: SHIPPED | OUTPATIENT
Start: 2025-08-14 | End: 2026-08-14

## 2025-08-14 RX ORDER — PREDNISONE 5 MG/1
TABLET ORAL
Qty: 126 TABLET | Refills: 0 | Status: SHIPPED | OUTPATIENT
Start: 2025-08-14 | End: 2025-09-25

## 2025-08-14 RX ORDER — PREDNISONE 50 MG/1
100 TABLET ORAL DAILY
Qty: 10 TABLET | Refills: 0 | Status: SHIPPED | OUTPATIENT
Start: 2025-08-14 | End: 2025-08-19

## 2025-08-15 ENCOUNTER — APPOINTMENT (OUTPATIENT)
Facility: HOSPITAL | Age: 80
End: 2025-08-15
Payer: COMMERCIAL

## 2025-08-15 DIAGNOSIS — Z94.0 KIDNEY REPLACED BY TRANSPLANT (HHS-HCC): ICD-10-CM

## 2025-08-15 LAB
ALBUMIN SERPL BCP-MCNC: 4.4 G/DL (ref 3.4–5)
ANION GAP SERPL CALC-SCNC: 13 MMOL/L (ref 10–20)
BUN SERPL-MCNC: 23 MG/DL (ref 6–23)
CALCIUM SERPL-MCNC: 10 MG/DL (ref 8.6–10.6)
CHLORIDE SERPL-SCNC: 107 MMOL/L (ref 98–107)
CO2 SERPL-SCNC: 25 MMOL/L (ref 21–32)
CREAT SERPL-MCNC: 1.14 MG/DL (ref 0.5–1.05)
EGFRCR SERPLBLD CKD-EPI 2021: 49 ML/MIN/1.73M*2
ERYTHROCYTE [DISTWIDTH] IN BLOOD BY AUTOMATED COUNT: 14 % (ref 11.5–14.5)
GLUCOSE SERPL-MCNC: 115 MG/DL (ref 74–99)
HCT VFR BLD AUTO: 38.4 % (ref 36–46)
HGB BLD-MCNC: 11.5 G/DL (ref 12–16)
MAGNESIUM SERPL-MCNC: 1.95 MG/DL (ref 1.6–2.4)
MCH RBC QN AUTO: 28.9 PG (ref 26–34)
MCHC RBC AUTO-ENTMCNC: 29.9 G/DL (ref 32–36)
MCV RBC AUTO: 97 FL (ref 80–100)
NRBC BLD-RTO: 0 /100 WBCS (ref 0–0)
PHOSPHATE SERPL-MCNC: 2.9 MG/DL (ref 2.5–4.9)
PLATELET # BLD AUTO: 164 X10*3/UL (ref 150–450)
POTASSIUM SERPL-SCNC: 4.5 MMOL/L (ref 3.5–5.3)
RBC # BLD AUTO: 3.98 X10*6/UL (ref 4–5.2)
SODIUM SERPL-SCNC: 140 MMOL/L (ref 136–145)
TACROLIMUS BLD-MCNC: 7.2 NG/ML
WBC # BLD AUTO: 6.9 X10*3/UL (ref 4.4–11.3)

## 2025-08-15 PROCEDURE — 83735 ASSAY OF MAGNESIUM: CPT | Performed by: HOSPITALIST

## 2025-08-15 PROCEDURE — 85027 COMPLETE CBC AUTOMATED: CPT | Performed by: HOSPITALIST

## 2025-08-15 PROCEDURE — 36415 COLL VENOUS BLD VENIPUNCTURE: CPT

## 2025-08-15 PROCEDURE — 80069 RENAL FUNCTION PANEL: CPT | Performed by: HOSPITALIST

## 2025-08-15 PROCEDURE — 80197 ASSAY OF TACROLIMUS: CPT | Performed by: HOSPITALIST

## 2025-08-20 ENCOUNTER — TELEPHONE (OUTPATIENT)
Facility: HOSPITAL | Age: 80
End: 2025-08-20
Payer: COMMERCIAL

## 2025-08-20 DIAGNOSIS — R19.7 DIARRHEA, UNSPECIFIED TYPE: ICD-10-CM

## 2025-08-20 DIAGNOSIS — Z94.0 KIDNEY REPLACED BY TRANSPLANT (HHS-HCC): ICD-10-CM

## 2025-08-20 DIAGNOSIS — R05.9 COUGH, UNSPECIFIED TYPE: ICD-10-CM

## 2025-08-21 DIAGNOSIS — Z94.0 KIDNEY REPLACED BY TRANSPLANT (HHS-HCC): ICD-10-CM

## 2025-08-21 RX ORDER — MYCOPHENOLIC ACID 180 MG/1
720 TABLET, DELAYED RELEASE ORAL 2 TIMES DAILY
Qty: 240 TABLET | Refills: 11 | Status: SHIPPED | OUTPATIENT
Start: 2025-08-21 | End: 2026-08-21

## 2025-08-22 ENCOUNTER — TELEPHONE (OUTPATIENT)
Facility: HOSPITAL | Age: 80
End: 2025-08-22
Payer: COMMERCIAL

## 2025-08-22 ENCOUNTER — APPOINTMENT (OUTPATIENT)
Facility: HOSPITAL | Age: 80
End: 2025-08-22
Payer: COMMERCIAL

## 2025-08-22 ENCOUNTER — HOSPITAL ENCOUNTER (OUTPATIENT)
Dept: RADIOLOGY | Facility: HOSPITAL | Age: 80
Discharge: HOME | End: 2025-08-22
Payer: COMMERCIAL

## 2025-08-22 DIAGNOSIS — R19.7 DIARRHEA, UNSPECIFIED TYPE: ICD-10-CM

## 2025-08-22 DIAGNOSIS — R05.9 COUGH, UNSPECIFIED TYPE: ICD-10-CM

## 2025-08-22 DIAGNOSIS — Z94.0 KIDNEY REPLACED BY TRANSPLANT (HHS-HCC): ICD-10-CM

## 2025-08-22 LAB
ALBUMIN SERPL BCP-MCNC: 4 G/DL (ref 3.4–5)
ANION GAP SERPL CALC-SCNC: 14 MMOL/L (ref 10–20)
BUN SERPL-MCNC: 42 MG/DL (ref 6–23)
CALCIUM SERPL-MCNC: 9.8 MG/DL (ref 8.6–10.6)
CHLORIDE SERPL-SCNC: 105 MMOL/L (ref 98–107)
CO2 SERPL-SCNC: 25 MMOL/L (ref 21–32)
CREAT SERPL-MCNC: 1.34 MG/DL (ref 0.5–1.05)
EGFRCR SERPLBLD CKD-EPI 2021: 41 ML/MIN/1.73M*2
ERYTHROCYTE [DISTWIDTH] IN BLOOD BY AUTOMATED COUNT: 14.5 % (ref 11.5–14.5)
GLUCOSE SERPL-MCNC: 126 MG/DL (ref 74–99)
HCT VFR BLD AUTO: 38.2 % (ref 36–46)
HGB BLD-MCNC: 11.9 G/DL (ref 12–16)
MAGNESIUM SERPL-MCNC: 2.27 MG/DL (ref 1.6–2.4)
MCH RBC QN AUTO: 29.2 PG (ref 26–34)
MCHC RBC AUTO-ENTMCNC: 31.2 G/DL (ref 32–36)
MCV RBC AUTO: 94 FL (ref 80–100)
NRBC BLD-RTO: 0.9 /100 WBCS (ref 0–0)
PHOSPHATE SERPL-MCNC: 3.3 MG/DL (ref 2.5–4.9)
PLATELET # BLD AUTO: 160 X10*3/UL (ref 150–450)
POTASSIUM SERPL-SCNC: 3.8 MMOL/L (ref 3.5–5.3)
RBC # BLD AUTO: 4.07 X10*6/UL (ref 4–5.2)
SODIUM SERPL-SCNC: 140 MMOL/L (ref 136–145)
TACROLIMUS BLD-MCNC: 9.4 NG/ML
WBC # BLD AUTO: 5.3 X10*3/UL (ref 4.4–11.3)

## 2025-08-22 PROCEDURE — 87799 DETECT AGENT NOS DNA QUANT: CPT | Performed by: STUDENT IN AN ORGANIZED HEALTH CARE EDUCATION/TRAINING PROGRAM

## 2025-08-22 PROCEDURE — 80197 ASSAY OF TACROLIMUS: CPT | Performed by: HOSPITALIST

## 2025-08-22 PROCEDURE — 83735 ASSAY OF MAGNESIUM: CPT | Performed by: HOSPITALIST

## 2025-08-22 PROCEDURE — 36415 COLL VENOUS BLD VENIPUNCTURE: CPT

## 2025-08-22 PROCEDURE — 71046 X-RAY EXAM CHEST 2 VIEWS: CPT

## 2025-08-22 PROCEDURE — 84100 ASSAY OF PHOSPHORUS: CPT | Performed by: HOSPITALIST

## 2025-08-22 PROCEDURE — 85027 COMPLETE CBC AUTOMATED: CPT | Performed by: HOSPITALIST

## 2025-08-23 ENCOUNTER — SPECIALTY PHARMACY (OUTPATIENT)
Dept: PHARMACY | Facility: CLINIC | Age: 80
End: 2025-08-23

## 2025-08-23 LAB
BKV DNA SERPL NAA+PROBE-ACNC: 52 IU/ML (ref ?–22)
BKV DNA SERPL NAA+PROBE-LOG#: 1.72 LOG IU/ML
CMV DNA SERPL NAA+PROBE-LOG IU: NORMAL {LOG_IU}/ML
EBV DNA SPEC NAA+PROBE-LOG#: NORMAL {LOG_COPIES}/ML
LABORATORY COMMENT REPORT: DETECTED
LABORATORY COMMENT REPORT: NOT DETECTED
LABORATORY COMMENT REPORT: NOT DETECTED

## 2025-08-23 PROCEDURE — RXMED WILLOW AMBULATORY MEDICATION CHARGE

## 2025-08-25 ENCOUNTER — PHARMACY VISIT (OUTPATIENT)
Dept: PHARMACY | Facility: CLINIC | Age: 80
End: 2025-08-25
Payer: COMMERCIAL

## 2025-08-25 LAB
LAB AP ASR DISCLAIMER: NORMAL
LABORATORY COMMENT REPORT: NORMAL
PATH REPORT.FINAL DX SPEC: NORMAL
PATH REPORT.GROSS SPEC: NORMAL
PATH REPORT.MICROSCOPIC SPEC OTHER STN: NORMAL
PATH REPORT.RELEVANT HX SPEC: NORMAL
PATH REPORT.TOTAL CANCER: NORMAL

## 2025-08-25 PROCEDURE — 88348 ELECTRON MICROSCOPY DX: CPT | Mod: TC,SUR | Performed by: INTERNAL MEDICINE

## 2025-08-26 ENCOUNTER — APPOINTMENT (OUTPATIENT)
Facility: HOSPITAL | Age: 80
End: 2025-08-26
Payer: COMMERCIAL

## 2025-08-26 ENCOUNTER — TELEPHONE (OUTPATIENT)
Facility: HOSPITAL | Age: 80
End: 2025-08-26

## 2025-08-26 ENCOUNTER — SPECIALTY PHARMACY (OUTPATIENT)
Dept: PHARMACY | Facility: CLINIC | Age: 80
End: 2025-08-26

## 2025-08-26 ENCOUNTER — OFFICE VISIT (OUTPATIENT)
Facility: HOSPITAL | Age: 80
End: 2025-08-26
Payer: COMMERCIAL

## 2025-08-26 VITALS
TEMPERATURE: 96.6 F | WEIGHT: 197.9 LBS | SYSTOLIC BLOOD PRESSURE: 106 MMHG | BODY MASS INDEX: 31.94 KG/M2 | HEART RATE: 81 BPM | DIASTOLIC BLOOD PRESSURE: 71 MMHG | OXYGEN SATURATION: 98 %

## 2025-08-26 DIAGNOSIS — Z94.0 KIDNEY REPLACED BY TRANSPLANT (HHS-HCC): Primary | ICD-10-CM

## 2025-08-26 DIAGNOSIS — D63.1 ANEMIA IN CHRONIC KIDNEY DISEASE, UNSPECIFIED CKD STAGE: ICD-10-CM

## 2025-08-26 DIAGNOSIS — R05.9 COUGH, UNSPECIFIED TYPE: ICD-10-CM

## 2025-08-26 DIAGNOSIS — I10 PRIMARY HYPERTENSION: ICD-10-CM

## 2025-08-26 DIAGNOSIS — N18.9 ANEMIA IN CHRONIC KIDNEY DISEASE, UNSPECIFIED CKD STAGE: ICD-10-CM

## 2025-08-26 DIAGNOSIS — J15.9 BACTERIAL PNEUMONIA: ICD-10-CM

## 2025-08-26 DIAGNOSIS — I15.1 HYPERTENSION SECONDARY TO OTHER RENAL DISORDERS: ICD-10-CM

## 2025-08-26 DIAGNOSIS — D84.9 IMMUNOSUPPRESSION: ICD-10-CM

## 2025-08-26 DIAGNOSIS — Z94.0 KIDNEY REPLACED BY TRANSPLANT (HHS-HCC): ICD-10-CM

## 2025-08-26 DIAGNOSIS — E87.70 HYPERVOLEMIA, UNSPECIFIED HYPERVOLEMIA TYPE: ICD-10-CM

## 2025-08-26 LAB
ALBUMIN SERPL BCP-MCNC: 3.9 G/DL (ref 3.4–5)
ANION GAP SERPL CALC-SCNC: 13 MMOL/L (ref 10–20)
BNP SERPL-MCNC: 1538 PG/ML (ref 0–99)
BUN SERPL-MCNC: 33 MG/DL (ref 6–23)
CALCIUM SERPL-MCNC: 9.4 MG/DL (ref 8.6–10.6)
CHLORIDE SERPL-SCNC: 105 MMOL/L (ref 98–107)
CO2 SERPL-SCNC: 24 MMOL/L (ref 21–32)
CREAT SERPL-MCNC: 1.44 MG/DL (ref 0.5–1.05)
CREAT UR-MCNC: 131.2 MG/DL (ref 20–320)
EGFRCR SERPLBLD CKD-EPI 2021: 37 ML/MIN/1.73M*2
ERYTHROCYTE [DISTWIDTH] IN BLOOD BY AUTOMATED COUNT: 15.6 % (ref 11.5–14.5)
GLUCOSE SERPL-MCNC: 125 MG/DL (ref 74–99)
HCT VFR BLD AUTO: 37.1 % (ref 36–46)
HGB BLD-MCNC: 11.2 G/DL (ref 12–16)
MAGNESIUM SERPL-MCNC: 2.26 MG/DL (ref 1.6–2.4)
MCH RBC QN AUTO: 28.9 PG (ref 26–34)
MCHC RBC AUTO-ENTMCNC: 30.2 G/DL (ref 32–36)
MCV RBC AUTO: 96 FL (ref 80–100)
NRBC BLD-RTO: 2.1 /100 WBCS (ref 0–0)
PHOSPHATE SERPL-MCNC: 3.1 MG/DL (ref 2.5–4.9)
PLATELET # BLD AUTO: 139 X10*3/UL (ref 150–450)
POTASSIUM SERPL-SCNC: 3.9 MMOL/L (ref 3.5–5.3)
PROT UR-ACNC: 29 MG/DL (ref 5–24)
PROT/CREAT UR: 0.22 MG/MG CREAT (ref 0–0.17)
RBC # BLD AUTO: 3.88 X10*6/UL (ref 4–5.2)
SODIUM SERPL-SCNC: 138 MMOL/L (ref 136–145)
TACROLIMUS BLD-MCNC: 8.4 NG/ML
WBC # BLD AUTO: 6.6 X10*3/UL (ref 4.4–11.3)

## 2025-08-26 PROCEDURE — 87799 DETECT AGENT NOS DNA QUANT: CPT | Performed by: HOSPITALIST

## 2025-08-26 PROCEDURE — 85027 COMPLETE CBC AUTOMATED: CPT | Performed by: HOSPITALIST

## 2025-08-26 PROCEDURE — 3074F SYST BP LT 130 MM HG: CPT | Performed by: STUDENT IN AN ORGANIZED HEALTH CARE EDUCATION/TRAINING PROGRAM

## 2025-08-26 PROCEDURE — 99215 OFFICE O/P EST HI 40 MIN: CPT

## 2025-08-26 PROCEDURE — 3078F DIAST BP <80 MM HG: CPT | Performed by: STUDENT IN AN ORGANIZED HEALTH CARE EDUCATION/TRAINING PROGRAM

## 2025-08-26 PROCEDURE — 36415 COLL VENOUS BLD VENIPUNCTURE: CPT

## 2025-08-26 PROCEDURE — 87385 HISTOPLASMA CAPSUL AG IA: CPT | Performed by: HOSPITALIST

## 2025-08-26 PROCEDURE — 83880 ASSAY OF NATRIURETIC PEPTIDE: CPT | Performed by: STUDENT IN AN ORGANIZED HEALTH CARE EDUCATION/TRAINING PROGRAM

## 2025-08-26 PROCEDURE — 1159F MED LIST DOCD IN RCRD: CPT | Performed by: STUDENT IN AN ORGANIZED HEALTH CARE EDUCATION/TRAINING PROGRAM

## 2025-08-26 PROCEDURE — 80069 RENAL FUNCTION PANEL: CPT | Performed by: HOSPITALIST

## 2025-08-26 PROCEDURE — RXMED WILLOW AMBULATORY MEDICATION CHARGE

## 2025-08-26 PROCEDURE — 83735 ASSAY OF MAGNESIUM: CPT | Performed by: HOSPITALIST

## 2025-08-26 PROCEDURE — 80197 ASSAY OF TACROLIMUS: CPT | Performed by: HOSPITALIST

## 2025-08-26 PROCEDURE — 82570 ASSAY OF URINE CREATININE: CPT | Performed by: HOSPITALIST

## 2025-08-26 RX ORDER — SULFAMETHOXAZOLE AND TRIMETHOPRIM 400; 80 MG/1; MG/1
1 TABLET ORAL 2 TIMES DAILY
Qty: 20 TABLET | Refills: 0 | Status: SHIPPED | OUTPATIENT
Start: 2025-08-26 | End: 2025-08-26 | Stop reason: ALTCHOICE

## 2025-08-26 RX ORDER — SULFAMETHOXAZOLE AND TRIMETHOPRIM 400; 80 MG/1; MG/1
1 TABLET ORAL DAILY
Qty: 30 TABLET | Refills: 3 | Status: ON HOLD | OUTPATIENT
Start: 2025-08-26 | End: 2025-09-26

## 2025-08-26 RX ORDER — FUROSEMIDE 20 MG/1
40 TABLET ORAL
Qty: 120 TABLET | Refills: 2 | Status: ON HOLD | OUTPATIENT
Start: 2025-08-26 | End: 2026-08-26

## 2025-08-26 RX ORDER — LEVOFLOXACIN 750 MG/1
750 TABLET, FILM COATED ORAL EVERY 24 HOURS
Qty: 7 TABLET | Refills: 0 | Status: ON HOLD | OUTPATIENT
Start: 2025-08-26 | End: 2025-09-02

## 2025-08-27 ENCOUNTER — TELEPHONE (OUTPATIENT)
Facility: HOSPITAL | Age: 80
End: 2025-08-27
Payer: COMMERCIAL

## 2025-08-27 ENCOUNTER — HOSPITAL ENCOUNTER (INPATIENT)
Facility: HOSPITAL | Age: 80
End: 2025-08-27
Attending: TRANSPLANT SURGERY | Admitting: TRANSPLANT SURGERY
Payer: MEDICARE

## 2025-08-27 ENCOUNTER — PHARMACY VISIT (OUTPATIENT)
Dept: PHARMACY | Facility: CLINIC | Age: 80
End: 2025-08-27
Payer: COMMERCIAL

## 2025-08-27 DIAGNOSIS — T86.10 COMPLICATION OF TRANSPLANTED KIDNEY, UNSPECIFIED COMPLICATION (HHS-HCC): ICD-10-CM

## 2025-08-27 DIAGNOSIS — Z94.0 KIDNEY REPLACED BY TRANSPLANT (HHS-HCC): ICD-10-CM

## 2025-08-27 SDOH — HEALTH STABILITY: MENTAL HEALTH: EXPERIENCED ANY OF THE FOLLOWING LIFE EVENTS: DEATH OF FAMILY/FRIEND

## 2025-08-27 SDOH — SOCIAL STABILITY: SOCIAL NETWORK: HOW OFTEN DO YOU GET TOGETHER WITH FRIENDS OR RELATIVES?: MORE THAN THREE TIMES A WEEK

## 2025-08-27 SDOH — HEALTH STABILITY: PHYSICAL HEALTH: ON AVERAGE, HOW MANY DAYS PER WEEK DO YOU ENGAGE IN MODERATE TO STRENUOUS EXERCISE (LIKE A BRISK WALK)?: 2 DAYS

## 2025-08-27 SDOH — SOCIAL STABILITY: SOCIAL INSECURITY: ARE YOU MARRIED, WIDOWED, DIVORCED, SEPARATED, NEVER MARRIED, OR LIVING WITH A PARTNER?: MARRIED

## 2025-08-27 SDOH — HEALTH STABILITY: MENTAL HEALTH
DO YOU FEEL STRESS - TENSE, RESTLESS, NERVOUS, OR ANXIOUS, OR UNABLE TO SLEEP AT NIGHT BECAUSE YOUR MIND IS TROUBLED ALL THE TIME - THESE DAYS?: NOT AT ALL

## 2025-08-27 SDOH — HEALTH STABILITY: MENTAL HEALTH: HOW MANY DRINKS CONTAINING ALCOHOL DO YOU HAVE ON A TYPICAL DAY WHEN YOU ARE DRINKING?: PATIENT DOES NOT DRINK

## 2025-08-27 SDOH — ECONOMIC STABILITY: HOUSING INSECURITY: AT ANY TIME IN THE PAST 12 MONTHS, WERE YOU HOMELESS OR LIVING IN A SHELTER (INCLUDING NOW)?: NO

## 2025-08-27 SDOH — SOCIAL STABILITY: SOCIAL INSECURITY: DO YOU FEEL UNSAFE GOING BACK TO THE PLACE WHERE YOU ARE LIVING?: NO

## 2025-08-27 SDOH — ECONOMIC STABILITY: HOUSING INSECURITY: IN THE LAST 12 MONTHS, WAS THERE A TIME WHEN YOU WERE NOT ABLE TO PAY THE MORTGAGE OR RENT ON TIME?: NO

## 2025-08-27 SDOH — HEALTH STABILITY: MENTAL HEALTH: HOW OFTEN DO YOU HAVE SIX OR MORE DRINKS ON ONE OCCASION?: NEVER

## 2025-08-27 SDOH — SOCIAL STABILITY: SOCIAL INSECURITY: WITHIN THE LAST YEAR, HAVE YOU BEEN AFRAID OF YOUR PARTNER OR EX-PARTNER?: NO

## 2025-08-27 SDOH — ECONOMIC STABILITY: INCOME INSECURITY: IN THE PAST 12 MONTHS HAS THE ELECTRIC, GAS, OIL, OR WATER COMPANY THREATENED TO SHUT OFF SERVICES IN YOUR HOME?: NO

## 2025-08-27 SDOH — HEALTH STABILITY: PHYSICAL HEALTH
HOW OFTEN DO YOU NEED TO HAVE SOMEONE HELP YOU WHEN YOU READ INSTRUCTIONS, PAMPHLETS, OR OTHER WRITTEN MATERIAL FROM YOUR DOCTOR OR PHARMACY?: SOMETIMES

## 2025-08-27 SDOH — SOCIAL STABILITY: SOCIAL NETWORK: HOW OFTEN DO YOU ATTEND CHURCH OR RELIGIOUS SERVICES?: MORE THAN 4 TIMES PER YEAR

## 2025-08-27 SDOH — ECONOMIC STABILITY: FOOD INSECURITY: WITHIN THE PAST 12 MONTHS, YOU WORRIED THAT YOUR FOOD WOULD RUN OUT BEFORE YOU GOT THE MONEY TO BUY MORE.: NEVER TRUE

## 2025-08-27 SDOH — ECONOMIC STABILITY: FOOD INSECURITY: WITHIN THE PAST 12 MONTHS, THE FOOD YOU BOUGHT JUST DIDN'T LAST AND YOU DIDN'T HAVE MONEY TO GET MORE.: NEVER TRUE

## 2025-08-27 SDOH — HEALTH STABILITY: MENTAL HEALTH: HOW OFTEN DO YOU HAVE A DRINK CONTAINING ALCOHOL?: NEVER

## 2025-08-27 SDOH — ECONOMIC STABILITY: TRANSPORTATION INSECURITY: IN THE PAST 12 MONTHS, HAS LACK OF TRANSPORTATION KEPT YOU FROM MEDICAL APPOINTMENTS OR FROM GETTING MEDICATIONS?: NO

## 2025-08-27 SDOH — SOCIAL STABILITY: SOCIAL INSECURITY: HAVE YOU HAD ANY THOUGHTS OF HARMING ANYONE ELSE?: NO

## 2025-08-27 SDOH — SOCIAL STABILITY: SOCIAL INSECURITY: HAVE YOU HAD THOUGHTS OF HARMING ANYONE ELSE?: NO

## 2025-08-27 SDOH — ECONOMIC STABILITY: FOOD INSECURITY: HOW HARD IS IT FOR YOU TO PAY FOR THE VERY BASICS LIKE FOOD, HOUSING, MEDICAL CARE, AND HEATING?: NOT HARD AT ALL

## 2025-08-27 SDOH — SOCIAL STABILITY: SOCIAL NETWORK
IN A TYPICAL WEEK, HOW MANY TIMES DO YOU TALK ON THE PHONE WITH FAMILY, FRIENDS, OR NEIGHBORS?: MORE THAN THREE TIMES A WEEK

## 2025-08-27 SDOH — SOCIAL STABILITY: SOCIAL NETWORK
DO YOU BELONG TO ANY CLUBS OR ORGANIZATIONS SUCH AS CHURCH GROUPS, UNIONS, FRATERNAL OR ATHLETIC GROUPS, OR SCHOOL GROUPS?: YES

## 2025-08-27 SDOH — SOCIAL STABILITY: SOCIAL INSECURITY: WITHIN THE LAST YEAR, HAVE YOU BEEN HUMILIATED OR EMOTIONALLY ABUSED IN OTHER WAYS BY YOUR PARTNER OR EX-PARTNER?: NO

## 2025-08-27 SDOH — SOCIAL STABILITY: SOCIAL NETWORK: HOW OFTEN DO YOU ATTEND MEETINGS OF THE CLUBS OR ORGANIZATIONS YOU BELONG TO?: MORE THAN 4 TIMES PER YEAR

## 2025-08-27 SDOH — SOCIAL STABILITY: SOCIAL INSECURITY: DOES ANYONE TRY TO KEEP YOU FROM HAVING/CONTACTING OTHER FRIENDS OR DOING THINGS OUTSIDE YOUR HOME?: NO

## 2025-08-27 SDOH — SOCIAL STABILITY: SOCIAL INSECURITY: DO YOU FEEL ANYONE HAS EXPLOITED OR TAKEN ADVANTAGE OF YOU FINANCIALLY OR OF YOUR PERSONAL PROPERTY?: NO

## 2025-08-27 SDOH — SOCIAL STABILITY: SOCIAL INSECURITY: HAS ANYONE EVER THREATENED TO HURT YOUR FAMILY OR YOUR PETS?: NO

## 2025-08-27 SDOH — SOCIAL STABILITY: SOCIAL INSECURITY: ARE THERE ANY APPARENT SIGNS OF INJURIES/BEHAVIORS THAT COULD BE RELATED TO ABUSE/NEGLECT?: NO

## 2025-08-27 SDOH — SOCIAL STABILITY: SOCIAL INSECURITY: ARE YOU OR HAVE YOU BEEN THREATENED OR ABUSED PHYSICALLY, EMOTIONALLY, OR SEXUALLY BY ANYONE?: NO

## 2025-08-27 ASSESSMENT — LIFESTYLE VARIABLES
HOW MANY STANDARD DRINKS CONTAINING ALCOHOL DO YOU HAVE ON A TYPICAL DAY: PATIENT DOES NOT DRINK
PRESCIPTION_ABUSE_PAST_12_MONTHS: NO
HOW OFTEN DO YOU HAVE A DRINK CONTAINING ALCOHOL: NEVER
HOW OFTEN DO YOU HAVE 6 OR MORE DRINKS ON ONE OCCASION: NEVER
SKIP TO QUESTIONS 9-10: 1
AUDIT-C TOTAL SCORE: 0
SUBSTANCE_ABUSE_PAST_12_MONTHS: NO
AUDIT-C TOTAL SCORE: 0
AUDIT-C TOTAL SCORE: 0
SKIP TO QUESTIONS 9-10: 1

## 2025-08-27 ASSESSMENT — COGNITIVE AND FUNCTIONAL STATUS - GENERAL
DAILY ACTIVITIY SCORE: 24
MOBILITY SCORE: 24

## 2025-08-27 ASSESSMENT — ACTIVITIES OF DAILY LIVING (ADL)
BATHING: INDEPENDENT
DRESSING YOURSELF: INDEPENDENT
HEARING - RIGHT EAR: FUNCTIONAL
JUDGMENT_ADEQUATE_SAFELY_COMPLETE_DAILY_ACTIVITIES: YES
GROOMING: INDEPENDENT
WALKS IN HOME: INDEPENDENT
LACK_OF_TRANSPORTATION: NO
TOILETING: INDEPENDENT
ASSISTIVE_DEVICE: EYEGLASSES
HEARING - LEFT EAR: FUNCTIONAL
ADEQUATE_TO_COMPLETE_ADL: YES
FEEDING YOURSELF: INDEPENDENT
PATIENT'S MEMORY ADEQUATE TO SAFELY COMPLETE DAILY ACTIVITIES?: YES

## 2025-08-28 ENCOUNTER — APPOINTMENT (OUTPATIENT)
Dept: RADIOLOGY | Facility: HOSPITAL | Age: 80
End: 2025-08-28
Payer: MEDICARE

## 2025-08-28 ENCOUNTER — APPOINTMENT (OUTPATIENT)
Dept: CARDIOLOGY | Facility: HOSPITAL | Age: 80
End: 2025-08-28
Payer: MEDICARE

## 2025-08-28 LAB
H CAPSUL AG UR QL: NOT DETECTED
SCAN RESULT: NORMAL

## 2025-08-28 PROCEDURE — 71046 X-RAY EXAM CHEST 2 VIEWS: CPT

## 2025-08-28 PROCEDURE — 93306 TTE W/DOPPLER COMPLETE: CPT | Performed by: INTERNAL MEDICINE

## 2025-08-28 PROCEDURE — C8929 TTE W OR WO FOL WCON,DOPPLER: HCPCS

## 2025-08-28 ASSESSMENT — ENCOUNTER SYMPTOMS
ABDOMINAL PAIN: 0
CONSTIPATION: 0
COUGH: 1
PALPITATIONS: 1
DIARRHEA: 0
CHILLS: 0
DIAPHORESIS: 0
FEVER: 0
VOMITING: 0
LIGHT-HEADEDNESS: 0
ABDOMINAL DISTENTION: 0
HEADACHES: 0

## 2025-08-28 ASSESSMENT — PAIN - FUNCTIONAL ASSESSMENT: PAIN_FUNCTIONAL_ASSESSMENT: 0-10

## 2025-08-28 ASSESSMENT — PAIN SCALES - GENERAL
PAINLEVEL_OUTOF10: 0 - NO PAIN
PAINLEVEL_OUTOF10: 0 - NO PAIN

## 2025-08-29 ENCOUNTER — ANESTHESIA (OUTPATIENT)
Dept: CARDIOLOGY | Facility: HOSPITAL | Age: 80
End: 2025-08-29
Payer: MEDICARE

## 2025-08-29 ENCOUNTER — APPOINTMENT (OUTPATIENT)
Dept: CARDIOLOGY | Facility: HOSPITAL | Age: 80
End: 2025-08-29
Payer: MEDICARE

## 2025-08-29 ENCOUNTER — APPOINTMENT (OUTPATIENT)
Dept: VASCULAR MEDICINE | Facility: HOSPITAL | Age: 80
End: 2025-08-29
Payer: MEDICARE

## 2025-08-29 ENCOUNTER — ANESTHESIA EVENT (OUTPATIENT)
Dept: CARDIOLOGY | Facility: HOSPITAL | Age: 80
End: 2025-08-29
Payer: MEDICARE

## 2025-08-29 ENCOUNTER — APPOINTMENT (OUTPATIENT)
Dept: RADIOLOGY | Facility: HOSPITAL | Age: 80
End: 2025-08-29
Payer: MEDICARE

## 2025-08-29 DIAGNOSIS — D84.9 IMMUNOSUPPRESSION: ICD-10-CM

## 2025-08-29 DIAGNOSIS — Z94.0 KIDNEY REPLACED BY TRANSPLANT (HHS-HCC): ICD-10-CM

## 2025-08-29 PROCEDURE — 93970 EXTREMITY STUDY: CPT

## 2025-08-29 PROCEDURE — 3700000002 HC GENERAL ANESTHESIA TIME - EACH INCREMENTAL 1 MINUTE

## 2025-08-29 PROCEDURE — 93970 EXTREMITY STUDY: CPT | Performed by: INTERNAL MEDICINE

## 2025-08-29 PROCEDURE — 2500000004 HC RX 250 GENERAL PHARMACY W/ HCPCS (ALT 636 FOR OP/ED)

## 2025-08-29 PROCEDURE — 93312 ECHO TRANSESOPHAGEAL: CPT | Performed by: STUDENT IN AN ORGANIZED HEALTH CARE EDUCATION/TRAINING PROGRAM

## 2025-08-29 PROCEDURE — 2500000005 HC RX 250 GENERAL PHARMACY W/O HCPCS

## 2025-08-29 PROCEDURE — 93325 DOPPLER ECHO COLOR FLOW MAPG: CPT | Performed by: STUDENT IN AN ORGANIZED HEALTH CARE EDUCATION/TRAINING PROGRAM

## 2025-08-29 PROCEDURE — 93320 DOPPLER ECHO COMPLETE: CPT

## 2025-08-29 PROCEDURE — 71045 X-RAY EXAM CHEST 1 VIEW: CPT

## 2025-08-29 PROCEDURE — 3700000001 HC GENERAL ANESTHESIA TIME - INITIAL BASE CHARGE

## 2025-08-29 PROCEDURE — 93320 DOPPLER ECHO COMPLETE: CPT | Performed by: STUDENT IN AN ORGANIZED HEALTH CARE EDUCATION/TRAINING PROGRAM

## 2025-08-29 RX ORDER — MIDAZOLAM HYDROCHLORIDE 1 MG/ML
INJECTION, SOLUTION INTRAMUSCULAR; INTRAVENOUS AS NEEDED
Status: DISCONTINUED | OUTPATIENT
Start: 2025-08-29 | End: 2025-08-29

## 2025-08-29 RX ORDER — PROPOFOL 10 MG/ML
INJECTION, EMULSION INTRAVENOUS AS NEEDED
Status: DISCONTINUED | OUTPATIENT
Start: 2025-08-29 | End: 2025-08-29

## 2025-08-29 RX ORDER — LIDOCAINE HCL/PF 100 MG/5ML
SYRINGE (ML) INTRAVENOUS AS NEEDED
Status: DISCONTINUED | OUTPATIENT
Start: 2025-08-29 | End: 2025-08-29

## 2025-08-29 RX ADMIN — SODIUM CHLORIDE, POTASSIUM CHLORIDE, SODIUM LACTATE AND CALCIUM CHLORIDE: 600; 310; 30; 20 INJECTION, SOLUTION INTRAVENOUS at 09:13

## 2025-08-29 RX ADMIN — LIDOCAINE HYDROCHLORIDE 100 MG: 20 INJECTION, SOLUTION INTRAVENOUS at 09:26

## 2025-08-29 RX ADMIN — MIDAZOLAM 1 MG: 1 INJECTION INTRAMUSCULAR; INTRAVENOUS at 09:24

## 2025-08-29 RX ADMIN — Medication 20 MG: at 09:23

## 2025-08-29 RX ADMIN — PROPOFOL 20 MG: 10 INJECTION, EMULSION INTRAVENOUS at 09:27

## 2025-08-29 RX ADMIN — PROPOFOL 20 MG: 10 INJECTION, EMULSION INTRAVENOUS at 09:24

## 2025-08-29 SDOH — HEALTH STABILITY: MENTAL HEALTH: CURRENT SMOKER: 0

## 2025-08-29 ASSESSMENT — PAIN SCALES - GENERAL
PAINLEVEL_OUTOF10: 0 - NO PAIN

## 2025-08-29 ASSESSMENT — PAIN - FUNCTIONAL ASSESSMENT
PAIN_FUNCTIONAL_ASSESSMENT: 0-10

## 2025-08-29 ASSESSMENT — COGNITIVE AND FUNCTIONAL STATUS - GENERAL
STANDING UP FROM CHAIR USING ARMS: A LITTLE
MOBILITY SCORE: 16
TURNING FROM BACK TO SIDE WHILE IN FLAT BAD: A LITTLE
CLIMB 3 TO 5 STEPS WITH RAILING: TOTAL
MOVING TO AND FROM BED TO CHAIR: A LITTLE
MOVING FROM LYING ON BACK TO SITTING ON SIDE OF FLAT BED WITH BEDRAILS: A LITTLE
WALKING IN HOSPITAL ROOM: A LITTLE

## 2025-08-29 ASSESSMENT — ACTIVITIES OF DAILY LIVING (ADL): ADL_ASSISTANCE: INDEPENDENT

## 2025-08-30 ENCOUNTER — APPOINTMENT (OUTPATIENT)
Dept: RADIOLOGY | Facility: HOSPITAL | Age: 80
End: 2025-08-30
Payer: MEDICARE

## 2025-08-30 ENCOUNTER — APPOINTMENT (OUTPATIENT)
Dept: CARDIOLOGY | Facility: HOSPITAL | Age: 80
End: 2025-08-30
Payer: MEDICARE

## 2025-08-30 PROCEDURE — 71045 X-RAY EXAM CHEST 1 VIEW: CPT

## 2025-08-30 PROCEDURE — 93005 ELECTROCARDIOGRAM TRACING: CPT

## 2025-08-30 ASSESSMENT — PAIN - FUNCTIONAL ASSESSMENT

## 2025-08-30 ASSESSMENT — PAIN SCALES - GENERAL
PAINLEVEL_OUTOF10: 3
PAINLEVEL_OUTOF10: 0 - NO PAIN
PAINLEVEL_OUTOF10: 6
PAINLEVEL_OUTOF10: 0 - NO PAIN
PAINLEVEL_OUTOF10: 3
PAINLEVEL_OUTOF10: 6
PAINLEVEL_OUTOF10: 0 - NO PAIN

## 2025-08-30 ASSESSMENT — PAIN DESCRIPTION - LOCATION: LOCATION: SHOULDER

## 2025-08-30 ASSESSMENT — PAIN DESCRIPTION - ORIENTATION: ORIENTATION: RIGHT

## 2025-08-31 ENCOUNTER — APPOINTMENT (OUTPATIENT)
Dept: CARDIOLOGY | Facility: HOSPITAL | Age: 80
End: 2025-08-31
Payer: MEDICARE

## 2025-08-31 PROCEDURE — 93005 ELECTROCARDIOGRAM TRACING: CPT

## 2025-08-31 ASSESSMENT — COGNITIVE AND FUNCTIONAL STATUS - GENERAL
DAILY ACTIVITIY SCORE: 24
MOBILITY SCORE: 24
MOBILITY SCORE: 23
CLIMB 3 TO 5 STEPS WITH RAILING: A LITTLE
DAILY ACTIVITIY SCORE: 24

## 2025-08-31 ASSESSMENT — PAIN SCALES - GENERAL
PAINLEVEL_OUTOF10: 0 - NO PAIN
PAINLEVEL_OUTOF10: 0 - NO PAIN

## 2025-08-31 ASSESSMENT — PAIN - FUNCTIONAL ASSESSMENT
PAIN_FUNCTIONAL_ASSESSMENT: 0-10
PAIN_FUNCTIONAL_ASSESSMENT: 0-10

## 2025-09-01 PROCEDURE — 93005 ELECTROCARDIOGRAM TRACING: CPT

## 2025-09-01 ASSESSMENT — COGNITIVE AND FUNCTIONAL STATUS - GENERAL
CLIMB 3 TO 5 STEPS WITH RAILING: A LITTLE
DAILY ACTIVITIY SCORE: 24
MOBILITY SCORE: 23

## 2025-09-01 ASSESSMENT — PAIN SCALES - GENERAL
PAINLEVEL_OUTOF10: 0 - NO PAIN
PAINLEVEL_OUTOF10: 0 - NO PAIN

## 2025-09-01 ASSESSMENT — PAIN - FUNCTIONAL ASSESSMENT
PAIN_FUNCTIONAL_ASSESSMENT: 0-10
PAIN_FUNCTIONAL_ASSESSMENT: 0-10

## 2025-09-02 ENCOUNTER — APPOINTMENT (OUTPATIENT)
Dept: RADIOLOGY | Facility: HOSPITAL | Age: 80
End: 2025-09-02
Payer: MEDICARE

## 2025-09-02 ENCOUNTER — APPOINTMENT (OUTPATIENT)
Dept: CARDIOLOGY | Facility: HOSPITAL | Age: 80
End: 2025-09-02
Payer: MEDICARE

## 2025-09-02 PROCEDURE — 50200 RENAL BIOPSY PERQ: CPT

## 2025-09-02 PROCEDURE — 99153 MOD SED SAME PHYS/QHP EA: CPT

## 2025-09-02 PROCEDURE — 2720000007 HC OR 272 NO HCPCS

## 2025-09-02 PROCEDURE — 93005 ELECTROCARDIOGRAM TRACING: CPT

## 2025-09-02 PROCEDURE — 99152 MOD SED SAME PHYS/QHP 5/>YRS: CPT

## 2025-09-02 ASSESSMENT — PAIN - FUNCTIONAL ASSESSMENT
PAIN_FUNCTIONAL_ASSESSMENT: 0-10

## 2025-09-02 ASSESSMENT — PAIN SCALES - GENERAL
PAINLEVEL_OUTOF10: 0 - NO PAIN
PAINLEVEL_OUTOF10: 0 - NO PAIN
PAINLEVEL_OUTOF10: 8
PAINLEVEL_OUTOF10: 0 - NO PAIN

## 2025-09-02 ASSESSMENT — PAIN DESCRIPTION - DESCRIPTORS: DESCRIPTORS: ACHING

## 2025-09-03 ENCOUNTER — PHARMACY VISIT (OUTPATIENT)
Dept: PHARMACY | Facility: CLINIC | Age: 80
End: 2025-09-03
Payer: COMMERCIAL

## 2025-09-03 ENCOUNTER — APPOINTMENT (OUTPATIENT)
Dept: CARDIOLOGY | Facility: HOSPITAL | Age: 80
End: 2025-09-03
Payer: MEDICARE

## 2025-09-03 PROCEDURE — 93005 ELECTROCARDIOGRAM TRACING: CPT

## 2025-09-03 PROCEDURE — RXMED WILLOW AMBULATORY MEDICATION CHARGE

## 2025-09-03 ASSESSMENT — COGNITIVE AND FUNCTIONAL STATUS - GENERAL
MOVING TO AND FROM BED TO CHAIR: A LITTLE
CLIMB 3 TO 5 STEPS WITH RAILING: A LITTLE
WALKING IN HOSPITAL ROOM: A LITTLE
MOBILITY SCORE: 19
TURNING FROM BACK TO SIDE WHILE IN FLAT BAD: A LITTLE
STANDING UP FROM CHAIR USING ARMS: A LITTLE

## 2025-09-03 ASSESSMENT — PAIN - FUNCTIONAL ASSESSMENT
PAIN_FUNCTIONAL_ASSESSMENT: 0-10
PAIN_FUNCTIONAL_ASSESSMENT: 0-10

## 2025-09-03 ASSESSMENT — PAIN SCALES - GENERAL
PAINLEVEL_OUTOF10: 0 - NO PAIN
PAINLEVEL_OUTOF10: 0 - NO PAIN

## 2025-11-03 ENCOUNTER — APPOINTMENT (OUTPATIENT)
Dept: PODIATRY | Facility: CLINIC | Age: 80
End: 2025-11-03
Payer: COMMERCIAL

## 2026-05-18 ENCOUNTER — APPOINTMENT (OUTPATIENT)
Dept: OPHTHALMOLOGY | Facility: CLINIC | Age: 81
End: 2026-05-18
Payer: COMMERCIAL

## (undated) DEVICE — SPONGE, HEMOSTATIC, CELLULOSE, SURGICEL, 2 X 14 IN

## (undated) DEVICE — INSERT, CLAMP, SURGICAL, SOFT/TRACTION, STEALTH, 1 MM

## (undated) DEVICE — DRAIN, PENROSE, 0.5 X 12 IN, LATEX, STERILE

## (undated) DEVICE — COUNTER, NEEDLE, FOAM BLOCK, W/MAGNET, W/BLADE GUARD, 10 COUNT, RED, LF

## (undated) DEVICE — NEEDLE, HYPODERMIC, REGULAR WALL, REGULAR BEVEL, 30 G X 0.5 IN

## (undated) DEVICE — DRESSING, ADHESIVE, ISLAND, TELFA, 4 X 10 IN

## (undated) DEVICE — SUTURE, ETHILON, 2-0, 18 IN, FS, BLACK, BX/36

## (undated) DEVICE — SUTURE, PROLENE, 6-0, 30 IN, C1, DA, BLUE

## (undated) DEVICE — SOLUTION, SCRUB EXIDINE, 4% CHG, 4 OZ

## (undated) DEVICE — CATHETER TRAY, SURESTEP, 16FR, URINE METER W/STATLOCK

## (undated) DEVICE — BRIEF, CURITY, XLARGE, MESH

## (undated) DEVICE — SPONGE GAUZE, XRAY SC+RFID, 4X4 16 PLY, STERILE

## (undated) DEVICE — ACCESSORY, FLUID MANAGEMENT, AVETA

## (undated) DEVICE — LIGASURE, MARYLAND JAW, OPEN DELIVERY

## (undated) DEVICE — Device

## (undated) DEVICE — SUTURE, PROLENE, 1 X 60IN TP-1, BLUE

## (undated) DEVICE — DRAPE, INSTRUMENT, W/POUCH, STERI DRAPE, 7 X 11 IN, DISPOSABLE, STERILE

## (undated) DEVICE — PAD, GROUNDING, ELECTROSURGICAL, DUAL

## (undated) DEVICE — SUTURE, PDS II, 4-0, 27 IN, RB-1 VIL MONO, LF

## (undated) DEVICE — TUBING, SUCTION, CONNECTING, STERILE 0.25 X 120 IN., LF

## (undated) DEVICE — SUTURE, VICRYL, 3-0,18 IN, SH, UNDYED

## (undated) DEVICE — TOWEL, OR XRAY, RFID DETECT, WHITE, 17X26, STERILE

## (undated) DEVICE — STAPLER, SKIN, PLUS, REGULAR, 35

## (undated) DEVICE — SYRINGE, 20 CC, LUER LOCK, MONOJECT, W/O CAP, LF

## (undated) DEVICE — EVACUATOR, WOUND, SUCTION, CLOSED, JACKSON-PRATT, 100 CC, SILICONE

## (undated) DEVICE — COVER, EQUIPMENT, SOLUTION, SLUSH, 112 X 168 CM, LF, STERILE

## (undated) DEVICE — DRAIN, WOUND, FLAT, HUBLESS, 0.75 PERFORATION, 10 MM X 20 CM, SILICONE

## (undated) DEVICE — BAG, DECANTER

## (undated) DEVICE — PUNCH, AORTIC 4MM

## (undated) DEVICE — SUTURE, PDS II, 6-0 C1 30IN VIL MONO, VIOLET

## (undated) DEVICE — SPONGE, DISSECTOR, PEANUT, 3/8, STERILE 5 FOAM HOLDER"

## (undated) DEVICE — TOWEL, OR, XRAY DETECT 5 PK, WHITE, 17X26, W/DMT TAG, ST

## (undated) DEVICE — NEEDLE, HYPODERMIC, REGULAR WALL, REGULAR BEVEL, 22 G X 1 IN

## (undated) DEVICE — SUTURE, PDS II, 4-0, 27 IN, SH, VIOLET

## (undated) DEVICE — MANIFOLD, 4 PORT NEPTUNE STANDARD

## (undated) DEVICE — PAD, SANITARY, OBSTETRICAL, W/ADHSV STRIP,11 IN,LF

## (undated) DEVICE — HYSTEROSCOPE, AVETA CORAL, 4.6MM

## (undated) DEVICE — SUTURE, PROLENE, 5-0, 36 IN, C1, DA, BLUE

## (undated) DEVICE — SUTURE, SILK, 3-0, 18 IN SH/CR, BLACK

## (undated) DEVICE — TUBE, FEEDING, INFANT, 8 FR, 20IN

## (undated) DEVICE — ACCESSORY, AVETA, WASTE MANAGEMENT WITH CAP

## (undated) DEVICE — COVER HANDLE LIGHT, STERIS, BLUE, STERILE

## (undated) DEVICE — GUIDEWIRE, .035 X 150 PTFE, FIXED CORE, 15CM BENTSON

## (undated) DEVICE — DRAPE, INCISE, ANTIMICROBIAL, IOBAN 2, LARGE, 17 X 23 IN, DISPOSABLE, STERILE

## (undated) DEVICE — SUTURE, SILK, 0, 24 IN, SUTUPAK, BLACK

## (undated) DEVICE — TOWEL, SURGICAL, NEURO, O/R, 16 X 26, BLUE, STERILE

## (undated) DEVICE — PREP TRAY, VAGINAL

## (undated) DEVICE — DRAPE, FLUID WARMER

## (undated) DEVICE — COVER, CART, 45 X 27 X 48 IN, CLEAR

## (undated) DEVICE — HANDPIECE, ABC, SINGLE FUNCTION, MALLEABLE, W/CORD, BEND-A-BEAM, 3 IN, LF

## (undated) DEVICE — SPONGE, LAP, XRAY DECT, 18IN X 18IN, W/LOOP, STERILE

## (undated) DEVICE — COVER, TABLE, 44 X 75 IN, DISPOSABLE, LF, STERILE